# Patient Record
Sex: FEMALE | Race: WHITE | Employment: OTHER | URBAN - NONMETROPOLITAN AREA
[De-identification: names, ages, dates, MRNs, and addresses within clinical notes are randomized per-mention and may not be internally consistent; named-entity substitution may affect disease eponyms.]

---

## 2017-06-05 ENCOUNTER — HOSPITAL ENCOUNTER (EMERGENCY)
Facility: HOSPITAL | Age: 22
Discharge: HOME | End: 2017-06-05
Attending: INTERNAL MEDICINE | Admitting: INTERNAL MEDICINE
Payer: COMMERCIAL

## 2017-06-05 VITALS
DIASTOLIC BLOOD PRESSURE: 87 MMHG | HEART RATE: 85 BPM | OXYGEN SATURATION: 100 % | RESPIRATION RATE: 16 BRPM | SYSTOLIC BLOOD PRESSURE: 122 MMHG | TEMPERATURE: 98.3 F

## 2017-06-05 DIAGNOSIS — S39.012A STRAIN OF MUSCLE, FASCIA AND TENDON OF LOWER BACK, INITIAL ENCOUNTER: ICD-10-CM

## 2017-06-05 DIAGNOSIS — S39.91XA BLUNT TRAUMA TO ABDOMEN, INITIAL ENCOUNTER: ICD-10-CM

## 2017-06-05 PROCEDURE — 25000132 ZZH RX MED GY IP 250 OP 250 PS 637: Performed by: INTERNAL MEDICINE

## 2017-06-05 PROCEDURE — 25000125 ZZHC RX 250: Performed by: INTERNAL MEDICINE

## 2017-06-05 PROCEDURE — 99283 EMERGENCY DEPT VISIT LOW MDM: CPT

## 2017-06-05 PROCEDURE — 99283 EMERGENCY DEPT VISIT LOW MDM: CPT | Performed by: INTERNAL MEDICINE

## 2017-06-05 RX ORDER — IBUPROFEN 600 MG/1
600 TABLET, FILM COATED ORAL ONCE
Status: COMPLETED | OUTPATIENT
Start: 2017-06-05 | End: 2017-06-05

## 2017-06-05 RX ORDER — ONDANSETRON 4 MG/1
4 TABLET, ORALLY DISINTEGRATING ORAL ONCE
Status: COMPLETED | OUTPATIENT
Start: 2017-06-05 | End: 2017-06-05

## 2017-06-05 RX ADMIN — ONDANSETRON 4 MG: 4 TABLET, ORALLY DISINTEGRATING ORAL at 05:36

## 2017-06-05 RX ADMIN — IBUPROFEN 600 MG: 600 TABLET ORAL at 05:37
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
## 2017-06-05 NOTE — ED NOTES
Face to face report given with opportunity to observe patient.    Report given to BRYAN Mendez   6/5/2017  7:12 AM
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
## 2017-06-05 NOTE — DISCHARGE INSTRUCTIONS
Back Sprain or Strain    Injury to the muscles (strain) or ligaments (sprain) around the spine can be troubling. Injury may occur after a sudden forceful twisting or bending force such as in a car accident, after a simple awkward movement, or after lifting something heavy with poor body positioning. In any case, muscle spasm is often present and adds to the pain.  Thankfully, most people feel better in 1 to 2 weeks, and most of the rest in 1 to 2 months. Most people can remain active. Unless you had a forceful physical injury such as a car accident or fall, X-rays are usually not ordered for the first evaluation of a back sprain or strain. If pain continues and does not respond to medical treatment, your healthcare provider may order X-rays and other tests.  Home care  The following guidelines will help you care for your injury at home:    When in bed, try to find a comfortable position. A firm mattress is best. Try lying flat on your back with pillows under your knees. You can also try lying on your side with your knees bent up toward your chest and a pillow between your knees.    Don't sit for long periods. Try not to take long car rides or take other trips that have you sitting for a long time. This puts more stress on the lower back than standing or walking.    During the first 24 to 72 hours after an injury or flare-up, apply an ice pack to the painful area for 20 minutes. Then remove it for 20 minutes. Do this for 60 to 90 minutes, or several times a day, This will reduce swelling and pain. Be sure to wrap the ice pack in a thin towel or plastic to protect your skin.    You can start with ice, then switch to heat. Heat from a hot shower, hot bath, or heating pad reduces swelling and pain and works well for muscle spasms. Put heat on the painful area for 20 minutes, then remove for 20 minutes. Do this for 60 to 90 minutes, or several times a day. Do not use a heating pad while sleeping. It can burn the  skin.    You can alternate the ice and heat. Talk with your healthcare provider to find out the best treatment or therapy for your back pain.    Therapeutic massage will help relax the back muscles without stretching them.    Be aware of safe lifting methods. Do not lift anything over 15 pounds until all of the pain is gone.  Medicines  Talk to your healthcare provider before using medicines, especially if you have other health problems or are taking other medicines.    You may use acetaminophen or ibuprofen to control pain, unless another pain medicine was prescribed. If you have chronic conditions like diabetes, liver or kidney disease, stomach ulcers, or gastrointestinal bleeding, or are taking blood-thinner medicines, talk with your doctor before taking any medicines.    Be careful if you are given prescription medicines, narcotics, or medicine for muscle spasm. They can cause drowsiness, and affect your coordination, reflexes, and judgment. Do not drive or operate heavy machinery.  Follow-up care  Follow up with your healthcare provider or this facility as advised. You may need physical therapy or more tests if your symptoms get worse.  If you had X-rays today, they didn t show any broken bones, breaks, or fractures. Sometimes fractures don t show up on the first X-ray. Bruises and sprains can sometimes hurt as much as a fracture. These injuries can take time to heal completely. If your symptoms don t improve or they get worse, talk with your healthcare provider. You may need a repeat X-ray.  Call 911  Call for emergency care if any of the following occur:    Trouble breathing    Confused    Very drowsy or trouble awakening    Fainting or loss of consciousness    Rapid or very slow heart rate    Loss of bowel or bladder control  When to seek medical advice  Call your healthcare provider right away if any of the following occur:    Pain gets worse or spreads to your arms or legs    Weakness or numbness in one or  both arms or legs    Numbness in the groin or genital area    3801-2520 [employer]. [address] [place] [address], [place] [address]. All rights reserved. This information is not intended as a substitute for professional medical care. Always follow your healthcare professional's instructions.        Blunt Abdominal Trauma    Your abdomen extends from just below your chest to the top of your pelvis. It contains a number of vital organs, including your spleen, liver, pancreas, and stomach. These organs can be injured by the impact from a car accident or fall. Injury from a force that doesn t break your skin to penetrate your body is known as blunt trauma.  When to go to the emergency department (ED)  Injury to your abdomen can be very serious. For that reason, a person with blunt abdominal trauma should be taken to the ED by trained medical personnel. The effects of blunt trauma often don t appear right away, so it s important to see a doctor after a hard blow to the abdomen, even if you feel OK.  What to expect in the ED  Your breathing and pulse will be checked. You also will be examined carefully for injuries. Severe trauma may need surgery right away. Otherwise you will be watched closely for a time. You may also need to have one or more tests to find out the extent of your injuries. These may include:    Blood or urine tests need a sample of the blood or urine to be taken and checked for problems.    X-rays use radiation to take pictures of inside the body.    CT scan combines X-rays and a computer. This gives a detailed picture that can show problems with organs, such as your kidneys, spleen, liver, and stomach.    Ultrasound uses radio waves to make images of the organs in your abdomen.    Diagnostic peritoneal lavage (DPL) checks fluid from your abdomen for signs of blood or infection.  Based on the test results, you may be admitted to the hospital. Or you may have further care in the ER.  When to call  your healthcare provider  After treatment, call your healthcare provider if you notice any of these symptoms:    Increased pain or swelling in your abdomen    Nausea or vomiting    Weakness or fainting    Blood in your stool or urine     4339-3253 [employer]. [address] [place] [address], [place] [address]. All rights reserved. This information is not intended as a substitute for professional medical care. Always follow your healthcare professional's instructions.

## 2017-06-05 NOTE — ED AVS SNAPSHOT
[facility]    [address] [address]    Phone:  [phone]                                       April YURIY Maria   MRN: [id number]    Department:  [facility]   Date of Visit:  6/5/2017           After Visit Summary Signature Page     I have received my discharge instructions, and my questions have been answered. I have discussed any challenges I see with this plan with the nurse or doctor.    ..........................................................................................................................................  Patient/Patient Representative Signature      ..........................................................................................................................................  Patient Representative Print Name and Relationship to Patient    ..................................................               ................................................  Date                                            Time    ..........................................................................................................................................  Reviewed by Signature/Title    ...................................................              ..............................................  Date                                                            Time

## 2017-06-08 ASSESSMENT — ENCOUNTER SYMPTOMS
RECTAL PAIN: 0
DIZZINESS: 0
WHEEZING: 0
NECK PAIN: 0
BLOOD IN STOOL: 0
PALPITATIONS: 0
DIAPHORESIS: 0
HEMATURIA: 0
COUGH: 0
ANAL BLEEDING: 0
NUMBNESS: 0
SHORTNESS OF BREATH: 0
FEVER: 0
LIGHT-HEADEDNESS: 0
NAUSEA: 1
WOUND: 0
CHEST TIGHTNESS: 0
CONSTIPATION: 0
ABDOMINAL DISTENTION: 0
ABDOMINAL PAIN: 1
MYALGIAS: 0
FLANK PAIN: 0
NECK STIFFNESS: 0
CHILLS: 0
DYSURIA: 0
VOMITING: 0
COLOR CHANGE: 0
CONFUSION: 0
HEADACHES: 0
DIARRHEA: 0
ARTHRALGIAS: 0
BACK PAIN: 0
VOICE CHANGE: 0

## 2017-09-06 ENCOUNTER — HOSPITAL ENCOUNTER (EMERGENCY)
Facility: HOSPITAL | Age: 22
Discharge: HOME | End: 2017-09-06
Attending: NURSE PRACTITIONER | Admitting: NURSE PRACTITIONER
Payer: COMMERCIAL

## 2017-09-06 VITALS
HEART RATE: 102 BPM | SYSTOLIC BLOOD PRESSURE: 134 MMHG | DIASTOLIC BLOOD PRESSURE: 83 MMHG | RESPIRATION RATE: 16 BRPM | TEMPERATURE: 98.6 F | OXYGEN SATURATION: 99 %

## 2017-09-06 DIAGNOSIS — S05.01XA CORNEAL ABRASION, RIGHT, INITIAL ENCOUNTER: ICD-10-CM

## 2017-09-06 PROCEDURE — 25000125 ZZHC RX 250: Performed by: NURSE PRACTITIONER

## 2017-09-06 PROCEDURE — 99213 OFFICE O/P EST LOW 20 MIN: CPT

## 2017-09-06 PROCEDURE — 99213 OFFICE O/P EST LOW 20 MIN: CPT | Performed by: NURSE PRACTITIONER

## 2017-09-06 RX ORDER — TETRACAINE HYDROCHLORIDE 5 MG/ML
2 SOLUTION OPHTHALMIC ONCE
Status: COMPLETED | OUTPATIENT
Start: 2017-09-06 | End: 2017-09-06

## 2017-09-06 RX ORDER — CIPROFLOXACIN HYDROCHLORIDE 3.5 MG/ML
2 SOLUTION/ DROPS TOPICAL 4 TIMES DAILY
Qty: 1 BOTTLE | Refills: 0 | Status: SHIPPED | OUTPATIENT
Start: 2017-09-06 | End: 2021-05-24

## 2017-09-06 RX ADMIN — TETRACAINE HYDROCHLORIDE 2 DROP: 5 SOLUTION OPHTHALMIC at 09:15

## 2017-09-06 ASSESSMENT — ENCOUNTER SYMPTOMS
DIARRHEA: 0
TROUBLE SWALLOWING: 0
EYE REDNESS: 0
WHEEZING: 0
NAUSEA: 0
STRIDOR: 0
EYE PAIN: 1
PHOTOPHOBIA: 1
NEUROLOGICAL NEGATIVE: 1
COUGH: 0
EYE DISCHARGE: 0
FEVER: 0
ACTIVITY CHANGE: 0
PSYCHIATRIC NEGATIVE: 1
DYSURIA: 0
CHILLS: 0
EYE ITCHING: 0
VOMITING: 0
SHORTNESS OF BREATH: 0

## 2017-09-06 ASSESSMENT — VISUAL ACUITY
OD: 20/50
OS: 20/25

## 2017-09-06 NOTE — ED AVS SNAPSHOT
[facility]    [address] [address]    Phone:  [phone]                                       April YURIY Maria   MRN: [id number]    Department:  [facility]   Date of Visit:  9/6/2017           After Visit Summary Signature Page     I have received my discharge instructions, and my questions have been answered. I have discussed any challenges I see with this plan with the nurse or doctor.    ..........................................................................................................................................  Patient/Patient Representative Signature      ..........................................................................................................................................  Patient Representative Print Name and Relationship to Patient    ..................................................               ................................................  Date                                            Time    ..........................................................................................................................................  Reviewed by Signature/Title    ...................................................              ..............................................  Date                                                            Time

## 2017-09-06 NOTE — ED AVS SNAPSHOT
[facility]    [address]    [place] [address]    Phone:  [phone]                                       April YURIY Maria   MRN: [id number]    Department:  [facility]   Date of Visit:  9/6/2017           Patient Information     Date Of Birth          1995        Your diagnoses for this visit were:     Corneal abrasion, right, initial encounter        You were seen by Eloisa Chavez NP.      Follow-up Information     Follow up with Joellen Aguillon NP.    Why:  As needed, If symptoms worsen    Contact information:    [facility]   [address]   [facility] [address]  [phone]          Follow up with [facility].    Specialty:  EMERGENCY MEDICINE    Why:  As needed, If symptoms worsen    Contact information:    [address]  [place] [address] [phone]    Additional information:    From [facility]: Take [address]. Turn left at [address]. Turn left at the first stoplight on [address]. At the first stop sign, take a right onto [address]. Take a left into the parking lot and continue through until you reach the North enterance of the building.       From [place]: Take [address]. Take the [address] ramp towards [place]. Turn left onto [address]. Take a slight right onto [address]. Turn left at the first stoplight on [address]. At the first stop sign, take a right onto [address]. Take a left into the parking lot and continue through until you reach the North enterance of the building.       From [place]: Take [address]. Take a right at [address]. At the first stop sign, take a right onto [address]. Take a left into the parking lot and continue through until you reach the North enterance of the building.         Discharge Instructions       Use eye drops as ordered.   Do not wear contact in right eye until right eye abrasion  "has healed.   Follow up with eye doctor with any increase in symptoms or concerns.   Follow up with PCP with any increase in symptoms or concerns.   Return to urgent care or emergency department with any increase in symptoms or concerns.     Discharge References/Attachments     CORNEAL ABRASION (ENGLISH)         Review of your medicines      START taking        Dose / Directions Last dose taken    ciprofloxacin 0.3 % ophthalmic solution   Commonly known as:  CILOXAN   Dose:  2 drop   Quantity:  1 Bottle        Place 2 drops into the right eye 4 times daily for 5 days   Refills:  0                Prescriptions were sent or printed at these locations (1 Prescription)                   [id number] - [place], [place] - [address]   [address], [place] [address]    Telephone:  [phone]   Fax:  [phone]   Hours:                  E-Prescribed (1 of 1)         ciprofloxacin (CILOXAN) 0.3 % ophthalmic solution                Orders Needing Specimen Collection     None      Pending Results     No orders found from 9/4/2017 to 9/7/2017.            Pending Culture Results     No orders found from 9/4/2017 to 9/7/2017.            Thank you for choosing [facility]       Thank you for choosing [facility] for your care. Our goal is always to provide you with excellent care. Hearing back from our patients is one way we can continue to improve our services. Please take a few minutes to complete the written survey that you may receive in the mail after you visit with us. Thank you!        [facility] Information     [facility] lets you send messages to your doctor, view your test results, renew your prescriptions, schedule appointments and more. To sign up, go to [email or web address] . Click on \"Log in\" on the left side of the screen, which will take you to the Welcome page. Then click on \"Sign up Now\" on the right side of the page.     You will be asked to enter the access code listed below, as well as some personal " information. Please follow the directions to create your username and password.     Your access code is: [email or web address]  Expires: 2017  9:41 AM     Your access code will  in 90 days. If you need help or a new code, please call your [facility] clinic or [phone].        Care EveryWhere ID     This is your Care EveryWhere ID. This could be used by other organizations to access your [facility] medical records  [id number]        Equal Access to Services     [facility] : Hadbailey lebrono Soojssy, waaxda luqadaha, qaybta kaalmada adekenyattayamaximo, madeline irby . So [facility] [phone].    ATENCIÓN: Si habla español, tiene a devlin disposición servicios gratuitos de asistencia lingüística. Llame al [phone].    We comply with applicable federal civil rights laws and [place] laws. We do not discriminate on the basis of race, color, national origin, age, disability sex, sexual orientation or gender identity.            After Visit Summary       This is your record. Keep this with you and show to your community pharmacist(s) and doctor(s) at your next visit.

## 2017-09-06 NOTE — DISCHARGE INSTRUCTIONS
Use eye drops as ordered.   Do not wear contact in right eye until right eye abrasion has healed.   Follow up with eye doctor with any increase in symptoms or concerns.   Follow up with PCP with any increase in symptoms or concerns.   Return to urgent care or emergency department with any increase in symptoms or concerns.

## 2017-09-06 NOTE — ED PROVIDER NOTES
History     Chief Complaint   Patient presents with     Facial Swelling     The history is provided by the patient. No  was used.     May Maria is a 21 year old female who presents with right eye discomfort with swelling below right eye that comes and goes that started at 0130 this am. She works the night shift. She's flushed her right eye with normal saline with mild effectiveness. Vision in right eye is blurry at times. She wears contacts and changed her contacts last about 2 weeks ago. Denies injury or trauma to right eye. Denies fever, chills, or night sweats. Eating and drinking well. Bowel and bladder are working well. No antibiotic use in the past 30 days.     Visual acuity OD 20/50 and OS 20/25 per LPN.     Last comprehensive eye exam in April 2017.     I have reviewed the Medications, Allergies, Past Medical and Surgical History, and Social History in the Epic system.      Review of Systems   Constitutional: Negative for activity change, chills and fever.   HENT: Negative for trouble swallowing.    Eyes: Positive for photophobia, pain and visual disturbance. Negative for discharge, redness and itching.        Swelling around right eye. Right eye blurry at times.    Respiratory: Negative for cough, shortness of breath, wheezing and stridor.    Gastrointestinal: Negative for diarrhea, nausea and vomiting.   Genitourinary: Negative for dysuria.   Skin: Negative for rash.   Neurological: Negative.    Psychiatric/Behavioral: Negative.        Physical Exam   BP: 134/83  Pulse: 102  Temp: 98.6  F (37  C)  Resp: 16  SpO2: 99 %  Physical Exam   Constitutional: She is oriented to person, place, and time. She appears well-developed and well-nourished. No distress.   HENT:   Head: Normocephalic.   Mouth/Throat: Oropharynx is clear and moist.   Eyes: EOM are normal. Pupils are equal, round, and reactive to light. Right eye exhibits no discharge, no exudate and no hordeolum. No foreign body  present in the right eye. Left eye exhibits no discharge. Right conjunctiva is injected. Right conjunctiva has no hemorrhage. Left conjunctiva is not injected. Left conjunctiva has no hemorrhage.       Right eye examined under magnification, with fluorescein stain and wood's lamp.  No ulcer/FB/ferning noted. Abrasion to right eye at 6-7 o'clock region.  Right conjunctiva injected.    Neck: Normal range of motion. Neck supple.   Cardiovascular: Normal rate, regular rhythm and normal heart sounds.    No murmur heard.  Pulmonary/Chest: Effort normal. No respiratory distress.   Abdominal: Soft. She exhibits no distension.   Musculoskeletal: Normal range of motion.   Lymphadenopathy:     She has no cervical adenopathy.   Neurological: She is alert and oriented to person, place, and time.   Skin: Skin is warm and dry. No rash noted. She is not diaphoretic.   Psychiatric: She has a normal mood and affect. Her behavior is normal.   Nursing note and vitals reviewed.      ED Course     ED Course     Procedures    Right eye examined under magnification, with fluorescein stain and wood's lamp.  No ulcer/FB/ferning noted. Abrasion to right eye at 6-7 o'clock region.      Medications   tetracaine (PONTOCAINE) 0.5 % ophthalmic solution 2 drop (2 drops Right Eye Given 9/6/17 0915)       Assessments & Plan (with Medical Decision Making)     Discussed plan of care. She verbalized understanding. All questions answered.     I have reviewed the nursing notes.    I have reviewed the findings, diagnosis, plan and need for follow up with the patient.  Discharged in stable condition.     New Prescriptions    CIPROFLOXACIN (CILOXAN) 0.3 % OPHTHALMIC SOLUTION    Place 2 drops into the right eye 4 times daily for 5 days       Final diagnoses:   Corneal abrasion, right, initial encounter     Use eye drops as ordered.   Do not wear contact in right eye until right eye abrasion has healed.   Follow up with eye doctor with any increase in symptoms  or concerns.   Follow up with PCP with any increase in symptoms or concerns.   Return to urgent care or emergency department with any increase in symptoms or concerns.     GENE Borrero  9/6/2017  9:04 AM  [facility]       Eloisa Chavez NP  09/06/17 1147

## 2017-09-22 NOTE — ED NOTES
Luis Sheriff is a 46 year old male presenting to the walk-in clinic today for left eye erythema and irrigation x2 days. Patient reports he has three children, whom are all asymptomatic. Patient denies any hx of eye problems/trauma, extraocular pressure, impaired EOM, or vision changes. Patient denies any OTC medication use.    Denies known Latex allergy or symptoms of Latex sensitivity.  Medications reviewed and updated.  Patient would like communication of their results via:      Home Phone: [phone] (home)  Okay to leave a message containing results? Yes       Pt presents today alone for c/o eye swelling/irritation that started around 1-2 this am and then around 5 the rest of her face started to swell for unknown reason, no new products soap or foods. She has not taken an meds other than flushing her eye with saline.

## 2018-01-19 ENCOUNTER — OFFICE VISIT (OUTPATIENT)
Dept: FAMILY MEDICINE | Facility: OTHER | Age: 23
End: 2018-01-19
Attending: FAMILY MEDICINE
Payer: COMMERCIAL

## 2018-01-19 VITALS
HEIGHT: 62 IN | DIASTOLIC BLOOD PRESSURE: 72 MMHG | HEART RATE: 80 BPM | BODY MASS INDEX: 30 KG/M2 | OXYGEN SATURATION: 99 % | SYSTOLIC BLOOD PRESSURE: 118 MMHG | RESPIRATION RATE: 16 BRPM | WEIGHT: 163 LBS | TEMPERATURE: 98.2 F

## 2018-01-19 DIAGNOSIS — Z23 NEED FOR VACCINATION: ICD-10-CM

## 2018-01-19 DIAGNOSIS — G47.26 CIRCADIAN RHYTHM SLEEP DISORDER, SHIFT WORK TYPE: Primary | ICD-10-CM

## 2018-01-19 PROCEDURE — 90471 IMMUNIZATION ADMIN: CPT | Performed by: FAMILY MEDICINE

## 2018-01-19 PROCEDURE — 90651 9VHPV VACCINE 2/3 DOSE IM: CPT | Performed by: FAMILY MEDICINE

## 2018-01-19 PROCEDURE — 99213 OFFICE O/P EST LOW 20 MIN: CPT | Mod: 25 | Performed by: FAMILY MEDICINE

## 2018-01-19 RX ORDER — TRAZODONE HYDROCHLORIDE 50 MG/1
50-100 TABLET, FILM COATED ORAL
Qty: 30 TABLET | Refills: 1 | Status: SHIPPED | OUTPATIENT
Start: 2018-01-19 | End: 2018-04-20

## 2018-01-19 ASSESSMENT — ANXIETY QUESTIONNAIRES
4. TROUBLE RELAXING: SEVERAL DAYS
GAD7 TOTAL SCORE: 4
IF YOU CHECKED OFF ANY PROBLEMS ON THIS QUESTIONNAIRE, HOW DIFFICULT HAVE THESE PROBLEMS MADE IT FOR YOU TO DO YOUR WORK, TAKE CARE OF THINGS AT HOME, OR GET ALONG WITH OTHER PEOPLE: SOMEWHAT DIFFICULT
7. FEELING AFRAID AS IF SOMETHING AWFUL MIGHT HAPPEN: NOT AT ALL
3. WORRYING TOO MUCH ABOUT DIFFERENT THINGS: SEVERAL DAYS
2. NOT BEING ABLE TO STOP OR CONTROL WORRYING: NOT AT ALL
6. BECOMING EASILY ANNOYED OR IRRITABLE: NOT AT ALL
5. BEING SO RESTLESS THAT IT IS HARD TO SIT STILL: SEVERAL DAYS
1. FEELING NERVOUS, ANXIOUS, OR ON EDGE: SEVERAL DAYS

## 2018-01-19 ASSESSMENT — PATIENT HEALTH QUESTIONNAIRE - PHQ9: SUM OF ALL RESPONSES TO PHQ QUESTIONS 1-9: 4

## 2018-01-19 ASSESSMENT — PAIN SCALES - GENERAL: PAINLEVEL: NO PAIN (0)

## 2018-01-19 NOTE — PATIENT INSTRUCTIONS
Insomnia  Insomnia is repeated difficulty going to sleep or staying asleep, or both. Whether you have insomnia is not defined by a specific amount of sleep. Different people need different amounts of sleep, and you may need more or less sleep at different times of your life.  There are 3 major types of insomnia:  short-term, chronic, and  other.   Short-term, or acute insomnia lasts less than 3 months.  The symptoms are temporary and can be linked directly to a stressor, such as the death of a loved one, financial problems, or a new physical problem.  Short-term insomnia stops when the stressor resolves or the person adapts to its presence.  Chronic insomnia occurs at least 3 times a week and lasts longer than 3 months.  Chronic insomnia can occur when either the cause of the sleeping problem is not clear, or the insomnia does not get better when the stressor is resolved. A number of other criteria are also used to make the diagnosis of chronic insomnia.    Other insomnia  is the third type of insomnia-related sleep disorders.  This description applies to people who have problems getting to sleep or staying asleep, but do not meet all of the factors that describe either short-term or chronic insomnia.    Many things cause insomnia. Different people may have different causes. It can be from an underlying medical or psychological condition, or lifestyle. It can also be primary insomnia, which means no cause can be found.  Causes of insomnia include:    Chronic medical problems- heart disease, gastrointestinal problems, hormonal changes, breathing problems    Anxiety    Stress    Depression    Pain    Work schedule    Sleep apnea    Illegal drugs    Certain medicines  Many different medidcines can affect your sleep, such as stimulants, caffeine, alcohol, some decongestants, and diet pills. Other medicines may include some types of blood pressure pills, steroids, asthma medicines, antihistamines, antidepressants,  seizure medicines and statins. Not all of these will affect your sleep, and they shouldn t be stopped without talking to your doctor.  Symptoms of insomnia can include:    Lying awake for long periods at night before falling asleep    Waking up several times during the night    Waking up early in the morning and not being able to get back to sleep    Feeling tired and not refreshed by sleep    Not being able to function properly during the day and finding it hard to concentrate    Irritability    Tiredness and fatigue during the day  Home care  1. Review your medicines with your doctor or pharmacist to find out if they can cause insomnia. Not all medicines will affect your sleep, but they shouldn't be stopped without reviewing them with your doctor. There may be serious side effects and consequences from suddenly stopping your medicines. Not taking them may cause strokes, heart attacks, and many other problems.  2. Caffeine, smoking and alcohol also affect sleep. Limit your daily use and do not use these before bedtime. Alcohol may make you sleepy at first, but as its effects wear off, you may awaken a few hours later and have trouble returning to sleep.  3. Do not exercise, eat or drink large amounts of liquid within 2 hours of your bedtime.  4. Improve your sleep habits. Have a fixed bed and wake-up time. Try to keep noise, light and heat in your bedroom at a comfortable level. Try using earplugs or eyeshades if needed.   5. Avoid watching TV in bed.  6. If you do not fall asleep within 30 minutes, try to relax by reading or listening to soft music.  7. Limit daytime napping to one 30 minute period, early in the day.  8. Get regular exercise. Find other ways to lessen your stress level.  9. If a medicine was prescribed to help reset your sleep patterns, take it as directed. Sleeping pills are intended for short-term use, only. If taken for too long, the effect wears off while the risk of physical addiction and  psychological dependence increases.  Sleep diary  If the cause isn t obvious and it is not improving, try keeping a  sleep diary  for a couple of weeks. Include in it:    The time you go to bed    How long it takes to fall asleep    How many times you wake up    What time you wake up    Your meal times and what you eat    What time you drink alcohol    Your exercise habits and times  Follow-up care  Follow up with your healthcare provider, or as advised. If X-rays or CT scans were done, you will be notified if there is a change in the reading, especially if it affects treatment.  Call 911  Call 911 if any of these occur:    Trouble breathing    Confusion or trouble waking    Fainting or loss of consciousness    Rapid heart rate    New chest, arm, shoulder, neck or upper back pain    Trouble with speech or vision, weakness of an arm or leg    Trouble walking or talking, loss of balance, numbness or weakness in one side of your body, facial droop  When to seek medical advice  Call your healthcare provider right away if any of these occur:    Extreme restlessness or irritability    Confusion or hallucinations (seeing or hearing things that are not there)    Anxiety, depression    Several days without sleeping  Date Last Reviewed: 11/19/2015 2000-2017 [employer]. [address] [place] [address], [place] [address]. All rights reserved. This information is not intended as a substitute for professional medical care. Always follow your healthcare professional's instructions.

## 2018-01-19 NOTE — MR AVS SNAPSHOT
After Visit Summary   1/19/2018    May Maria    MRN: [id number]           Patient Information     Date Of Birth          1995        Visit Information        Provider Department      1/19/2018 4:00 PM Jia Kirk MD [facility] [place]        Today's Diagnoses     Circadian rhythm sleep disorder, shift work type    -  1      Care Instructions      Insomnia  Insomnia is repeated difficulty going to sleep or staying asleep, or both. Whether you have insomnia is not defined by a specific amount of sleep. Different people need different amounts of sleep, and you may need more or less sleep at different times of your life.  There are 3 major types of insomnia:  short-term, chronic, and  other.   Short-term, or acute insomnia lasts less than 3 months.  The symptoms are temporary and can be linked directly to a stressor, such as the death of a loved one, financial problems, or a new physical problem.  Short-term insomnia stops when the stressor resolves or the person adapts to its presence.  Chronic insomnia occurs at least 3 times a week and lasts longer than 3 months.  Chronic insomnia can occur when either the cause of the sleeping problem is not clear, or the insomnia does not get better when the stressor is resolved. A number of other criteria are also used to make the diagnosis of chronic insomnia.    Other insomnia  is the third type of insomnia-related sleep disorders.  This description applies to people who have problems getting to sleep or staying asleep, but do not meet all of the factors that describe either short-term or chronic insomnia.    Many things cause insomnia. Different people may have different causes. It can be from an underlying medical or psychological condition, or lifestyle. It can also be primary insomnia, which means no cause can be found.  Causes of insomnia include:    Chronic medical problems- heart disease, gastrointestinal problems, hormonal changes,  breathing problems    Anxiety    Stress    Depression    Pain    Work schedule    Sleep apnea    Illegal drugs    Certain medicines  Many different medidcines can affect your sleep, such as stimulants, caffeine, alcohol, some decongestants, and diet pills. Other medicines may include some types of blood pressure pills, steroids, asthma medicines, antihistamines, antidepressants, seizure medicines and statins. Not all of these will affect your sleep, and they shouldn t be stopped without talking to your doctor.  Symptoms of insomnia can include:    Lying awake for long periods at night before falling asleep    Waking up several times during the night    Waking up early in the morning and not being able to get back to sleep    Feeling tired and not refreshed by sleep    Not being able to function properly during the day and finding it hard to concentrate    Irritability    Tiredness and fatigue during the day  Home care  1. Review your medicines with your doctor or pharmacist to find out if they can cause insomnia. Not all medicines will affect your sleep, but they shouldn't be stopped without reviewing them with your doctor. There may be serious side effects and consequences from suddenly stopping your medicines. Not taking them may cause strokes, heart attacks, and many other problems.  2. Caffeine, smoking and alcohol also affect sleep. Limit your daily use and do not use these before bedtime. Alcohol may make you sleepy at first, but as its effects wear off, you may awaken a few hours later and have trouble returning to sleep.  3. Do not exercise, eat or drink large amounts of liquid within 2 hours of your bedtime.  4. Improve your sleep habits. Have a fixed bed and wake-up time. Try to keep noise, light and heat in your bedroom at a comfortable level. Try using earplugs or eyeshades if needed.   5. Avoid watching TV in bed.  6. If you do not fall asleep within 30 minutes, try to relax by reading or listening to  soft music.  7. Limit daytime napping to one 30 minute period, early in the day.  8. Get regular exercise. Find other ways to lessen your stress level.  9. If a medicine was prescribed to help reset your sleep patterns, take it as directed. Sleeping pills are intended for short-term use, only. If taken for too long, the effect wears off while the risk of physical addiction and psychological dependence increases.  Sleep diary  If the cause isn t obvious and it is not improving, try keeping a  sleep diary  for a couple of weeks. Include in it:    The time you go to bed    How long it takes to fall asleep    How many times you wake up    What time you wake up    Your meal times and what you eat    What time you drink alcohol    Your exercise habits and times  Follow-up care  Follow up with your healthcare provider, or as advised. If X-rays or CT scans were done, you will be notified if there is a change in the reading, especially if it affects treatment.  Call 911  Call 911 if any of these occur:    Trouble breathing    Confusion or trouble waking    Fainting or loss of consciousness    Rapid heart rate    New chest, arm, shoulder, neck or upper back pain    Trouble with speech or vision, weakness of an arm or leg    Trouble walking or talking, loss of balance, numbness or weakness in one side of your body, facial droop  When to seek medical advice  Call your healthcare provider right away if any of these occur:    Extreme restlessness or irritability    Confusion or hallucinations (seeing or hearing things that are not there)    Anxiety, depression    Several days without sleeping  Date Last Reviewed: 11/19/2015 2000-2017 [employer]. [address] [place] [address], [place] [address]. All rights reserved. This information is not intended as a substitute for professional medical care. Always follow your healthcare professional's instructions.                Follow-ups after your visit        Follow-up notes  "from your care team     Return in about 1 month (around 2018).      Who to contact     If you have questions or need follow up information about today's clinic visit or your schedule please contact [facility] [place] directly at [phone].  Normal or non-critical lab and imaging results will be communicated to you by MyChart, letter or phone within 4 business days after the clinic has received the results. If you do not hear from us within 7 days, please contact the clinic through MyChart or phone. If you have a critical or abnormal lab result, we will notify you by phone as soon as possible.  Submit refill requests through [employer] or call your pharmacy and they will forward the refill request to us. Please allow 3 business days for your refill to be completed.          Additional Information About Your Visit        [employer] Information     [employer] lets you send messages to your doctor, view your test results, renew your prescriptions, schedule appointments and more. To sign up, go to [email or web address] . Click on \"Log in\" on the left side of the screen, which will take you to the Welcome page. Then click on \"Sign up Now\" on the right side of the page.     You will be asked to enter the access code listed below, as well as some personal information. Please follow the directions to create your username and password.     Your access code is: [email or web address]  Expires: 2018  4:11 PM     Your access code will  in 90 days. If you need help or a new code, please call your [facility] clinic or [phone].        Care EveryWhere ID     This is your Care EveryWhere ID. This could be used by other organizations to access your [facility] medical records  [id number]        Your Vitals Were     Pulse Temperature Respirations Height Pulse Oximetry BMI (Body Mass Index)    80 98.2  F (36.8  C) (Tympanic) 16 5' 2\" (1.575 m) 99% 29.81 kg/m2       Blood Pressure from Last 3 Encounters:   18 118/72 "   09/06/17 134/83   06/05/17 122/87    Weight from Last 3 Encounters:   01/19/18 163 lb (73.9 kg)   01/21/16 153 lb (69.4 kg) (86 %)*     * Growth percentiles are based on [facility] 2-20 Years data.              Today, you had the following     No orders found for display         Today's Medication Changes          These changes are accurate as of: 1/19/18  4:11 PM.  If you have any questions, ask your nurse or doctor.               Start taking these medicines.        Dose/Directions    traZODone 50 MG tablet   Commonly known as:  DESYREL   Used for:  Circadian rhythm sleep disorder, shift work type   Started by:  Jia Kirk MD        Dose:   mg   Take 1-2 tablets ( mg) by mouth nightly as needed for sleep (take 1 at bedtime, may repeat x 1.)   Quantity:  30 tablet   Refills:  1            Where to get your medicines      These medications were sent to [id number] - [place] - [address] [facility] [address]  [address] [facility] [address], [facility] [address]     Phone:  [phone]     traZODone 50 MG tablet                Primary Care Provider Office Phone # Fax #    Joellen Aguillon [phone]       [facility]  [address]   [facility] [address]        Equal Access to Services     NIKO STAHL AH: Hadii meggan ku hadasho Soomaali, waaxda luqadaha, qaybta kaalmada adeegyada, madeline baugh haymima irby . So [place] [phone].    ATENCIÓN: Si habla español, tiene a devlin disposición servicios gratuitos de asistencia lingüística. Ll[facility] [phone].    We comply with applicable federal civil rights laws and [place] laws. We do not discriminate on the basis of race, color, national origin, age, disability, sex, sexual orientation, or gender identity.            Thank you!     Thank you for choosing [facility]  for your care. Our goal is always to provide you with excellent care. Hearing back from our patients is one way we can continue to improve our services. Please take  a few minutes to complete the written survey that you may receive in the mail after your visit with us. Thank you!             Your Updated Medication List - Protect others around you: Learn how to safely use, store and throw away your medicines at www.disposemymeds.org.          This list is accurate as of: 1/19/18  4:11 PM.  Always use your most recent med list.                   Brand Name Dispense Instructions for use Diagnosis    traZODone 50 MG tablet    DESYREL    30 tablet    Take 1-2 tablets ( mg) by mouth nightly as needed for sleep (take 1 at bedtime, may repeat x 1.)    Circadian rhythm sleep disorder, shift work type

## 2018-01-19 NOTE — PROGRESS NOTES
SUBJECTIVE:   May Maria is a 22 year old female who presents to clinic today for the following health issues:      New Patient/Transfer of Care  Previously received care in [place] at [facility]. Moved to [place] 2 years ago. Works as a CNA in the Hospital here.     Insomnia      Duration: works nights last 2 years part-time; worse in last 4 months since she is now fulltime    Description  Frequency of insomnia:  nightly  Time to fall asleep: over 2 hours  Middle of night awakening:  nightly  Early morning awakening:  nightly    Accompanying signs and symptoms:  morning headache and lost weight    History  Similar episodes in past:  no   Previous evaluation/sleep study:  no     Precipitating or alleviating factors:  New stressful situation: no   Caffeine intake after lunchtime: no   OTC decongestants: no   Any new medications: no     Therapies tried and outcome: OTC sleep aide, drinks water no pop    Pt works overnight shifts at the hospital. Usually gets home around 8 AM from work. Cannot fall asleep until around 12. Will also wake frequently. Has tried to wear sunglasses and keep house dark, however, working on the computer at work up until she is off and the lights in the hospital come on roughly and hour or so before she leaves for the day. She does not try and switch her days/nights on days off. Has tried unisom and melatonin. No screen once she is home. No caffeine within 6 hours of desired bet time. When has poor sleep has AM headache.     Problem list and histories reviewed & adjusted, as indicated.  Additional history: as documented    Labs reviewed in EPIC    Reviewed and updated as needed this visit by clinical staffTobacco  Allergies  Meds  Problems  Med Hx  Surg Hx  Fam Hx  Soc Hx        Reviewed and updated as needed this visit by Provider  Allergies  Meds  Problems         ROS:  Constitutional, HEENT, cardiovascular, pulmonary, gi and gu systems are negative, except as otherwise  "noted.      OBJECTIVE:   /72  Pulse 80  Temp 98.2  F (36.8  C) (Tympanic)  Resp 16  Ht 5' 2\" (1.575 m)  Wt 163 lb (73.9 kg)  SpO2 99%  BMI 29.81 kg/m2  Body mass index is 29.81 kg/(m^2).  GENERAL: healthy, alert and no distress  RESP: lungs clear to auscultation - no rales, rhonchi or wheezes  CV: regular rate and rhythm, normal S1 S2, no S3 or S4, no murmur, click or rub, no peripheral edema and peripheral pulses strong  MS: no gross musculoskeletal defects noted, no edema  NEURO: Normal strength and tone, mentation intact and speech normal  PSYCH: mentation appears normal, affect normal/bright    Diagnostic Test Results:  none     ASSESSMENT/PLAN:       1. Circadian rhythm sleep disorder, shift work type  Trial of trazodone, follow up 1 month. May increase dose. If not effective may need to consider z drug, would like to avoid, however, due to side effect/safety profile. Pt aware and agreeable.   - traZODone (DESYREL) 50 MG tablet; Take 1-2 tablets ( mg) by mouth nightly as needed for sleep (take 1 at bedtime, may repeat x 1.)  Dispense: 30 tablet; Refill: 1    2. Need for vaccination  - HUMAN PAPILLOMA VIRUS (GARDASIL 9) VACCINE [[id number]]  - 1st  Administration  [[id number]]    Jia Kirk MD  [facility] [place]  "

## 2018-01-19 NOTE — NURSING NOTE
"Chief Complaint   Patient presents with     Establish Care       Initial /72  Pulse 80  Temp 98.2  F (36.8  C) (Tympanic)  Resp 16  Ht 5' 2\" (1.575 m)  Wt 163 lb (73.9 kg)  SpO2 99%  BMI 29.81 kg/m2 Estimated body mass index is 29.81 kg/(m^2) as calculated from the following:    Height as of this encounter: 5' 2\" (1.575 m).    Weight as of this encounter: 163 lb (73.9 kg).  Medication Reconciliation: complete   Nikky Pham      "

## 2018-01-23 ASSESSMENT — ANXIETY QUESTIONNAIRES: GAD7 TOTAL SCORE: 4

## 2018-01-28 ENCOUNTER — HEALTH MAINTENANCE LETTER (OUTPATIENT)
Age: 23
End: 2018-01-28

## 2018-02-12 ENCOUNTER — APPOINTMENT (OUTPATIENT)
Dept: GENERAL RADIOLOGY | Facility: HOSPITAL | Age: 23
End: 2018-02-12
Attending: EMERGENCY MEDICINE
Payer: COMMERCIAL

## 2018-02-12 ENCOUNTER — HOSPITAL ENCOUNTER (EMERGENCY)
Facility: HOSPITAL | Age: 23
Discharge: HOME | End: 2018-02-12
Attending: EMERGENCY MEDICINE | Admitting: EMERGENCY MEDICINE
Payer: COMMERCIAL

## 2018-02-12 VITALS
OXYGEN SATURATION: 99 % | DIASTOLIC BLOOD PRESSURE: 86 MMHG | TEMPERATURE: 98.9 F | HEART RATE: 99 BPM | SYSTOLIC BLOOD PRESSURE: 132 MMHG | RESPIRATION RATE: 12 BRPM

## 2018-02-12 DIAGNOSIS — S60.031A CONTUSION OF RIGHT MIDDLE FINGER WITHOUT DAMAGE TO NAIL, INITIAL ENCOUNTER: Primary | ICD-10-CM

## 2018-02-12 PROCEDURE — 73140 X-RAY EXAM OF FINGER(S): CPT | Mod: TC,RT

## 2018-02-12 PROCEDURE — 25000132 ZZH RX MED GY IP 250 OP 250 PS 637: Performed by: EMERGENCY MEDICINE

## 2018-02-12 PROCEDURE — 90471 IMMUNIZATION ADMIN: CPT

## 2018-02-12 PROCEDURE — 99283 EMERGENCY DEPT VISIT LOW MDM: CPT | Performed by: EMERGENCY MEDICINE

## 2018-02-12 PROCEDURE — 25000128 H RX IP 250 OP 636: Performed by: EMERGENCY MEDICINE

## 2018-02-12 PROCEDURE — 90715 TDAP VACCINE 7 YRS/> IM: CPT | Performed by: EMERGENCY MEDICINE

## 2018-02-12 PROCEDURE — 99283 EMERGENCY DEPT VISIT LOW MDM: CPT | Mod: 25

## 2018-02-12 RX ORDER — IBUPROFEN 600 MG/1
600 TABLET, FILM COATED ORAL ONCE
Status: COMPLETED | OUTPATIENT
Start: 2018-02-12 | End: 2018-02-12

## 2018-02-12 RX ORDER — IBUPROFEN 800 MG/1
800 TABLET, FILM COATED ORAL EVERY 8 HOURS PRN
Qty: 30 TABLET | Refills: 0 | Status: SHIPPED | OUTPATIENT
Start: 2018-02-12 | End: 2021-05-24

## 2018-02-12 RX ADMIN — IBUPROFEN 600 MG: 600 TABLET ORAL at 03:20

## 2018-02-12 RX ADMIN — CLOSTRIDIUM TETANI TOXOID ANTIGEN (FORMALDEHYDE INACTIVATED), CORYNEBACTERIUM DIPHTHERIAE TOXOID ANTIGEN (FORMALDEHYDE INACTIVATED), BORDETELLA PERTUSSIS TOXOID ANTIGEN (GLUTARALDEHYDE INACTIVATED), BORDETELLA PERTUSSIS FILAMENTOUS HEMAGGLUTININ ANTIGEN (FORMALDEHYDE INACTIVATED), BORDETELLA PERTUSSIS PERTACTIN ANTIGEN, AND BORDETELLA PERTUSSIS FIMBRIAE 2/3 ANTIGEN 0.5 ML: 5; 2; 2.5; 5; 3; 5 INJECTION, SUSPENSION INTRAMUSCULAR at 03:20

## 2018-02-12 NOTE — ED AVS SNAPSHOT
[facility]    [address] [address]    Phone:  [phone]                                       April YURIY Maria   MRN: [id number]    Department:  [facility]   Date of Visit:  2/12/2018           After Visit Summary Signature Page     I have received my discharge instructions, and my questions have been answered. I have discussed any challenges I see with this plan with the nurse or doctor.    ..........................................................................................................................................  Patient/Patient Representative Signature      ..........................................................................................................................................  Patient Representative Print Name and Relationship to Patient    ..................................................               ................................................  Date                                            Time    ..........................................................................................................................................  Reviewed by Signature/Title    ...................................................              ..............................................  Date                                                            Time

## 2018-02-12 NOTE — DISCHARGE INSTRUCTIONS
Finger Contusion  You have a contusion. This is also called a bruise. There is swelling and some bleeding under the skin, but no broken bones. This injury generally takes a few days to a few weeks to heal. During that time, the bruise will typically change in color from reddish, to purple-blue, to greenish-yellow, then to yellow-brown.  A finger contusion may be treated with a splint or bhanu tape (taping the injured finger to the one next to it for support). Minor contusions likely will need no other treatment.  Home care    Elevate the hand to reduce pain and swelling. As much as possible, sit or lie down with the hand raised about the level of your heart. This is especially important during the first 48 hours.    Ice the finger to help reduce pain and swelling. Wrap a cold source (ice pack or ice cubes in a plastic bag) in a thin towel. Apply to the bruised finger for 20 minutes every 1 to 2 hours the first day. Continue this 3 to 4 times a day until the pain and swelling goes away.    If bhanu tape was applied and it becomes wet or dirty, change it. You may replace it with paper, plastic, or cloth tape. Before taping, put a thin strip of cotton or gauze between the fingers to absorb sweat. This will help prevent any breakdown of skin or fungal infections.     Unless another medicine was prescribed, you can take acetaminophen, ibuprofen, or naproxen to control pain. (If you have chronic liver or kidney disease or ever had a stomach ulcer or gastrointestinal bleeding, talk with your doctor before using these medicines.)  Follow up  Follow up with your healthcare provider, or as advised. Call if you are not improving within 1 to 2 weeks.  When to seek medical advice   Call your healthcare provider right away if you have any of the following:    Increased pain or swelling    Hand or arm becomes cold, blue, numb or tingly    Signs of infection: Warmth, drainage, or increased redness or pain around the  bruise    Inability to move the injured finger or hand     Frequent bruising for unknown reasons  Date Last Reviewed: 5/1/2017 2000-2017 [employer]. [address] [place] [address], [place] [address]. All rights reserved. This information is not intended as a substitute for professional medical care. Always follow your healthcare professional's instructions.

## 2018-02-12 NOTE — ED AVS SNAPSHOT
[facility]    [address] [address]    Phone:  [phone]                                       May Maria   MRN: [id number]    Department:  [facility]   Date of Visit:  2/12/2018           Patient Information     Date Of Birth          1995        Your diagnoses for this visit were:     Contusion of right middle finger without damage to nail, initial encounter        You were seen by Brannon Otero MD.      Follow-up Information     Follow up with Jia Kirk MD.    Specialty:  Family Practice    Why:  As needed    Contact information:    [address] [facility] [address]  [phone]          Discharge Instructions         Finger Contusion  You have a contusion. This is also called a bruise. There is swelling and some bleeding under the skin, but no broken bones. This injury generally takes a few days to a few weeks to heal. During that time, the bruise will typically change in color from reddish, to purple-blue, to greenish-yellow, then to yellow-brown.  A finger contusion may be treated with a splint or buddy tape (taping the injured finger to the one next to it for support). Minor contusions likely will need no other treatment.  Home care    Elevate the hand to reduce pain and swelling. As much as possible, sit or lie down with the hand raised about the level of your heart. This is especially important during the first 48 hours.    Ice the finger to help reduce pain and swelling. Wrap a cold source (ice pack or ice cubes in a plastic bag) in a thin towel. Apply to the bruised finger for 20 minutes every 1 to 2 hours the first day. Continue this 3 to 4 times a day until the pain and swelling goes away.    If buddy tape was applied and it becomes wet or dirty, change it. You may replace it with paper, plastic, or cloth tape. Before taping, put a thin strip of cotton or gauze between the fingers to absorb sweat. This will help prevent any  breakdown of skin or fungal infections.     Unless another medicine was prescribed, you can take acetaminophen, ibuprofen, or naproxen to control pain. (If you have chronic liver or kidney disease or ever had a stomach ulcer or gastrointestinal bleeding, talk with your doctor before using these medicines.)  Follow up  Follow up with your healthcare provider, or as advised. Call if you are not improving within 1 to 2 weeks.  When to seek medical advice   Call your healthcare provider right away if you have any of the following:    Increased pain or swelling    Hand or arm becomes cold, blue, numb or tingly    Signs of infection: Warmth, drainage, or increased redness or pain around the bruise    Inability to move the injured finger or hand     Frequent bruising for unknown reasons  Date Last Reviewed: 5/1/2017 2000-2017 [employer]. [address] [place] [address]. All rights reserved. This information is not intended as a substitute for professional medical care. Always follow your healthcare professional's instructions.          Future Appointments        Provider Department Dept Phone Center    2/20/2018 9:00 AM Jia Kirk MD [facility] [phone] [facility]         Review of your medicines      START taking        Dose / Directions Last dose taken    ibuprofen 800 MG tablet   Commonly known as:  ADVIL/MOTRIN   Dose:  800 mg   Quantity:  30 tablet        Take 1 tablet (800 mg) by mouth every 8 hours as needed for moderate pain   Refills:  0          Our records show that you are taking the medicines listed below. If these are incorrect, please call your family doctor or clinic.        Dose / Directions Last dose taken    traZODone 50 MG tablet   Commonly known as:  DESYREL   Dose:   mg   Quantity:  30 tablet        Take 1-2 tablets ( mg) by mouth nightly as needed for sleep (take 1 at bedtime, may repeat x 1.)   Refills:  1               "  Prescriptions were sent or printed at these locations (1 Prescription)                   [id number] - [place], [place] - [address]    [id number] , [place] [address]    Telephone:  [phone]   Fax:  [phone]   Hours:                  E-Prescribed (1 of 1)         ibuprofen (ADVIL/MOTRIN) 800 MG tablet                Procedures and tests performed during your visit     Fingers XR, 2-3 views, right      Orders Needing Specimen Collection     None      Pending Results     Date and Time Order Name Status Description    2018 0303 Fingers XR, 2-3 views, right In process             Pending Culture Results     No orders found from 2/10/2018 to 2018.            Thank you for choosing [facility]       Thank you for choosing [facility] for your care. Our goal is always to provide you with excellent care. Hearing back from our patients is one way we can continue to improve our services. Please take a few minutes to complete the written survey that you may receive in the mail after you visit with us. Thank you!        [employer] Information     [employer] lets you send messages to your doctor, view your test results, renew your prescriptions, schedule appointments and more. To sign up, go to [email or web address] . Click on \"Log in\" on the left side of the screen, which will take you to the Welcome page. Then click on \"Sign up Now\" on the right side of the page.     You will be asked to enter the access code listed below, as well as some personal information. Please follow the directions to create your username and password.     Your access code is: [email or web address]  Expires: 2018  4:11 PM     Your access code will  in 90 days. If you need help or a new code, please call your [facility] clinic or [phone].        Care EveryWhere ID     This is your Care EveryWhere ID. This could be used by other organizations to access your [facility] medical records  [id number]        Equal Access to Services     " NIKO STAHL : Hadii meggan Fuller, waaxda luqadaha, qaybta kaalmada betsey, madeline sung. So [facility] [phone].    ATENCIÓN: Si habla español, tiene a devlin disposición servicios gratuitos de asistencia lingüística. Llame al [phone].    We comply with applicable federal civil rights laws and [place] laws. We do not discriminate on the basis of race, color, national origin, age, disability, sex, sexual orientation, or gender identity.            After Visit Summary       This is your record. Keep this with you and show to your community pharmacist(s) and doctor(s) at your next visit.

## 2018-02-12 NOTE — ED PROVIDER NOTES
History     Chief Complaint   Patient presents with     Hand Injury     Right hand, middle digit injured in door. 1 cm laceration noted     HPI  April M Candace is a 22 year old female who presents to the emergency department with complaints of right middle finger pain.  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  Patient is an employee of the hospital.  Sustained a small laceration on the right middle finger palmar aspect.  Denies any other concerns.    Problem List:    There are no active problems to display for this patient.       Past Medical History:    No past medical history on file.    Past Surgical History:    Past Surgical History:   Procedure Laterality Date     PE TUBES Bilateral     years        Family History:    Family History   Problem Relation Age of Onset     Other - See Comments Mother      bells palsy post auto accident     HEART DISEASE Father        Social History:  Marital Status:  Single [1]  Social History   Substance Use Topics     Smoking status: Former Smoker     Smokeless tobacco: Never Used     Alcohol use No        Medications:      ibuprofen (ADVIL/MOTRIN) 800 MG tablet   traZODone (DESYREL) 50 MG tablet         Review of Systems   All other systems reviewed and are negative.      Physical Exam   BP: 132/86  Pulse: 99  Temp: 98.9  F (37.2  C)  Resp: 12  SpO2: 99 %      Physical Exam   Constitutional: She is oriented to person, place, and time. She appears well-developed and well-nourished. No distress.   HENT:   Head: Normocephalic and atraumatic.   Mouth/Throat: Oropharynx is clear and moist. No oropharyngeal exudate.   Eyes: Pupils are equal, round, and reactive to light. No scleral icterus.   Cardiovascular: Normal rate, regular rhythm, normal heart sounds and intact distal pulses.    Pulmonary/Chest: Breath sounds normal. No respiratory distress.   Abdominal: Soft. Bowel sounds are normal. There is no tenderness.   Musculoskeletal: She  exhibits tenderness. She exhibits no edema.        Hands:  Neurological: She is alert and oriented to person, place, and time.   Skin: Skin is warm. No rash noted. She is not diaphoretic.   Nursing note and vitals reviewed.      ED Course   Patient evaluated and x-rays ordered.  Motrin oral given for pain control.    ED Course     Procedures         Labs Ordered and Resulted from Time of ED Arrival Up to the Time of Departure from the ED - No data to display    Assessments & Plan (with Medical Decision Making)   Contusion to right middle finger: X-rays are negative for any fractures.  Bo[facility]rix was answered all questions and subsequently discharged home.  Follow-up with PCP as needed.  Given, oral Motrin given.     I have reviewed the nursing notes.    I have reviewed the findings, diagnosis, plan and need for follow up with the patient.    New Prescriptions    IBUPROFEN (ADVIL/MOTRIN) 800 MG TABLET    Take 1 tablet (800 mg) by mouth every 8 hours as needed for moderate pain       Final diagnoses:   Contusion of right middle finger without damage to nail, initial encounter       2/12/2018   [facility]     Brannon Otero MD  02/12/18 0323

## 2018-02-20 ENCOUNTER — OFFICE VISIT (OUTPATIENT)
Dept: FAMILY MEDICINE | Facility: OTHER | Age: 23
End: 2018-02-20
Attending: FAMILY MEDICINE
Payer: COMMERCIAL

## 2018-02-20 VITALS
BODY MASS INDEX: 30.73 KG/M2 | WEIGHT: 168 LBS | OXYGEN SATURATION: 97 % | SYSTOLIC BLOOD PRESSURE: 115 MMHG | DIASTOLIC BLOOD PRESSURE: 72 MMHG | TEMPERATURE: 98.1 F | HEART RATE: 82 BPM

## 2018-02-20 DIAGNOSIS — G47.00 INSOMNIA, UNSPECIFIED TYPE: Primary | ICD-10-CM

## 2018-02-20 PROCEDURE — 99213 OFFICE O/P EST LOW 20 MIN: CPT | Performed by: FAMILY MEDICINE

## 2018-02-20 RX ORDER — ZOLPIDEM TARTRATE 5 MG/1
2.5-5 TABLET ORAL
Qty: 60 TABLET | Refills: 0 | Status: SHIPPED | OUTPATIENT
Start: 2018-02-20 | End: 2018-04-20

## 2018-02-20 ASSESSMENT — ANXIETY QUESTIONNAIRES
6. BECOMING EASILY ANNOYED OR IRRITABLE: SEVERAL DAYS
GAD7 TOTAL SCORE: 7
5. BEING SO RESTLESS THAT IT IS HARD TO SIT STILL: MORE THAN HALF THE DAYS
7. FEELING AFRAID AS IF SOMETHING AWFUL MIGHT HAPPEN: NOT AT ALL
2. NOT BEING ABLE TO STOP OR CONTROL WORRYING: NOT AT ALL
1. FEELING NERVOUS, ANXIOUS, OR ON EDGE: SEVERAL DAYS
3. WORRYING TOO MUCH ABOUT DIFFERENT THINGS: SEVERAL DAYS

## 2018-02-20 ASSESSMENT — PAIN SCALES - GENERAL: PAINLEVEL: NO PAIN (0)

## 2018-02-20 ASSESSMENT — PATIENT HEALTH QUESTIONNAIRE - PHQ9: 5. POOR APPETITE OR OVEREATING: MORE THAN HALF THE DAYS

## 2018-02-20 NOTE — PATIENT INSTRUCTIONS
Zolpidem tablets  Brand Name: Ambien  What is this medicine?  ZOLPIDEM (zole PI dem) is used to treat insomnia. This medicine helps you to fall asleep and sleep through the night.  How should I use this medicine?  Take this medicine by mouth with a glass of water. Follow the directions on the prescription label. It is better to take this medicine on an empty stomach and only when you are ready for bed. Do not take your medicine more often than directed. If you have been taking this medicine for several weeks and suddenly stop taking it, you may get unpleasant withdrawal symptoms. Your doctor or health care professional may want to gradually reduce the dose. Do not stop taking this medicine on your own. Always follow your doctor or health care professional's advice.  A special MedGuide will be given to you by the pharmacist with each prescription and refill. Be sure to read this information carefully each time.  Talk to your pediatrician regarding the use of this medicine in children. Special care may be needed.  What side effects may I notice from receiving this medicine?  Side effects that you should report to your doctor or health care professional as soon as possible:    allergic reactions like skin rash, itching or hives, swelling of the face, lips, or tongue    breathing problems    changes in vision    confusion    depressed mood or other changes in moods or emotions    feeling faint or lightheaded, falls    hallucinations    loss of balance or coordination    loss of memory    numbness or tingling of the tongue    restlessness, excitability, or feelings of anxiety or agitation    signs and symptoms of liver injury like dark yellow or brown urine; general ill feeling or flu-like symptoms; light-colored stools; loss of appetite; nausea; right upper belly pain; unusually weak or tired; yellowing of the eyes or skin    suicidal thoughts    unusual activities while asleep like driving, eating, making phone  calls, or sexual activity  Side effects that usually do not require medical attention (report to your doctor or health care professional if they continue or are bothersome):    dizziness    drowsiness the day after you take this medicine    headache  What may interact with this medicine?    alcohol    antihistamines for allergy, cough and cold    certain medicines for anxiety or sleep    certain medicines for depression, like amitriptyline, fluoxetine, sertraline    certain medicines for fungal infections like ketoconazole and itraconazole    certain medicines for seizures like phenobarbital, primidone    ciprofloxacin    dietary supplements for sleep, like valerian or kava kava    general anesthetics like halothane, isoflurane, methoxyflurane, propofol    local anesthetics like lidocaine, pramoxine, tetracaine    medicines that relax muscles for surgery    narcotic medicines for pain    phenothiazines like chlorpromazine, mesoridazine, prochlorperazine, thioridazine    rifampin  What if I miss a dose?  This does not apply. This medicine should only be taken immediately before going to sleep. Do not take double or extra doses.  Where should I keep my medicine?  Keep out of the reach of children. This medicine can be abused. Keep your medicine in a safe place to protect it from theft. Do not share this medicine with anyone. Selling or giving away this medicine is dangerous and against the law.  This medicine may cause accidental overdose and death if taken by other adults, children, or pets. Mix any unused medicine with a substance like cat litter or coffee grounds. Then throw the medicine away in a sealed container like a sealed bag or a coffee can with a lid. Do not use the medicine after the expiration date.  Store at room temperature between 20 and 25 degrees C (68 and 77 degrees F).  What should I tell my health care provider before I take this medicine?  They need to know if you have any of these  "conditions:    depression    history of drug abuse or addiction    if you often drink alcohol    liver disease    lung or breathing disease    myasthenia gravis    sleep apnea    suicidal thoughts, plans, or attempt; a previous suicide attempt by you or a family member    an unusual or allergic reaction to zolpidem, other medicines, foods, dyes, or preservatives    pregnant or trying to get pregnant    breast-feeding  What should I watch for while using this medicine?  Visit your doctor or health care professional for regular checks on your progress. Keep a regular sleep schedule by going to bed at about the same time each night. Avoid caffeine-containing drinks in the evening hours. When sleep medicines are used every night for more than a few weeks, they may stop working. Talk to your doctor if you still have trouble sleeping.  After taking this medicine for sleep, you may get up out of bed while not being fully awake and do an activity that you do not know you are doing. The next morning, you may have no memory of the event. Activities such as driving a car (\"sleep-driving\"), making and eating food, talking on the phone, sexual activity, and sleep-walking have been reported. Call your doctor right away if you find out you have done any of these activities. Do not take this medicine if you have used alcohol that evening or before bed or taken another medicine for sleep since your risk of doing these sleep-related activities will be increased.  Wait for at least 8 hours after you take a dose before driving or doing other activities that require full mental alertness. Do not take this medicine unless you are able to stay in bed for a full night (7 to 8 hours) before you must be active again. You may have a decrease in mental alertness the day after use, even if you feel that you are fully awake. Tell your doctor if you will need to perform activities requiring full alertness, such as driving, the next day. Do not " stand or sit up quickly after taking this medicine, especially if you are an older patient. This reduces the risk of dizzy or fainting spells.   If you or your family notice any changes in your behavior, such as new or worsening depression, thoughts of harming yourself, anxiety, other unusual or disturbing thoughts, or memory loss, call your doctor right away.  After you stop taking this medicine, you may have trouble falling asleep. This is called rebound insomnia. This problem usually goes away on its own after 1 or 2 nights.  NOTE:This sheet is a summary. It may not cover all possible information. If you have questions about this medicine, talk to your doctor, pharmacist, or health care provider. Copyright  2017 [employer]

## 2018-02-20 NOTE — PROGRESS NOTES
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 Has tried the trazodone. No improvement with 1 tab. Thought the 100mg dose was working for a bit, but this was not sustained. No hangover or drowsiness the next day.     Problem list and histories reviewed & adjusted, as indicated.  Additional history: as documented    Labs reviewed in EPIC    Reviewed and updated as needed this visit by clinical staff  Tobacco  Allergies  Meds  Problems  Med Hx  Surg Hx  Fam Hx  Soc Hx        Reviewed and updated as needed this visit by Provider  Allergies  Meds  Problems         ROS:  Constitutional, HEENT, cardiovascular, pulmonary, gi and gu systems are negative, except as otherwise noted.    OBJECTIVE:     /72 (BP Location: Right arm, Patient Position: Supine, Cuff Size: Adult Regular)  Pulse 82  Temp 98.1  F (36.7  C) (Tympanic)  Wt 168 lb (76.2 kg)  SpO2 97%  BMI 30.73 kg/m2  Body mass index is 30.73 kg/(m^2).  GENERAL: healthy, alert and no distress  RESP: lungs clear to auscultation - no rales, rhonchi or wheezes  CV: regular rate and rhythm, normal S1 S2, no S3 or S4, no murmur, click or rub,  MS: no gross  musculoskeletal defects noted, no edema    Diagnostic Test Results:  none     ASSESSMENT/PLAN:     1. Insomnia, unspecified type  Shift work sleep disorder. Trial of ambien. Discussed at some length potential side effects of sleep walking, eating, driving, residual drowsiness the next day. She will use caution. If not helpful, consider referral to sleep medicine.   - zolpidem (AMBIEN) 5 MG tablet; Take 0.5-1 tablets (2.5-5 mg) by mouth nightly as needed for sleep  Dispense: 60 tablet; Refill: 0    Jia Kirk MD  [facility]

## 2018-02-20 NOTE — MR AVS SNAPSHOT
After Visit Summary   2/20/2018    May Maria    MRN: [id number]           Patient Information     Date Of Birth          1995        Visit Information        Provider Department      2/20/2018 9:00 AM Jia Kirk MD [facility] [place]        Today's Diagnoses     Insomnia, unspecified type    -  1      Care Instructions      Zolpidem tablets  Brand Name: Ambien  What is this medicine?  ZOLPIDEM (zole PI dem) is used to treat insomnia. This medicine helps you to fall asleep and sleep through the night.  How should I use this medicine?  Take this medicine by mouth with a glass of water. Follow the directions on the prescription label. It is better to take this medicine on an empty stomach and only when you are ready for bed. Do not take your medicine more often than directed. If you have been taking this medicine for several weeks and suddenly stop taking it, you may get unpleasant withdrawal symptoms. Your doctor or health care professional may want to gradually reduce the dose. Do not stop taking this medicine on your own. Always follow your doctor or health care professional's advice.  A special MedGuide will be given to you by the pharmacist with each prescription and refill. Be sure to read this information carefully each time.  Talk to your pediatrician regarding the use of this medicine in children. Special care may be needed.  What side effects may I notice from receiving this medicine?  Side effects that you should report to your doctor or health care professional as soon as possible:    allergic reactions like skin rash, itching or hives, swelling of the face, lips, or tongue    breathing problems    changes in vision    confusion    depressed mood or other changes in moods or emotions    feeling faint or lightheaded, falls    hallucinations    loss of balance or coordination    loss of memory    numbness or tingling of the tongue    restlessness, excitability, or  feelings of anxiety or agitation    signs and symptoms of liver injury like dark yellow or brown urine; general ill feeling or flu-like symptoms; light-colored stools; loss of appetite; nausea; right upper belly pain; unusually weak or tired; yellowing of the eyes or skin    suicidal thoughts    unusual activities while asleep like driving, eating, making phone calls, or sexual activity  Side effects that usually do not require medical attention (report to your doctor or health care professional if they continue or are bothersome):    dizziness    drowsiness the day after you take this medicine    headache  What may interact with this medicine?    alcohol    antihistamines for allergy, cough and cold    certain medicines for anxiety or sleep    certain medicines for depression, like amitriptyline, fluoxetine, sertraline    certain medicines for fungal infections like ketoconazole and itraconazole    certain medicines for seizures like phenobarbital, primidone    ciprofloxacin    dietary supplements for sleep, like valerian or kava kava    general anesthetics like halothane, isoflurane, methoxyflurane, propofol    local anesthetics like lidocaine, pramoxine, tetracaine    medicines that relax muscles for surgery    narcotic medicines for pain    phenothiazines like chlorpromazine, mesoridazine, prochlorperazine, thioridazine    rifampin  What if I miss a dose?  This does not apply. This medicine should only be taken immediately before going to sleep. Do not take double or extra doses.  Where should I keep my medicine?  Keep out of the reach of children. This medicine can be abused. Keep your medicine in a safe place to protect it from theft. Do not share this medicine with anyone. Selling or giving away this medicine is dangerous and against the law.  This medicine may cause accidental overdose and death if taken by other adults, children, or pets. Mix any unused medicine with a substance like cat litter or coffee  "grounds. Then throw the medicine away in a sealed container like a sealed bag or a coffee can with a lid. Do not use the medicine after the expiration date.  Store at room temperature between 20 and 25 degrees C (68 and 77 degrees F).  What should I tell my health care provider before I take this medicine?  They need to know if you have any of these conditions:    depression    history of drug abuse or addiction    if you often drink alcohol    liver disease    lung or breathing disease    myasthenia gravis    sleep apnea    suicidal thoughts, plans, or attempt; a previous suicide attempt by you or a family member    an unusual or allergic reaction to zolpidem, other medicines, foods, dyes, or preservatives    pregnant or trying to get pregnant    breast-feeding  What should I watch for while using this medicine?  Visit your doctor or health care professional for regular checks on your progress. Keep a regular sleep schedule by going to bed at about the same time each night. Avoid caffeine-containing drinks in the evening hours. When sleep medicines are used every night for more than a few weeks, they may stop working. Talk to your doctor if you still have trouble sleeping.  After taking this medicine for sleep, you may get up out of bed while not being fully awake and do an activity that you do not know you are doing. The next morning, you may have no memory of the event. Activities such as driving a car (\"sleep-driving\"), making and eating food, talking on the phone, sexual activity, and sleep-walking have been reported. Call your doctor right away if you find out you have done any of these activities. Do not take this medicine if you have used alcohol that evening or before bed or taken another medicine for sleep since your risk of doing these sleep-related activities will be increased.  Wait for at least 8 hours after you take a dose before driving or doing other activities that require full mental alertness. Do " not take this medicine unless you are able to stay in bed for a full night (7 to 8 hours) before you must be active again. You may have a decrease in mental alertness the day after use, even if you feel that you are fully awake. Tell your doctor if you will need to perform activities requiring full alertness, such as driving, the next day. Do not stand or sit up quickly after taking this medicine, especially if you are an older patient. This reduces the risk of dizzy or fainting spells.   If you or your family notice any changes in your behavior, such as new or worsening depression, thoughts of harming yourself, anxiety, other unusual or disturbing thoughts, or memory loss, call your doctor right away.  After you stop taking this medicine, you may have trouble falling asleep. This is called rebound insomnia. This problem usually goes away on its own after 1 or 2 nights.  NOTE:This sheet is a summary. It may not cover all possible information. If you have questions about this medicine, talk to your doctor, pharmacist, or health care provider. Copyright  2017 [employer]                Follow-ups after your visit        Follow-up notes from your care team     Return in about 2 months (around 4/20/2018) for insomnia.      Your next 10 appointments already scheduled     Apr 20, 2018  8:30 AM CDT   (Arrive by 8:15 AM)   SHORT with Jia Kirk MD   [facility] ([facility] )    [address] [place] [address] [phone]              Who to contact     If you have questions or need follow up information about today's clinic visit or your schedule please [facility] at [phone].  Normal or non-critical lab and imaging results will be communicated to you by MyChart, letter or phone within 4 business days after the clinic has received the results. If you do not hear from us within 7 days, please contact the clinic through MyChart or phone. If you  have a critical or abnormal lab result, we will notify you by phone as soon as possible.  Submit refill requests through [employer] or call your pharmacy and they will forward the refill request to us. Please allow 3 business days for your refill to be completed.          Additional Information About Your Visit        [employer] Information     [employer] gives you secure access to your electronic health record. If you see a primary care provider, you can also send messages to your care team and make appointments. If you have questions, please call your primary care clinic.  If you do not have a primary care provider, please call [phone] and they will assist you.        Care EveryWhere ID     This is your Care EveryWhere ID. This could be used by other organizations to access your [place] medical records  [id number]        Your Vitals Were     Pulse Temperature Pulse Oximetry BMI (Body Mass Index)          82 98.1  F (36.7  C) (Tympanic) 97% 30.73 kg/m2         Blood Pressure from Last 3 Encounters:   02/20/18 115/72   02/12/18 132/86   01/19/18 118/72    Weight from Last 3 Encounters:   02/20/18 168 lb (76.2 kg)   01/19/18 163 lb (73.9 kg)   01/21/16 153 lb (69.4 kg) (86 %)*     * Growth percentiles are based on [facility] 2-20 Years data.              Today, you had the following     No orders found for display         Today's Medication Changes          These changes are accurate as of 2/20/18  9:25 AM.  If you have any questions, ask your nurse or doctor.               Start taking these medicines.        Dose/Directions    zolpidem 5 MG tablet   Commonly known as:  AMBIEN   Used for:  Insomnia, unspecified type   Started by:  Jia Kirk MD        Dose:  2.5-5 mg   Take 0.5-1 tablets (2.5-5 mg) by mouth nightly as needed for sleep   Quantity:  60 tablet   Refills:  0            Where to get your medicines      Some of these will need a paper prescription and others can be bought over the counter.  Ask your nurse  if you have questions.     Bring a paper prescription for each of these medications     zolpidem 5 MG tablet                Primary Care Provider Office Phone # Fax #    Jia Kirk [phone] [address] [facility] [address]        Equal Access to Services     [facility] FAVIO : Hadbailey maharaj hadtawandao Soomaali, waaxda luqadaha, qaybta kaalmada adeegyada, waxmaisha hamiltonyarielneftali sung. So [facility] [phone].    ATENCIÓN: Si habla español, tiene a devlin disposición servicios gratuitos de asistencia lingüística. Llame al [phone].    We comply with applicable federal civil rights laws and [place] laws. We do not discriminate on the basis of race, color, national origin, age, disability, sex, sexual orientation, or gender identity.            Thank you!     Thank you for choosing [facility]  for your care. Our goal is always to provide you with excellent care. Hearing back from our patients is one way we can continue to improve our services. Please take a few minutes to complete the written survey that you may receive in the mail after your visit with us. Thank you!             Your Updated Medication List - Protect others around you: Learn how to safely use, store and throw away your medicines at www.disposemymeds.org.          This list is accurate as of 2/20/18  9:25 AM.  Always use your most recent med list.                   Brand Name Dispense Instructions for use Diagnosis    ibuprofen 800 MG tablet    ADVIL/MOTRIN    30 tablet    Take 1 tablet (800 mg) by mouth every 8 hours as needed for moderate pain        traZODone 50 MG tablet    DESYREL    30 tablet    Take 1-2 tablets ( mg) by mouth nightly as needed for sleep (take 1 at bedtime, may repeat x 1.)    Circadian rhythm sleep disorder, shift work type       zolpidem 5 MG tablet    AMBIEN    60 tablet    Take 0.5-1 tablets (2.5-5 mg) by mouth nightly as needed for sleep    Insomnia, unspecified  type

## 2018-02-21 ASSESSMENT — PATIENT HEALTH QUESTIONNAIRE - PHQ9: SUM OF ALL RESPONSES TO PHQ QUESTIONS 1-9: 4

## 2018-02-21 ASSESSMENT — ANXIETY QUESTIONNAIRES: GAD7 TOTAL SCORE: 7

## 2018-04-20 ENCOUNTER — OFFICE VISIT (OUTPATIENT)
Dept: FAMILY MEDICINE | Facility: OTHER | Age: 23
End: 2018-04-20
Attending: FAMILY MEDICINE
Payer: COMMERCIAL

## 2018-04-20 VITALS
HEART RATE: 98 BPM | TEMPERATURE: 99.4 F | RESPIRATION RATE: 20 BRPM | HEIGHT: 62 IN | SYSTOLIC BLOOD PRESSURE: 114 MMHG | OXYGEN SATURATION: 98 % | DIASTOLIC BLOOD PRESSURE: 76 MMHG | BODY MASS INDEX: 30.91 KG/M2 | WEIGHT: 168 LBS

## 2018-04-20 DIAGNOSIS — G47.26 SHIFT WORK SLEEP DISORDER: Primary | ICD-10-CM

## 2018-04-20 PROCEDURE — 99213 OFFICE O/P EST LOW 20 MIN: CPT | Performed by: FAMILY MEDICINE

## 2018-04-20 RX ORDER — ZOLPIDEM TARTRATE 5 MG/1
5 TABLET ORAL
Qty: 60 TABLET | Refills: 3 | Status: SHIPPED | OUTPATIENT
Start: 2018-04-20 | End: 2018-07-15

## 2018-04-20 ASSESSMENT — ANXIETY QUESTIONNAIRES
IF YOU CHECKED OFF ANY PROBLEMS ON THIS QUESTIONNAIRE, HOW DIFFICULT HAVE THESE PROBLEMS MADE IT FOR YOU TO DO YOUR WORK, TAKE CARE OF THINGS AT HOME, OR GET ALONG WITH OTHER PEOPLE: NOT DIFFICULT AT ALL
2. NOT BEING ABLE TO STOP OR CONTROL WORRYING: NOT AT ALL
1. FEELING NERVOUS, ANXIOUS, OR ON EDGE: NOT AT ALL
GAD7 TOTAL SCORE: 2
4. TROUBLE RELAXING: SEVERAL DAYS
7. FEELING AFRAID AS IF SOMETHING AWFUL MIGHT HAPPEN: NOT AT ALL
5. BEING SO RESTLESS THAT IT IS HARD TO SIT STILL: SEVERAL DAYS
6. BECOMING EASILY ANNOYED OR IRRITABLE: NOT AT ALL
3. WORRYING TOO MUCH ABOUT DIFFERENT THINGS: NOT AT ALL

## 2018-04-20 ASSESSMENT — PAIN SCALES - GENERAL: PAINLEVEL: NO PAIN (0)

## 2018-04-20 NOTE — PROGRESS NOTES
"  SUBJECTIVE:   May Maria is a 22 year old female who presents to clinic today for the following health issues:      Insomnia  Onset: one year and a half ago    Description:   Time to fall asleep (sleep latency): 1 hour  Middle of night awakening:  YES  Early morning awakening:  YES    Progression of Symptoms:  improving    Accompanying Signs & Symptoms:  Daytime sleepiness/napping: no  Excessive snoring/apnea: no  Restless legs: no  Frequent urination: no  Chronic pain:  no    History:  Prior Insomnia: YES    Precipitating factors:   New stressful situation: no  Caffeine intake: no  OTC decongestants: YES- Pt is sick   Any new medications: no    Alleviating factors:  Self medicating (alcohol, etc.):  no    Therapies Tried and outcome: Ambien- Pt states she can sleep throughout the night.     Pt states she is sleeping nearly a full 8 hrs with the Ambien. Feels rested the next day. No hangover effect. No sleep walking/behaviors. Continues to abstain from etoh. Not smoking. No other drug use. No plans for transition to days at work.     Problem list and histories reviewed & adjusted, as indicated.  Additional history: as documented    Labs reviewed in EPIC    Reviewed and updated as needed this visit by clinical staff  Tobacco  Allergies  Meds  Problems  Med Hx  Surg Hx  Fam Hx  Soc Hx        Reviewed and updated as needed this visit by Provider  Tobacco  Allergies  Meds  Problems  Med Hx  Surg Hx  Soc Hx        ROS:  Constitutional, HEENT, cardiovascular, pulmonary, gi and gu systems are negative, except as otherwise noted.    OBJECTIVE:     /76 (BP Location: Left arm, Patient Position: Chair, Cuff Size: Adult Regular)  Pulse 98  Temp 99.4  F (37.4  C) (Tympanic)  Resp 20  Ht 5' 2\" (1.575 m)  Wt 168 lb (76.2 kg)  SpO2 98%  BMI 30.73 kg/m2  Body mass index is 30.73 kg/(m^2).  GENERAL: healthy, alert and no distress  NECK: no adenopathy, no asymmetry, masses, or scars and thyroid normal " to palpation  RESP: lungs clear to auscultation - no rales, rhonchi or wheezes  CV: regular rate and rhythm, normal S1 S2, no S3 or S4, no murmur, click or rub, no peripheral edema and peripheral pulses strong  ABDOMEN: soft, nontender, no hepatosplenomegaly, no masses and bowel sounds normal  MS: no gross musculoskeletal defects noted, no edema    Diagnostic Test Results:  none     ASSESSMENT/PLAN:     1. Shift work sleep disorder  Ambien is effective. Continue for now. Trial of melatonin, benadryl, trazodone ineffective.   - zolpidem (AMBIEN) 5 MG tablet; Take 1 tablet (5 mg) by mouth nightly as needed for sleep  Dispense: 60 tablet; Refill: 3    Jia Kirk MD  [facility]

## 2018-04-20 NOTE — NURSING NOTE
"Chief Complaint   Patient presents with     RECHECK     2 month F/U insomnia        Initial /76 (BP Location: Left arm, Patient Position: Chair, Cuff Size: Adult Regular)  Pulse 98  Temp 99.4  F (37.4  C) (Tympanic)  Resp 20  Ht 5' 2\" (1.575 m)  Wt 168 lb (76.2 kg)  SpO2 98%  BMI 30.73 kg/m2 Estimated body mass index is 30.73 kg/(m^2) as calculated from the following:    Height as of this encounter: 5' 2\" (1.575 m).    Weight as of this encounter: 168 lb (76.2 kg).  Medication Reconciliation: karli Carter      "

## 2018-04-20 NOTE — MR AVS SNAPSHOT
After Visit Summary   4/20/2018    May Maria    MRN: [id number]           Patient Information     Date Of Birth          1995        Visit Information        Provider Department      4/20/2018 8:30 AM Jia Kirk MD [facility]        Today's Diagnoses     Shift work sleep disorder    -  1       Follow-ups after your visit        Follow-up notes from your care team     Return in about 7 months (around 11/20/2018) for Physical.      Your next 10 appointments already scheduled     Apr 20, 2018  8:30 AM CDT   (Arrive by 8:15 AM)   SHORT with Jia Kirk MD   [facility] ([facility] [place] )    [address]  [place] [address]   [phone]            Nov 27, 2018  8:00 AM CST   (Arrive by 7:45 AM)   PHYSICAL with Jia Kirk MD   [facility] ([facility] [place] )    [address]  [place] [address]   [phone]              Who to contact     If you have questions or need follow up information about today's clinic visit or your schedule please contact [facility] directly at [phone].  Normal or non-critical lab and imaging results will be communicated to you by MyChart, letter or phone within 4 business days after the clinic has received the results. If you do not hear from us within 7 days, please contact the clinic through MyChart or phone. If you have a critical or abnormal lab result, we will notify you by phone as soon as possible.  Submit refill requests through [email or web address] or call your pharmacy and they will forward the refill request to us. Please allow 3 business days for your refill to be completed.          Additional Information About Your Visit        MyChart Information     [email or web address] gives you secure access to your electronic health record. If you see a primary care provider, you can also send messages to your care team and make appointments. If you have questions, please call  "your primary care clinic.  If you do not have a primary care provider, please call [phone] and they will assist you.        Care EveryWhere ID     This is your Care EveryWhere ID. This could be used by other organizations to access your [place] medical records  [id number]        Your Vitals Were     Pulse Temperature Respirations Height Pulse Oximetry BMI (Body Mass Index)    98 99.4  F (37.4  C) (Tympanic) 20 5' 2\" (1.575 m) 98% 30.73 kg/m2       Blood Pressure from Last 3 Encounters:   04/20/18 114/76   02/20/18 115/72   02/12/18 132/86    Weight from Last 3 Encounters:   04/20/18 168 lb (76.2 kg)   02/20/18 168 lb (76.2 kg)   01/19/18 163 lb (73.9 kg)              Today, you had the following     No orders found for display         Today's Medication Changes          These changes are accurate as of 4/20/18  8:29 AM.  If you have any questions, ask your nurse or doctor.               These medicines have changed or have updated prescriptions.        Dose/Directions    zolpidem 5 MG tablet   Commonly known as:  AMBIEN   This may have changed:  how much to take   Used for:  Shift work sleep disorder   Changed by:  Jia Kirk MD        Dose:  5 mg   Take 1 tablet (5 mg) by mouth nightly as needed for sleep   Quantity:  60 tablet   Refills:  3            Where to get your medicines      Some of these will need a paper prescription and others can be bought over the counter.  Ask your nurse if you have questions.     Bring a paper prescription for each of these medications     zolpidem 5 MG tablet                Primary Care Provider Office Phone # Fax #    Jia Kirk [phone]       [facility] [place] [address]        Equal Access to Services     ALLAN STAHL AH: Jose Luis Fuller, holli beltran, lidia leggett, madeline sung. So [facility] [phone].    ATENCIÓN: Si habla español, tiene a devlin disposición servicios " arie de asistencia lingüística. Noah leslie [phone].    We comply with applicable federal civil rights laws and [place] laws. We do not discriminate on the basis of race, color, national origin, age, disability, sex, sexual orientation, or gender identity.            Thank you!     Thank you for choosing [facility]  for your care. Our goal is always to provide you with excellent care. Hearing back from our patients is one way we can continue to improve our services. Please take a few minutes to complete the written survey that you may receive in the mail after your visit with us. Thank you!             Your Updated Medication List - Protect others around you: Learn how to safely use, store and throw away your medicines at www.disposemymeds.org.          This list is accurate as of 4/20/18  8:29 AM.  Always use your most recent med list.                   Brand Name Dispense Instructions for use Diagnosis    zolpidem 5 MG tablet    AMBIEN    60 tablet    Take 1 tablet (5 mg) by mouth nightly as needed for sleep    Shift work sleep disorder

## 2018-04-21 ASSESSMENT — PATIENT HEALTH QUESTIONNAIRE - PHQ9: SUM OF ALL RESPONSES TO PHQ QUESTIONS 1-9: 0

## 2018-04-21 ASSESSMENT — ANXIETY QUESTIONNAIRES: GAD7 TOTAL SCORE: 2

## 2018-05-15 ENCOUNTER — APPOINTMENT (OUTPATIENT)
Dept: ULTRASOUND IMAGING | Facility: HOSPITAL | Age: 23
End: 2018-05-15
Attending: EMERGENCY MEDICINE
Payer: COMMERCIAL

## 2018-05-15 ENCOUNTER — APPOINTMENT (OUTPATIENT)
Dept: CT IMAGING | Facility: HOSPITAL | Age: 23
End: 2018-05-15
Attending: EMERGENCY MEDICINE
Payer: COMMERCIAL

## 2018-05-15 ENCOUNTER — HOSPITAL ENCOUNTER (EMERGENCY)
Facility: HOSPITAL | Age: 23
Discharge: HOME | End: 2018-05-15
Attending: EMERGENCY MEDICINE | Admitting: EMERGENCY MEDICINE
Payer: COMMERCIAL

## 2018-05-15 VITALS
BODY MASS INDEX: 29.44 KG/M2 | SYSTOLIC BLOOD PRESSURE: 119 MMHG | OXYGEN SATURATION: 98 % | RESPIRATION RATE: 18 BRPM | TEMPERATURE: 98.5 F | DIASTOLIC BLOOD PRESSURE: 75 MMHG | HEIGHT: 62 IN | HEART RATE: 112 BPM | WEIGHT: 160 LBS

## 2018-05-15 DIAGNOSIS — N26.1 RENAL ATROPHY, RIGHT: ICD-10-CM

## 2018-05-15 DIAGNOSIS — R10.31 ABDOMINAL PAIN, RIGHT LOWER QUADRANT: Primary | ICD-10-CM

## 2018-05-15 DIAGNOSIS — N83.11 CORPUS LUTEUM CYST OF RIGHT OVARY: ICD-10-CM

## 2018-05-15 LAB
ALBUMIN SERPL-MCNC: 4.4 G/DL (ref 3.4–5)
ALBUMIN UR-MCNC: 10 MG/DL
ALP SERPL-CCNC: 96 U/L (ref 40–150)
ALT SERPL W P-5'-P-CCNC: 23 U/L (ref 0–50)
ANION GAP SERPL CALCULATED.3IONS-SCNC: 10 MMOL/L (ref 3–14)
APPEARANCE UR: ABNORMAL
AST SERPL W P-5'-P-CCNC: 14 U/L (ref 0–45)
BACTERIA #/AREA URNS HPF: ABNORMAL /HPF
BASOPHILS # BLD AUTO: 0.1 10E9/L (ref 0–0.2)
BASOPHILS NFR BLD AUTO: 0.9 %
BILIRUB SERPL-MCNC: 0.6 MG/DL (ref 0.2–1.3)
BILIRUB UR QL STRIP: NEGATIVE
BUN SERPL-MCNC: 15 MG/DL (ref 7–30)
CALCIUM SERPL-MCNC: 9.5 MG/DL (ref 8.5–10.1)
CHLORIDE SERPL-SCNC: 105 MMOL/L (ref 94–109)
CO2 SERPL-SCNC: 23 MMOL/L (ref 20–32)
COLOR UR AUTO: YELLOW
CREAT SERPL-MCNC: 0.78 MG/DL (ref 0.52–1.04)
CRP SERPL-MCNC: <2.9 MG/L (ref 0–8)
DIFFERENTIAL METHOD BLD: NORMAL
EOSINOPHIL # BLD AUTO: 0.1 10E9/L (ref 0–0.7)
EOSINOPHIL NFR BLD AUTO: 1.7 %
ERYTHROCYTE [DISTWIDTH] IN BLOOD BY AUTOMATED COUNT: 13.2 % (ref 10–15)
GFR SERPL CREATININE-BSD FRML MDRD: >90 ML/MIN/1.7M2
GLUCOSE SERPL-MCNC: 94 MG/DL (ref 70–99)
GLUCOSE UR STRIP-MCNC: NEGATIVE MG/DL
HCG UR QL: NEGATIVE
HCT VFR BLD AUTO: 38.4 % (ref 35–47)
HGB BLD-MCNC: 13.5 G/DL (ref 11.7–15.7)
HGB UR QL STRIP: ABNORMAL
IMM GRANULOCYTES # BLD: 0 10E9/L (ref 0–0.4)
IMM GRANULOCYTES NFR BLD: 0.3 %
KETONES UR STRIP-MCNC: NEGATIVE MG/DL
LACTATE SERPL-SCNC: 1.3 MMOL/L (ref 0.4–2)
LEUKOCYTE ESTERASE UR QL STRIP: ABNORMAL
LIPASE SERPL-CCNC: 113 U/L (ref 73–393)
LYMPHOCYTES # BLD AUTO: 2.2 10E9/L (ref 0.8–5.3)
LYMPHOCYTES NFR BLD AUTO: 27.7 %
MCH RBC QN AUTO: 30 PG (ref 26.5–33)
MCHC RBC AUTO-ENTMCNC: 35.2 G/DL (ref 31.5–36.5)
MCV RBC AUTO: 85 FL (ref 78–100)
MONOCYTES # BLD AUTO: 0.6 10E9/L (ref 0–1.3)
MONOCYTES NFR BLD AUTO: 7.5 %
MUCOUS THREADS #/AREA URNS LPF: PRESENT /LPF
NEUTROPHILS # BLD AUTO: 4.8 10E9/L (ref 1.6–8.3)
NEUTROPHILS NFR BLD AUTO: 61.9 %
NITRATE UR QL: NEGATIVE
NRBC # BLD AUTO: 0 10*3/UL
NRBC BLD AUTO-RTO: 0 /100
PH UR STRIP: 7 PH (ref 4.7–8)
PLATELET # BLD AUTO: 302 10E9/L (ref 150–450)
POTASSIUM SERPL-SCNC: 3.9 MMOL/L (ref 3.4–5.3)
PROT SERPL-MCNC: 8.4 G/DL (ref 6.8–8.8)
RBC # BLD AUTO: 4.5 10E12/L (ref 3.8–5.2)
RBC #/AREA URNS AUTO: 54 /HPF (ref 0–2)
SODIUM SERPL-SCNC: 138 MMOL/L (ref 133–144)
SOURCE: ABNORMAL
SP GR UR STRIP: 1.02 (ref 1–1.03)
UROBILINOGEN UR STRIP-MCNC: 4 MG/DL (ref 0–2)
WBC # BLD AUTO: 7.8 10E9/L (ref 4–11)
WBC #/AREA URNS AUTO: 6 /HPF (ref 0–5)

## 2018-05-15 PROCEDURE — 36415 COLL VENOUS BLD VENIPUNCTURE: CPT | Performed by: EMERGENCY MEDICINE

## 2018-05-15 PROCEDURE — 99285 EMERGENCY DEPT VISIT HI MDM: CPT | Mod: 25

## 2018-05-15 PROCEDURE — 80053 COMPREHEN METABOLIC PANEL: CPT | Performed by: EMERGENCY MEDICINE

## 2018-05-15 PROCEDURE — 83605 ASSAY OF LACTIC ACID: CPT | Performed by: EMERGENCY MEDICINE

## 2018-05-15 PROCEDURE — 96375 TX/PRO/DX INJ NEW DRUG ADDON: CPT

## 2018-05-15 PROCEDURE — 96361 HYDRATE IV INFUSION ADD-ON: CPT

## 2018-05-15 PROCEDURE — 81001 URINALYSIS AUTO W/SCOPE: CPT | Performed by: FAMILY MEDICINE

## 2018-05-15 PROCEDURE — 83690 ASSAY OF LIPASE: CPT | Performed by: EMERGENCY MEDICINE

## 2018-05-15 PROCEDURE — 81025 URINE PREGNANCY TEST: CPT | Performed by: FAMILY MEDICINE

## 2018-05-15 PROCEDURE — 76705 ECHO EXAM OF ABDOMEN: CPT | Mod: TC

## 2018-05-15 PROCEDURE — 25000128 H RX IP 250 OP 636: Performed by: EMERGENCY MEDICINE

## 2018-05-15 PROCEDURE — 85025 COMPLETE CBC W/AUTO DIFF WBC: CPT | Performed by: EMERGENCY MEDICINE

## 2018-05-15 PROCEDURE — 86140 C-REACTIVE PROTEIN: CPT | Performed by: EMERGENCY MEDICINE

## 2018-05-15 PROCEDURE — 99284 EMERGENCY DEPT VISIT MOD MDM: CPT | Mod: Z6 | Performed by: EMERGENCY MEDICINE

## 2018-05-15 PROCEDURE — 96374 THER/PROPH/DIAG INJ IV PUSH: CPT | Mod: XU

## 2018-05-15 PROCEDURE — 74177 CT ABD & PELVIS W/CONTRAST: CPT | Mod: TC

## 2018-05-15 RX ORDER — METOCLOPRAMIDE HYDROCHLORIDE 5 MG/ML
10 INJECTION INTRAMUSCULAR; INTRAVENOUS ONCE
Status: COMPLETED | OUTPATIENT
Start: 2018-05-15 | End: 2018-05-15

## 2018-05-15 RX ORDER — KETOROLAC TROMETHAMINE 30 MG/ML
30 INJECTION, SOLUTION INTRAMUSCULAR; INTRAVENOUS ONCE
Status: COMPLETED | OUTPATIENT
Start: 2018-05-15 | End: 2018-05-15

## 2018-05-15 RX ORDER — ONDANSETRON 2 MG/ML
4 INJECTION INTRAMUSCULAR; INTRAVENOUS ONCE
Status: COMPLETED | OUTPATIENT
Start: 2018-05-15 | End: 2018-05-15

## 2018-05-15 RX ORDER — IBUPROFEN 800 MG/1
800 TABLET, FILM COATED ORAL EVERY 8 HOURS PRN
Qty: 60 TABLET | Refills: 0 | Status: SHIPPED | OUTPATIENT
Start: 2018-05-15 | End: 2021-05-24

## 2018-05-15 RX ORDER — IOPAMIDOL 612 MG/ML
100 INJECTION, SOLUTION INTRAVASCULAR ONCE
Status: COMPLETED | OUTPATIENT
Start: 2018-05-15 | End: 2018-05-15

## 2018-05-15 RX ADMIN — SODIUM CHLORIDE 1000 ML: 9 INJECTION, SOLUTION INTRAVENOUS at 21:28

## 2018-05-15 RX ADMIN — KETOROLAC TROMETHAMINE 30 MG: 30 INJECTION, SOLUTION INTRAMUSCULAR at 21:01

## 2018-05-15 RX ADMIN — ONDANSETRON 4 MG: 2 INJECTION INTRAMUSCULAR; INTRAVENOUS at 20:58

## 2018-05-15 RX ADMIN — METOCLOPRAMIDE 10 MG: 5 INJECTION, SOLUTION INTRAMUSCULAR; INTRAVENOUS at 21:29

## 2018-05-15 RX ADMIN — IOPAMIDOL 100 ML: 612 INJECTION, SOLUTION INTRAVENOUS at 22:34

## 2018-05-15 NOTE — ED AVS SNAPSHOT
[facility]    [address] [address]    Phone:  [phone]                                       April YURIY Maria   MRN: [id number]    Department:  [facility]   Date of Visit:  5/15/2018           After Visit Summary Signature Page     I have received my discharge instructions, and my questions have been answered. I have discussed any challenges I see with this plan with the nurse or doctor.    ..........................................................................................................................................  Patient/Patient Representative Signature      ..........................................................................................................................................  Patient Representative Print Name and Relationship to Patient    ..................................................               ................................................  Date                                            Time    ..........................................................................................................................................  Reviewed by Signature/Title    ...................................................              ..............................................  Date                                                            Time

## 2018-05-15 NOTE — ED AVS SNAPSHOT
[facility]    [address] [address]    Phone:  [phone]                                       May Maria   MRN: [id number]    Department:  [facility]   Date of Visit:  5/15/2018           Patient Information     Date Of Birth          1995        Your diagnoses for this visit were:     Abdominal pain, right lower quadrant     Renal atrophy, right     Corpus luteum cyst of right ovary        You were seen by Brannon Otero MD.      Follow-up Information     Follow up with Jia Kirk MD In 3 days.    Specialty:  Family Practice    Why:  Continuity of care    Contact information:    [address] [place] [address]  [phone]          Discharge Instructions           Ovarian Cysts  A cyst is usually a fluid-filled sac, like a small water balloon. Cysts are almost always harmless, and many go away on their own. Usually they grow slowly. They can vary in size from as small as a pea to larger than a grapefruit. Many cause no symptoms at all. Often they are felt only during a pelvic exam. Ovarian cysts are usually not cancer.       Functional cyst  A functional cyst is the most common kind of cyst. It forms when a follicle does not release a mature egg or continues to grow after releasing the egg. Functional cysts usually occur on only one ovary at a time. They usually shrink on their own in 1 to 3 months. In rare cases, a cyst will burst (rupture), causing pain. Pain might also be caused by the twisting of an ovary that is enlarged because of the cyst growing on it.     Dermoid cyst  Sometimes cells that are present from birth will start to grow into different kinds of tissue such as skin, fat, hair, and teeth. This kind of cyst is called a dermoid cyst. Dermoid cysts can grow on one or both ovaries. Usually they cause no symptoms. But if they leak or the ovary becomes twisted, they can cause severe pain.    Endometrioma  Sometimes tissue  similar to the lining of the uterus (endometrium) grows and becomes part of the ovary. This kind of cyst is often called a chocolate cyst because of its dark-brown color. These cysts can grow on one or both ovaries. They often cause pain, especially around menstruation or during sex.    Benign cystadenoma  If the capsule that surrounds the ovary grows, it can form a cystadenoma. These cysts can grow on one or both ovaries. Usually they cause no symptoms if they are small. But if they become large, they can press on organs near the ovaries, causing pain. They can also cause pain by stretching the ovarian capsule. A cyst that pushes on the bladder can cause frequent urination. Sometimes these cysts rupture and bleed.  Malignant cysts  These cysts can invade other tissues or spread to other parts of the body.  Date Last Reviewed: 6/1/2017 2000-2017 [employer]. [address] [place] [address]. All rights reserved. This information is not intended as a substitute for professional medical care. Always follow your healthcare professional's instructions.      What to expect when you have contrast    During your exam, we will inject  contrast  into your vein or artery. (Contrast is a clear liquid with iodine in it. It shows up on X-rays.)    You may feel warm or hot. You may have a metal taste in your mouth and a slight upset stomach. You may also feel pressure near the kidneys and bladder. These effects will last about 1 to 3 minutes.    Please tell us if you have:    Sneezing     Itching    Hives     Swelling in the face    A hoarse voice    Breathing problems    Other new symptoms    Serious problems are rare.  They may include:    Irregular heartbeat     Seizures    Kidney failure              Tissue damage    Shock      Death    If you have any problems during the exam, we  will treat them right away.    When you get home    Call your hospital if you have any new symptoms in the next 2 days,  like hives or swelling. (Phone numbers are at the bottom of this page.) Or call your family doctor.     If you have wheezing or trouble breathing, call 911.    Self-care  -Drink at least 4 extra glasses of water today.   This reduces the stress on your kidneys.  -Keep taking your regular medicines.    The contrast will pass out of your body in your  Urine(pee). This will happen in the next 24 hours. You  will not feel this. Your urine will not  change color.    If you have kidney problems or take metformin    Drink 4 to 8 large glasses of water for the next  2 days, if you are not on a fluid restriction.    ?If you take metformin (Glucophage or Glucovance) for diabetes, keep taking it.      ?Your kidney function tests are abnormal.  If you take Metformin, do not take it for 48 hours. Please go to your clinic for a blood test within 3 days after your exam before the restarting this medicine.     (Note to provider:please give patient prescription for lab tests.)    ?Special instructions: Drink an extra 4 glasses of water to flush out the contrast.     I have read and understand the above information.    Patient Sign Here:______________________________________Date:________Time:______    Staff Sign Here:________________________________________Date:_______Time:______      Radiology Departments:     ?[facility]: [phone] ?[facility]: [phone]     ?[place]: [phone] ?[facility]:[phone]      ?[facility]: [phone]  ?[facility]: [phone]  ?[facility]:[phone]    ?[facility]:[phone]  ?[facility]:[phone]    Your next 10 appointments already scheduled     May 16, 2018  1:00 PM CDT   (Arrive by 12:45 PM)   SHORT with Jia Kirk MD   [facility] [place] ([facility] [place] )    [address]  [place] [address]   [phone]            Nov 27, 2018  8:00 AM CST   (Arrive by 7:45 AM)   PHYSICAL with Jia Kirk MD   [facility] [place] ([facility] [place] )    [address]  [place] [address]   [phone]                 Review of your medicines      START taking        Dose / Directions Last dose taken    ibuprofen 800 MG tablet   Commonly known as:  ADVIL/MOTRIN   Dose:  800 mg   Quantity:  60 tablet        Take 1 tablet (800 mg) by mouth every 8 hours as needed for moderate pain   Refills:  0          Our records show that you are taking the medicines listed below. If these are incorrect, please call your family doctor or clinic.        Dose / Directions Last dose taken    zolpidem 5 MG tablet   Commonly known as:  AMBIEN   Dose:  5 mg   Quantity:  60 tablet        Take 1 tablet (5 mg) by mouth nightly as needed for sleep   Refills:  3                Prescriptions were sent or printed at these locations (1 Prescription)                   [facility] [id number]  [place], [place] - [address] [address]   [address] [facility] [address], [place] [address]    Telephone:  [phone]   Fax:  [phone]   Hours:                  E-Prescribed (1 of 1)         ibuprofen (ADVIL/MOTRIN) 800 MG tablet                Procedures and tests performed during your visit     CBC with platelets differential    CRP inflammation    CT Abdomen Pelvis w Contrast    Comprehensive metabolic panel    HCG qualitative urine (UPT)    Lactic acid    Lipase    UA with Microscopic    US Appendix Only      Orders Needing Specimen Collection     None      Pending Results     Date and Time Order Name Status Description    5/15/2018 2113 CT Abdomen Pelvis w Contrast In process     5/15/2018 2035 US Appendix Only In process             Pending Culture Results     No orders found from 5/13/2018 to 5/16/2018.            Thank you for choosing [facility]       Thank you for choosing [facility] for your care. Our goal is always to provide you with excellent care. Hearing back from our patients is one way we can continue to improve our services. Please take a few minutes to complete the written survey that you may  receive in the mail after you visit with us. Thank you!        [employer] Information     [employer] gives you secure access to your electronic health record. If you see a primary care provider, you can also send messages to your care team and make appointments. If you have questions, please call your primary care clinic.  If you do not have a primary care provider, please call [phone] and they will assist you.        Care EveryWhere ID     This is your Care EveryWhere ID. This could be used by other organizations to access your [facility] medical records  [id number]        Equal Access to Services     [facility] : Jose Luis Fuller, holli beltran, lidia leggett, madeline irby . So [facility] [phone].    ATENCIÓN: Si habla español, tiene a devlin disposición servicios gratuitos de asistencia lingüística. Llame al [phone].    We comply with applicable federal civil rights laws and [place] laws. We do not discriminate on the basis of race, color, national origin, age, disability, sex, sexual orientation, or gender identity.            After Visit Summary       This is your record. Keep this with you and show to your community pharmacist(s) and doctor(s) at your next visit.

## 2018-05-16 ENCOUNTER — OFFICE VISIT (OUTPATIENT)
Dept: FAMILY MEDICINE | Facility: OTHER | Age: 23
End: 2018-05-16
Attending: FAMILY MEDICINE
Payer: COMMERCIAL

## 2018-05-16 VITALS
WEIGHT: 161.13 LBS | HEART RATE: 82 BPM | DIASTOLIC BLOOD PRESSURE: 76 MMHG | BODY MASS INDEX: 29.47 KG/M2 | TEMPERATURE: 97.8 F | SYSTOLIC BLOOD PRESSURE: 126 MMHG | OXYGEN SATURATION: 97 %

## 2018-05-16 DIAGNOSIS — K59.00 CONSTIPATION, UNSPECIFIED CONSTIPATION TYPE: ICD-10-CM

## 2018-05-16 DIAGNOSIS — Z30.013 ENCOUNTER FOR INITIAL PRESCRIPTION OF INJECTABLE CONTRACEPTIVE: ICD-10-CM

## 2018-05-16 DIAGNOSIS — N26.1 ATROPHY OF RIGHT KIDNEY: ICD-10-CM

## 2018-05-16 DIAGNOSIS — R10.31 RLQ ABDOMINAL PAIN: Primary | ICD-10-CM

## 2018-05-16 PROCEDURE — 96372 THER/PROPH/DIAG INJ SC/IM: CPT | Performed by: FAMILY MEDICINE

## 2018-05-16 PROCEDURE — 99213 OFFICE O/P EST LOW 20 MIN: CPT | Mod: 25 | Performed by: FAMILY MEDICINE

## 2018-05-16 RX ORDER — MEDROXYPROGESTERONE ACETATE 150 MG/ML
150 INJECTION, SUSPENSION INTRAMUSCULAR
Qty: 1 ML | Refills: 0 | OUTPATIENT
Start: 2018-05-16 | End: 2019-03-11

## 2018-05-16 ASSESSMENT — ANXIETY QUESTIONNAIRES
IF YOU CHECKED OFF ANY PROBLEMS ON THIS QUESTIONNAIRE, HOW DIFFICULT HAVE THESE PROBLEMS MADE IT FOR YOU TO DO YOUR WORK, TAKE CARE OF THINGS AT HOME, OR GET ALONG WITH OTHER PEOPLE: SOMEWHAT DIFFICULT
4. TROUBLE RELAXING: SEVERAL DAYS
GAD7 TOTAL SCORE: 3
1. FEELING NERVOUS, ANXIOUS, OR ON EDGE: NOT AT ALL
3. WORRYING TOO MUCH ABOUT DIFFERENT THINGS: NOT AT ALL
6. BECOMING EASILY ANNOYED OR IRRITABLE: SEVERAL DAYS
2. NOT BEING ABLE TO STOP OR CONTROL WORRYING: NOT AT ALL
5. BEING SO RESTLESS THAT IT IS HARD TO SIT STILL: SEVERAL DAYS
7. FEELING AFRAID AS IF SOMETHING AWFUL MIGHT HAPPEN: NOT AT ALL

## 2018-05-16 ASSESSMENT — PAIN SCALES - GENERAL: PAINLEVEL: MODERATE PAIN (4)

## 2018-05-16 NOTE — NURSING NOTE
"Chief Complaint   Patient presents with     Contraception     depo     Kidney Problem       Initial /76  Pulse 82  Temp 97.8  F (36.6  C) (Tympanic)  Wt 161 lb 2 oz (73.1 kg)  SpO2 97%  BMI 29.47 kg/m2 Estimated body mass index is 29.47 kg/(m^2) as calculated from the following:    Height as of 5/15/18: 5' 2\" (1.575 m).    Weight as of this encounter: 161 lb 2 oz (73.1 kg).  Medication Reconciliation: complete    Jenna Kim MA    "

## 2018-05-16 NOTE — ED PROVIDER NOTES
"  History     Chief Complaint   Patient presents with     Abdominal Pain     right sided flank/abdomen pain. intermittent for the last few weeks. worse over the last week. ongoing today. sharp to aching pain.     Nausea     HPI  April M Candace is a 22 year old female who presents to the emergency department with a one-week history of nausea and right lower quadrant abdominal pain. The pain is rated a 6/10 and has worsened in the last 24 hours.  Denies any vomiting or diarrhea.  No fever.  Denies any history of abdominal trauma, shortness of breath, weight loss or night sweats.  Patient is currently on her monthly periods and describes the pain is very different from monthly period pain.    Problem List:    There are no active problems to display for this patient.       Past Medical History:    Past Medical History:   Diagnosis Date     Shift work sleep disorder        Past Surgical History:    Past Surgical History:   Procedure Laterality Date     PE TUBES Bilateral     years        Family History:    Family History   Problem Relation Age of Onset     Other - See Comments Mother      bells palsy post auto accident     HEART DISEASE Father        Social History:  Marital Status:  Single [1]  Social History   Substance Use Topics     Smoking status: Former Smoker     Smokeless tobacco: Never Used     Alcohol use No        Medications:      ibuprofen (ADVIL/MOTRIN) 800 MG tablet   zolpidem (AMBIEN) 5 MG tablet   medroxyPROGESTERone (DEPO-PROVERA) 150 MG/ML injection         Review of Systems   All other systems reviewed and are negative.      Physical Exam   BP: 126/79  Pulse: 112  Heart Rate: 103  Temp: 98.5  F (36.9  C)  Resp: 20  Height: 157.5 cm (5' 2\")  Weight: 72.6 kg (160 lb)  SpO2: 99 %      Physical Exam   Constitutional: She appears well-developed. No distress.   HENT:   Mouth/Throat: No oropharyngeal exudate.   Cardiovascular: Normal rate, regular rhythm and normal heart sounds.    Pulmonary/Chest: Breath " sounds normal. No respiratory distress. She has no wheezes.   Abdominal: Soft. Bowel sounds are normal. She exhibits no distension. There is tenderness.       Neurological: She is alert.   Skin: She is not diaphoretic.   Nursing note and vitals reviewed.      ED Course   Patient evaluated and laboratory tests ordered.      ED Course     Procedures         Results for orders placed or performed during the hospital encounter of 05/15/18 (from the past 24 hour(s))   CT Abdomen Pelvis w Contrast    Narrative    EXAMINATION: CT ABDOMEN PELVIS W CONTRAST, 5/15/2018 10:44 PM    TECHNIQUE:  Helical CT images from the lung bases through the  symphysis pubis were obtained  with IV contrast. Contrast dose:  [id number]    COMPARISON: none    HISTORY: right flank pain with nausea;     FINDINGS:    There is dependent atelectasis at the lung bases.    The liver is free of masses or biliary ductal enlargement. No  calcified gallstones are seen.    The the spleen and pancreas appear normal.    The adrenal glands are normal.    The right kidney is atrophic. The left kidney is normal. No renal  masses or hydronephrosis.    The periaortic lymph nodes are normal in caliber.    No intraperitoneal masses or inflammatory changes are noted. Appendix  is normal    In the pelvis the bladder and rectum appear normal. Uterus is normal  in appearance. No ovarian masses are seen.    The regional skeleton is intact      Impression    IMPRESSION: Atrophic right kidney which appears chronic there is  compensatory hypertrophy of the left kidney. No intraperitoneal masses  or inflammatory changes are seen.     IAM LAUREANO MD       Medications   ondansetron (ZOFRAN) injection 4 mg (4 mg Intravenous Given 5/15/18 2058)   ketorolac (TORADOL) injection 30 mg (30 mg Intravenous Given 5/15/18 2101)   metoclopramide (REGLAN) injection 10 mg (10 mg Intravenous Given 5/15/18 2129)   0.9% sodium chloride BOLUS (0 mLs Intravenous Stopped 5/15/18 2231)    iopamidol (ISOVUE-300) IV solution 61% 100 mL (100 mLs Intravenous Given 5/15/18 2234)   sodium chloride (PF) 0.9% PF flush 50 mL (250 mLs Intravenous Given 5/15/18 2234)       Assessments & Plan (with Medical Decision Making)   Right lower quadrant abdominal pain: Unclear etiology though CT scan of the abdomen showed atrophic right kidney and corpus luteum cyst of the right ovary.  The right ovarian cyst could possibly explain the right lower quadrant abdominal pain.  Patient's pain was controlled after IV Toradol was given.  Laboratory test done were all normal including normal CRP, lactic acid, lipase, CBC and CMP.  Negative pregnancy test.  Urinalysis showed some blood consistent with patient's current monthly period.  These results were discussed with the patient and she will now be discharged home on Motrin as needed for pain.  Advised follow-up with primary care physician tomorrow for further evaluation of the atrophic right kidney.  Answered all questions and subsequently discharged home.  I have reviewed the nursing notes.    I have reviewed the findings, diagnosis, plan and need for follow up with the patient.    Discharge Medication List as of 5/15/2018 11:40 PM      START taking these medications    Details   ibuprofen (ADVIL/MOTRIN) 800 MG tablet Take 1 tablet (800 mg) by mouth every 8 hours as needed for moderate pain, Disp-60 tablet, R-0, E-Prescribe             Final diagnoses:   Abdominal pain, right lower quadrant   Renal atrophy, right   Corpus luteum cyst of right ovary       5/15/2018   [facility]      Brannon Otero MD  05/15/18 0831       Brannon Otero MD  05/16/18 2687

## 2018-05-16 NOTE — ED NOTES
Condition stable, understands discharge instructions, prescription x 1 e-scribed to Pharmacy of choice, discharged home.

## 2018-05-16 NOTE — MR AVS SNAPSHOT
After Visit Summary   5/16/2018    May Maria    MRN: [id number]           Patient Information     Date Of Birth          1995        Visit Information        Provider Department      5/16/2018 1:00 PM Jia Kirk MD [facility] [place]        Today's Diagnoses     RLQ abdominal pain    -  1    Constipation, unspecified constipation type        Encounter for initial prescription of injectable contraceptive        Atrophy of right kidney           Follow-ups after your visit        Additional Services     NEPHROLOGY ADULT REFERRAL       Your provider has referred you to: Dr. Boone or Dr. Ravi    Please be aware that coverage of these services is subject to the terms and limitations of your health insurance plan.  Call member services at your health plan with any benefit or coverage questions.      Reason for referral:  Atrophic right kidney, ? Congenital    Please bring the following to your appointment:    >>   Any x-rays, CTs or MRIs which have been performed.  Contact the facility where they were done to arrange for  prior to your scheduled appointment.   >>   List of current medications   >>   This referral request   >>   Any documents/labs given to you for this referral                  Your next 10 appointments already scheduled     Nov 27, 2018  8:00 AM CST   (Arrive by 7:45 AM)   PHYSICAL with Jia Kirk MD   [facility] [place] ([facility] [place] )    [address] [place] [address] [place]  [place] [address] [phone]              Who to contact     If you have questions or need follow up information about today's clinic visit or your schedule please contact [facility] directly at [phone].  Normal or non-critical lab and imaging results will be communicated to you by MyChart, letter or phone within 4 business days after the clinic has received the results. If you do not hear from us within 7 days, please contact the clinic through  [employer] or phone. If you have a critical or abnormal lab result, we will notify you by phone as soon as possible.  Submit refill requests through [address] or call your pharmacy and they will forward the refill request to us. Please allow 3 business days for your refill to be completed.          Additional Information About Your Visit        [employer] Information     [address] gives you secure access to your electronic health record. If you see a primary care provider, you can also send messages to your care team and make appointments. If you have questions, please call your primary care clinic.  If you do not have a primary care provider, please call [phone] and they will assist you.        Care EveryWhere ID     This is your Care EveryWhere ID. This could be used by other organizations to access your [facility] medical records  [id number]        Your Vitals Were     Pulse Temperature Pulse Oximetry BMI (Body Mass Index)          82 97.8  F (36.6  C) (Tympanic) 97% 29.47 kg/m2         Blood Pressure from Last 3 Encounters:   05/16/18 126/76   05/15/18 119/75   04/20/18 114/76    Weight from Last 3 Encounters:   05/16/18 161 lb 2 oz (73.1 kg)   05/15/18 160 lb (72.6 kg)   04/20/18 168 lb (76.2 kg)              We Performed the Following     NEPHROLOGY ADULT REFERRAL        Primary Care Provider Office Phone # Fax #    Jia Kirk [phone] [phone]        Equal Access to Services     [facility] : Hadii meggan ku hadasho Solupeali, waaxda luqadaha, qaybta kaalmada madeline leggett . So [facility] [phone].    ATENCIÓN: Si habla español, tiene a devlin disposición servicios gratuitos de asistencia lingüística. Llame al [phone].    We comply with applicable federal civil rights laws and [place] laws. We do not discriminate on the basis of race, color, national origin, age, disability, sex, sexual orientation, or gender  identity.            Thank you!     Thank you for choosing [facility]  for your care. Our goal is always to provide you with excellent care. Hearing back from our patients is one way we can continue to improve our services. Please take a few minutes to complete the written survey that you may receive in the mail after your visit with us. Thank you!             Your Updated Medication List - Protect others around you: Learn how to safely use, store and throw away your medicines at www.disposemymeds.org.          This list is accurate as of 5/16/18  1:36 PM.  Always use your most recent med list.                   Brand Name Dispense Instructions for use Diagnosis    ibuprofen 800 MG tablet    ADVIL/MOTRIN    60 tablet    Take 1 tablet (800 mg) by mouth every 8 hours as needed for moderate pain        zolpidem 5 MG tablet    AMBIEN    60 tablet    Take 1 tablet (5 mg) by mouth nightly as needed for sleep    Shift work sleep disorder

## 2018-05-16 NOTE — PROGRESS NOTES
CT Abdomen Pelvis w Contrast     IMPRESSION: Atrophic right kidney which appears chronic there is  compensatory hypertrophy of the left kidney. No intraperitoneal masses  or inflammatory changes are seen.       Results discussed with patient in ED.  Advised to follow up with PCP regarding atrophic right kidney.  Result routed to PCP Dr. Kirk

## 2018-05-16 NOTE — PROGRESS NOTES
SUBJECTIVE:                                                    May Maria is a 22 year old female who presents to clinic today for the following health issues:    Contraception       Duration: none    Description (location/character/radiation): none    Intensity:  none    Accompanying signs and symptoms:  Has been on depo in the past and would like to restart.     History (similar episodes/previous evaluation): None    Precipitating or alleviating factors: None    Therapies tried and outcome: None    Not currently sexually active, however, would like to get back on birth control. Menses are regular. Hcg negative in ED yesterday. Declines STI testing.        Abdominal Pain      Duration: about a week ago    Description (location/character/radiation): right side    Intensity:  Moderate    Accompanying signs and symptoms: cramping and nauseous. Cant eat without getting sick. Went to ER last night and had a CT scan done. Was told that she has renal atrophy of the right kidney. wonders if they abdomen pain is from the kidney issue.      History (similar episodes/previous evaluation): None    Precipitating or alleviating factors: None    Therapies tried and outcome: given ibuprofen last night.     Pt notes rlq abd pain over the last week. Pain is crampy. Stooling daily. Stools is small, hard. Not difficult to pass. Passing gas. Menses regular, currently having this.  She is currently on menses, but this does not feel like period cramps. Labwork in ED unremarkable. CT done and noted only atrophic right kidney and cyst on right ovary. She was sent home and advised close follow up.        Problem list and histories reviewed & adjusted, as indicated.  Additional history: as documented    Labs reviewed in EPIC    ROS:  Constitutional, HEENT, cardiovascular, pulmonary, gi and gu systems are negative, except as otherwise noted.    OBJECTIVE:     /76  Pulse 82  Temp 97.8  F (36.6  C) (Tympanic)  Wt 161 lb 2 oz  (73.1 kg)  SpO2 97%  BMI 29.47 kg/m2  Body mass index is 29.47 kg/(m^2).  GENERAL: healthy, alert and no distress  NECK: no adenopathy, no asymmetry, masses, or scars and thyroid normal to palpation  RESP: lungs clear to auscultation - no rales, rhonchi or wheezes  CV: regular rate and rhythm, normal S1 S2, no S3 or S4, no murmur, click or rub, no peripheral edema and peripheral pulses strong  ABDOMEN: soft, mild tenderness in RLQ, no hepatosplenomegaly, no masses and bowel sounds slow  MS: no gross musculoskeletal defects noted, no edema    Diagnostic Test Results:  EXAMINATION: CT ABDOMEN PELVIS W CONTRAST, 5/15/2018 10:44 PM     TECHNIQUE:  Helical CT images from the lung bases through the  symphysis pubis were obtained  with IV contrast. Contrast dose:  [id number]     COMPARISON: none     HISTORY: right flank pain with nausea;      FINDINGS:     There is dependent atelectasis at the lung bases.     The liver is free of masses or biliary ductal enlargement. No  calcified gallstones are seen.     The the spleen and pancreas appear normal.     The adrenal glands are normal.     The right kidney is atrophic. The left kidney is normal. No renal  masses or hydronephrosis.     The periaortic lymph nodes are normal in caliber.     No intraperitoneal masses or inflammatory changes are noted. Appendix  is normal     In the pelvis the bladder and rectum appear normal. Uterus is normal  in appearance. No ovarian masses are seen.     The regional skeleton is intact         IMPRESSION: Atrophic right kidney which appears chronic there is  compensatory hypertrophy of the left kidney. No intraperitoneal masses  or inflammatory changes are seen.      IAM LAUREANO MD    ASSESSMENT/PLAN:     RLQ abdominal pain / Constipation, unspecified constipation type  Pain persists. No acute cause noted in ED. Labs unremarkable. CT with large stool ball noted in RLQ per my read. Attempt bowel cleanout with Miralax, instructions given,  return to clinic next week for recheck.     Encounter for initial prescription of injectable contraceptive  Pregnancy test negative yesterday in ED. Depo given. No complications. Return for nurse visit to repeat. Pt aware of potential side effects of weight gain, skin changes, long term decreased bone density. Aware she needs condoms for STI protection.,  - Medroxyprogesterone inj  1mg   (Depo Provera J-Code)  - INJECTION INTRAMUSCULAR OR SUB-Q  - medroxyPROGESTERone (DEPO-PROVERA) 150 MG/ML injection; Inject 1 mL (150 mg) into the muscle every 3 months  Dispense: 1 mL; Refill: 0    Atrophy of right kidney  Noted incidentally on CT of abdomen. Creatinine was normal. Suspect this is congenital. Referred to Nephrology for further discussion regarding any needed work up and follow up which can likely be done in primary clinic.   - NEPHROLOGY ADULT REFERRAL    Jia Kirk MD  [facility]

## 2018-05-16 NOTE — DISCHARGE INSTRUCTIONS
Ovarian Cysts  A cyst is usually a fluid-filled sac, like a small water balloon. Cysts are almost always harmless, and many go away on their own. Usually they grow slowly. They can vary in size from as small as a pea to larger than a grapefruit. Many cause no symptoms at all. Often they are felt only during a pelvic exam. Ovarian cysts are usually not cancer.       Functional cyst  A functional cyst is the most common kind of cyst. It forms when a follicle does not release a mature egg or continues to grow after releasing the egg. Functional cysts usually occur on only one ovary at a time. They usually shrink on their own in 1 to 3 months. In rare cases, a cyst will burst (rupture), causing pain. Pain might also be caused by the twisting of an ovary that is enlarged because of the cyst growing on it.     Dermoid cyst  Sometimes cells that are present from birth will start to grow into different kinds of tissue such as skin, fat, hair, and teeth. This kind of cyst is called a dermoid cyst. Dermoid cysts can grow on one or both ovaries. Usually they cause no symptoms. But if they leak or the ovary becomes twisted, they can cause severe pain.    Endometrioma  Sometimes tissue similar to the lining of the uterus (endometrium) grows and becomes part of the ovary. This kind of cyst is often called a chocolate cyst because of its dark-brown color. These cysts can grow on one or both ovaries. They often cause pain, especially around menstruation or during sex.    Benign cystadenoma  If the capsule that surrounds the ovary grows, it can form a cystadenoma. These cysts can grow on one or both ovaries. Usually they cause no symptoms if they are small. But if they become large, they can press on organs near the ovaries, causing pain. They can also cause pain by stretching the ovarian capsule. A cyst that pushes on the bladder can cause frequent urination. Sometimes these cysts rupture and bleed.  Malignant cysts  These  cysts can invade other tissues or spread to other parts of the body.  Date Last Reviewed: 6/1/2017 2000-2017 [employer]. [address] [place] [address], [place] [address]. All rights reserved. This information is not intended as a substitute for professional medical care. Always follow your healthcare professional's instructions.      What to expect when you have contrast    During your exam, we will inject  contrast  into your vein or artery. (Contrast is a clear liquid with iodine in it. It shows up on X-rays.)    You may feel warm or hot. You may have a metal taste in your mouth and a slight upset stomach. You may also feel pressure near the kidneys and bladder. These effects will last about 1 to 3 minutes.    Please tell us if you have:    Sneezing     Itching    Hives     Swelling in the face    A hoarse voice    Breathing problems    Other new symptoms    Serious problems are rare.  They may include:    Irregular heartbeat     Seizures    Kidney failure              Tissue damage    Shock      Death    If you have any problems during the exam, we  will treat them right away.    When you get home    Call your hospital if you have any new symptoms in the next 2 days, like hives or swelling. (Phone numbers are at the bottom of this page.) Or call your family doctor.     If you have wheezing or trouble breathing, call 911.    Self-care  -Drink at least 4 extra glasses of water today.   This reduces the stress on your kidneys.  -Keep taking your regular medicines.    The contrast will pass out of your body in your  Urine(pee). This will happen in the next 24 hours. You  will not feel this. Your urine will not  change color.    If you have kidney problems or take metformin    Drink 4 to 8 large glasses of water for the next  2 days, if you are not on a fluid restriction.    ?If you take metformin (Glucophage or Glucovance) for diabetes, keep taking it.      ?Your kidney function tests are abnormal.  If you  take Metformin, do not take it for 48 hours. Please go to your clinic for a blood test within 3 days after your exam before the restarting this medicine.     (Note to provider:please give patient prescription for lab tests.)    ?Special instructions: Drink an extra 4 glasses of water to flush out the contrast.     I have read and understand the above information.    Patient Sign Here:______________________________________Date:________Time:______    Staff Sign Here:________________________________________Date:_______Time:______      Radiology Departments:     ?[facility]: [phone] ?[facility]: [phone]     ?[place]: [phone] ?[facility]:[phone]      ?[facility]: [phone]  ?[facility]: [phone]  ?[place]:[phone]    ?[facility]:[phone]  ?[facility]:[phone]

## 2018-05-17 ASSESSMENT — ANXIETY QUESTIONNAIRES: GAD7 TOTAL SCORE: 3

## 2018-05-17 ASSESSMENT — PATIENT HEALTH QUESTIONNAIRE - PHQ9: SUM OF ALL RESPONSES TO PHQ QUESTIONS 1-9: 4

## 2018-05-25 ENCOUNTER — RADIANT APPOINTMENT (OUTPATIENT)
Dept: GENERAL RADIOLOGY | Facility: OTHER | Age: 23
End: 2018-05-25
Attending: FAMILY MEDICINE
Payer: COMMERCIAL

## 2018-05-25 ENCOUNTER — OFFICE VISIT (OUTPATIENT)
Dept: FAMILY MEDICINE | Facility: OTHER | Age: 23
End: 2018-05-25
Attending: FAMILY MEDICINE
Payer: COMMERCIAL

## 2018-05-25 VITALS
WEIGHT: 161 LBS | HEART RATE: 100 BPM | DIASTOLIC BLOOD PRESSURE: 82 MMHG | BODY MASS INDEX: 29.63 KG/M2 | OXYGEN SATURATION: 99 % | TEMPERATURE: 97.8 F | HEIGHT: 62 IN | SYSTOLIC BLOOD PRESSURE: 124 MMHG

## 2018-05-25 DIAGNOSIS — R82.90 ABNORMAL URINE FINDINGS: ICD-10-CM

## 2018-05-25 DIAGNOSIS — R10.31 RIGHT LOWER QUADRANT PAIN: ICD-10-CM

## 2018-05-25 DIAGNOSIS — K59.00 CONSTIPATION, UNSPECIFIED CONSTIPATION TYPE: ICD-10-CM

## 2018-05-25 DIAGNOSIS — N30.01 ACUTE CYSTITIS WITH HEMATURIA: Primary | ICD-10-CM

## 2018-05-25 DIAGNOSIS — R31.9 HEMATURIA, UNSPECIFIED TYPE: ICD-10-CM

## 2018-05-25 LAB
ALBUMIN UR-MCNC: NEGATIVE MG/DL
APPEARANCE UR: ABNORMAL
BACTERIA #/AREA URNS HPF: ABNORMAL /HPF
BILIRUB UR QL STRIP: NEGATIVE
COLOR UR AUTO: YELLOW
GLUCOSE UR STRIP-MCNC: NEGATIVE MG/DL
HGB UR QL STRIP: ABNORMAL
KETONES UR STRIP-MCNC: NEGATIVE MG/DL
LEUKOCYTE ESTERASE UR QL STRIP: ABNORMAL
MUCOUS THREADS #/AREA URNS LPF: PRESENT /LPF
NITRATE UR QL: POSITIVE
PH UR STRIP: 7 PH (ref 4.7–8)
RBC #/AREA URNS AUTO: 66 /HPF (ref 0–2)
SOURCE: ABNORMAL
SP GR UR STRIP: 1.02 (ref 1–1.03)
SQUAMOUS #/AREA URNS AUTO: 3 /HPF (ref 0–1)
UROBILINOGEN UR STRIP-MCNC: NORMAL MG/DL (ref 0–2)
WBC #/AREA URNS AUTO: 10 /HPF (ref 0–5)

## 2018-05-25 PROCEDURE — 87088 URINE BACTERIA CULTURE: CPT | Performed by: FAMILY MEDICINE

## 2018-05-25 PROCEDURE — 87086 URINE CULTURE/COLONY COUNT: CPT | Performed by: FAMILY MEDICINE

## 2018-05-25 PROCEDURE — 81001 URINALYSIS AUTO W/SCOPE: CPT | Performed by: FAMILY MEDICINE

## 2018-05-25 PROCEDURE — 87186 SC STD MICRODIL/AGAR DIL: CPT | Performed by: FAMILY MEDICINE

## 2018-05-25 PROCEDURE — 74019 RADEX ABDOMEN 2 VIEWS: CPT | Mod: TC | Performed by: RADIOLOGY

## 2018-05-25 PROCEDURE — 99213 OFFICE O/P EST LOW 20 MIN: CPT | Performed by: FAMILY MEDICINE

## 2018-05-25 RX ORDER — NITROFURANTOIN 25; 75 MG/1; MG/1
100 CAPSULE ORAL 2 TIMES DAILY
Qty: 14 CAPSULE | Refills: 0 | Status: SHIPPED | OUTPATIENT
Start: 2018-05-25 | End: 2018-06-22

## 2018-05-25 ASSESSMENT — PAIN SCALES - GENERAL: PAINLEVEL: SEVERE PAIN (7)

## 2018-05-25 NOTE — PROGRESS NOTES
"  SUBJECTIVE:                                                    May Maria is a 22 year old female who presents to clinic today for the following health issues:        Follow up abdomen      Duration: 5-15-18    Description (location/character/radiation): back and abdomen    Intensity:  moderate    Accompanying signs and symptoms: side pain has gotten better. Now the back hurts and the cyst is very bad these last few days.    History (similar episodes/previous evaluation): None    Precipitating or alleviating factors: None    Therapies tried and outcome: None    Pain in the flank has improved, but continues to have significant pain in the RLQ. Still feels she cannot stand up straight due to pain. Pain comes in waves, but is now constantly present at least a little. Able to work, but not do any physical tasks. Ongoing nausea that is persistent. Had watery stool for some time, but now having solid stools. Used miralax, but did not have large or watery BMs. Feels her stools were basically like normal. No fevers, chills. She is concerned about the ovarian cyst noted on CT. Also notes new low back pain which does not radiate. Is tearful due to ongoing pain issues.      Problem list and histories reviewed & adjusted, as indicated.  Additional history: as documented    Labs reviewed in EPIC    ROS:  Constitutional, HEENT, cardiovascular, pulmonary, gi and gu systems are negative, except as otherwise noted.    OBJECTIVE:     /82  Pulse 100  Temp 97.8  F (36.6  C) (Tympanic)  Ht 5' 2\" (1.575 m)  Wt 161 lb (73 kg)  SpO2 99%  BMI 29.45 kg/m2  Body mass index is 29.45 kg/(m^2).  GENERAL: healthy, alert and no distress  RESP: lungs clear to auscultation - no rales, rhonchi or wheezes  CV: regular rate and rhythm, normal S1 S2, no S3 or S4, no murmur, click or rub, no peripheral edema and peripheral pulses strong  ABDOMEN: tenderness RLQ and bowel sounds normal  NEURO: Normal strength and tone, mentation intact " and speech normal  PSYCH: mentation appears normal and tearful    Diagnostic Test Results:  Results for orders placed or performed in visit on 05/25/18 (from the past 24 hour(s))   UA reflex to Microscopic and Culture - [place]   Result Value Ref [place]    Color Urine Yellow     Appearance Urine Slightly Cloudy     Glucose Urine Negative NEG^Negative mg/dL    Bilirubin Urine Negative NEG^Negative    Ketones Urine Negative NEG^Negative mg/dL    Specific Gravity Urine 1.016 1.003 - 1.035    Blood Urine Moderate (A) NEG^Negative    pH Urine 7.0 4.7 - 8.0 pH    Protein Albumin Urine Negative NEG^Negative mg/dL    Urobilinogen mg/dL Normal 0.0 - 2.0 mg/dL    Nitrite Urine Positive (A) NEG^Negative    Leukocyte Esterase Urine Large (A) NEG^Negative    Source Midstream Urine     RBC Urine 66 (H) 0 - 2 /HPF    WBC Urine 10 (H) 0 - 5 /HPF    Bacteria Urine Moderate (A) NEG^Negative /HPF    Squamous Epithelial /HPF Urine 3 (H) 0 - 1 /HPF    Mucous Urine Present (A) NEG^Negative /LPF     PROCEDURE: XR ABDOMEN 2 VW 5/25/2018 9:37 AM     HISTORY: ; Constipation, unspecified constipation type; Right lower  quadrant pain     COMPARISONS: None.     TECHNIQUE: Supine and upright views.     FINDINGS: There is no free intraperitoneal air. Small amount of gas  and stool are seen within the colon. There are a few nondilated  gas-filled small bowel loops. No mass is seen and there is no  suspicious calcification.          IMPRESSION: Bowel gas pattern within normal limits.     FREDY MCLEAN MD    ASSESSMENT/PLAN:     1. Acute cystitis with hematuria  Based on UA. Possible cause of low back and RLQ pain. Will treat and follow up urine test and check in on sx in 1 week.   - nitroFURantoin, macrocrystal-monohydrate, (MACROBID) 100 MG capsule; Take 1 capsule (100 mg) by mouth 2 times daily  Dispense: 14 capsule; Refill: 0  - UA reflex to Microscopic and Culture - [place]  - Urine Culture Aerobic Bacterial    2. Constipation, unspecified  constipation type  Continue miralax 1-2 capfuls a day. Increase hydration.   - XR ABDOMEN 2 VW (Clinic Performed); Future    3. Right lower quadrant pain  Possibly related to above, doubt cyst is cause of pain at this point, but if pain persisting next week will get Pelvic US.   - XR ABDOMEN 2 VW (Clinic Performed); Future    Jia Kirk MD  [facility]

## 2018-05-25 NOTE — NURSING NOTE
"Chief Complaint   Patient presents with     RECHECK     abdomen pain       Initial /82  Pulse 100  Temp 97.8  F (36.6  C) (Tympanic)  Ht 5' 2\" (1.575 m)  Wt 161 lb (73 kg)  SpO2 99%  BMI 29.45 kg/m2 Estimated body mass index is 29.45 kg/(m^2) as calculated from the following:    Height as of this encounter: 5' 2\" (1.575 m).    Weight as of this encounter: 161 lb (73 kg).  Medication Reconciliation: complete    Jenna Kim MA    "

## 2018-05-25 NOTE — MR AVS SNAPSHOT
After Visit Summary   5/25/2018    May Maria    MRN: [id number]           Patient Information     Date Of Birth          1995        Visit Information        Provider Department      5/25/2018 9:00 AM Jia Kirk MD [facility]        Today's Diagnoses     Acute cystitis with hematuria    -  1    Constipation, unspecified constipation type        Right lower quadrant pain        Hematuria, unspecified type        Abnormal urine findings           Follow-ups after your visit        Your next 10 appointments already scheduled     Jun 01, 2018  4:00 PM CDT   (Arrive by 3:45 PM)   SHORT with Jia Kirk MD   [facility] [place] )    [address]  [place] [address]   [phone]            Nov 27, 2018  8:00 AM CST   (Arrive by 7:45 AM)   PHYSICAL with Jia Kirk MD   [facility] ([facility] [place] )    [address]  [place] [address]   [phone]              Who to contact     If you have questions or need follow up information about today's clinic visit or your schedule please contact [facility] directly at [phone].  Normal or non-critical lab and imaging results will be communicated to you by MyChart, letter or phone within 4 business days after the clinic has received the results. If you do not hear from us within 7 days, please contact the clinic through MyChart or phone. If you have a critical or abnormal lab result, we will notify you by phone as soon as possible.  Submit refill requests through [employer] or call your pharmacy and they will forward the refill request to us. Please allow 3 business days for your refill to be completed.          Additional Information About Your Visit        MyChart Information     [employer] gives you secure access to your electronic health record. If you see a primary care provider, you can also send messages to your care team and make  "appointments. If you have questions, please call your primary care clinic.  If you do not have a primary care provider, please call [phone] and they will assist you.        Care EveryWhere ID     This is your Care EveryWhere ID. This could be used by other organizations to access your [place] medical records  [id number]        Your Vitals Were     Pulse Temperature Height Pulse Oximetry BMI (Body Mass Index)       100 97.8  F (36.6  C) (Tympanic) 5' 2\" (1.575 m) 99% 29.45 kg/m2        Blood Pressure from Last 3 Encounters:   05/25/18 124/82   05/16/18 126/76   05/15/18 119/75    Weight from Last 3 Encounters:   05/25/18 161 lb (73 kg)   05/16/18 161 lb 2 oz (73.1 kg)   05/15/18 160 lb (72.6 kg)              We Performed the Following     UA reflex to Microscopic and Culture - [place]     Urine Culture Aerobic Bacterial          Today's Medication Changes          These changes are accurate as of 5/25/18 10:20 AM.  If you have any questions, ask your nurse or doctor.               Start taking these medicines.        Dose/Directions    nitroFURantoin (macrocrystal-monohydrate) 100 MG capsule   Commonly known as:  MACROBID   Used for:  Acute cystitis with hematuria   Started by:  Jia Kirk MD        Dose:  100 mg   Take 1 capsule (100 mg) by mouth 2 times daily   Quantity:  14 capsule   Refills:  0            Where to get your medicines      These medications were sent to [id number] - [place], [place] - [address]  [address], [place] [address]     Phone:  [phone]     nitroFURantoin (macrocrystal-monohydrate) 100 MG capsule                Primary Care Provider Office Phone # Fax #    Jia Kirk [phone] [address] [facility]  [place] [address]        Equal Access to Services     ALLAN STAHL AH: Jose Luis Fuller, waaxda luqadaha, qaybta kaalmamaximo leggett, madeline sung. [facility] [phone].    ATENCIÓN: Si habla " español, tiene a devlin disposición servicios gratuitos de asistencia lingüística. Noah leslie [phone].    We comply with applicable federal civil rights laws and [place] laws. We do not discriminate on the basis of race, color, national origin, age, disability, sex, sexual orientation, or gender identity.            Thank you!     Thank you for choosing [facility]  [facility] your [facility]. Our goal is always to provide you with excellent care. Hearing back from our patients is one way we can continue to improve our services. Please take a few minutes to complete the written survey that you may receive in the mail after your visit with us. Thank you!             Your Updated Medication List - Protect others around you: Learn how to safely use, store and throw away your medicines at www.disposemymeds.org.          This list is accurate as of 5/25/18 10:20 AM.  Always use your most recent med list.                   Brand Name Dispense Instructions for use Diagnosis    medroxyPROGESTERone 150 MG/ML injection    DEPO-PROVERA    1 mL    Inject 1 mL (150 mg) into the muscle every 3 months    Encounter for initial prescription of injectable contraceptive       nitroFURantoin (macrocrystal-monohydrate) 100 MG capsule    MACROBID    14 capsule    Take 1 capsule (100 mg) by mouth 2 times daily    Acute cystitis with hematuria       zolpidem 5 MG tablet    AMBIEN    60 tablet    Take 1 tablet (5 mg) by mouth nightly as needed for sleep    Shift work sleep disorder

## 2018-05-27 LAB
BACTERIA SPEC CULT: ABNORMAL
SPECIMEN SOURCE: ABNORMAL

## 2018-06-01 ENCOUNTER — OFFICE VISIT (OUTPATIENT)
Dept: FAMILY MEDICINE | Facility: OTHER | Age: 23
End: 2018-06-01
Attending: FAMILY MEDICINE
Payer: COMMERCIAL

## 2018-06-01 VITALS
BODY MASS INDEX: 29.77 KG/M2 | OXYGEN SATURATION: 100 % | DIASTOLIC BLOOD PRESSURE: 80 MMHG | WEIGHT: 161.8 LBS | HEART RATE: 108 BPM | TEMPERATURE: 98.3 F | SYSTOLIC BLOOD PRESSURE: 126 MMHG | HEIGHT: 62 IN

## 2018-06-01 DIAGNOSIS — R10.31 RLQ ABDOMINAL PAIN: Primary | ICD-10-CM

## 2018-06-01 DIAGNOSIS — R30.0 DYSURIA: ICD-10-CM

## 2018-06-01 DIAGNOSIS — R11.0 NAUSEA: ICD-10-CM

## 2018-06-01 LAB
ALBUMIN SERPL-MCNC: 4.2 G/DL (ref 3.4–5)
ALBUMIN UR-MCNC: NEGATIVE MG/DL
ALP SERPL-CCNC: 106 U/L (ref 40–150)
ALT SERPL W P-5'-P-CCNC: 23 U/L (ref 0–50)
ANION GAP SERPL CALCULATED.3IONS-SCNC: 6 MMOL/L (ref 3–14)
APPEARANCE UR: CLEAR
AST SERPL W P-5'-P-CCNC: 10 U/L (ref 0–45)
BACTERIA #/AREA URNS HPF: ABNORMAL /HPF
BASOPHILS # BLD AUTO: 0.1 10E9/L (ref 0–0.2)
BASOPHILS NFR BLD AUTO: 0.7 %
BILIRUB SERPL-MCNC: 0.5 MG/DL (ref 0.2–1.3)
BILIRUB UR QL STRIP: NEGATIVE
BUN SERPL-MCNC: 14 MG/DL (ref 7–30)
CALCIUM SERPL-MCNC: 8.9 MG/DL (ref 8.5–10.1)
CHLORIDE SERPL-SCNC: 108 MMOL/L (ref 94–109)
CO2 SERPL-SCNC: 26 MMOL/L (ref 20–32)
COLOR UR AUTO: YELLOW
CREAT SERPL-MCNC: 0.71 MG/DL (ref 0.52–1.04)
DIFFERENTIAL METHOD BLD: NORMAL
EOSINOPHIL # BLD AUTO: 0.2 10E9/L (ref 0–0.7)
EOSINOPHIL NFR BLD AUTO: 3.3 %
ERYTHROCYTE [DISTWIDTH] IN BLOOD BY AUTOMATED COUNT: 12.8 % (ref 10–15)
GFR SERPL CREATININE-BSD FRML MDRD: >90 ML/MIN/1.7M2
GLUCOSE SERPL-MCNC: 94 MG/DL (ref 70–99)
GLUCOSE UR STRIP-MCNC: NEGATIVE MG/DL
HCT VFR BLD AUTO: 40.6 % (ref 35–47)
HGB BLD-MCNC: 14 G/DL (ref 11.7–15.7)
HGB UR QL STRIP: ABNORMAL
IMM GRANULOCYTES # BLD: 0 10E9/L (ref 0–0.4)
IMM GRANULOCYTES NFR BLD: 0.4 %
KETONES UR STRIP-MCNC: NEGATIVE MG/DL
LEUKOCYTE ESTERASE UR QL STRIP: NEGATIVE
LYMPHOCYTES # BLD AUTO: 1.7 10E9/L (ref 0.8–5.3)
LYMPHOCYTES NFR BLD AUTO: 24.4 %
MCH RBC QN AUTO: 29.7 PG (ref 26.5–33)
MCHC RBC AUTO-ENTMCNC: 34.5 G/DL (ref 31.5–36.5)
MCV RBC AUTO: 86 FL (ref 78–100)
MONOCYTES # BLD AUTO: 0.6 10E9/L (ref 0–1.3)
MONOCYTES NFR BLD AUTO: 8 %
MUCOUS THREADS #/AREA URNS LPF: PRESENT /LPF
NEUTROPHILS # BLD AUTO: 4.5 10E9/L (ref 1.6–8.3)
NEUTROPHILS NFR BLD AUTO: 63.2 %
NITRATE UR QL: NEGATIVE
NRBC # BLD AUTO: 0 10*3/UL
NRBC BLD AUTO-RTO: 0 /100
PH UR STRIP: 6.5 PH (ref 4.7–8)
PLATELET # BLD AUTO: 293 10E9/L (ref 150–450)
POTASSIUM SERPL-SCNC: 3.7 MMOL/L (ref 3.4–5.3)
PROT SERPL-MCNC: 8.1 G/DL (ref 6.8–8.8)
RBC # BLD AUTO: 4.71 10E12/L (ref 3.8–5.2)
RBC #/AREA URNS AUTO: 7 /HPF (ref 0–2)
SODIUM SERPL-SCNC: 140 MMOL/L (ref 133–144)
SOURCE: ABNORMAL
SP GR UR STRIP: 1.01 (ref 1–1.03)
SQUAMOUS #/AREA URNS AUTO: 2 /HPF (ref 0–1)
UROBILINOGEN UR STRIP-MCNC: NORMAL MG/DL (ref 0–2)
WBC # BLD AUTO: 7 10E9/L (ref 4–11)
WBC #/AREA URNS AUTO: 2 /HPF (ref 0–5)

## 2018-06-01 PROCEDURE — 80053 COMPREHEN METABOLIC PANEL: CPT | Performed by: FAMILY MEDICINE

## 2018-06-01 PROCEDURE — 99213 OFFICE O/P EST LOW 20 MIN: CPT | Performed by: FAMILY MEDICINE

## 2018-06-01 PROCEDURE — 36415 COLL VENOUS BLD VENIPUNCTURE: CPT | Performed by: FAMILY MEDICINE

## 2018-06-01 PROCEDURE — 85025 COMPLETE CBC W/AUTO DIFF WBC: CPT | Performed by: FAMILY MEDICINE

## 2018-06-01 PROCEDURE — 81001 URINALYSIS AUTO W/SCOPE: CPT | Performed by: FAMILY MEDICINE

## 2018-06-01 RX ORDER — METOCLOPRAMIDE 5 MG/1
5 TABLET ORAL 3 TIMES DAILY PRN
Qty: 30 TABLET | Refills: 0 | Status: SHIPPED | OUTPATIENT
Start: 2018-06-01 | End: 2019-12-30

## 2018-06-01 ASSESSMENT — ANXIETY QUESTIONNAIRES
4. TROUBLE RELAXING: SEVERAL DAYS
3. WORRYING TOO MUCH ABOUT DIFFERENT THINGS: NOT AT ALL
7. FEELING AFRAID AS IF SOMETHING AWFUL MIGHT HAPPEN: NOT AT ALL
GAD7 TOTAL SCORE: 2
2. NOT BEING ABLE TO STOP OR CONTROL WORRYING: NOT AT ALL
6. BECOMING EASILY ANNOYED OR IRRITABLE: NOT AT ALL
1. FEELING NERVOUS, ANXIOUS, OR ON EDGE: NOT AT ALL
5. BEING SO RESTLESS THAT IT IS HARD TO SIT STILL: SEVERAL DAYS

## 2018-06-01 ASSESSMENT — PAIN SCALES - GENERAL: PAINLEVEL: MODERATE PAIN (4)

## 2018-06-01 NOTE — PROGRESS NOTES
"  SUBJECTIVE:   May Maria is a 22 year old female who presents to clinic today for the following health issues:      Abdominal Pain      Duration: 5/15/2018    Description (location/character/radiation): RLQ       Associated flank pain: None    Intensity:  4/10    Accompanying signs and symptoms:        Fever/Chills: no        Gas/Bloating: YES- Gas       Nausea/vomitting: YES- Nausea       Diarrhea: no        Dysuria or Hematuria: no     History (previous similar pain/trauma/previous testing): yes    Precipitating or alleviating factors:       Pain worse with eating/BM/urination: No       Pain relieved by BM: no     Therapies tried and outcome: miralax feeling like things are passing but having     Low back pain.    LMP:  5/15/2018    Continues to struggle with the RLQ pain. Pain can be severe, but otherwise is mild and constant. Associated with lots of nausea. No vomiting. Pain sometimes radiates to the right flank area. Dx with UTI last week. Completed abx. Has been using miralax and having multiple stools. Feels her bowels are cleared. Again, denies fevers, chills, hematuria, hematochezia.     Problem list and histories reviewed & adjusted, as indicated.  Additional history: as documented    Labs reviewed in EPIC    Reviewed and updated as needed this visit by clinical staff  Tobacco  Allergies  Meds  Med Hx  Surg Hx  Fam Hx  Soc Hx      Reviewed and updated as needed this visit by Provider         ROS:  Constitutional, HEENT, cardiovascular, pulmonary, gi and gu systems are negative, except as otherwise noted.    OBJECTIVE:     /80  Pulse 108  Temp 98.3  F (36.8  C)  Ht 5' 2\" (1.575 m)  Wt 161 lb 12.8 oz (73.4 kg)  SpO2 100%  Breastfeeding? No  BMI 29.59 kg/m2  Body mass index is 29.59 kg/(m^2).  GENERAL: healthy, alert and no distress  CV: regular rate and rhythm, normal S1 S2, no S3 or S4, no murmur, click or rub, no peripheral edema and peripheral pulses strong  ABDOMEN: tenderness " RLQ, bowel sounds normal and no palpable or pulsatile masses  MS: no gross musculoskeletal defects noted, no edema    Diagnostic Test Results:  Results for orders placed or performed in visit on 06/01/18   CBC with platelets and differential   Result Value Ref Range    WBC 7.0 4.0 - 11.0 10e9/L    RBC Count 4.71 3.8 - 5.2 10e12/L    Hemoglobin 14.0 11.7 - 15.7 g/dL    Hematocrit 40.6 35.0 - 47.0 %    MCV 86 78 - 100 fl    MCH 29.7 26.5 - 33.0 pg    MCHC 34.5 31.5 - 36.5 g/dL    RDW 12.8 10.0 - 15.0 %    Platelet Count 293 150 - 450 10e9/L    Diff Method Automated Method     % Neutrophils 63.2 %    % Lymphocytes 24.4 %    % Monocytes 8.0 %    % Eosinophils 3.3 %    % Basophils 0.7 %    % Immature Granulocytes 0.4 %    Nucleated RBCs 0 0 /100    Absolute Neutrophil 4.5 1.6 - 8.3 10e9/L    Absolute Lymphocytes 1.7 0.8 - 5.3 10e9/L    Absolute Monocytes 0.6 0.0 - 1.3 10e9/L    Absolute Eosinophils 0.2 0.0 - 0.7 10e9/L    Absolute Basophils 0.1 0.0 - 0.2 10e9/L    Abs Immature Granulocytes 0.0 0 - 0.4 10e9/L    Absolute Nucleated RBC 0.0    Comprehensive metabolic panel (BMP + Alb, Alk Phos, ALT, AST, Total. Bili, TP)   Result Value Ref Range    Sodium 140 133 - 144 mmol/L    Potassium 3.7 3.4 - 5.3 mmol/L    Chloride 108 94 - 109 mmol/L    Carbon Dioxide 26 20 - 32 mmol/L    Anion Gap 6 3 - 14 mmol/L    Glucose 94 70 - 99 mg/dL    Urea Nitrogen 14 7 - 30 mg/dL    Creatinine 0.71 0.52 - 1.04 mg/dL    GFR Estimate >90 >60 mL/min/1.7m2    GFR Estimate If Black >90 >60 mL/min/1.7m2    Calcium 8.9 8.5 - 10.1 mg/dL    Bilirubin Total 0.5 0.2 - 1.3 mg/dL    Albumin 4.2 3.4 - 5.0 g/dL    Protein Total 8.1 6.8 - 8.8 g/dL    Alkaline Phosphatase 106 40 - 150 U/L    ALT 23 0 - 50 U/L    AST 10 0 - 45 U/L   UA reflex to Microscopic and Culture - [place]   Result Value Ref Range    Color Urine Yellow     Appearance Urine Clear     Glucose Urine Negative NEG^Negative mg/dL    Bilirubin Urine Negative NEG^Negative    Ketones Urine  Negative NEG^Negative mg/dL    Specific Gravity Urine 1.013 1.003 - 1.035    Blood Urine Moderate (A) NEG^Negative    pH Urine 6.5 4.7 - 8.0 pH    Protein Albumin Urine Negative NEG^Negative mg/dL    Urobilinogen mg/dL Normal 0.0 - 2.0 mg/dL    Nitrite Urine Negative NEG^Negative    Leukocyte Esterase Urine Negative NEG^Negative    Source Midstream Urine     RBC Urine 7 (H) 0 - 2 /HPF    WBC Urine 2 0 - 5 /HPF    Bacteria Urine None (A) NEG^Negative /HPF    Squamous Epithelial /HPF Urine 2 (H) 0 - 1 /HPF    Mucous Urine Present (A) NEG^Negative /LPF       ASSESSMENT/PLAN:     1. RLQ abdominal pain / Nausea  Unclear cause. Pt has had CT of the abdomen and pelvis and labwork remains normal save for UTI at last visit. Reglan for nausea (zofran does not work for her), will get Pelvic US to further characterize possible right ovarian cyst as possible cause of pain.   - US Pelvic Complete with Transvaginal; Future  - metoclopramide (REGLAN) 5 MG tablet; Take 1 tablet (5 mg) by mouth 3 times daily as needed  Dispense: 30 tablet; Refill: 0  - CBC with platelets and differential  - Comprehensive metabolic panel (BMP + Alb, Alk Phos, ALT, AST, Total. Bili, TP)  - UA reflex to Microscopic and Culture - [place] Kirk MD  [facility] [place]

## 2018-06-01 NOTE — MR AVS SNAPSHOT
After Visit Summary   6/1/2018    May Maria    MRN: [id number]           Patient Information     Date Of Birth          1995        Visit Information        Provider Department      6/1/2018 4:00 PM Jia Kirk MD [facility]        Today's Diagnoses     RLQ abdominal pain    -  1    Nausea        Dysuria           Follow-ups after your visit        Your next 10 appointments already scheduled     Nov 27, 2018  8:00 AM CST   (Arrive by 7:45 AM)   PHYSICAL with Jia Kirk MD   [facility] [place] ([facility] [place] )    [address]  [place] [address]   [phone]              Who to contact     If you have questions or need follow up information about today's clinic visit or your schedule please contact [facility] directly at [phone].  Normal or non-critical lab and imaging results will be communicated to you by MyChart, letter or phone within 4 business days after the clinic has received the results. If you do not hear from us within 7 days, please contact the clinic through MyChart or phone. If you have a critical or abnormal lab result, we will notify you by phone as soon as possible.  Submit refill requests through [employer] or call your pharmacy and they will forward the refill request to us. Please allow 3 business days for your refill to be completed.          Additional Information About Your Visit        MyChart Information     [employer] gives you secure access to your electronic health record. If you see a primary care provider, you can also send messages to your care team and make appointments. If you have questions, please call your primary care clinic.  If you do not have a primary care provider, please call [phone] and they will assist you.        Care EveryWhere ID     This is your Care EveryWhere ID. This could be used by other organizations to access your [facility] medical records  [id number]        Your  "Vitals Were     Pulse Temperature Height Pulse Oximetry Breastfeeding? BMI (Body Mass Index)    108 98.3  F (36.8  C) 5' 2\" (1.575 m) 100% No 29.59 kg/m2       Blood Pressure from Last 3 Encounters:   06/01/18 126/80   05/25/18 124/82   05/16/18 126/76    Weight from Last 3 Encounters:   06/01/18 161 lb 12.8 oz (73.4 kg)   05/25/18 161 lb (73 kg)   05/16/18 161 lb 2 oz (73.1 kg)              We Performed the Following     CBC with platelets and differential     Comprehensive metabolic panel (BMP + Alb, Alk Phos, ALT, AST, Total. Bili, TP)     UA reflex to Microscopic and Culture - [place]          Today's Medication Changes          These changes are accurate as of 6/1/18 11:59 PM.  If you have any questions, ask your nurse or doctor.               Start taking these medicines.        Dose/Directions    metoclopramide 5 MG tablet   Commonly known as:  REGLAN   Used for:  RLQ abdominal pain   Started by:  Jia Kirk MD        Dose:  5 mg   Take 1 tablet (5 mg) by mouth 3 times daily as needed   Quantity:  30 tablet   Refills:  0            Where to get your medicines      These medications were sent to [id number] - [place] - [address]   [address], [place] [address]     Phone:  [phone]     metoclopramide 5 MG tablet                Primary Care Provider Office Phone # Fax #    Jia Kirk [phone]       [facility]  [facility] [address]        Equal Access to Services     [facility]: Hadii meggan lebrono Sojossy, waaxda luqadaha, qaybta kaalmada adeegyamaximo, madeline sung. So [facility] [phone].    ATENCIÓN: Si habla español, tiene a devlin disposición servicios gratuitos de asistencia lingüística. Llame al [phone].    We comply with applicable federal civil rights laws and [place] laws. We do not discriminate on the basis of race, color, national origin, age, disability, sex, sexual orientation, or gender identity.          "   Thank you!     Thank you for choosing [facility] [place]  for your care. Our goal is always to provide you with excellent care. Hearing back from our patients is one way we can continue to improve our services. Please take a few minutes to complete the written survey that you may receive in the mail after your visit with us. Thank you!             Your Updated Medication List - Protect others around you: Learn how to safely use, store and throw away your medicines at www.disposemymeds.org.          This list is accurate as of 6/1/18 11:59 PM.  Always use your most recent med list.                   Brand Name Dispense Instructions for use Diagnosis    medroxyPROGESTERone 150 MG/ML injection    DEPO-PROVERA    1 mL    Inject 1 mL (150 mg) into the muscle every 3 months    Encounter for initial prescription of injectable contraceptive       metoclopramide 5 MG tablet    REGLAN    30 tablet    Take 1 tablet (5 mg) by mouth 3 times daily as needed    RLQ abdominal pain       nitroFURantoin (macrocrystal-monohydrate) 100 MG capsule    MACROBID    14 capsule    Take 1 capsule (100 mg) by mouth 2 times daily    Acute cystitis with hematuria       zolpidem 5 MG tablet    AMBIEN    60 tablet    Take 1 tablet (5 mg) by mouth nightly as needed for sleep    Shift work sleep disorder

## 2018-06-01 NOTE — NURSING NOTE
"Chief Complaint   Patient presents with     Abdominal Pain       Initial /80  Pulse 108  Temp 98.3  F (36.8  C)  Ht 5' 2\" (1.575 m)  Wt 161 lb 12.8 oz (73.4 kg)  SpO2 100%  Breastfeeding? No  BMI 29.59 kg/m2 Estimated body mass index is 29.59 kg/(m^2) as calculated from the following:    Height as of this encounter: 5' 2\" (1.575 m).    Weight as of this encounter: 161 lb 12.8 oz (73.4 kg).  Medication Reconciliation: complete    DARCY Healy    "

## 2018-06-02 ASSESSMENT — ANXIETY QUESTIONNAIRES: GAD7 TOTAL SCORE: 2

## 2018-06-02 ASSESSMENT — PATIENT HEALTH QUESTIONNAIRE - PHQ9: SUM OF ALL RESPONSES TO PHQ QUESTIONS 1-9: 6

## 2018-06-04 DIAGNOSIS — N39.0 RECURRENT UTI: Primary | ICD-10-CM

## 2018-06-04 LAB
ALBUMIN UR-MCNC: 30 MG/DL
APPEARANCE UR: ABNORMAL
BACTERIA #/AREA URNS HPF: ABNORMAL /HPF
BILIRUB UR QL STRIP: NEGATIVE
COLOR UR AUTO: YELLOW
GLUCOSE UR STRIP-MCNC: NEGATIVE MG/DL
HGB UR QL STRIP: ABNORMAL
KETONES UR STRIP-MCNC: NEGATIVE MG/DL
LEUKOCYTE ESTERASE UR QL STRIP: ABNORMAL
MUCOUS THREADS #/AREA URNS LPF: PRESENT /LPF
NITRATE UR QL: NEGATIVE
PH UR STRIP: 5.5 PH (ref 4.7–8)
RBC #/AREA URNS AUTO: 9 /HPF (ref 0–2)
SOURCE: ABNORMAL
SP GR UR STRIP: 1.02 (ref 1–1.03)
SQUAMOUS #/AREA URNS AUTO: 6 /HPF (ref 0–1)
UROBILINOGEN UR STRIP-MCNC: NORMAL MG/DL (ref 0–2)
WBC #/AREA URNS AUTO: 19 /HPF (ref 0–5)

## 2018-06-04 PROCEDURE — 87086 URINE CULTURE/COLONY COUNT: CPT | Performed by: INTERNAL MEDICINE

## 2018-06-04 PROCEDURE — 81001 URINALYSIS AUTO W/SCOPE: CPT | Performed by: INTERNAL MEDICINE

## 2018-06-05 ENCOUNTER — HOSPITAL ENCOUNTER (OUTPATIENT)
Dept: ULTRASOUND IMAGING | Facility: HOSPITAL | Age: 23
Discharge: HOME | End: 2018-06-05
Attending: FAMILY MEDICINE | Admitting: FAMILY MEDICINE
Payer: COMMERCIAL

## 2018-06-05 DIAGNOSIS — R10.31 RLQ ABDOMINAL PAIN: ICD-10-CM

## 2018-06-05 LAB
BACTERIA SPEC CULT: ABNORMAL
SPECIMEN SOURCE: ABNORMAL

## 2018-06-05 PROCEDURE — 76856 US EXAM PELVIC COMPLETE: CPT | Mod: TC

## 2018-06-15 ENCOUNTER — TELEPHONE (OUTPATIENT)
Dept: FAMILY MEDICINE | Facility: OTHER | Age: 23
End: 2018-06-15

## 2018-06-15 NOTE — TELEPHONE ENCOUNTER
11:06 AM    Reason for Call: OVERBOOK    Patient is having the following symptoms: talk about birth control// not working for her she states that she is bleeding and has been for a while for 1 weeks.    The patient is requesting an appointment for ASAP with .    Was an appointment offered for this call? Yes  If yes : Appointment type short               Date 07/03/18, does not want to wait this long    Preferred method for responding to this message: Telephone Call  What is your phone number ?[phone]    If we cannot reach you directly, may we leave a detailed response at the number you provided? Yes    Can this message wait until your PCP/provider returns, if unavailable today?Yes , pcp is out     Enid Millan

## 2018-06-22 ENCOUNTER — OFFICE VISIT (OUTPATIENT)
Dept: FAMILY MEDICINE | Facility: OTHER | Age: 23
End: 2018-06-22
Attending: FAMILY MEDICINE
Payer: COMMERCIAL

## 2018-06-22 VITALS
OXYGEN SATURATION: 97 % | WEIGHT: 156 LBS | DIASTOLIC BLOOD PRESSURE: 84 MMHG | HEART RATE: 71 BPM | SYSTOLIC BLOOD PRESSURE: 132 MMHG | TEMPERATURE: 97.9 F | BODY MASS INDEX: 28.53 KG/M2

## 2018-06-22 DIAGNOSIS — Z30.09 ENCOUNTER FOR OTHER GENERAL COUNSELING OR ADVICE ON CONTRACEPTION: Primary | ICD-10-CM

## 2018-06-22 DIAGNOSIS — M54.50 RIGHT-SIDED LOW BACK PAIN WITHOUT SCIATICA, UNSPECIFIED CHRONICITY: ICD-10-CM

## 2018-06-22 DIAGNOSIS — R63.4 LOSS OF WEIGHT: ICD-10-CM

## 2018-06-22 DIAGNOSIS — R31.29 OTHER MICROSCOPIC HEMATURIA: ICD-10-CM

## 2018-06-22 PROCEDURE — 99213 OFFICE O/P EST LOW 20 MIN: CPT | Performed by: FAMILY MEDICINE

## 2018-06-22 ASSESSMENT — ANXIETY QUESTIONNAIRES
3. WORRYING TOO MUCH ABOUT DIFFERENT THINGS: NOT AT ALL
1. FEELING NERVOUS, ANXIOUS, OR ON EDGE: NOT AT ALL
5. BEING SO RESTLESS THAT IT IS HARD TO SIT STILL: SEVERAL DAYS
4. TROUBLE RELAXING: SEVERAL DAYS
GAD7 TOTAL SCORE: 2
6. BECOMING EASILY ANNOYED OR IRRITABLE: NOT AT ALL
7. FEELING AFRAID AS IF SOMETHING AWFUL MIGHT HAPPEN: NOT AT ALL
2. NOT BEING ABLE TO STOP OR CONTROL WORRYING: NOT AT ALL

## 2018-06-22 ASSESSMENT — PAIN SCALES - GENERAL: PAINLEVEL: MODERATE PAIN (4)

## 2018-06-22 NOTE — PROGRESS NOTES
"  SUBJECTIVE:                                                    May Maria is a 22 year old female who presents to clinic today for the following health issues:        Birth control      Duration: 5-16-18    Description (location/character/radiation): bea    Intensity:  none    Accompanying signs and symptoms: patient started depo on may 16 th and has been bleeding a lot ever since. having some clots. Would be willng to try another form of BC. Spoke of the \"mary\"    History (similar episodes/previous evaluation): None    Precipitating or alleviating factors: None    Therapies tried and outcome: None     Low Back Pain    This has been an issue for the last month or so. Initially noted in RLQ, CT w/contrast negative other than for atrophy of right kidney, Pelvic US negative, blood work negative including inflammatory markers. Did have UTI which was treated, some residual blood noted in urine. Given stool cleanout. Pain now better in the RLQ, but still noted in right low back/flank area. Associated with nausea. Has also been losing weight. Is not trying. Bowel soft and regular, no blood.     Problem list and histories reviewed & adjusted, as indicated.  Additional history: as documented    Labs reviewed in EPIC    ROS:  Constitutional, HEENT, cardiovascular, pulmonary, gi and gu systems are negative, except as otherwise noted.    OBJECTIVE:     /84  Pulse 71  Temp 97.9  F (36.6  C) (Tympanic)  Wt 156 lb (70.8 kg)  SpO2 97%  BMI 28.53 kg/m2  Body mass index is 28.53 kg/(m^2).  GENERAL: healthy, alert and no distress  RESP: lungs clear to auscultation - no rales, rhonchi or wheezes  CV: regular rate and rhythm, normal S1 S2, no S3 or S4, no murmur, click or rub  ABDOMEN: soft, nontender, no hepatosplenomegaly, no masses and bowel sounds normal, no flank pain  MS: no gross musculoskeletal defects noted, no edema    Diagnostic Test Results:  none     ASSESSMENT/PLAN:     1. Encounter for other general " counseling or advice on contraception  Would like nexplanon when depo provera is due to repeated. Referred to OB for this.   - OB/GYN REFERRAL    2. Right-sided low back pain without sciatica, unspecified chronicity / hematuria  Has been referred to Urology, follow up with them. If work up negative, will consider msk cause, possibly trial of PT.     3. Loss of weight  Unclear cause, persisting nausea. Will complete urologic work up as above. Monitor, possible trial of PPI    Jia Kirk MD  [facility]

## 2018-06-22 NOTE — PATIENT INSTRUCTIONS
Etonogestrel implant  Brand Name: Nexplanon  What is this medicine?  ETONOGESTREL (et oh bea RUBIN trel) is a contraceptive (birth control) device. It is used to prevent pregnancy. It can be used for up to 3 years.  How should I use this medicine?  This device is inserted just under the skin on the inner side of your upper arm by a health care professional.  Talk to your pediatrician regarding the use of this medicine in children. Special care may be needed.  What side effects may I notice from receiving this medicine?  Side effects that you should report to your doctor or health care professional as soon as possible:    allergic reactions like skin rash, itching or hives, swelling of the face, lips, or tongue    breast lumps    changes in emotions or moods    depressed mood    heavy or prolonged menstrual bleeding    pain, irritation, swelling, or bruising at the insertion site    scar at site of insertion    signs of infection at the insertion site such as fever, and skin redness, pain or discharge    signs of pregnancy    signs and symptoms of a blood clot such as breathing problems; changes in vision; chest pain; severe, sudden headache; pain, swelling, warmth in the leg; trouble speaking; sudden numbness or weakness of the face, arm or leg    signs and symptoms of liver injury like dark yellow or brown urine; general ill feeling or flu-like symptoms; light-colored stools; loss of appetite; nausea; right upper belly pain; unusually weak or tired; yellowing of the eyes or skin    unusual vaginal bleeding, discharge    signs and symptoms of a stroke like changes in vision; confusion; trouble speaking or understanding; severe headaches; sudden numbness or weakness of the face, arm or leg; trouble walking; dizziness; loss of balance or coordination  Side effects that usually do not require medical attention (report to your doctor or health care professional if they continue or are bothersome):    acne    back pain     breast pain    changes in weight    dizziness    general ill feeling or flu-like symptoms    headache    irregular menstrual bleeding    nausea    sore throat    vaginal irritation or inflammation  What may interact with this medicine?  Do not take this medicine with any of the following medications:    amprenavir    bosentan    fosamprenavir  This medicine may also interact with the following medications:    barbiturate medicines for inducing sleep or treating seizures    certain medicines for fungal infections like ketoconazole and itraconazole    grapefruit juice    griseofulvin    medicines to treat seizures like carbamazepine, felbamate, oxcarbazepine, phenytoin, topiramate    modafinil    phenylbutazone    rifampin    rufinamide    some medicines to treat HIV infection like atazanavir, indinavir, lopinavir, nelfinavir, tipranavir, ritonavir    Eufemia's wort  What if I miss a dose?  This does not apply.  Where should I keep my medicine?  This drug is given in a hospital or clinic and will not be stored at home.  What should I tell my health care provider before I take this medicine?  They need to know if you have any of these conditions:    abnormal vaginal bleeding    blood vessel disease or blood clots    cancer of the breast, cervix, or liver    depression    diabetes    gallbladder disease    headaches    heart disease or recent heart attack    high blood pressure    high cholesterol    kidney disease    liver disease    renal disease    seizures    tobacco smoker    an unusual or allergic reaction to etonogestrel, other hormones, anesthetics or antiseptics, medicines, foods, dyes, or preservatives    pregnant or trying to get pregnant    breast-feeding  What should I watch for while using this medicine?  This product does not protect you against HIV infection (AIDS) or other sexually transmitted diseases.  You should be able to feel the implant by pressing your fingertips over the skin where it was  inserted. Contact your doctor if you cannot feel the implant, and use a non-hormonal birth control method (such as condoms) until your doctor confirms that the implant is in place. If you feel that the implant may have broken or become bent while in your arm, contact your healthcare provider.  NOTE:This sheet is a summary. It may not cover all possible information. If you have questions about this medicine, talk to your doctor, pharmacist, or health care provider. Copyright  2018 [employer]

## 2018-06-22 NOTE — MR AVS SNAPSHOT
After Visit Summary   6/22/2018    May Maria    MRN: [id number]           Patient Information     Date Of Birth          1995        Visit Information        Provider Department      6/22/2018 8:00 AM Jia Kirk MD [facility]        Today's Diagnoses     Encounter for other general counseling or advice on contraception    -  1    Right-sided low back pain without sciatica, unspecified chronicity        Loss of weight          Care Instructions      Etonogestrel implant  Brand Name: Nexplanon  What is this medicine?  ETONOGESTREL (et oh bea RUBIN trel) is a contraceptive (birth control) device. It is used to prevent pregnancy. It can be used for up to 3 years.  How should I use this medicine?  This device is inserted just under the skin on the inner side of your upper arm by a health care professional.  Talk to your pediatrician regarding the use of this medicine in children. Special care may be needed.  What side effects may I notice from receiving this medicine?  Side effects that you should report to your doctor or health care professional as soon as possible:    allergic reactions like skin rash, itching or hives, swelling of the face, lips, or tongue    breast lumps    changes in emotions or moods    depressed mood    heavy or prolonged menstrual bleeding    pain, irritation, swelling, or bruising at the insertion site    scar at site of insertion    signs of infection at the insertion site such as fever, and skin redness, pain or discharge    signs of pregnancy    signs and symptoms of a blood clot such as breathing problems; changes in vision; chest pain; severe, sudden headache; pain, swelling, warmth in the leg; trouble speaking; sudden numbness or weakness of the face, arm or leg    signs and symptoms of liver injury like dark yellow or brown urine; general ill feeling or flu-like symptoms; light-colored stools; loss of appetite; nausea; right upper belly pain;  unusually weak or tired; yellowing of the eyes or skin    unusual vaginal bleeding, discharge    signs and symptoms of a stroke like changes in vision; confusion; trouble speaking or understanding; severe headaches; sudden numbness or weakness of the face, arm or leg; trouble walking; dizziness; loss of balance or coordination  Side effects that usually do not require medical attention (report to your doctor or health care professional if they continue or are bothersome):    acne    back pain    breast pain    changes in weight    dizziness    general ill feeling or flu-like symptoms    headache    irregular menstrual bleeding    nausea    sore throat    vaginal irritation or inflammation  What may interact with this medicine?  Do not take this medicine with any of the following medications:    amprenavir    bosentan    fosamprenavir  This medicine may also interact with the following medications:    barbiturate medicines for inducing sleep or treating seizures    certain medicines for fungal infections like ketoconazole and itraconazole    grapefruit juice    griseofulvin    medicines to treat seizures like carbamazepine, felbamate, oxcarbazepine, phenytoin, topiramate    modafinil    phenylbutazone    rifampin    rufinamide    some medicines to treat HIV infection like atazanavir, indinavir, lopinavir, nelfinavir, tipranavir, ritonavir    Eufemia's wort  What if I miss a dose?  This does not apply.  Where should I keep my medicine?  This drug is given in a hospital or clinic and will not be stored at home.  What should I tell my health care provider before I take this medicine?  They need to know if you have any of these conditions:    abnormal vaginal bleeding    blood vessel disease or blood clots    cancer of the breast, cervix, or liver    depression    diabetes    gallbladder disease    headaches    heart disease or recent heart attack    high blood pressure    high cholesterol    kidney disease    liver  disease    renal disease    seizures    tobacco smoker    an unusual or allergic reaction to etonogestrel, other hormones, anesthetics or antiseptics, medicines, foods, dyes, or preservatives    pregnant or trying to get pregnant    breast-feeding  What should I watch for while using this medicine?  This product does not protect you against HIV infection (AIDS) or other sexually transmitted diseases.  You should be able to feel the implant by pressing your fingertips over the skin where it was inserted. Contact your doctor if you cannot feel the implant, and use a non-hormonal birth control method (such as condoms) until your doctor confirms that the implant is in place. If you feel that the implant may have broken or become bent while in your arm, contact your healthcare provider.  NOTE:This sheet is a summary. It may not cover all possible information. If you have questions about this medicine, talk to your doctor, pharmacist, or health care provider. Copyright  2018 [employer]                Follow-ups after your visit        Additional Services     OB/GYN REFERRAL       Your provider has referred you to:  Critical access hospital [phone]  [email or web address]    Please be aware that coverage of these services is subject to the terms and limitations of your health insurance plan.  Call member services at your health plan with any benefit or coverage questions.      Please bring the following with you to your appointment:    (1) Any X-Rays, CTs or MRIs which have been performed.  Contact the facility where they were done to arrange for  prior to your scheduled appointment.   (2) List of current medications   (3) This referral request   (4) Any documents/labs given to you for this referral                  Your next 10 appointments already scheduled     Aug 16, 2018  9:45 AM CDT   (Arrive by 9:30 AM)   PROCEDURE with Merline Diallo NP   [facility]  ([facility] )    [address] [place] [address]   [phone]            Nov 27, 2018  8:00 AM CST   (Arrive by 7:45 AM)   PHYSICAL with Jia Kirk MD   [facility] [place] ([facility] )    [address] [place] [address]   [phone]              Who to contact     If you have questions or need follow up information about today's clinic visit or your schedule please contact [facility] directly at [phone].  Normal or non-critical lab and imaging results will be communicated to you by MyChart, letter or phone within 4 business days after the clinic has received the results. If you do not hear from us within 7 days, please contact the clinic through NEHPt or phone. If you have a critical or abnormal lab result, we will notify you by phone as soon as possible.  Submit refill requests through [employer] or call your pharmacy and they will forward the refill request to us. Please allow 3 business days for your refill to be completed.          Additional Information About Your Visit        MyChart Information     [employer] gives you secure access to your electronic health record. If you see a primary care provider, you can also send messages to your care team and make appointments. If you have questions, please call your primary care clinic.  If you do not have a primary care provider, please call [phone] and they will assist you.        Care EveryWhere ID     This is your Care EveryWhere ID. This could be used by other organizations to access your [facility] medical records  [id number]        Your Vitals Were     Pulse Temperature Pulse Oximetry BMI (Body Mass Index)          71 97.9  F (36.6  C) (Tympanic) 97% 28.53 kg/m2         Blood Pressure from Last 3 Encounters:   06/22/18 132/84   06/01/18 126/80   05/25/18 124/82    Weight from Last 3 Encounters:   06/22/18 156 lb (70.8 kg)   06/01/18 161 lb 12.8 oz (73.4 kg)   05/25/18 161  lb (73 kg)              We Performed the Following     OB/GYN REFERRAL        Primary Care Provider Office Phone # Fax #    Jia Kirk [phone] [phone]        Equal Access to Services     NIKO STAHL : Hadii meggan ku hadtawandao Soomaali, waaxda luqadaha, qaybta kaalmada adeegyada, waxmaisha arvinin hayaan daliakenyatta thomasneftali sung. So [facility] [phone].    ATENCIÓN: Si habla español, tiene a devlin disposición servicios gratuitos de asistencia lingüística. Llame al [phone].    We comply with applicable federal civil rights laws and [place] laws. We do not discriminate on the basis of race, color, national origin, age, disability, sex, sexual orientation, or gender identity.            Thank you!     Thank you for choosing [facility]  for your care. Our goal is always to provide you with excellent care. Hearing back from our patients is one way we can continue to improve our services. Please take a few minutes to complete the written survey that you may receive in the mail after your visit with us. Thank you!             Your Updated Medication List - Protect others around you: Learn how to safely use, store and throw away your medicines at www.disposemymeds.org.          This list is accurate as of 6/22/18  8:09 AM.  Always use your most recent med list.                   Brand Name Dispense Instructions for use Diagnosis    medroxyPROGESTERone 150 MG/ML injection    DEPO-PROVERA    1 mL    Inject 1 mL (150 mg) into the muscle every 3 months    Encounter for initial prescription of injectable contraceptive       metoclopramide 5 MG tablet    REGLAN    30 tablet    Take 1 tablet (5 mg) by mouth 3 times daily as needed    RLQ abdominal pain       zolpidem 5 MG tablet    AMBIEN    60 tablet    Take 1 tablet (5 mg) by mouth nightly as needed for sleep    Shift work sleep disorder

## 2018-06-22 NOTE — NURSING NOTE
"Chief Complaint   Patient presents with     RECHECK       Initial /84  Pulse 71  Temp 97.9  F (36.6  C) (Tympanic)  Wt 156 lb (70.8 kg)  SpO2 97%  BMI 28.53 kg/m2 Estimated body mass index is 28.53 kg/(m^2) as calculated from the following:    Height as of 6/1/18: 5' 2\" (1.575 m).    Weight as of this encounter: 156 lb (70.8 kg).  Medication Reconciliation: complete    Jenna Kim MA    "

## 2018-06-23 ASSESSMENT — ANXIETY QUESTIONNAIRES: GAD7 TOTAL SCORE: 2

## 2018-06-23 ASSESSMENT — PATIENT HEALTH QUESTIONNAIRE - PHQ9: SUM OF ALL RESPONSES TO PHQ QUESTIONS 1-9: 6

## 2018-07-12 ENCOUNTER — MYC MEDICAL ADVICE (OUTPATIENT)
Dept: FAMILY MEDICINE | Facility: OTHER | Age: 23
End: 2018-07-12

## 2018-07-15 DIAGNOSIS — G47.26 SHIFT WORK SLEEP DISORDER: ICD-10-CM

## 2018-07-16 RX ORDER — ZOLPIDEM TARTRATE 5 MG/1
TABLET ORAL
Qty: 60 TABLET | Refills: 0 | Status: SHIPPED | OUTPATIENT
Start: 2018-07-16 | End: 2019-10-31

## 2018-07-25 ENCOUNTER — MYC REFILL (OUTPATIENT)
Dept: FAMILY MEDICINE | Facility: OTHER | Age: 23
End: 2018-07-25

## 2018-07-25 DIAGNOSIS — G47.26 SHIFT WORK SLEEP DISORDER: ICD-10-CM

## 2018-07-25 RX ORDER — ZOLPIDEM TARTRATE 5 MG/1
TABLET ORAL
Qty: 60 TABLET | Refills: 0 | Status: CANCELLED | OUTPATIENT
Start: 2018-07-25

## 2018-07-26 RX ORDER — ZOLPIDEM TARTRATE 5 MG/1
TABLET ORAL
Qty: 60 TABLET | Refills: 0 | OUTPATIENT
Start: 2018-07-26

## 2018-07-26 NOTE — TELEPHONE ENCOUNTER
[facility]  Original authorizing provider: Malik Bautista MD    May YANCharisma Maria would like a refill of the following medications:  zolpidem (AMBIEN) 5 MG tablet [Malik Bautista MD]    Preferred pharmacy: [facility] [address] [place] [id number] [facility] [address]    Comment:

## 2018-07-26 NOTE — TELEPHONE ENCOUNTER
Ambien   Last Written Prescription Date: 7/16/18  Last Fill Quantity: 60 # of Refills: 0  Last Office Visit: 6/22/18    Gisella Salinas LPN

## 2018-07-26 NOTE — TELEPHONE ENCOUNTER
Patient calling back and stated that [facility] never received the refill on the 16th. Could this please be re sent.

## 2018-07-26 NOTE — TELEPHONE ENCOUNTER
Ambien was filled 7/16/18 for #60 which should last 2 months.  Notified patient that this is too early to fill.

## 2018-08-05 ENCOUNTER — APPOINTMENT (OUTPATIENT)
Dept: CT IMAGING | Facility: HOSPITAL | Age: 23
End: 2018-08-05
Attending: FAMILY MEDICINE

## 2018-08-05 ENCOUNTER — HOSPITAL ENCOUNTER (EMERGENCY)
Facility: HOSPITAL | Age: 23
Discharge: HOME | End: 2018-08-05
Attending: FAMILY MEDICINE | Admitting: FAMILY MEDICINE

## 2018-08-05 VITALS
OXYGEN SATURATION: 99 % | TEMPERATURE: 98 F | DIASTOLIC BLOOD PRESSURE: 74 MMHG | RESPIRATION RATE: 16 BRPM | HEART RATE: 96 BPM | SYSTOLIC BLOOD PRESSURE: 108 MMHG

## 2018-08-05 DIAGNOSIS — S00.03XA CONTUSION OF FACE, SCALP AND NECK, INITIAL ENCOUNTER: ICD-10-CM

## 2018-08-05 DIAGNOSIS — S16.1XXA STRAIN OF NECK MUSCLE, INITIAL ENCOUNTER: ICD-10-CM

## 2018-08-05 DIAGNOSIS — S10.93XA CONTUSION OF FACE, SCALP AND NECK, INITIAL ENCOUNTER: ICD-10-CM

## 2018-08-05 DIAGNOSIS — S09.90XA CLOSED HEAD INJURY, INITIAL ENCOUNTER: ICD-10-CM

## 2018-08-05 DIAGNOSIS — S00.83XA CONTUSION OF FACE, SCALP AND NECK, INITIAL ENCOUNTER: ICD-10-CM

## 2018-08-05 LAB
ALBUMIN SERPL-MCNC: 4.5 G/DL (ref 3.4–5)
ALP SERPL-CCNC: 97 U/L (ref 40–150)
ALT SERPL W P-5'-P-CCNC: 22 U/L (ref 0–50)
ANION GAP SERPL CALCULATED.3IONS-SCNC: 11 MMOL/L (ref 3–14)
AST SERPL W P-5'-P-CCNC: 13 U/L (ref 0–45)
BASOPHILS # BLD AUTO: 0 10E9/L (ref 0–0.2)
BASOPHILS NFR BLD AUTO: 0.5 %
BILIRUB SERPL-MCNC: 0.5 MG/DL (ref 0.2–1.3)
BUN SERPL-MCNC: 12 MG/DL (ref 7–30)
CALCIUM SERPL-MCNC: 9.1 MG/DL (ref 8.5–10.1)
CHLORIDE SERPL-SCNC: 109 MMOL/L (ref 94–109)
CO2 SERPL-SCNC: 22 MMOL/L (ref 20–32)
CREAT SERPL-MCNC: 0.78 MG/DL (ref 0.52–1.04)
DIFFERENTIAL METHOD BLD: NORMAL
EOSINOPHIL # BLD AUTO: 0.2 10E9/L (ref 0–0.7)
EOSINOPHIL NFR BLD AUTO: 2.9 %
ERYTHROCYTE [DISTWIDTH] IN BLOOD BY AUTOMATED COUNT: 12.6 % (ref 10–15)
GFR SERPL CREATININE-BSD FRML MDRD: >90 ML/MIN/1.7M2
GLUCOSE SERPL-MCNC: 99 MG/DL (ref 70–99)
HCG UR QL: NEGATIVE
HCT VFR BLD AUTO: 40.7 % (ref 35–47)
HGB BLD-MCNC: 14.3 G/DL (ref 11.7–15.7)
IMM GRANULOCYTES # BLD: 0 10E9/L (ref 0–0.4)
IMM GRANULOCYTES NFR BLD: 0.3 %
LYMPHOCYTES # BLD AUTO: 1.5 10E9/L (ref 0.8–5.3)
LYMPHOCYTES NFR BLD AUTO: 18.6 %
MCH RBC QN AUTO: 29.8 PG (ref 26.5–33)
MCHC RBC AUTO-ENTMCNC: 35.1 G/DL (ref 31.5–36.5)
MCV RBC AUTO: 85 FL (ref 78–100)
MONOCYTES # BLD AUTO: 0.4 10E9/L (ref 0–1.3)
MONOCYTES NFR BLD AUTO: 5.4 %
NEUTROPHILS # BLD AUTO: 5.7 10E9/L (ref 1.6–8.3)
NEUTROPHILS NFR BLD AUTO: 72.3 %
NRBC # BLD AUTO: 0 10*3/UL
NRBC BLD AUTO-RTO: 0 /100
PLATELET # BLD AUTO: 277 10E9/L (ref 150–450)
POTASSIUM SERPL-SCNC: 3.8 MMOL/L (ref 3.4–5.3)
PROT SERPL-MCNC: 8.1 G/DL (ref 6.8–8.8)
RBC # BLD AUTO: 4.8 10E12/L (ref 3.8–5.2)
SODIUM SERPL-SCNC: 142 MMOL/L (ref 133–144)
WBC # BLD AUTO: 7.9 10E9/L (ref 4–11)

## 2018-08-05 PROCEDURE — 99284 EMERGENCY DEPT VISIT MOD MDM: CPT | Mod: 25

## 2018-08-05 PROCEDURE — 70486 CT MAXILLOFACIAL W/O DYE: CPT | Mod: TC

## 2018-08-05 PROCEDURE — 96374 THER/PROPH/DIAG INJ IV PUSH: CPT

## 2018-08-05 PROCEDURE — 36415 COLL VENOUS BLD VENIPUNCTURE: CPT | Performed by: FAMILY MEDICINE

## 2018-08-05 PROCEDURE — 85025 COMPLETE CBC W/AUTO DIFF WBC: CPT | Performed by: FAMILY MEDICINE

## 2018-08-05 PROCEDURE — 99285 EMERGENCY DEPT VISIT HI MDM: CPT | Mod: 25

## 2018-08-05 PROCEDURE — 99285 EMERGENCY DEPT VISIT HI MDM: CPT | Performed by: FAMILY MEDICINE

## 2018-08-05 PROCEDURE — 72125 CT NECK SPINE W/O DYE: CPT | Mod: TC

## 2018-08-05 PROCEDURE — 70450 CT HEAD/BRAIN W/O DYE: CPT | Mod: TC

## 2018-08-05 PROCEDURE — 81025 URINE PREGNANCY TEST: CPT | Performed by: FAMILY MEDICINE

## 2018-08-05 PROCEDURE — 25000128 H RX IP 250 OP 636: Performed by: FAMILY MEDICINE

## 2018-08-05 PROCEDURE — 80053 COMPREHEN METABOLIC PANEL: CPT | Performed by: FAMILY MEDICINE

## 2018-08-05 RX ORDER — METHOCARBAMOL 750 MG/1
1500 TABLET, FILM COATED ORAL 3 TIMES DAILY
Qty: 60 TABLET | Refills: 0 | Status: SHIPPED | OUTPATIENT
Start: 2018-08-05 | End: 2018-11-13

## 2018-08-05 RX ORDER — METOCLOPRAMIDE HYDROCHLORIDE 5 MG/ML
10 INJECTION INTRAMUSCULAR; INTRAVENOUS ONCE
Status: COMPLETED | OUTPATIENT
Start: 2018-08-05 | End: 2018-08-05

## 2018-08-05 RX ADMIN — METOCLOPRAMIDE 10 MG: 5 INJECTION, SOLUTION INTRAMUSCULAR; INTRAVENOUS at 14:07

## 2018-08-05 ASSESSMENT — ENCOUNTER SYMPTOMS
WEAKNESS: 0
LIGHT-HEADEDNESS: 0
FEVER: 0
VOMITING: 0
NECK PAIN: 1
ACTIVITY CHANGE: 1
DIAPHORESIS: 0
NAUSEA: 1
FATIGUE: 0
BACK PAIN: 0
SHORTNESS OF BREATH: 0
HEADACHES: 1
PSYCHIATRIC NEGATIVE: 1
ABDOMINAL PAIN: 0
DIZZINESS: 0
DYSURIA: 0
COUGH: 0
SPEECH DIFFICULTY: 0
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
## 2018-08-05 NOTE — ED AVS SNAPSHOT
[facility]    [address] [address]    Phone:  [phone]                                       April YURIY Maria   MRN: [id number]    Department:  [facility]   Date of Visit:  8/5/2018           After Visit Summary Signature Page     I have received my discharge instructions, and my questions have been answered. I have discussed any challenges I see with this plan with the nurse or doctor.    ..........................................................................................................................................  Patient/Patient Representative Signature      ..........................................................................................................................................  Patient Representative Print Name and Relationship to Patient    ..................................................               ................................................  Date                                            Time    ..........................................................................................................................................  Reviewed by Signature/Title    ...................................................              ..............................................  Date                                                            Time

## 2018-08-05 NOTE — ED AVS SNAPSHOT
[facility]    [address] [address]    Phone:  [phone]                                       May Maria   MRN: [id number]    Department:  [facility]   Date of Visit:  8/5/2018           Patient Information     Date Of Birth          1995        Your diagnoses for this visit were:     Contusion of face, scalp and neck, initial encounter     Strain of neck muscle, initial encounter     Closed head injury, initial encounter        You were seen by Gris Masters MD.      Follow-up Information     Follow up with Jia Kirk MD In 3 days.    Specialty:  Family Practice    Why:  As needed, If symptoms worsen, or fail to improve    Contact information:    [address] [facility] [address]  [phone]        Discharge References/Attachments     CONCUSSION, AFTER (ENGLISH)    NECK SPRAIN OR STRAIN (ENGLISH)      Your next 10 appointments already scheduled     Aug 16, 2018  9:45 AM CDT   (Arrive by 9:30 AM)   PROCEDURE with Merline Diallo NP   [facility] ([facility] )    [address] [facility] [address]   [phone]            Nov 27, 2018  8:00 AM CST   (Arrive by 7:45 AM)   PHYSICAL with Jia Kirk MD   [facility] ([facility] )    [address] [facility] [address]   [phone]                 Review of your medicines      START taking        Dose / Directions Last dose taken    methocarbamol 750 MG tablet   Commonly known as:  ROBAXIN   Dose:  1500 mg   Quantity:  60 tablet        Take 2 tablets (1,500 mg) by mouth 3 times daily   Refills:  0          Our records show that you are taking the medicines listed below. If these are incorrect, please call your family doctor or clinic.        Dose / Directions Last dose taken    medroxyPROGESTERone 150 MG/ML injection   Commonly known as:  DEPO-PROVERA   Dose:  150 mg   Quantity:  1 mL        Inject 1 mL  (150 mg) into the muscle every 3 months   Refills:  0        metoclopramide 5 MG tablet   Commonly known as:  REGLAN   Dose:  5 mg   Quantity:  30 tablet        Take 1 tablet (5 mg) by mouth 3 times daily as needed   Refills:  0        zolpidem 5 MG tablet   Commonly known as:  AMBIEN   Quantity:  60 tablet        TAKE ONE-HALF TO ONE TABLET BY MOUTH NIGHTLY AS NEEDED FOR SLEEP   Refills:  0                Prescriptions were sent or printed at these locations (1 Prescription)                   [id number] - [place], [place] - [address] [facility] [address]   [address] [address], [place] [address]    Telephone:  [phone]   Fax:  [phone]   Hours:                  E-Prescribed (1 of 1)         methocarbamol (ROBAXIN) 750 MG tablet                Procedures and tests performed during your visit     CBC with platelets differential    CT Cervical Spine w/o Contrast    CT Facial Bones without Contrast    CT Head w/o Contrast    Comprehensive metabolic panel    [place] Coma Scale (GCS) assessment    HCG qualitative urine    Peripheral IV: Standard    Pulse oximetry nursing      Orders Needing Specimen Collection     None      Pending Results     Date and Time Order Name Status Description    8/5/2018 1340 CT Facial Bones without Contrast In process     8/5/2018 1340 CT Cervical Spine w/o Contrast In process     8/5/2018 1340 CT Head w/o Contrast In process             Pending Culture Results     No orders found from 8/3/2018 to 8/6/2018.            Thank you for choosing [facility]       Thank you for choosing [facility] for your care. Our goal is always to provide you with excellent care. Hearing back from our patients is one way we can continue to improve our services. Please take a few minutes to complete the written survey that you may receive in the mail after you visit with us. Thank you!        [employer] Information     [employer] gives you secure access to your electronic health record. If you see a primary care provider, you can also  send messages to your care team and make appointments. If you have questions, please call your primary care clinic.  If you do not have a primary care provider, please call [phone] and they will assist you.        Care EveryWhere ID     This is your Care EveryWhere ID. This could be used by other organizations to access your [facility] medical records  [id number]        Equal Access to Services     NIKO STAHL : Jose Luis Fuller, wamarvin beltran, qaybta kaalmamaximo leggett, madeline irby . So [facility] [phone].    ATENCIÓN: Si habla español, tiene a devlin disposición servicios gratuitos de asistencia lingüística. Noah al [phone].    We comply with applicable federal civil rights laws and [place] laws. We do not discriminate on the basis of race, color, national origin, age, disability, sex, sexual orientation, or gender identity.            After Visit Summary       This is your record. Keep this with you and show to your community pharmacist(s) and doctor(s) at your next visit.
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
## 2018-08-14 ENCOUNTER — OFFICE VISIT (OUTPATIENT)
Dept: OBGYN | Facility: OTHER | Age: 23
End: 2018-08-14
Attending: NURSE PRACTITIONER
Payer: COMMERCIAL

## 2018-08-14 VITALS
DIASTOLIC BLOOD PRESSURE: 64 MMHG | HEART RATE: 87 BPM | WEIGHT: 156 LBS | HEIGHT: 62 IN | SYSTOLIC BLOOD PRESSURE: 106 MMHG | BODY MASS INDEX: 28.71 KG/M2 | OXYGEN SATURATION: 99 %

## 2018-08-14 DIAGNOSIS — Z30.42 ENCOUNTER FOR SURVEILLANCE OF INJECTABLE CONTRACEPTIVE: Primary | ICD-10-CM

## 2018-08-14 DIAGNOSIS — Z30.017 ENCOUNTER FOR INITIAL PRESCRIPTION OF IMPLANTABLE SUBDERMAL CONTRACEPTIVE: ICD-10-CM

## 2018-08-14 PROCEDURE — 96372 THER/PROPH/DIAG INJ SC/IM: CPT | Performed by: NURSE PRACTITIONER

## 2018-08-14 ASSESSMENT — PAIN SCALES - GENERAL: PAINLEVEL: NO PAIN (0)

## 2018-08-14 NOTE — NURSING NOTE
"Chief Complaint   Patient presents with     Contraception     Nexplanon Placement       Initial /64 (BP Location: Right arm, Patient Position: Sitting, Cuff Size: Adult Regular)  Pulse 87  Ht 5' 2\" (1.575 m)  Wt 156 lb (70.8 kg)  SpO2 99%  BMI 28.53 kg/m2 Estimated body mass index is 28.53 kg/(m^2) as calculated from the following:    Height as of this encounter: 5' 2\" (1.575 m).    Weight as of this encounter: 156 lb (70.8 kg).  Medication Reconciliation: complete    Lety Sanders LPN    "

## 2018-08-14 NOTE — MR AVS SNAPSHOT
After Visit Summary   8/14/2018    May Maria    MRN: [id number]           Patient Information     Date Of Birth          1995        Visit Information        Provider Department      8/14/2018 10:45 AM Merline Diallo NP [facility] [place]        Today's Diagnoses     Encounter for surveillance of injectable contraceptive    -  1      Care Instructions    Follow up as needed          Follow-ups after your visit        Your next 10 appointments already scheduled     Nov 27, 2018  8:00 AM CST   (Arrive by 7:45 AM)   PHYSICAL with Jia Kirk MD   [facility] [place] ([facility] [place] )    [address]  [place] [address]   [phone]              Who to contact     If you have questions or need follow up information about today's clinic visit or your schedule please contact [facility] directly at [phone].  Normal or non-critical lab and imaging results will be communicated to you by MyChart, letter or phone within 4 business days after the clinic has received the results. If you do not hear from us within 7 days, please contact the clinic through MyChart or phone. If you have a critical or abnormal lab result, we will notify you by phone as soon as possible.  Submit refill requests through [employer] or call your pharmacy and they will forward the refill request to us. Please allow 3 business days for your refill to be completed.          Additional Information About Your Visit        MyChart Information     [employer] gives you secure access to your electronic health record. If you see a primary care provider, you can also send messages to your care team and make appointments. If you have questions, please call your primary care clinic.  If you do not have a primary care provider, please call [phone] and they will assist you.        Care EveryWhere ID     This is your Care EveryWhere ID. This could be used by other organizations to access  "your [facility] medical records  [id number]        Your Vitals Were     Pulse Height Pulse Oximetry BMI (Body Mass Index)          87 5' 2\" (1.575 m) 99% 28.53 kg/m2         Blood Pressure from Last 3 Encounters:   08/14/18 106/64   08/05/18 108/74   06/22/18 132/84    Weight from Last 3 Encounters:   08/14/18 156 lb (70.8 kg)   06/22/18 156 lb (70.8 kg)   06/01/18 161 lb 12.8 oz (73.4 kg)              We Performed the Following     C Medroxyprogesterone inj/1mg     INJECTION INTRAMUSCULAR OR SUB-Q        Primary Care Provider Office Phone # Fax #    Jia Kirk [phone] [address] [phone]        Equal Access to Services     [facility] : Hadii meggan maharaj hadasho Sojossy, waaxda luqadaha, qaybta kaalmada betsey, madeline irby . So [facility] [phone].    ATENCIÓN: Si habla español, tiene a devlin disposición servicios gratuitos de asistencia lingüística. Noah al [phone].    We comply with applicable federal civil rights laws and [place] laws. We do not discriminate on the basis of race, color, national origin, age, disability, sex, sexual orientation, or gender identity.            Thank you!     Thank you for choosing [facility]  for your care. Our goal is always to provide you with excellent care. Hearing back from our patients is one way we can continue to improve our services. Please take a few minutes to complete the written survey that you may receive in the mail after your visit with us. Thank you!             Your Updated Medication List - Protect others around you: Learn how to safely use, store and throw away your medicines at www.disposemymeds.org.          This list is accurate as of 8/14/18 11:59 PM.  Always use your most recent med list.                   Brand Name Dispense Instructions for use Diagnosis    medroxyPROGESTERone 150 MG/ML injection    DEPO-PROVERA    1 mL    Inject 1 mL (150 mg) into the " muscle every 3 months    Encounter for initial prescription of injectable contraceptive       methocarbamol 750 MG tablet    ROBAXIN    60 tablet    Take 2 tablets (1,500 mg) by mouth 3 times daily        metoclopramide 5 MG tablet    REGLAN    30 tablet    Take 1 tablet (5 mg) by mouth 3 times daily as needed    RLQ abdominal pain       zolpidem 5 MG tablet    AMBIEN    60 tablet    TAKE ONE-HALF TO ONE TABLET BY MOUTH NIGHTLY AS NEEDED FOR SLEEP    Shift work sleep disorder

## 2018-08-14 NOTE — PROGRESS NOTES
The following medication was given:     MEDICATION: Depo Provera 150mg  ROUTE: IM  SITE: Deltoid - Left  DOSE: 1 mL  LOT #: [id number]  :  [employer]   EXPIRATION DATE:  4/30/2020  NDC#: [address]-5  Patient will return between 10/30/18-11/13/18.    Lety Sanders

## 2018-08-15 NOTE — PROGRESS NOTES
"[facility]                HPI   April is presenting with questions on contraception.  She is currently using Depo Provera but would like information on all other options.  We have discussed all contraceptive options  In depth including pills, injections, patches, implants, vaginal rings,and IUD's. She has chosen to stick with DepoProvera at this time.               Medications:     Current Outpatient Prescriptions Ordered in Epic   Medication     medroxyPROGESTERone (DEPO-PROVERA) 150 MG/ML injection     methocarbamol (ROBAXIN) 750 MG tablet     metoclopramide (REGLAN) 5 MG tablet     zolpidem (AMBIEN) 5 MG tablet     No current Epic-ordered facility-administered medications on file.                 Allergies:   Pineapple         Review of Systems:   The 5 point Review of Systems is negative other than noted in the HPI                     Physical Exam:   Blood pressure 106/64, pulse 87, height 5' 2\" (1.575 m), weight 156 lb (70.8 kg), SpO2 99 %, not currently breastfeeding.  Constitutional:   awake, alert, cooperative, no apparent distress, and appears stated age              Assessment and Plan:   Contraceptive counseling - as above  Contraceptive surveillance - Depo Provera as documented     LANRE Hart  8/15/2018  11:45 AM  "

## 2018-11-13 ENCOUNTER — HOSPITAL ENCOUNTER (EMERGENCY)
Facility: HOSPITAL | Age: 23
Discharge: HOME | End: 2018-11-13
Attending: FAMILY MEDICINE | Admitting: FAMILY MEDICINE
Payer: COMMERCIAL

## 2018-11-13 VITALS
WEIGHT: 153 LBS | TEMPERATURE: 97.9 F | DIASTOLIC BLOOD PRESSURE: 81 MMHG | RESPIRATION RATE: 16 BRPM | BODY MASS INDEX: 28.16 KG/M2 | OXYGEN SATURATION: 100 % | HEART RATE: 90 BPM | SYSTOLIC BLOOD PRESSURE: 117 MMHG | HEIGHT: 62 IN

## 2018-11-13 DIAGNOSIS — N39.0 ACUTE UTI: ICD-10-CM

## 2018-11-13 LAB
ALBUMIN UR-MCNC: 10 MG/DL
ANION GAP SERPL CALCULATED.3IONS-SCNC: 7 MMOL/L (ref 3–14)
APPEARANCE UR: ABNORMAL
BACTERIA #/AREA URNS HPF: ABNORMAL /HPF
BILIRUB UR QL STRIP: NEGATIVE
BUN SERPL-MCNC: 16 MG/DL (ref 7–30)
CALCIUM SERPL-MCNC: 8.5 MG/DL (ref 8.5–10.1)
CHLORIDE SERPL-SCNC: 111 MMOL/L (ref 94–109)
CO2 SERPL-SCNC: 22 MMOL/L (ref 20–32)
COLOR UR AUTO: YELLOW
CREAT SERPL-MCNC: 0.9 MG/DL (ref 0.52–1.04)
GFR SERPL CREATININE-BSD FRML MDRD: 79 ML/MIN/1.7M2
GLUCOSE SERPL-MCNC: 95 MG/DL (ref 70–99)
GLUCOSE UR STRIP-MCNC: NEGATIVE MG/DL
HCG UR QL: NEGATIVE
HGB UR QL STRIP: ABNORMAL
KETONES UR STRIP-MCNC: NEGATIVE MG/DL
LEUKOCYTE ESTERASE UR QL STRIP: ABNORMAL
MUCOUS THREADS #/AREA URNS LPF: PRESENT /LPF
NITRATE UR QL: POSITIVE
PH UR STRIP: 7 PH (ref 4.7–8)
POTASSIUM SERPL-SCNC: 4 MMOL/L (ref 3.4–5.3)
RBC #/AREA URNS AUTO: 21 /HPF (ref 0–2)
SODIUM SERPL-SCNC: 140 MMOL/L (ref 133–144)
SOURCE: ABNORMAL
SP GR UR STRIP: 1.02 (ref 1–1.03)
UROBILINOGEN UR STRIP-MCNC: NORMAL MG/DL (ref 0–2)
WBC #/AREA URNS AUTO: 19 /HPF (ref 0–5)

## 2018-11-13 PROCEDURE — 99283 EMERGENCY DEPT VISIT LOW MDM: CPT

## 2018-11-13 PROCEDURE — 81025 URINE PREGNANCY TEST: CPT | Performed by: FAMILY MEDICINE

## 2018-11-13 PROCEDURE — 36415 COLL VENOUS BLD VENIPUNCTURE: CPT | Performed by: FAMILY MEDICINE

## 2018-11-13 PROCEDURE — 99283 EMERGENCY DEPT VISIT LOW MDM: CPT | Performed by: FAMILY MEDICINE

## 2018-11-13 PROCEDURE — 81001 URINALYSIS AUTO W/SCOPE: CPT | Performed by: FAMILY MEDICINE

## 2018-11-13 PROCEDURE — 80048 BASIC METABOLIC PNL TOTAL CA: CPT | Performed by: FAMILY MEDICINE

## 2018-11-13 RX ORDER — CIPROFLOXACIN 500 MG/1
500 TABLET, FILM COATED ORAL 2 TIMES DAILY
Qty: 10 TABLET | Refills: 0 | Status: SHIPPED | OUTPATIENT
Start: 2018-11-13 | End: 2019-03-11

## 2018-11-13 RX ORDER — TRAMADOL HYDROCHLORIDE 50 MG/1
50-100 TABLET ORAL EVERY 6 HOURS PRN
Qty: 15 TABLET | Refills: 0 | Status: SHIPPED | OUTPATIENT
Start: 2018-11-13 | End: 2019-01-15

## 2018-11-13 ASSESSMENT — ENCOUNTER SYMPTOMS
SHORTNESS OF BREATH: 0
DYSURIA: 1
NEUROLOGICAL NEGATIVE: 1
ROS GI COMMENTS: SEE HPI
ACTIVITY CHANGE: 1
FLANK PAIN: 1
FATIGUE: 0
ABDOMINAL PAIN: 1
DYSPHORIC MOOD: 1
DIAPHORESIS: 0
FEVER: 0
HEMATURIA: 0

## 2018-11-13 NOTE — ED AVS SNAPSHOT
[facility]    [address] [address]    Phone:  [phone]                                       May Maria   MRN: [id number]    Department[facility]  [facility]   Date of Visit:  11/13/2018           Patient Information     Date Of Birth          1995        Your diagnoses for this visit were:     Acute UTI        You were seen by Gris Masters MD.      Follow-up Information     Follow up with Jia Kirk MD In 2 days.    Specialty:  Family Practice    Contact information:    [address] [facility] [address]  [phone]          Discharge Instructions         Understanding Urinary Tract Infections (UTIs)  Most UTIs are caused by bacteria, although they may also be caused by viruses or fungi. Bacteria from the bowel are the most common source of infection. The infection may start because of any of the following:    Sexual activity. During sex, bacteria can travel from the penis, vagina, or rectum into the urethra.     Bacteria on the skin outside the rectum may travel into the urethra. This is more common in women since the rectum and urethra are closer to each other than in men. Wiping from front to back after using the toilet and keeping the area clean can help prevent germs from getting to the urethra.    Blockage of urine flow through the urinary tract. If urine sits too long, germs may start to grow out of control.      Parts of the urinary tract  The infection can occur in any part of the urinary tract.    The kidneys collect and store urine.    The ureters carry urine from the kidneys to the bladder.    The bladder holds urine until you are ready to let it out.    The urethra carries urine from the bladder out of the body. It is shorter in women, so bacteria can move through it more easily. The urethra is longer in men, so a UTI is less likely to reach the bladder or kidneys in men.  Date Last Reviewed: 1/1/2017 2000-2018 [employer]  [employer]. [address] [place] [address]. All rights reserved. This information is not intended as a substitute for professional medical care. Always follow your healthcare professional's instructions.          Your next 10 appointments already scheduled     Nov 27, 2018  8:00 AM CST   (Arrive by 7:45 AM)   PHYSICAL with Jia Kirk MD   [facility] ([facility] )    [address]  [place] [address]   [phone]                 Review of your medicines      START taking        Dose / Directions Last dose taken    ciprofloxacin 500 MG tablet   Commonly known as:  CIPRO   Dose:  500 mg   Quantity:  10 tablet        Take 1 tablet (500 mg) by mouth 2 times daily for 5 days   Refills:  0        traMADol 50 MG tablet   Commonly known as:  ULTRAM   Dose:   mg   Quantity:  15 tablet        Take 1-2 tablets ( mg) by mouth every 6 hours as needed for pain   Refills:  0          Our records show that you are taking the medicines listed below. If these are incorrect, please call your family doctor or clinic.        Dose / Directions Last dose taken    medroxyPROGESTERone 150 MG/ML injection   Commonly known as:  DEPO-PROVERA   Dose:  150 mg   Quantity:  1 mL        Inject 1 mL (150 mg) into the muscle every 3 months   Refills:  0        metoclopramide 5 MG tablet   Commonly known as:  REGLAN   Dose:  5 mg   Quantity:  30 tablet        Take 1 tablet (5 mg) by mouth 3 times daily as needed   Refills:  0        zolpidem 5 MG tablet   Commonly known as:  AMBIEN   Quantity:  60 tablet        TAKE ONE-HALF TO ONE TABLET BY MOUTH NIGHTLY AS NEEDED FOR SLEEP   Refills:  0                Information about OPIOIDS     PRESCRIPTION OPIOIDS: WHAT YOU NEED TO KNOW   We gave you an opioid (narcotic) pain medicine. It is important to manage your pain, but opioids are not always the best choice. You should first try all the other options your care team  gave you. Take this medicine for as short a time (and as few doses) as possible.    Some activities can increase your pain, such as bandage changes or therapy sessions. It may help to take your pain medicine 30 to 60 minutes before these activities. Reduce your stress by getting enough sleep, working on hobbies you enjoy and practicing relaxation or meditation. Talk to your care team about ways to manage your pain beyond prescription opioids.    These medicines have risks:    DO NOT drive when on new or higher doses of pain medicine. These medicines can affect your alertness and reaction times, and you could be arrested for driving under the influence (DUI). If you need to use opioids long-term, talk to your care team about driving.    DO NOT operate heavy machinery    DO NOT do any other dangerous activities while taking these medicines.    DO NOT drink any alcohol while taking these medicines.     If the opioid prescribed includes acetaminophen, DO NOT take with any other medicines that contain acetaminophen. Read all labels carefully. Look for the word  acetaminophen  or  Tylenol.  Ask your pharmacist if you have questions or are unsure.    You can get addicted to pain medicines, especially if you have a history of addiction (chemical, alcohol or substance dependence). Talk to your care team about ways to reduce this risk.    All opioids tend to cause constipation. Drink plenty of water and eat foods that have a lot of fiber, such as fruits, vegetables, prune juice, apple juice and high-fiber cereal. Take a laxative (Miralax, milk of magnesia, Colace, Senna) if you don t move your bowels at least every other day. Other side effects include upset stomach, sleepiness, dizziness, throwing up, tolerance (needing more of the medicine to have the same effect), physical dependence and slowed breathing.    Store your pills in a secure place, locked if possible. We will not replace any lost or stolen medicine. If you  don t finish your medicine, please throw away (dispose) as directed by your pharmacist. The [place] [employer]ution [employer] has more information about safe disposal: https://www.pca.state.[place]/living-green/managing-unwanted-medications        Prescriptions were sent or printed at these locations (2 Prescriptions)                   [facility] [address] [address]    [address] , [place] [address]    Telephone:  [phone]   Fax:  [phone]   Hours:                  E-Prescribed (1 of 2)         ciprofloxacin (CIPRO) 500 MG tablet                 Printed at Department/Unit printer (1 of 2)         traMADol (ULTRAM) 50 MG tablet                Procedures and tests performed during your visit     Basic metabolic panel    HCG qualitative urine    UA reflex to Microscopic      Orders Needing Specimen Collection     None      Pending Results     No orders found from 11/11/2018 to 11/14/2018.            Pending Culture Results     No orders found from 11/11/2018 to 11/14/2018.            Thank you for choosing [facility]       Thank you for choosing [facility] for your care. Our goal is always to provide you with excellent care. Hearing back from our patients is one way we can continue to improve our services. Please take a few minutes to complete the written survey that you may receive in the mail after you visit with us. Thank you!        [employer] Information     [employer] gives you secure access to your electronic health record. If you see a primary care provider, you can also send messages to your care team and make appointments. If you have questions, please call your primary care clinic.  If you do not have a primary care provider, please call [phone] and they will assist you.        Care EveryWhere ID     This is your Care EveryWhere ID. This could be used by other organizations to access your [facility] medical records  [id number]        Equal Access to Services     NIKO BARNEY: Jose Luis  meggan Fuller, holli beltran, madeline swenson. So [facility] [phone].    ATENCIÓN: Si habla español, tiene a devlin disposición servicios gratuitos de asistencia lingüística. Llame al [phone].    We comply with applicable federal civil rights laws and [place] laws. We do not discriminate on the basis of race, color, national origin, age, disability, sex, sexual orientation, or gender identity.            After Visit Summary       This is your record. Keep this with you and show to your community pharmacist(s) and doctor(s) at your next visit.

## 2018-11-13 NOTE — ED NOTES
Pt discharged to home with note for work, script for ultram and instruct to follow up with Reagan in two days and start cipro for uti

## 2018-11-13 NOTE — ED PROVIDER NOTES
"  History     Chief Complaint   Patient presents with     Pelvic Pain     HPI  May Maria is a 22 year old female who presents to the ER with pelvic pain radiating up her right side.  It does not approach her kidney but follows the line of the ureter on the right side.  Kidney on the right is not functioning but it is still present.  She has not had a fever, but has been up since 2:00 this morning due to the pain.    Problem List:    There are no active problems to display for this patient.       Past Medical History:    Past Medical History:   Diagnosis Date     Atrophic kidney      Shift work sleep disorder        Past Surgical History:    Past Surgical History:   Procedure Laterality Date     PE TUBES Bilateral     years        Family History:    Family History   Problem Relation Age of Onset     Other - See Comments Mother      bells palsy post auto accident     HEART DISEASE Father        Social History:  Marital Status:  Single [1]  Social History   Substance Use Topics     Smoking status: Former Smoker     Smokeless tobacco: Never Used     Alcohol use No        Medications:      ciprofloxacin (CIPRO) 500 MG tablet   metoclopramide (REGLAN) 5 MG tablet   traMADol (ULTRAM) 50 MG tablet   zolpidem (AMBIEN) 5 MG tablet   medroxyPROGESTERone (DEPO-PROVERA) 150 MG/ML injection         Review of Systems   Constitutional: Positive for activity change. Negative for diaphoresis, fatigue and fever.   HENT: Negative.    Respiratory: Negative for shortness of breath.    Cardiovascular: Negative for chest pain.   Gastrointestinal: Positive for abdominal pain.        See HPI   Genitourinary: Positive for dysuria and flank pain. Negative for hematuria and urgency.   Skin: Negative.    Neurological: Negative.    Psychiatric/Behavioral: Positive for dysphoric mood.       Physical Exam   BP: 132/97  Heart Rate: 96  Temp: 99.1  F (37.3  C)  Resp: 16  Height: 157.5 cm (5' 2\")  Weight: 69.4 kg (153 lb)  SpO2: 99 " %      Physical Exam   Constitutional: She is oriented to person, place, and time. She appears well-developed and well-nourished. No distress.   HENT:   Head: Normocephalic and atraumatic.   Neck: Normal range of motion. Neck supple.   Cardiovascular: Normal rate, regular rhythm, normal heart sounds and intact distal pulses.    No murmur heard.  Pulmonary/Chest: Effort normal and breath sounds normal. No respiratory distress.   Abdominal: Soft. Bowel sounds are normal. She exhibits no distension. There is no tenderness.   Musculoskeletal: Normal range of motion. She exhibits no edema.   Neurological: She is alert and oriented to person, place, and time.   Skin: Skin is warm and dry.   Psychiatric: Judgment normal. Her affect is blunt. Cognition and memory are normal.   Nursing note and vitals reviewed.      ED Course     ED Course     Procedures    Results for orders placed or performed during the hospital encounter of 11/13/18 (from the past 24 hour(s))   HCG qualitative urine   Result Value Ref Range    HCG Qual Urine Negative NEG^Negative   UA reflex to Microscopic   Result Value Ref Range    Color Urine Yellow     Appearance Urine Slightly Cloudy     Glucose Urine Negative NEG^Negative mg/dL    Bilirubin Urine Negative NEG^Negative    Ketones Urine Negative NEG^Negative mg/dL    Specific Gravity Urine 1.018 1.003 - 1.035    Blood Urine Small (A) NEG^Negative    pH Urine 7.0 4.7 - 8.0 pH    Protein Albumin Urine 10 (A) NEG^Negative mg/dL    Urobilinogen mg/dL Normal 0.0 - 2.0 mg/dL    Nitrite Urine Positive (A) NEG^Negative    Leukocyte Esterase Urine Moderate (A) NEG^Negative    Source Unspecified Urine     RBC Urine 21 (H) 0 - 2 /HPF    WBC Urine 19 (H) 0 - 5 /HPF    Bacteria Urine Moderate (A) NEG^Negative /HPF    Mucous Urine Present (A) NEG^Negative /LPF   Basic metabolic panel   Result Value Ref Range    Sodium 140 133 - 144 mmol/L    Potassium 4.0 3.4 - 5.3 mmol/L    Chloride 111 (H) 94 - 109 mmol/L     Carbon Dioxide 22 20 - 32 mmol/L    Anion Gap 7 3 - 14 mmol/L    Glucose 95 70 - 99 mg/dL    Urea Nitrogen 16 7 - 30 mg/dL    Creatinine 0.90 0.52 - 1.04 mg/dL    GFR Estimate 79 >60 mL/min/1.7m2    GFR Estimate If Black >90 >60 mL/min/1.7m2    Calcium 8.5 8.5 - 10.1 mg/dL       Medications - No data to display    Assessments & Plan (with Medical Decision Making)    Urine shows positive nitrites, moderate leukocyte esterase, white cells, red cells, and bacteria.  Patient be treated with Cipro for her urinary tract infection and given a small supply of Ultram for pain as she is not able to take NSAIDs due to her single kidney.  She should follow-up with primary care in 2 days if symptoms have not completely gone away.  Work note given for today.  Patient also states that she is having depression secondary to her Depo-Provera and is feeling very ambivalent about having additional injections.  This information was forwarded to Dr. Kirk.    I have reviewed the nursing notes.    I have reviewed the findings, diagnosis, plan and need for follow up with the patient.  New Prescriptions    CIPROFLOXACIN (CIPRO) 500 MG TABLET    Take 1 tablet (500 mg) by mouth 2 times daily for 5 days    TRAMADOL (ULTRAM) 50 MG TABLET    Take 1-2 tablets ( mg) by mouth every 6 hours as needed for pain       Final diagnoses:   Acute UTI       11/13/2018   [facility]     Gris Masters MD  11/13/18 1010       Gris Masters MD  11/13/18 1012

## 2018-11-13 NOTE — ED AVS SNAPSHOT
[facility]    [address] [address]    Phone:  [phone]                                       April YURIY Maria   MRN: [id number]    Department:  [facility]   Date of Visit:  11/13/2018           After Visit Summary Signature Page     I have received my discharge instructions, and my questions have been answered. I have discussed any challenges I see with this plan with the nurse or doctor.    ..........................................................................................................................................  Patient/Patient Representative Signature      ..........................................................................................................................................  Patient Representative Print Name and Relationship to Patient    ..................................................               ................................................  Date                                   Time    ..........................................................................................................................................  Reviewed by Signature/Title    ...................................................              ..............................................  Date                                               Time          22EPIC Rev 08/18

## 2018-11-13 NOTE — ED NOTES
Pt to rm 6 in er, urine spec obtained with no c/o dysuria, Pt states has been having rt side low abd pain off and on for 2 months,l but this am since 2 am has gotten worse and won't go away after tylenol. Rates pain at 8/10 and is sharp shooting pain rt low quad. Gown on and call light in reach

## 2018-11-13 NOTE — LETTER
[facility]  [address] [address]  Phone: [phone]    November 13, 2018        May Maria  [address] [facility] [address]          To whom it may concern:    RE: May Maria was seen in the emergency room and should not return to work today.    Please contact me for questions or concerns.      Sincerely,        Gris Nunes MD

## 2018-11-13 NOTE — DISCHARGE INSTRUCTIONS
Understanding Urinary Tract Infections (UTIs)  Most UTIs are caused by bacteria, although they may also be caused by viruses or fungi. Bacteria from the bowel are the most common source of infection. The infection may start because of any of the following:    Sexual activity. During sex, bacteria can travel from the penis, vagina, or rectum into the urethra.     Bacteria on the skin outside the rectum may travel into the urethra. This is more common in women since the rectum and urethra are closer to each other than in men. Wiping from front to back after using the toilet and keeping the area clean can help prevent germs from getting to the urethra.    Blockage of urine flow through the urinary tract. If urine sits too long, germs may start to grow out of control.      Parts of the urinary tract  The infection can occur in any part of the urinary tract.    The kidneys collect and store urine.    The ureters carry urine from the kidneys to the bladder.    The bladder holds urine until you are ready to let it out.    The urethra carries urine from the bladder out of the body. It is shorter in women, so bacteria can move through it more easily. The urethra is longer in men, so a UTI is less likely to reach the bladder or kidneys in men.  Date Last Reviewed: 1/1/2017 2000-2018 [employer]. [address] [place] [address], [place] [address]. All rights reserved. This information is not intended as a substitute for professional medical care. Always follow your healthcare professional's instructions.         room air

## 2019-01-08 NOTE — PROGRESS NOTES
SUBJECTIVE:   May Maria is a 23 year old female who presents to clinic today for the following health issues:      Heart Palpitations      Duration: about a month     Description (location/character/radiation): palpitations daily, feeling fast fluttering for 30 seconds or more, states it hurts and she has to sit down and catch her breath, notices it happening with more movement but also notices it at night     Intensity:  moderate    Accompanying signs and symptoms: above     History (similar episodes/previous evaluation): has had this in the past, but has now become a daily issue     Precipitating or alleviating factors: movement     Therapies tried and outcome: None     Seems to be increasing in frequency recently. Last week lasted long enough to make her lightheaded and had to sit down. Has never had these to this degree in the past. No fevers, chills, nausea, vomiting, diarrhea.     Continues to have her right lower abd pain, now chronic. Was seen in nephrology and referred to Urology, but has not been able to schedule. Has had persisting hematuria that is mild. Recent UTI again 11/13. Treated with Cipro.     Problem list and histories reviewed & adjusted, as indicated.  Additional history: as documented    Labs reviewed in EPIC    Reviewed and updated as needed this visit by clinical staff  Tobacco  Allergies  Meds  Problems  Med Hx  Surg Hx  Fam Hx  Soc Hx        Reviewed and updated as needed this visit by Provider  Tobacco  Allergies  Meds  Problems  Med Hx  Surg Hx  Fam Hx  Soc Hx          ROS:  Constitutional, HEENT, cardiovascular, pulmonary, gi and gu systems are negative, except as otherwise noted.    OBJECTIVE:     /84 (Patient Position: Sitting)   Pulse 91   Temp 98.3  F (36.8  C) (Tympanic)   Wt 69.4 kg (153 lb)   SpO2 98%   BMI 27.98 kg/m    Body mass index is 27.98 kg/m .  GENERAL: healthy, alert and no distress  NECK: no adenopathy, no asymmetry, masses, or scars  and thyroid normal to palpation  RESP: lungs clear to auscultation - no rales, rhonchi or wheezes  CV: regular rate and rhythm, normal S1 S2, no S3 or S4, no murmur, click or rub, no peripheral edema and peripheral pulses strong  ABDOMEN: soft, mild ttp in the RLQ, no epigastic, RUQ pain, no flank ttp or suprapubic pain.     Diagnostic Test Results:  No results found for this or any previous visit (from the past 24 hour(s)).    ASSESSMENT/PLAN:       Palpitations  Worsening over the last month. EKG wnl today. Labs and monitor ordered. Follow up 1 month. If syncope or sustained palpitations needs to be seen asap.   - EKG 12-lead complete w/read - (Clinic Performed)  - CBC with platelets and differential  - Comprehensive metabolic panel (BMP + Alb, Alk Phos, ALT, AST, Total. Bili, TP)  - TSH with free T4 reflex  - Zio Patch Holter Adult Pediatric Greater than 48 hrs; Future    Recurrent UTI / Abdominal pain, right lower quadrant / Microscopic hematuria  Urinary symptoms resolved. Placed repeat referral to Urology (initially placed by nephrology). Recheck UA today.   - UROLOGY ADULT REFERRAL  - UA reflex to Microscopic and Culture - JOSÉ MIGUEL Kirk MD  [facility] - JOSÉ MIGUEL

## 2019-01-15 ENCOUNTER — OFFICE VISIT (OUTPATIENT)
Dept: FAMILY MEDICINE | Facility: OTHER | Age: 24
End: 2019-01-15
Attending: FAMILY MEDICINE
Payer: COMMERCIAL

## 2019-01-15 VITALS
SYSTOLIC BLOOD PRESSURE: 126 MMHG | BODY MASS INDEX: 27.98 KG/M2 | HEART RATE: 91 BPM | OXYGEN SATURATION: 98 % | TEMPERATURE: 98.3 F | WEIGHT: 153 LBS | DIASTOLIC BLOOD PRESSURE: 84 MMHG

## 2019-01-15 DIAGNOSIS — R10.31 ABDOMINAL PAIN, RIGHT LOWER QUADRANT: ICD-10-CM

## 2019-01-15 DIAGNOSIS — R00.2 PALPITATIONS: Primary | ICD-10-CM

## 2019-01-15 DIAGNOSIS — R31.29 MICROSCOPIC HEMATURIA: ICD-10-CM

## 2019-01-15 DIAGNOSIS — N39.0 RECURRENT UTI: ICD-10-CM

## 2019-01-15 LAB
ALBUMIN SERPL-MCNC: 4.2 G/DL (ref 3.4–5)
ALBUMIN UR-MCNC: NEGATIVE MG/DL
ALP SERPL-CCNC: 97 U/L (ref 40–150)
ALT SERPL W P-5'-P-CCNC: 20 U/L (ref 0–50)
ANION GAP SERPL CALCULATED.3IONS-SCNC: 6 MMOL/L (ref 3–14)
APPEARANCE UR: CLEAR
AST SERPL W P-5'-P-CCNC: 8 U/L (ref 0–45)
BACTERIA #/AREA URNS HPF: ABNORMAL /HPF
BASOPHILS # BLD AUTO: 0.1 10E9/L (ref 0–0.2)
BASOPHILS NFR BLD AUTO: 0.8 %
BILIRUB SERPL-MCNC: 0.3 MG/DL (ref 0.2–1.3)
BILIRUB UR QL STRIP: NEGATIVE
BUN SERPL-MCNC: 13 MG/DL (ref 7–30)
CALCIUM SERPL-MCNC: 8.7 MG/DL (ref 8.5–10.1)
CHLORIDE SERPL-SCNC: 107 MMOL/L (ref 94–109)
CO2 SERPL-SCNC: 26 MMOL/L (ref 20–32)
COLOR UR AUTO: ABNORMAL
CREAT SERPL-MCNC: 0.77 MG/DL (ref 0.52–1.04)
DIFFERENTIAL METHOD BLD: NORMAL
EOSINOPHIL # BLD AUTO: 0.3 10E9/L (ref 0–0.7)
EOSINOPHIL NFR BLD AUTO: 4.9 %
ERYTHROCYTE [DISTWIDTH] IN BLOOD BY AUTOMATED COUNT: 12.7 % (ref 10–15)
GFR SERPL CREATININE-BSD FRML MDRD: >90 ML/MIN/{1.73_M2}
GLUCOSE SERPL-MCNC: 91 MG/DL (ref 70–99)
GLUCOSE UR STRIP-MCNC: NEGATIVE MG/DL
HCT VFR BLD AUTO: 37.7 % (ref 35–47)
HGB BLD-MCNC: 13 G/DL (ref 11.7–15.7)
HGB UR QL STRIP: ABNORMAL
IMM GRANULOCYTES # BLD: 0 10E9/L (ref 0–0.4)
IMM GRANULOCYTES NFR BLD: 0.2 %
KETONES UR STRIP-MCNC: NEGATIVE MG/DL
LEUKOCYTE ESTERASE UR QL STRIP: NEGATIVE
LYMPHOCYTES # BLD AUTO: 2.2 10E9/L (ref 0.8–5.3)
LYMPHOCYTES NFR BLD AUTO: 36.2 %
MCH RBC QN AUTO: 30 PG (ref 26.5–33)
MCHC RBC AUTO-ENTMCNC: 34.5 G/DL (ref 31.5–36.5)
MCV RBC AUTO: 87 FL (ref 78–100)
MONOCYTES # BLD AUTO: 0.4 10E9/L (ref 0–1.3)
MONOCYTES NFR BLD AUTO: 7.1 %
NEUTROPHILS # BLD AUTO: 3.1 10E9/L (ref 1.6–8.3)
NEUTROPHILS NFR BLD AUTO: 50.8 %
NITRATE UR QL: NEGATIVE
NRBC # BLD AUTO: 0 10*3/UL
NRBC BLD AUTO-RTO: 0 /100
PH UR STRIP: 6 PH (ref 4.7–8)
PLATELET # BLD AUTO: 268 10E9/L (ref 150–450)
POTASSIUM SERPL-SCNC: 3.7 MMOL/L (ref 3.4–5.3)
PROT SERPL-MCNC: 7.7 G/DL (ref 6.8–8.8)
RBC # BLD AUTO: 4.33 10E12/L (ref 3.8–5.2)
RBC #/AREA URNS AUTO: 7 /HPF (ref 0–2)
SODIUM SERPL-SCNC: 139 MMOL/L (ref 133–144)
SOURCE: ABNORMAL
SP GR UR STRIP: 1.02 (ref 1–1.03)
TSH SERPL DL<=0.005 MIU/L-ACNC: 1.42 MU/L (ref 0.4–4)
UROBILINOGEN UR STRIP-MCNC: NORMAL MG/DL (ref 0–2)
WBC # BLD AUTO: 6.2 10E9/L (ref 4–11)
WBC #/AREA URNS AUTO: 2 /HPF (ref 0–5)

## 2019-01-15 PROCEDURE — 36415 COLL VENOUS BLD VENIPUNCTURE: CPT | Performed by: FAMILY MEDICINE

## 2019-01-15 PROCEDURE — 81001 URINALYSIS AUTO W/SCOPE: CPT | Performed by: FAMILY MEDICINE

## 2019-01-15 PROCEDURE — 93000 ELECTROCARDIOGRAM COMPLETE: CPT | Performed by: INTERNAL MEDICINE

## 2019-01-15 PROCEDURE — 80050 GENERAL HEALTH PANEL: CPT | Performed by: FAMILY MEDICINE

## 2019-01-15 PROCEDURE — 99214 OFFICE O/P EST MOD 30 MIN: CPT | Mod: 25 | Performed by: FAMILY MEDICINE

## 2019-01-15 NOTE — NURSING NOTE
"Chief Complaint   Patient presents with     Palpitations       Initial /84 (Patient Position: Sitting)   Pulse 91   Temp 98.3  F (36.8  C) (Tympanic)   Wt 69.4 kg (153 lb)   SpO2 98%   BMI 27.98 kg/m   Estimated body mass index is 27.98 kg/m  as calculated from the following:    Height as of 11/13/18: 1.575 m (5' 2\").    Weight as of this encounter: 69.4 kg (153 lb).  Medication Reconciliation: complete    Anita Jade LPN  "

## 2019-01-18 ENCOUNTER — APPOINTMENT (OUTPATIENT)
Dept: OCCUPATIONAL MEDICINE | Facility: OTHER | Age: 24
End: 2019-01-18

## 2019-01-18 PROCEDURE — 99000 SPECIMEN HANDLING OFFICE-LAB: CPT

## 2019-01-21 ENCOUNTER — HOSPITAL ENCOUNTER (OUTPATIENT)
Dept: CARDIOLOGY | Facility: HOSPITAL | Age: 24
Discharge: HOME | End: 2019-01-21
Attending: FAMILY MEDICINE | Admitting: FAMILY MEDICINE
Payer: COMMERCIAL

## 2019-01-21 DIAGNOSIS — R00.2 PALPITATIONS: ICD-10-CM

## 2019-01-21 PROCEDURE — 0296T ZIO PATCH HOLTER ADULT PEDIATRIC GREATER THAN 48 HRS: CPT | Mod: TC

## 2019-01-21 PROCEDURE — 0298T ZIO PATCH HOLTER ADULT PEDIATRIC GREATER THAN 48 HRS: CPT | Performed by: INTERNAL MEDICINE

## 2019-01-23 ENCOUNTER — OFFICE VISIT (OUTPATIENT)
Dept: UROLOGY | Facility: OTHER | Age: 24
End: 2019-01-23
Attending: UROLOGY
Payer: COMMERCIAL

## 2019-01-23 ENCOUNTER — MYC MEDICAL ADVICE (OUTPATIENT)
Dept: FAMILY MEDICINE | Facility: OTHER | Age: 24
End: 2019-01-23

## 2019-01-23 VITALS
HEART RATE: 100 BPM | RESPIRATION RATE: 16 BRPM | WEIGHT: 153 LBS | DIASTOLIC BLOOD PRESSURE: 62 MMHG | BODY MASS INDEX: 28.16 KG/M2 | HEIGHT: 62 IN | TEMPERATURE: 98.5 F | OXYGEN SATURATION: 98 % | SYSTOLIC BLOOD PRESSURE: 102 MMHG

## 2019-01-23 DIAGNOSIS — R31.29 MICROSCOPIC HEMATURIA: ICD-10-CM

## 2019-01-23 DIAGNOSIS — R10.31 ABDOMINAL PAIN, RIGHT LOWER QUADRANT: ICD-10-CM

## 2019-01-23 DIAGNOSIS — N39.0 RECURRENT UTI: ICD-10-CM

## 2019-01-23 PROCEDURE — 51798 US URINE CAPACITY MEASURE: CPT | Performed by: UROLOGY

## 2019-01-23 PROCEDURE — 99203 OFFICE O/P NEW LOW 30 MIN: CPT | Performed by: UROLOGY

## 2019-01-23 ASSESSMENT — MIFFLIN-ST. JEOR: SCORE: 1412.25

## 2019-01-23 ASSESSMENT — PAIN SCALES - GENERAL: PAINLEVEL: MODERATE PAIN (4)

## 2019-01-23 NOTE — NURSING NOTE
"Chief Complaint   Patient presents with     Consult     Regarding recurrent UTI's per Reagan. Patient C/O hematuria, right flank pain also.       Initial /62   Pulse 100   Temp 98.5  F (36.9  C) (Tympanic)   Resp 16   Ht 1.575 m (5' 2\")   Wt 69.4 kg (153 lb)   SpO2 98%   BMI 27.98 kg/m   Estimated body mass index is 27.98 kg/m  as calculated from the following:    Height as of this encounter: 1.575 m (5' 2\").    Weight as of this encounter: 69.4 kg (153 lb).  Medication Reconciliation: complete   Review of Systems:    Weight loss:    yes     Recent fever/chills:  yes   Night sweats:   No  Current skin rash:  No   Recent hair loss:  No  Heat intolerance:  No   Cold intolerance:  No  Chest pain:   No   Palpitations:   yes  Shortness of breath:  yes   Wheezing:   No  Constipation:    No   Diarrhea:   yes   Nausea:   yes   Vomiting:   No   Kidney/side pain:  yes   Back pain:   yes  Frequent headaches:  yes   Dizziness:     yes  Leg swelling:   No   Calf pain:    No    Parents, brothers or sisters with history of kidney cancer:   No  Parents, brothers or sisters with history of bladder cancer: No      Pauline Hahn LPN    "

## 2019-01-23 NOTE — PROGRESS NOTES
"I was asked to see this patient by Jia Kirk MD and provide my opinion about the following:  Frequent UTI    Type of Visit  Consult    Chief Complaint  Frequent UTI    HPI  Ms. Maria is a 23 year old female who presents with recurrent UTIs.  She has been treated for approximately 4 infections in the last year.  The frequent infections have become a problem predominantly in the last 2 years.  She denies fevers or development of pyelonephritis.  All have been treated with oral outpatient course of antibiotics.  She has undergone a CT scan revealing no stones.  She has no history of stones however incidentally she was identified as having an atrophic right kidney.      Past Medical History  She  has a past medical history of Atrophic kidney and Shift work sleep disorder.  There is no problem list on file for this patient.      Past Surgical History  She  has a past surgical history that includes pe tubes (Bilateral).    Medications  She has a current medication list which includes the following prescription(s): medroxyprogesterone, metoclopramide, and zolpidem.    Allergies  Allergies   Allergen Reactions     Pineapple        Social History  She  reports that she has quit smoking. she has never used smokeless tobacco. She reports that she does not drink alcohol or use drugs.  No drug abuse.    Family History  Family History   Problem Relation Age of Onset     Other - See Comments Mother         bells palsy post auto accident     Heart Disease Father         s/p stents       Review of Systems  I personally reviewed the ROS with the patient.    Nursing Notes:   Pauline Hahn LPN  1/23/2019  8:50 AM  Signed  Chief Complaint   Patient presents with     Consult     Regarding recurrent UTI's per Reagan. Patient C/O hematuria, right flank pain also.       Initial /62   Pulse 100   Temp 98.5  F (36.9  C) (Tympanic)   Resp 16   Ht 1.575 m (5' 2\")   Wt 69.4 kg (153 lb)   SpO2 98%   BMI 27.98 kg/m    " "Estimated body mass index is 27.98 kg/m  as calculated from the following:    Height as of this encounter: 1.575 m (5' 2\").    Weight as of this encounter: 69.4 kg (153 lb).  Medication Reconciliation: complete   Review of Systems:    Weight loss:    yes     Recent fever/chills:  yes   Night sweats:   No  Current skin rash:  No   Recent hair loss:  No  Heat intolerance:  No   Cold intolerance:  No  Chest pain:   No   Palpitations:   yes  Shortness of breath:  yes   Wheezing:   No  Constipation:    No   Diarrhea:   yes   Nausea:   yes   Vomiting:   No   Kidney/side pain:  yes   Back pain:   yes  Frequent headaches:  yes   Dizziness:     yes  Leg swelling:   No   Calf pain:    No    Parents, brothers or sisters with history of kidney cancer:   No  Parents, brothers or sisters with history of bladder cancer: No      Pauline Hahn LPN      Physical Exam  Vitals:    01/23/19 0836   BP: 102/62   Pulse: 100   Resp: 16   Temp: 98.5  F (36.9  C)   TempSrc: Tympanic   SpO2: 98%   Weight: 69.4 kg (153 lb)   Height: 1.575 m (5' 2\")     Constitutional: NAD, WDWN  Head: NCAT  Eyes: Conjunctivae normal  Cardiovascular: Regular rate  Pulmonary/Chest: Respirations are even and non-labored bilaterally  Abdominal: Soft with no distension, tenderness, masses, guarding or CVA tenderness  Neurological: A + O x 3,  cranial nerves II-XII grossly intact  Extremities: YOLANDA x 4, warm, no clubbing, no cyanosis  Skin: Pink, warm, dry with no rash  Psychiatric:  Normal mood and affect  Genitourinary: nonpalpable bladder    Labs  Results for orders placed or performed in visit on 01/15/19   UA reflex to Microscopic and Culture - [place]   Result Value Ref Range    Color Urine Light Yellow     Appearance Urine Clear     Glucose Urine Negative NEG^Negative mg/dL    Bilirubin Urine Negative NEG^Negative    Ketones Urine Negative NEG^Negative mg/dL    Specific Gravity Urine 1.020 1.003 - 1.035    Blood Urine Moderate (A) NEG^Negative    pH Urine " 6.0 4.7 - 8.0 pH    Protein Albumin Urine Negative NEG^Negative mg/dL    Urobilinogen mg/dL Normal 0.0 - 2.0 mg/dL    Nitrite Urine Negative NEG^Negative    Leukocyte Esterase Urine Negative NEG^Negative    Source Midstream Urine     RBC Urine 7 (H) 0 - 2 /HPF    WBC Urine 2 0 - 5 /HPF    Bacteria Urine Many (A) NEG^Negative /HPF   CBC with platelets and differential   Result Value Ref Range    WBC 6.2 4.0 - 11.0 10e9/L    RBC Count 4.33 3.8 - 5.2 10e12/L    Hemoglobin 13.0 11.7 - 15.7 g/dL    Hematocrit 37.7 35.0 - 47.0 %    MCV 87 78 - 100 fl    MCH 30.0 26.5 - 33.0 pg    MCHC 34.5 31.5 - 36.5 g/dL    RDW 12.7 10.0 - 15.0 %    Platelet Count 268 150 - 450 10e9/L    Diff Method Automated Method     % Neutrophils 50.8 %    % Lymphocytes 36.2 %    % Monocytes 7.1 %    % Eosinophils 4.9 %    % Basophils 0.8 %    % Immature Granulocytes 0.2 %    Nucleated RBCs 0 0 /100    Absolute Neutrophil 3.1 1.6 - 8.3 10e9/L    Absolute Lymphocytes 2.2 0.8 - 5.3 10e9/L    Absolute Monocytes 0.4 0.0 - 1.3 10e9/L    Absolute Eosinophils 0.3 0.0 - 0.7 10e9/L    Absolute Basophils 0.1 0.0 - 0.2 10e9/L    Abs Immature Granulocytes 0.0 0 - 0.4 10e9/L    Absolute Nucleated RBC 0.0    Comprehensive metabolic panel (BMP + Alb, Alk Phos, ALT, AST, Total. Bili, TP)   Result Value Ref Range    Sodium 139 133 - 144 mmol/L    Potassium 3.7 3.4 - 5.3 mmol/L    Chloride 107 94 - 109 mmol/L    Carbon Dioxide 26 20 - 32 mmol/L    Anion Gap 6 3 - 14 mmol/L    Glucose 91 70 - 99 mg/dL    Urea Nitrogen 13 7 - 30 mg/dL    Creatinine 0.77 0.52 - 1.04 mg/dL    GFR Estimate >90 >60 mL/min/[1.73_m2]    GFR Estimate If Black >90 >60 mL/min/[1.73_m2]    Calcium 8.7 8.5 - 10.1 mg/dL    Bilirubin Total 0.3 0.2 - 1.3 mg/dL    Albumin 4.2 3.4 - 5.0 g/dL    Protein Total 7.7 6.8 - 8.8 g/dL    Alkaline Phosphatase 97 40 - 150 U/L    ALT 20 0 - 50 U/L    AST 8 0 - 45 U/L   TSH with free T4 reflex   Result Value Ref Range    TSH 1.42 0.40 - 4.00 mU/L      Susceptibility     Escherichia coli (1)     Antibiotic Interpretation Sensitivity Method Status   Amoxicillin/Clav Sensitive <=2 ug/mL RENÉ Final   AMPICILLIN Sensitive <=2 ug/mL RENÉ Final   CEFAZOLIN Sensitive <=4 ug/mL RENÉ Final    Cefazolin RENÉ breakpoints are for the treatment of uncomplicated urinary tract   infections.  For the treatment of systemic infections, please contact the   laboratory for additional testing.   CEFTAZIDIME Sensitive <=1 ug/mL RENÉ Final   CEFTRIAXONE Sensitive <=1 ug/mL RENÉ Final   CIPROFLOXACIN Sensitive <=0.25 ug/mL RENÉ Final   GENTAMICIN Sensitive <=1 ug/mL RENÉ Final   LEVOFLOXACIN Sensitive <=0.12 ug/mL RENÉ Final   NITROFURANTOIN Sensitive <=16 ug/mL RENÉ Final   TOBRAMYCIN Sensitive <=1 ug/mL RENÉ Final   Trimethoprim/Sulfa Sensitive <=1/19 ug/mL RENÉ Final   AMPICILLIN/SULBACTAM Sensitive <=2 ug/mL RENÉ Final   Piperacillin/Tazo Sensitive <=4 ug/mL RENÉ Final   CEFEPIME Sensitive <=1 ug/mL RENÉ Final         Imaging  I personally reviewed and interpreted the images and report.  CT a/p   5/15/2018  IMPRESSION: Atrophic right kidney which appears chronic there is  compensatory hypertrophy of the left kidney. No intraperitoneal masses  or inflammatory changes are seen.     Post-Void Residual  A post-void residual was measured by ultrasonic bladder scanner.  40 mL    Assessment & Plan  Ms. Maria is a 23 year old female with frequent UTIs.  Relatively recent CT reviewed and no stones present.  She does have an atrophic right kidney.  Emptying well.  Discussed cystoscopy as the last diagnostic test remaining however she is of very low risk and I explained the clinical value of cystoscopy for her situation would be relatively low.  She elected to defer.

## 2019-01-25 PROBLEM — R31.29 MICROSCOPIC HEMATURIA: Status: ACTIVE | Noted: 2019-01-25

## 2019-01-25 NOTE — TELEPHONE ENCOUNTER
Called and LVM. Wait for appointment 2/13 to discuss further. Will repeat thorough abd and pelvic exam and discuss where to go from here. Okay to continue monitoring the blood in urine per guidelines.   Jia Kirk MD

## 2019-03-11 ENCOUNTER — TELEPHONE (OUTPATIENT)
Dept: FAMILY MEDICINE | Facility: OTHER | Age: 24
End: 2019-03-11

## 2019-03-11 ENCOUNTER — APPOINTMENT (OUTPATIENT)
Dept: GENERAL RADIOLOGY | Facility: HOSPITAL | Age: 24
End: 2019-03-11
Attending: NURSE PRACTITIONER

## 2019-03-11 ENCOUNTER — HOSPITAL ENCOUNTER (EMERGENCY)
Facility: HOSPITAL | Age: 24
Discharge: HOME | End: 2019-03-11
Attending: NURSE PRACTITIONER | Admitting: NURSE PRACTITIONER

## 2019-03-11 VITALS
HEART RATE: 88 BPM | OXYGEN SATURATION: 100 % | DIASTOLIC BLOOD PRESSURE: 85 MMHG | SYSTOLIC BLOOD PRESSURE: 133 MMHG | RESPIRATION RATE: 16 BRPM | TEMPERATURE: 98.9 F

## 2019-03-11 DIAGNOSIS — S39.012A STRAIN OF LUMBAR REGION, INITIAL ENCOUNTER: ICD-10-CM

## 2019-03-11 DIAGNOSIS — S39.92XA BACK INJURY, INITIAL ENCOUNTER: ICD-10-CM

## 2019-03-11 PROCEDURE — G0463 HOSPITAL OUTPT CLINIC VISIT: HCPCS

## 2019-03-11 PROCEDURE — 99213 OFFICE O/P EST LOW 20 MIN: CPT | Performed by: NURSE PRACTITIONER

## 2019-03-11 PROCEDURE — 72100 X-RAY EXAM L-S SPINE 2/3 VWS: CPT | Mod: TC

## 2019-03-11 ASSESSMENT — ENCOUNTER SYMPTOMS
TROUBLE SWALLOWING: 0
APPETITE CHANGE: 0
COUGH: 0
DYSURIA: 0
NUMBNESS: 0
WEAKNESS: 0
PSYCHIATRIC NEGATIVE: 1
CHILLS: 0
FEVER: 0
BACK PAIN: 1
ACTIVITY CHANGE: 0

## 2019-03-11 NOTE — DISCHARGE INSTRUCTIONS
Take tylenol as needed for pain. Follow directions on package.   Take Zanaflex as needed as directed for muscle spasm. Do not drive or participate in activities that require alertness when taking.   Apply ice to back. Protect skin.   Follow up with PCP this week for work comp injury. No work until cleared by PCP.   Return to urgent care or emergency department with any increase in symptoms or concerns.

## 2019-03-11 NOTE — ED TRIAGE NOTES
"Pt presents today with c/o back pain from a pt of hers \"going limb\" and putting all weight on her.   "

## 2019-03-11 NOTE — ED AVS SNAPSHOT
[facility]  [address] [address]  Phone:  [phone]                                    April YURIY Maria   MRN: [id number]    Department:  [facility]   Date of Visit:  3/11/2019           After Visit Summary Signature Page    I have received my discharge instructions, and my questions have been answered. I have discussed any challenges I see with this plan with the nurse or doctor.    ..........................................................................................................................................  Patient/Patient Representative Signature      ..........................................................................................................................................  Patient Representative Print Name and Relationship to Patient    ..................................................               ................................................  Date                                   Time    ..........................................................................................................................................  Reviewed by Signature/Title    ...................................................              ..............................................  Date                                               Time          22EPIC Rev 08/18

## 2019-03-11 NOTE — TELEPHONE ENCOUNTER
2:16 PM    Reason for Call: OVERBOOK    Patient is having the following symptoms: Work comp follow up back / Urgent Care calling    The patient is requesting an appointment for ASAP with Dr. Kirk    Was an appointment offered for this call?   No    Preferred method for responding to this message: [phone]    If we cannot reach you directly, may we leave a detailed response at the number you provided?  Yes      Valarie Mai

## 2019-03-11 NOTE — ED PROVIDER NOTES
History     Chief Complaint   Patient presents with     Back Pain     The history is provided by the patient. No  was used.     May Maria is a 23 year old female who presents with lower back pain. She was at work today helping a patient and developed low back pain after patient went limp causing all of the patients weight to distribute to her body. 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 No interventions for symptoms. Bowel and bladder are working well. No incontinence of bowel or bladder control. No saddle paresthesia. She is able to ambulate. Movement makes pain worse.         Review of Systems   Constitutional: Negative for activity change, appetite change, chills and fever.   HENT: Negative for trouble swallowing.    Respiratory: Negative for cough.    Genitourinary: Negative for dysuria.   Musculoskeletal: Positive for back pain.        Low back pain. Denies radiation of pain down legs.  Pain has a pulling and spasm sensation.   Neurological: Negative for weakness and numbness.   Psychiatric/Behavioral: Negative.        Physical Exam   BP: 133/85  Pulse: 88  Temp: 98.9  F (37.2  C)  Resp: 16  SpO2: 100 %      Physical Exam   Constitutional: She is oriented to person, place, and time. She appears well-developed and well-nourished. No distress.   HENT:   Head: Normocephalic.   Mouth/Throat: Oropharynx is clear and moist.   Neck: Normal range of motion. Neck supple.   Cardiovascular: Normal rate, regular rhythm, normal heart sounds and intact distal pulses.   No murmur heard.  Pulmonary/Chest: Effort normal. No stridor. No respiratory distress. She has no wheezes. She has no rales.   Abdominal: Soft. She exhibits no distension.   Musculoskeletal: Normal range of motion. She exhibits tenderness. She exhibits no edema or deformity.   CMS and ROM intact x4 extremities.  Distal pulses intact.  No step-offs or TTP to spinal column.  Pain is reproducible to bilateral  paraspinal muscles at L4-S1 region.  Pain is greater on the left paraspinal muscles than right paraspinal muscles.   Lymphadenopathy:     She has no cervical adenopathy.   Neurological: She is alert and oriented to person, place, and time. She exhibits normal muscle tone.   Skin: Skin is warm and dry. Capillary refill takes less than 2 seconds. No rash noted. She is not diaphoretic. No erythema.   No rash, erythema, bruising, or warmth to the touch to posterior back.   Psychiatric: She has a normal mood and affect. Her behavior is normal.   Nursing note and vitals reviewed.      ED Course       Procedures    I personally reviewed the x-rays and there is NO fracture or dislocation. Radiology review pending and nurse will notify patient if there is any change in the treatment plan.      Results for orders placed or performed during the hospital encounter of 03/11/19 (from the past 24 hour(s))   Lumbar spine XR, 2-3 views    Narrative    Exam: XR LUMBAR SPINE 2-3 VIEWS     History:Female, age 23 years, injury.    Comparison:  None    Technique: Three views are submitted.    Findings: Bones are normally mineralized. No evidence of acute or  subacute fracture. No evidence of acute subluxation. Straightening of  the normal lordotic curvature.           Impression    Impression:  1.  No evidence of acute or subacute bony abnormality.     2.  Lumbar spine straightening, possibly related to muscle spasm.    HUNG PAYAN MD       Assessments & Plan (with Medical Decision Making)     Discussed plan of care. She verbalized understanding. All questions answered.     I have reviewed the nursing notes.    I have reviewed the findings, diagnosis, plan and need for follow up with the patient.  Discharged in stable condition.        Medication List      Started    tiZANidine 4 MG tablet  Commonly known as:  ZANAFLEX  4 mg, Oral, 3 TIMES DAILY PRN            Final diagnoses:   Back injury, initial encounter   Strain of lumbar  region, initial encounter     Take tylenol as needed for pain. Follow directions on package.   Take Zanaflex as needed as directed for muscle spasm. Do not drive or participate in activities that require alertness when taking.   Apply ice to back. Protect skin.   Follow up with PCP this week for work comp injury. No work until cleared by PCP.   Return to urgent care or emergency department with any increase in symptoms or concerns.     GENE Borrero  3/11/2019  2:14 PM  UR[facility]ARE [facility]       Eloisa Chavez NP  03/17/19 [id number]

## 2019-03-12 NOTE — PROGRESS NOTES
Occupational Visit     Subjective:  April YURIY Maria, 23 year old, female is seen today.  The date of injury is 3/11/2019.  The patient is employed at [employer].    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 She initially felt a slight tug only. Was able to ambulate out of the room. Pain and stiffness started after sitting for some time. Pain does not radiate. Pain worse with prolonged sitting or standing.     Seen 3/11 in the ED. XR done and negative. Given muscle relaxer. Took only one. Makes her very sleepy. Feels in general pain is improving.       Allergies   Allergen Reactions     Pineapple          Review of Systems:  Constitutional, HEENT, cardiovascular, pulmonary, gi and gu systems are negative, except as otherwise noted.      OBJECTIVE:  Vitals: B/P: Data Unavailable, T: Data Unavailable, P: Data Unavailable, R: Data Unavailable      Exam:  GENERAL: healthy, alert, well nourished, well hydrated, no distress  RESP: lungs clear to auscultation - no rales, no rhonchi, no wheezes  CV: regular rates and rhythm, normal S1 S2, no S3 or S4 and no murmur, no click or rub -  NEURO: strength and tone- normal, sensory exam- grossly normal, mentation- intact, speech- normal, reflexes- symmetric  BACK: no CVA tenderness, no paralumbar tenderness      Labs: None      ASSESSMENT/PLAN:    April was seen today for work comp.    Diagnoses and all orders for this visit:    Back muscle spasm  Back strain due to work injury. Symptoms are improving over time. Will start PT, give pt 2 full weeks on light duty to ensure proper body mechanics for transfers of larger weight moving forward. NSAID, ice/heat as comfortable.   -     PHYSICAL THERAPY REFERRAL; Future    Jia Kirk MD

## 2019-03-15 ENCOUNTER — OFFICE VISIT (OUTPATIENT)
Dept: FAMILY MEDICINE | Facility: OTHER | Age: 24
End: 2019-03-15
Attending: FAMILY MEDICINE
Payer: OTHER MISCELLANEOUS

## 2019-03-15 VITALS
SYSTOLIC BLOOD PRESSURE: 104 MMHG | TEMPERATURE: 98 F | HEIGHT: 62 IN | OXYGEN SATURATION: 98 % | DIASTOLIC BLOOD PRESSURE: 72 MMHG | BODY MASS INDEX: 29.44 KG/M2 | RESPIRATION RATE: 18 BRPM | HEART RATE: 84 BPM | WEIGHT: 160 LBS

## 2019-03-15 DIAGNOSIS — M62.830 BACK MUSCLE SPASM: Primary | ICD-10-CM

## 2019-03-15 PROCEDURE — 99213 OFFICE O/P EST LOW 20 MIN: CPT | Performed by: FAMILY MEDICINE

## 2019-03-15 ASSESSMENT — MIFFLIN-ST. JEOR: SCORE: 1444.01

## 2019-03-15 ASSESSMENT — PAIN SCALES - GENERAL: PAINLEVEL: MILD PAIN (3)

## 2019-03-15 NOTE — NURSING NOTE
"Chief Complaint   Patient presents with     Work Comp     DOI 3/11/2019       Initial /72 (BP Location: Right arm, Patient Position: Sitting, Cuff Size: Adult Regular)   Pulse 84   Temp 98  F (36.7  C) (Tympanic)   Resp 18   Ht 1.575 m (5' 2\")   Wt 72.6 kg (160 lb)   SpO2 98%   BMI 29.26 kg/m   Estimated body mass index is 29.26 kg/m  as calculated from the following:    Height as of this encounter: 1.575 m (5' 2\").    Weight as of this encounter: 72.6 kg (160 lb).  Medication Reconciliation: complete    Melissa Pascual LPN  "

## 2019-03-20 NOTE — PROGRESS NOTES
Occupational Visit     Subjective:  April YURIY Maria, 23 year old, female is seen 3/29/19.  The date of injury is 3/11/19.  The patient is employed at [employer].    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 She initially felt a slight tug only. Was able to ambulate out of the room. Pain and stiffness started after sitting for some time.     Pain improved. Able to work without difficulty. Does not radiate. Occasional spasm here and there, but lessening. Would like to return to full work duties.        Allergies   Allergen Reactions     Pineapple          Review of Systems:  Constitutional, HEENT, cardiovascular, pulmonary, gi and gu systems are negative, except as otherwise noted.      OBJECTIVE:  Vitals: B/P: Data Unavailable, T: Data Unavailable, P: Data Unavailable, R: Data Unavailable      Exam:  GENERAL: healthy, alert and no distress  MS: extremities- no gross deformities noted, no edema  NEURO: strength and tone- normal, mentation- intact, speech- normal  Comprehensive back pain exam:  No tenderness, Range of motion not limited by pain, Lower extremity strength functional and equal on both sides, Lower extremity reflexes within normal limits bilaterally, Lower extremity sensation normal and equal on both sides and Straight leg raise negative bilaterally      Labs: None      ASSESSMENT/PLAN:    April was seen today for work comp.    Diagnoses and all orders for this visit:    Acute bilateral low back pain without sciatica: Doing well. Okay for return to work. Consider PT if spasms continue intermittently. Follow up for physical in 3-6 months.     Jia Kirk MD

## 2019-03-29 ENCOUNTER — OFFICE VISIT (OUTPATIENT)
Dept: FAMILY MEDICINE | Facility: OTHER | Age: 24
End: 2019-03-29
Attending: FAMILY MEDICINE
Payer: OTHER MISCELLANEOUS

## 2019-03-29 VITALS
TEMPERATURE: 98.3 F | BODY MASS INDEX: 29.26 KG/M2 | WEIGHT: 159 LBS | DIASTOLIC BLOOD PRESSURE: 74 MMHG | SYSTOLIC BLOOD PRESSURE: 110 MMHG | HEART RATE: 79 BPM | RESPIRATION RATE: 16 BRPM | OXYGEN SATURATION: 99 % | HEIGHT: 62 IN

## 2019-03-29 DIAGNOSIS — M54.50 ACUTE BILATERAL LOW BACK PAIN WITHOUT SCIATICA: Primary | ICD-10-CM

## 2019-03-29 PROCEDURE — 99213 OFFICE O/P EST LOW 20 MIN: CPT | Performed by: FAMILY MEDICINE

## 2019-03-29 ASSESSMENT — PAIN SCALES - GENERAL: PAINLEVEL: NO PAIN (0)

## 2019-03-29 ASSESSMENT — MIFFLIN-ST. JEOR: SCORE: 1439.47

## 2019-03-29 NOTE — NURSING NOTE
"Chief Complaint   Patient presents with     Work Comp       Initial /74   Pulse 79   Temp 98.3  F (36.8  C) (Tympanic)   Resp 16   Ht 1.575 m (5' 2\")   Wt 72.1 kg (159 lb)   SpO2 99%   BMI 29.08 kg/m   Estimated body mass index is 29.08 kg/m  as calculated from the following:    Height as of this encounter: 1.575 m (5' 2\").    Weight as of this encounter: 72.1 kg (159 lb).  Medication Reconciliation: complete    Yuko Blakely LPN  "

## 2019-06-14 DIAGNOSIS — N26.1 RENAL ATROPHY, RIGHT: Primary | ICD-10-CM

## 2019-06-18 ENCOUNTER — TRANSFERRED RECORDS (OUTPATIENT)
Dept: HEALTH INFORMATION MANAGEMENT | Facility: CLINIC | Age: 24
End: 2019-06-18

## 2019-06-18 DIAGNOSIS — N26.1 RENAL ATROPHY, RIGHT: ICD-10-CM

## 2019-06-18 LAB
ALBUMIN SERPL-MCNC: 3.9 G/DL (ref 3.4–5)
ALBUMIN UR-MCNC: NEGATIVE MG/DL
ANION GAP SERPL CALCULATED.3IONS-SCNC: 6 MMOL/L (ref 3–14)
APPEARANCE UR: ABNORMAL
BACTERIA #/AREA URNS HPF: ABNORMAL /HPF
BILIRUB UR QL STRIP: NEGATIVE
BUN SERPL-MCNC: 17 MG/DL (ref 7–30)
CALCIUM SERPL-MCNC: 9.8 MG/DL (ref 8.5–10.1)
CHLORIDE SERPL-SCNC: 105 MMOL/L (ref 94–109)
CO2 SERPL-SCNC: 27 MMOL/L (ref 20–32)
COLOR UR AUTO: YELLOW
CREAT SERPL-MCNC: 0.8 MG/DL (ref 0.52–1.04)
GFR SERPL CREATININE-BSD FRML MDRD: >90 ML/MIN/{1.73_M2}
GLUCOSE SERPL-MCNC: 89 MG/DL (ref 70–99)
GLUCOSE UR STRIP-MCNC: NEGATIVE MG/DL
HGB UR QL STRIP: ABNORMAL
KETONES UR STRIP-MCNC: NEGATIVE MG/DL
LEUKOCYTE ESTERASE UR QL STRIP: ABNORMAL
MUCOUS THREADS #/AREA URNS LPF: PRESENT /LPF
NITRATE UR QL: POSITIVE
PH UR STRIP: 5.5 PH (ref 4.7–8)
PHOSPHATE SERPL-MCNC: 4.6 MG/DL (ref 2.5–4.5)
POTASSIUM SERPL-SCNC: 3.8 MMOL/L (ref 3.4–5.3)
PROT UR-MCNC: 0.1 G/L
PROT/CREAT 24H UR: 0.07 G/G CR (ref 0–0.2)
RBC #/AREA URNS AUTO: 4 /HPF (ref 0–2)
SODIUM SERPL-SCNC: 138 MMOL/L (ref 133–144)
SOURCE: ABNORMAL
SP GR UR STRIP: 1.02 (ref 1–1.03)
SQUAMOUS #/AREA URNS AUTO: 3 /HPF (ref 0–1)
UROBILINOGEN UR STRIP-MCNC: NORMAL MG/DL (ref 0–2)
WBC #/AREA URNS AUTO: 23 /HPF (ref 0–5)

## 2019-06-18 PROCEDURE — 87086 URINE CULTURE/COLONY COUNT: CPT | Performed by: INTERNAL MEDICINE

## 2019-06-18 PROCEDURE — 87088 URINE BACTERIA CULTURE: CPT | Performed by: INTERNAL MEDICINE

## 2019-06-18 PROCEDURE — 36415 COLL VENOUS BLD VENIPUNCTURE: CPT | Performed by: INTERNAL MEDICINE

## 2019-06-18 PROCEDURE — 87186 SC STD MICRODIL/AGAR DIL: CPT | Performed by: INTERNAL MEDICINE

## 2019-06-18 PROCEDURE — 81001 URINALYSIS AUTO W/SCOPE: CPT | Performed by: INTERNAL MEDICINE

## 2019-06-18 PROCEDURE — 84156 ASSAY OF PROTEIN URINE: CPT | Performed by: INTERNAL MEDICINE

## 2019-06-18 PROCEDURE — 80069 RENAL FUNCTION PANEL: CPT | Performed by: INTERNAL MEDICINE

## 2019-06-19 NOTE — RESULT ENCOUNTER NOTE
[facility]  Spoke with Shaniqua Watkins in lab and she will be faxing result to Dr. Boone.  Result routed to PCP Dr. Kirk as well.

## 2019-06-20 LAB
BACTERIA SPEC CULT: ABNORMAL
SPECIMEN SOURCE: ABNORMAL

## 2019-06-24 DIAGNOSIS — N39.0 RECURRENT UTI: Primary | ICD-10-CM

## 2019-06-24 DIAGNOSIS — R31.29 MICROSCOPIC HEMATURIA: ICD-10-CM

## 2019-06-26 NOTE — PROGRESS NOTES
SUBJECTIVE:   CC: May Maria is an 23 year old woman who presents for preventive health visit.     Healthy Habits:    Do you get at least three servings of calcium containing foods daily (dairy, green leafy vegetables, etc.)? No    Amount of exercise or daily activities, outside of work: 3 hour(s) per day    Problems taking medications regularly No    Medication side effects: No    Have you had an eye exam in the past two years? yes    Do you see a dentist twice per year? yes    Do you have sleep apnea, excessive snoring or daytime drowsiness?no    Today's PHQ-2 Score:   PHQ-2 (  [employer]) 3/29/2019 3/15/2019   Q1: Little interest or pleasure in doing things 0 0   Q2: Feeling down, depressed or hopeless 0 0   PHQ-2 Score 0 0       Abuse: Current or Past(Physical, Sexual or Emotional)- No  Do you feel safe in your environment? Yes    Social History     Tobacco Use     Smoking status: Former Smoker     Packs/day: 0.25     Types: Cigarettes     Start date: 3/15/2017     Last attempt to quit: 3/15/2018     Years since quittin.2     Smokeless tobacco: Never Used   Substance Use Topics     Alcohol use: No     Alcohol/week: 0.0 oz     If you drink alcohol do you typically have >3 drinks per day or >7 drinks per week? No                     Reviewed orders with patient.  Reviewed health maintenance and updated orders accordingly - Yes    Cancer Screenings:  Colon - NA  Cervical - Due for first pap.   Breast - NA  Lung - NA  Prostate - NA  Skin - No person history of skin cancer, no family history melanoma. No tanning bed use.     Immunizations:  Tdap - 2018  Zoster - NA  Influenza - NA  Prevnar - NA  Pneumovax - NA  HPV - due for third injection.     Reviewed and updated as needed this visit by clinical staff  Tobacco  Allergies  Meds  Med Hx  Surg Hx  Soc Hx        Reviewed and updated as needed this visit by Provider  Tobacco  Med Hx  Surg Hx  Soc Hx           ROS:  CONSTITUTIONAL: NEGATIVE for fever,  "chills, change in weight  INTEGUMENTARU/SKIN: NEGATIVE for worrisome rashes, moles or lesions  EYES: NEGATIVE for vision changes or irritation  ENT: NEGATIVE for ear, mouth and throat problems  RESP: NEGATIVE for significant cough or SOB  BREAST: NEGATIVE for masses, tenderness or discharge  CV: NEGATIVE for chest pain, palpitations or peripheral edema  GI: NEGATIVE for nausea, abdominal pain, heartburn, or change in bowel habits  : NEGATIVE for unusual urinary or vaginal symptoms. Periods are regular.  MUSCULOSKELETAL: NEGATIVE for significant arthralgias or myalgia  NEURO: NEGATIVE for weakness, dizziness or paresthesias  PSYCHIATRIC: NEGATIVE for changes in mood or affect    OBJECTIVE:   /78 (BP Location: Right arm, Patient Position: Sitting, Cuff Size: Adult Regular)   Pulse 104   Temp 99.9  F (37.7  C) (Tympanic)   Ht 1.605 m (5' 3.2\")   Wt 73.5 kg (162 lb)   SpO2 98%   BMI 28.52 kg/m       EXAM:  GENERAL: healthy, alert and no distress  EYES: Eyes grossly normal to inspection, PERRL and conjunctivae and sclerae normal  HENT: ear canals and TM's normal, nose and mouth without ulcers or lesions  NECK: no adenopathy, no asymmetry, masses, or scars and thyroid normal to palpation  RESP: lungs clear to auscultation - no rales, rhonchi or wheezes  BREAST: normal without masses, tenderness or nipple discharge and no palpable axillary masses or adenopathy  CV: regular rate and rhythm, normal S1 S2, no S3 or S4, no murmur, click or rub, no peripheral edema and peripheral pulses strong  ABDOMEN: soft, nontender, no hepatosplenomegaly, no masses and bowel sounds normal   (female): normal female external genitalia, normal urethral meatus, vaginal mucosa pink, moist, well rugated, and normal cervix/adnexa/uterus without masses or discharge  MS: no gross musculoskeletal defects noted, no edema  SKIN: no suspicious lesions or rashes  NEURO: Normal strength and tone, mentation intact and speech normal  PSYCH: " mentation appears normal, affect normal/bright    Diagnostic Test Results:  Labs reviewed in Epic  No results found for this or any previous visit (from the past 24 hour(s)).  Results for orders placed or performed in visit on 06/18/19   Protein  random urine with Creat Ratio   Result Value Ref Range    Protein Random Urine 0.10 g/L    Protein Total Urine g/gr Creatinine 0.07 0 - 0.2 g/g Cr   UA reflex to Microscopic and Culture   Result Value Ref Range    Color Urine Yellow     Appearance Urine Slightly Cloudy     Glucose Urine Negative NEG^Negative mg/dL    Bilirubin Urine Negative NEG^Negative    Ketones Urine Negative NEG^Negative mg/dL    Specific Gravity Urine 1.022 1.003 - 1.035    Blood Urine Moderate (A) NEG^Negative    pH Urine 5.5 4.7 - 8.0 pH    Protein Albumin Urine Negative NEG^Negative mg/dL    Urobilinogen mg/dL Normal 0.0 - 2.0 mg/dL    Nitrite Urine Positive (A) NEG^Negative    Leukocyte Esterase Urine Large (A) NEG^Negative    Source Midstream Urine     RBC Urine 4 (H) 0 - 2 /HPF    WBC Urine 23 (H) 0 - 5 /HPF    Bacteria Urine Many (A) NEG^Negative /HPF    Squamous Epithelial /HPF Urine 3 (H) 0 - 1 /HPF    Mucous Urine Present (A) NEG^Negative /LPF   Renal panel (Alb, BUN, Ca, Cl, CO2, Creat, Gluc, Phos, K, Na)   Result Value Ref Range    Sodium 138 133 - 144 mmol/L    Potassium 3.8 3.4 - 5.3 mmol/L    Chloride 105 94 - 109 mmol/L    Carbon Dioxide 27 20 - 32 mmol/L    Anion Gap 6 3 - 14 mmol/L    Glucose 89 70 - 99 mg/dL    Urea Nitrogen 17 7 - 30 mg/dL    Creatinine 0.80 0.52 - 1.04 mg/dL    GFR Estimate >90 >60 mL/min/[1.73_m2]    GFR Estimate If Black >90 >60 mL/min/[1.73_m2]    Calcium 9.8 8.5 - 10.1 mg/dL    Phosphorus 4.6 (H) 2.5 - 4.5 mg/dL    Albumin 3.9 3.4 - 5.0 g/dL       ASSESSMENT/PLAN:   1. Routine general medical examination at a health care facility  - GC/Chlamydia by PCR - [facility]  - A pap thin layer screen only - recommended age 21 - 24 years    2. Encounter for initial  "prescription of contraceptive pills  Depo worsened depression. Non smoker, no migraine hx. ? Dad with blood clot, but not recurrent.   - norethindrone-ethinyl estradiol (JUNEL FE 1/20) 1-20 MG-MCG tablet; Take 1 tablet by mouth daily  Dispense: 83 tablet; Refill: 3    3. Dysuria  Intermittent, scheduled for cysto with Urology.   - UA reflex to Microscopic and Culture - [place]    COUNSELING:   Reviewed preventive health counseling, as reflected in patient instructions       Regular exercise       Healthy diet/nutrition    Estimated body mass index is 28.52 kg/m  as calculated from the following:    Height as of this encounter: 1.605 m (5' 3.2\").    Weight as of this encounter: 73.5 kg (162 lb).    Weight management plan: Discussed healthy diet and exercise guidelines     reports that she quit smoking about 15 months ago. Her smoking use included cigarettes. She started smoking about 2 years ago. She smoked 0.25 packs per day. She has never used smokeless tobacco.      Counseling Resources:  ATP IV Guidelines  Pooled Cohorts Equation Calculator  Breast Cancer Risk Calculator  FRAX Risk Assessment  ICSI Preventive Guidelines  Dietary Guidelines for Americans, 2010  [place] MyPlate  ASA Prophylaxis  Lung CA Screening    Jia Kirk MD  [facility] [place]  "

## 2019-07-02 ENCOUNTER — OFFICE VISIT (OUTPATIENT)
Dept: FAMILY MEDICINE | Facility: OTHER | Age: 24
End: 2019-07-02
Attending: FAMILY MEDICINE
Payer: COMMERCIAL

## 2019-07-02 VITALS
DIASTOLIC BLOOD PRESSURE: 78 MMHG | HEART RATE: 104 BPM | WEIGHT: 162 LBS | HEIGHT: 63 IN | BODY MASS INDEX: 28.7 KG/M2 | OXYGEN SATURATION: 98 % | TEMPERATURE: 99.9 F | SYSTOLIC BLOOD PRESSURE: 122 MMHG

## 2019-07-02 DIAGNOSIS — Z00.00 ROUTINE GENERAL MEDICAL EXAMINATION AT A HEALTH CARE FACILITY: Primary | ICD-10-CM

## 2019-07-02 DIAGNOSIS — R30.0 DYSURIA: ICD-10-CM

## 2019-07-02 DIAGNOSIS — R82.90 ABNORMAL URINE FINDINGS: ICD-10-CM

## 2019-07-02 DIAGNOSIS — Z30.011 ENCOUNTER FOR INITIAL PRESCRIPTION OF CONTRACEPTIVE PILLS: ICD-10-CM

## 2019-07-02 LAB
ALBUMIN UR-MCNC: NEGATIVE MG/DL
APPEARANCE UR: CLEAR
BACTERIA #/AREA URNS HPF: ABNORMAL /HPF
BILIRUB UR QL STRIP: NEGATIVE
C TRACH DNA SPEC QL PROBE+SIG AMP: NOT DETECTED
COLOR UR AUTO: YELLOW
GLUCOSE UR STRIP-MCNC: NEGATIVE MG/DL
HGB UR QL STRIP: ABNORMAL
KETONES UR STRIP-MCNC: NEGATIVE MG/DL
LEUKOCYTE ESTERASE UR QL STRIP: ABNORMAL
MUCOUS THREADS #/AREA URNS LPF: PRESENT /LPF
N GONORRHOEA DNA SPEC QL PROBE+SIG AMP: NOT DETECTED
NITRATE UR QL: NEGATIVE
PH UR STRIP: 6 PH (ref 4.7–8)
RBC #/AREA URNS AUTO: 20 /HPF (ref 0–2)
SOURCE: ABNORMAL
SP GR UR STRIP: 1.03 (ref 1–1.03)
SPECIMEN SOURCE: NORMAL
UROBILINOGEN UR STRIP-MCNC: 2 MG/DL (ref 0–2)
WBC #/AREA URNS AUTO: 34 /HPF (ref 0–5)

## 2019-07-02 PROCEDURE — 99395 PREV VISIT EST AGE 18-39: CPT | Performed by: FAMILY MEDICINE

## 2019-07-02 PROCEDURE — 81001 URINALYSIS AUTO W/SCOPE: CPT | Performed by: FAMILY MEDICINE

## 2019-07-02 PROCEDURE — 87491 CHLMYD TRACH DNA AMP PROBE: CPT | Performed by: FAMILY MEDICINE

## 2019-07-02 PROCEDURE — 87088 URINE BACTERIA CULTURE: CPT | Performed by: FAMILY MEDICINE

## 2019-07-02 PROCEDURE — 87086 URINE CULTURE/COLONY COUNT: CPT | Performed by: FAMILY MEDICINE

## 2019-07-02 PROCEDURE — G0123 SCREEN CERV/VAG THIN LAYER: HCPCS | Performed by: FAMILY MEDICINE

## 2019-07-02 PROCEDURE — 87591 N.GONORRHOEAE DNA AMP PROB: CPT | Performed by: FAMILY MEDICINE

## 2019-07-02 PROCEDURE — 87186 SC STD MICRODIL/AGAR DIL: CPT | Performed by: FAMILY MEDICINE

## 2019-07-02 RX ORDER — NORETHINDRONE ACETATE AND ETHINYL ESTRADIOL 1MG-20(21)
1 KIT ORAL DAILY
Qty: 83 TABLET | Refills: 3 | Status: SHIPPED | OUTPATIENT
Start: 2019-07-02 | End: 2019-10-31

## 2019-07-02 ASSESSMENT — PAIN SCALES - GENERAL: PAINLEVEL: MILD PAIN (3)

## 2019-07-02 ASSESSMENT — MIFFLIN-ST. JEOR: SCORE: 1472.13

## 2019-07-02 NOTE — NURSING NOTE
"Chief Complaint   Patient presents with     Physical       Initial /78 (BP Location: Right arm, Patient Position: Sitting, Cuff Size: Adult Regular)   Pulse 104   Temp 99.9  F (37.7  C) (Tympanic)   Ht 1.605 m (5' 3.2\")   Wt 73.5 kg (162 lb)   SpO2 98%   BMI 28.52 kg/m   Estimated body mass index is 28.52 kg/m  as calculated from the following:    Height as of this encounter: 1.605 m (5' 3.2\").    Weight as of this encounter: 73.5 kg (162 lb).  Medication Reconciliation: complete  "

## 2019-07-04 LAB
BACTERIA SPEC CULT: ABNORMAL
SPECIMEN SOURCE: ABNORMAL

## 2019-07-05 DIAGNOSIS — N39.0 URINARY TRACT INFECTION WITHOUT HEMATURIA, SITE UNSPECIFIED: Primary | ICD-10-CM

## 2019-07-05 RX ORDER — SULFAMETHOXAZOLE/TRIMETHOPRIM 800-160 MG
1 TABLET ORAL 2 TIMES DAILY
Qty: 10 TABLET | Refills: 0 | Status: SHIPPED | OUTPATIENT
Start: 2019-07-05 | End: 2019-12-30

## 2019-07-08 ENCOUNTER — OFFICE VISIT (OUTPATIENT)
Dept: UROLOGY | Facility: OTHER | Age: 24
End: 2019-07-08
Attending: UROLOGY
Payer: COMMERCIAL

## 2019-07-08 ENCOUNTER — HOSPITAL ENCOUNTER (OUTPATIENT)
Dept: ULTRASOUND IMAGING | Facility: OTHER | Age: 24
Discharge: HOME | End: 2019-07-08
Attending: UROLOGY | Admitting: UROLOGY
Payer: COMMERCIAL

## 2019-07-08 VITALS — RESPIRATION RATE: 12 BRPM | BODY MASS INDEX: 29.29 KG/M2 | HEART RATE: 88 BPM | WEIGHT: 166.4 LBS

## 2019-07-08 DIAGNOSIS — R31.29 MICROSCOPIC HEMATURIA: ICD-10-CM

## 2019-07-08 DIAGNOSIS — R31.0 GROSS HEMATURIA: Primary | ICD-10-CM

## 2019-07-08 PROCEDURE — 76770 US EXAM ABDO BACK WALL COMP: CPT

## 2019-07-08 PROCEDURE — 52000 CYSTOURETHROSCOPY: CPT | Performed by: UROLOGY

## 2019-07-08 ASSESSMENT — PAIN SCALES - GENERAL: PAINLEVEL: MILD PAIN (3)

## 2019-07-08 NOTE — PATIENT INSTRUCTIONS

## 2019-07-08 NOTE — PROGRESS NOTES
Preprocedure diagnosis  Hematuria    Postprocedure diagnosis  Hematuria    Procedure  Flexible Cystourethroscopy    Surgeon  Jose A Erwin MD    Anesthesia  2% lidocaine jelly intraurethrally    Complications  None    Indications  23 year old female undergoing a flexible cystoscopy for the above mentioned indications.    Findings  Cystoscopic findings included a normal urethra.    The bladder appeared to be normal capacity.    There were no tumors, stones or foreign bodies.    The orifices were slit-shaped and in their normal location.    Procedure  The patient was placed in supine position and prepped and draped in sterile fashion with lidocaine jelly per urethra for anesthesia.    I passed a lubricated 14F flexible cystoscope through the urethra and into the bladder and the bladder was completely visualized.  The cystoscope was retroflexed and the bladder neck visualized.    The cystoscope was slowly withdrawn while visualizing the urethra and the procedure terminated.    The patient tolerated the procedure well.      Imaging  Results for orders placed or performed during the hospital encounter of 07/08/19   US Renal Complete    Narrative    PROCEDURE: US RENAL COMPLETE    HISTORY: frequent uti; Microscopic hematuria. Renal atrophy on the  right.    TECHNIQUE:  A renal ultrasound was performed.    COMPARISON:  CT abdomen pelvis 5/15/2018    MEASUREMENTS:    Right renal length: 6 cm  Left renal length: 12.9 cm    RENAL FINDINGS: The right kidney is again atrophic. There is  compensatory hypertrophy of the left kidney. No suspicious renal mass  or shadowing stone is identified.    BLADDER: The bladder is moderately distended and grossly unremarkable.      Impression    IMPRESSION:      Chronic small volume right kidney.      JUDI SEXTON MD     Plan  Reassurance  Follow-up with Dr. Boone as planned

## 2019-07-08 NOTE — NURSING NOTE
Patient positioned in frog leg position prior to Jose A Erwin MD prepping patient with chlorhexidine gluconate cleanser.    [place] Protocol    A. Pre-procedure verification complete Yes   1-relevant information / documentation available, reviewed and properly matched to the patient; 2-consent accurate and complete, 3-equipment and supplies available    B. Site marking complete N/A  Site marked if not in continuous attendance with patient    C. TIME OUT completed Yes  Time Out was conducted just prior to starting procedure to verify the eight required elements: 1-patient identity, 2-consent accurate and complete, 3-position, 4-correct side/site marked (if applicable), 5-procedure, 6-relevant images / results properly labeled and displayed (if applicable), 7-antibiotics / irrigation fluids (if applicable), 8-safety precautions.    After procedure perineum area rinsed. Discharge instructions reviewed with patient. Patient verbalized understanding of discharge instructions and discharged ambulatory.  Nina Sandoval..................7/8/2019  10:35 AM

## 2019-07-10 LAB
COPATH REPORT: NORMAL
PAP: NORMAL

## 2019-10-31 ENCOUNTER — MYC REFILL (OUTPATIENT)
Dept: FAMILY MEDICINE | Facility: OTHER | Age: 24
End: 2019-10-31

## 2019-10-31 DIAGNOSIS — G47.26 SHIFT WORK SLEEP DISORDER: ICD-10-CM

## 2019-10-31 DIAGNOSIS — Z30.011 ENCOUNTER FOR INITIAL PRESCRIPTION OF CONTRACEPTIVE PILLS: ICD-10-CM

## 2019-10-31 RX ORDER — NORETHINDRONE ACETATE AND ETHINYL ESTRADIOL 1MG-20(21)
1 KIT ORAL DAILY
Qty: 83 TABLET | Refills: 0 | Status: SHIPPED | OUTPATIENT
Start: 2019-10-31 | End: 2019-12-30

## 2019-10-31 NOTE — TELEPHONE ENCOUNTER
zolpidem (AMBIEN) 5 MG tablet      Last Written Prescription Date:  7-16-18  Last Fill Quantity: 60,   # refills: 0  Last Office Visit: 7-2-19  Future Office visit:       Routing refill request to provider for review/approval because:  Drug not on the [facility], [facility] or [facility] refill protocol or controlled substance

## 2019-11-01 ENCOUNTER — MYC REFILL (OUTPATIENT)
Dept: FAMILY MEDICINE | Facility: OTHER | Age: 24
End: 2019-11-01

## 2019-11-01 DIAGNOSIS — G47.26 SHIFT WORK SLEEP DISORDER: ICD-10-CM

## 2019-11-01 RX ORDER — ZOLPIDEM TARTRATE 5 MG/1
TABLET ORAL
Qty: 60 TABLET | Refills: 0 | OUTPATIENT
Start: 2019-11-01

## 2019-11-01 RX ORDER — ZOLPIDEM TARTRATE 5 MG/1
2.5-5 TABLET ORAL
Qty: 60 TABLET | Refills: 0 | Status: SHIPPED | OUTPATIENT
Start: 2019-11-01 | End: 2019-12-30

## 2019-11-01 NOTE — TELEPHONE ENCOUNTER
zolpidem (AMBIEN) 5 MG tablet     Last Written Prescription Date:  11/01/2019  Last Fill Quantity: 60,   # refills: 0  Last Office Visit: 09/23/2019  Future Office visit:       Routing refill request to provider for review/approval because:

## 2019-12-02 ENCOUNTER — TELEPHONE (OUTPATIENT)
Dept: OBGYN | Facility: OTHER | Age: 24
End: 2019-12-02

## 2019-12-02 DIAGNOSIS — Z32.01 PREGNANCY TEST POSITIVE: Primary | ICD-10-CM

## 2019-12-02 NOTE — TELEPHONE ENCOUNTER
Christopher Anthony Patient:     Positive pregnancy test : Yes       P:0  LMP : 10/29/2019 GA: 4w6d  Prenatal vitamins?: Yes  Bleeding?: No  Cramping?: No  1-sided pelvic pain?: No   Advised patient to be seen ASAP if any of the above symptoms.    Order pended for dating US.     Charbel kant scheduled with Christopher on- Will schedule after US

## 2019-12-17 ENCOUNTER — HOSPITAL ENCOUNTER (OUTPATIENT)
Dept: ULTRASOUND IMAGING | Facility: HOSPITAL | Age: 24
Discharge: HOME | End: 2019-12-17
Attending: ADVANCED PRACTICE MIDWIFE | Admitting: ADVANCED PRACTICE MIDWIFE
Payer: COMMERCIAL

## 2019-12-17 DIAGNOSIS — Z32.01 PREGNANCY TEST POSITIVE: ICD-10-CM

## 2019-12-17 PROCEDURE — 76801 OB US < 14 WKS SINGLE FETUS: CPT | Mod: TC

## 2019-12-30 ENCOUNTER — PRENATAL OFFICE VISIT (OUTPATIENT)
Dept: OBGYN | Facility: OTHER | Age: 24
End: 2019-12-30
Attending: ADVANCED PRACTICE MIDWIFE
Payer: COMMERCIAL

## 2019-12-30 VITALS
BODY MASS INDEX: 29.52 KG/M2 | HEART RATE: 105 BPM | SYSTOLIC BLOOD PRESSURE: 112 MMHG | WEIGHT: 166.6 LBS | OXYGEN SATURATION: 99 % | HEIGHT: 63 IN | DIASTOLIC BLOOD PRESSURE: 66 MMHG

## 2019-12-30 DIAGNOSIS — Z34.01 SUPERVISION OF NORMAL FIRST PREGNANCY IN FIRST TRIMESTER: Primary | ICD-10-CM

## 2019-12-30 DIAGNOSIS — Z34.00 SUPERVISION OF NORMAL FIRST PREGNANCY, ANTEPARTUM: ICD-10-CM

## 2019-12-30 DIAGNOSIS — Z11.3 SCREEN FOR STD (SEXUALLY TRANSMITTED DISEASE): ICD-10-CM

## 2019-12-30 PROBLEM — N26.1 RENAL ATROPHY, RIGHT: Status: ACTIVE | Noted: 2018-06-04

## 2019-12-30 PROBLEM — N39.0 RECURRENT UTI: Status: ACTIVE | Noted: 2018-06-04

## 2019-12-30 LAB
SPECIMEN SOURCE: ABNORMAL
WET PREP SPEC: ABNORMAL

## 2019-12-30 PROCEDURE — 87491 CHLMYD TRACH DNA AMP PROBE: CPT | Performed by: ADVANCED PRACTICE MIDWIFE

## 2019-12-30 PROCEDURE — 87591 N.GONORRHOEAE DNA AMP PROB: CPT | Performed by: ADVANCED PRACTICE MIDWIFE

## 2019-12-30 PROCEDURE — 99207 ZZC PRENATAL VISIT: CPT | Performed by: ADVANCED PRACTICE MIDWIFE

## 2019-12-30 PROCEDURE — 87210 SMEAR WET MOUNT SALINE/INK: CPT | Performed by: ADVANCED PRACTICE MIDWIFE

## 2019-12-30 RX ORDER — PRENATAL VIT/IRON FUM/FOLIC AC 27MG-0.8MG
1 TABLET ORAL DAILY
COMMUNITY
End: 2021-01-14

## 2019-12-30 ASSESSMENT — MIFFLIN-ST. JEOR: SCORE: 1487.99

## 2019-12-30 ASSESSMENT — PAIN SCALES - GENERAL: PAINLEVEL: NO PAIN (0)

## 2019-12-30 NOTE — PROGRESS NOTES
"May Maria is a 24 year old  presenting after a positive pregnancy test.  /66 (BP Location: Left arm, Patient Position: Sitting, Cuff Size: Adult Regular)   Pulse 105   Ht 1.605 m (5' 3.2\")   Wt 75.6 kg (166 lb 9.6 oz)   LMP 10/29/2019   SpO2 99%   BMI 29.33 kg/m      Lmp: 10/28/19   Regular: y Oral Contraceptive Agent's: y  Bleeding: n  Pain: n  Smoker: n  Cats: n  Folate: y  Other meds: n    Major Medical Illness: n    Transvaginal Ultrasound by SPENSER Jim CNM  FHT: +  Gestational Age: 8 w 6 d  Scan = dates  Done by SPENSER Jim CNM      Assessment:   G 1 @ 8w 6 d  Need of prenatal cervical STI screening    Plan:   Wet prep, GC/CT    Total visit greater than 20 minutes with 15 minutes spent face to face with patient discussing first trimester of pregnancy education including medications, alcohol, foods, activities, and travel; prenatal care, questions answered.        SPENSER Jim CNM    "

## 2019-12-30 NOTE — NURSING NOTE
"Chief Complaint   Patient presents with     Prenatal Care     PreNew 8 weeks 6 days       Initial /66 (BP Location: Left arm, Patient Position: Sitting, Cuff Size: Adult Regular)   Pulse 105   Ht 1.605 m (5' 3.2\")   Wt 75.6 kg (166 lb 9.6 oz)   LMP 10/29/2019   SpO2 99%   BMI 29.33 kg/m   Estimated body mass index is 29.33 kg/m  as calculated from the following:    Height as of this encounter: 1.605 m (5' 3.2\").    Weight as of this encounter: 75.6 kg (166 lb 9.6 oz).  Medication Reconciliation: complete     Lety Sanders LPN    "

## 2019-12-30 NOTE — PATIENT INSTRUCTIONS
Return to office in 4 week(s) for prenatal care and as needed.    Thank you for allowing Christopher DUEÑAS CNM and our OB team to participate in your care.  If you have a scheduling or an appointment question please contact [facility] Unit Coordinator at their direct line [phone].   ALL nursing questions or concerns can be directed to your OB nurse at: [phone] Dione Hamilton/Carmela

## 2019-12-31 LAB
C TRACH DNA SPEC QL PROBE+SIG AMP: NOT DETECTED
N GONORRHOEA DNA SPEC QL PROBE+SIG AMP: NOT DETECTED
SPECIMEN SOURCE: NORMAL

## 2020-01-20 ENCOUNTER — PRENATAL OFFICE VISIT (OUTPATIENT)
Dept: OBGYN | Facility: OTHER | Age: 25
End: 2020-01-20
Attending: ADVANCED PRACTICE MIDWIFE
Payer: COMMERCIAL

## 2020-01-20 ENCOUNTER — MEDICAL CORRESPONDENCE (OUTPATIENT)
Dept: HEALTH INFORMATION MANAGEMENT | Facility: CLINIC | Age: 25
End: 2020-01-20

## 2020-01-20 VITALS
HEART RATE: 85 BPM | WEIGHT: 165 LBS | BODY MASS INDEX: 30.36 KG/M2 | DIASTOLIC BLOOD PRESSURE: 75 MMHG | HEIGHT: 62 IN | SYSTOLIC BLOOD PRESSURE: 110 MMHG | OXYGEN SATURATION: 98 %

## 2020-01-20 DIAGNOSIS — Z34.01 SUPERVISION OF NORMAL FIRST PREGNANCY IN FIRST TRIMESTER: Primary | ICD-10-CM

## 2020-01-20 DIAGNOSIS — N30.00 ACUTE CYSTITIS WITHOUT HEMATURIA: ICD-10-CM

## 2020-01-20 LAB
ABO + RH BLD: NORMAL
ABO + RH BLD: NORMAL
ALBUMIN UR-MCNC: NEGATIVE MG/DL
AMORPH CRY #/AREA URNS HPF: ABNORMAL /HPF
AMPHETAMINES UR QL: NOT DETECTED NG/ML
APPEARANCE UR: ABNORMAL
BACTERIA #/AREA URNS HPF: ABNORMAL /HPF
BARBITURATES UR QL SCN: NOT DETECTED NG/ML
BENZODIAZ UR QL SCN: NOT DETECTED NG/ML
BILIRUB UR QL STRIP: NEGATIVE
BLD GP AB SCN SERPL QL: NORMAL
BLOOD BANK CMNT PATIENT-IMP: NORMAL
BUPRENORPHINE UR QL: NOT DETECTED NG/ML
CANNABINOIDS UR QL: NOT DETECTED NG/ML
COCAINE UR QL SCN: NOT DETECTED NG/ML
COLOR UR AUTO: YELLOW
D-METHAMPHET UR QL: NOT DETECTED NG/ML
ERYTHROCYTE [DISTWIDTH] IN BLOOD BY AUTOMATED COUNT: 12.9 % (ref 10–15)
GLUCOSE UR STRIP-MCNC: NEGATIVE MG/DL
HCT VFR BLD AUTO: 40.3 % (ref 35–47)
HGB BLD-MCNC: 13.7 G/DL (ref 11.7–15.7)
HGB UR QL STRIP: ABNORMAL
KETONES UR STRIP-MCNC: NEGATIVE MG/DL
LEUKOCYTE ESTERASE UR QL STRIP: NEGATIVE
MCH RBC QN AUTO: 29.3 PG (ref 26.5–33)
MCHC RBC AUTO-ENTMCNC: 34 G/DL (ref 31.5–36.5)
MCV RBC AUTO: 86 FL (ref 78–100)
METHADONE UR QL SCN: NOT DETECTED NG/ML
MUCOUS THREADS #/AREA URNS LPF: PRESENT /LPF
NITRATE UR QL: POSITIVE
OPIATES UR QL SCN: NOT DETECTED NG/ML
OXYCODONE UR QL SCN: NOT DETECTED NG/ML
PCP UR QL SCN: NOT DETECTED NG/ML
PH UR STRIP: 7 PH (ref 4.7–8)
PLATELET # BLD AUTO: 277 10E9/L (ref 150–450)
PROPOXYPH UR QL: NOT DETECTED NG/ML
RBC # BLD AUTO: 4.68 10E12/L (ref 3.8–5.2)
RBC #/AREA URNS AUTO: 4 /HPF (ref 0–2)
SOURCE: ABNORMAL
SP GR UR STRIP: 1.02 (ref 1–1.03)
SPECIMEN EXP DATE BLD: NORMAL
SQUAMOUS #/AREA URNS AUTO: 1 /HPF (ref 0–1)
TRICYCLICS UR QL SCN: NOT DETECTED NG/ML
UROBILINOGEN UR STRIP-MCNC: NORMAL MG/DL (ref 0–2)
WBC # BLD AUTO: 7.5 10E9/L (ref 4–11)
WBC #/AREA URNS AUTO: 9 /HPF (ref 0–5)

## 2020-01-20 PROCEDURE — 87086 URINE CULTURE/COLONY COUNT: CPT | Performed by: ADVANCED PRACTICE MIDWIFE

## 2020-01-20 PROCEDURE — 76815 OB US LIMITED FETUS(S): CPT | Performed by: ADVANCED PRACTICE MIDWIFE

## 2020-01-20 PROCEDURE — 87088 URINE BACTERIA CULTURE: CPT | Performed by: ADVANCED PRACTICE MIDWIFE

## 2020-01-20 PROCEDURE — 87389 HIV-1 AG W/HIV-1&-2 AB AG IA: CPT | Performed by: ADVANCED PRACTICE MIDWIFE

## 2020-01-20 PROCEDURE — 81001 URINALYSIS AUTO W/SCOPE: CPT | Mod: 59 | Performed by: ADVANCED PRACTICE MIDWIFE

## 2020-01-20 PROCEDURE — 86850 RBC ANTIBODY SCREEN: CPT | Performed by: ADVANCED PRACTICE MIDWIFE

## 2020-01-20 PROCEDURE — 80306 DRUG TEST PRSMV INSTRMNT: CPT | Performed by: ADVANCED PRACTICE MIDWIFE

## 2020-01-20 PROCEDURE — 86900 BLOOD TYPING SEROLOGIC ABO: CPT | Performed by: ADVANCED PRACTICE MIDWIFE

## 2020-01-20 PROCEDURE — 36415 COLL VENOUS BLD VENIPUNCTURE: CPT | Performed by: ADVANCED PRACTICE MIDWIFE

## 2020-01-20 PROCEDURE — 87340 HEPATITIS B SURFACE AG IA: CPT | Performed by: ADVANCED PRACTICE MIDWIFE

## 2020-01-20 PROCEDURE — 86901 BLOOD TYPING SEROLOGIC RH(D): CPT | Performed by: ADVANCED PRACTICE MIDWIFE

## 2020-01-20 PROCEDURE — 87186 SC STD MICRODIL/AGAR DIL: CPT | Performed by: ADVANCED PRACTICE MIDWIFE

## 2020-01-20 PROCEDURE — 99207 ZZC FIRST OB VISIT: CPT | Performed by: ADVANCED PRACTICE MIDWIFE

## 2020-01-20 PROCEDURE — 86762 RUBELLA ANTIBODY: CPT | Performed by: ADVANCED PRACTICE MIDWIFE

## 2020-01-20 PROCEDURE — 85027 COMPLETE CBC AUTOMATED: CPT | Performed by: ADVANCED PRACTICE MIDWIFE

## 2020-01-20 PROCEDURE — 86780 TREPONEMA PALLIDUM: CPT | Performed by: ADVANCED PRACTICE MIDWIFE

## 2020-01-20 RX ORDER — CEPHALEXIN 500 MG/1
500 CAPSULE ORAL 4 TIMES DAILY
Qty: 21 CAPSULE | Refills: 0 | Status: SHIPPED | OUTPATIENT
Start: 2020-01-20 | End: 2020-02-07

## 2020-01-20 RX ORDER — CEPHALEXIN 500 MG/1
500 CAPSULE ORAL 2 TIMES DAILY
Qty: 21 CAPSULE | Refills: 0 | Status: SHIPPED | OUTPATIENT
Start: 2020-01-20 | End: 2020-01-20 | Stop reason: ALTCHOICE

## 2020-01-20 ASSESSMENT — ANXIETY QUESTIONNAIRES
1. FEELING NERVOUS, ANXIOUS, OR ON EDGE: NOT AT ALL
5. BEING SO RESTLESS THAT IT IS HARD TO SIT STILL: NOT AT ALL
3. WORRYING TOO MUCH ABOUT DIFFERENT THINGS: NOT AT ALL
7. FEELING AFRAID AS IF SOMETHING AWFUL MIGHT HAPPEN: NOT AT ALL
2. NOT BEING ABLE TO STOP OR CONTROL WORRYING: NOT AT ALL
6. BECOMING EASILY ANNOYED OR IRRITABLE: NOT AT ALL
GAD7 TOTAL SCORE: 0

## 2020-01-20 ASSESSMENT — PATIENT HEALTH QUESTIONNAIRE - PHQ9
5. POOR APPETITE OR OVEREATING: NOT AT ALL
SUM OF ALL RESPONSES TO PHQ QUESTIONS 1-9: 0

## 2020-01-20 ASSESSMENT — PAIN SCALES - GENERAL: PAINLEVEL: NO PAIN (0)

## 2020-01-20 ASSESSMENT — MIFFLIN-ST. JEOR: SCORE: 1461.69

## 2020-01-20 NOTE — NURSING NOTE
"Chief Complaint   Patient presents with     Prenatal Care       Initial /75 (BP Location: Left arm, Cuff Size: Adult Regular)   Pulse 85   Ht 1.575 m (5' 2\")   Wt 74.8 kg (165 lb)   LMP 10/29/2019   SpO2 98%   BMI 30.18 kg/m   Estimated body mass index is 30.18 kg/m  as calculated from the following:    Height as of this encounter: 1.575 m (5' 2\").    Weight as of this encounter: 74.8 kg (165 lb).  Medication Reconciliation: complete  Zaynab Russell LPN  "

## 2020-01-20 NOTE — PATIENT INSTRUCTIONS
Return to office in 4 week(s) for prenatal care and as needed.    If you think your bag of water is broke; have bleeding like a period; think your in labor; or are worried about your baby's movement; please call the labor and delivery unit @ [phone].    Thank you for allowing Christopher DUEÑAS CNM and our OB team to participate in your care.  If you have a scheduling or an appointment question please contact [facility] Unit Coordinator at their direct line [phone].   ALL nursing questions or concerns can be directed to your OB nurse at: [phone] Dione Hamilton/Carmela
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
## 2020-01-21 LAB
HBV SURFACE AG SERPL QL IA: NONREACTIVE
HIV 1+2 AB+HIV1 P24 AG SERPL QL IA: NONREACTIVE
RUBV IGG SERPL IA-ACNC: 29 IU/ML
T PALLIDUM AB SER QL: NONREACTIVE

## 2020-01-21 ASSESSMENT — ANXIETY QUESTIONNAIRES: GAD7 TOTAL SCORE: 0

## 2020-01-22 LAB
BACTERIA SPEC CULT: ABNORMAL
SPECIMEN SOURCE: ABNORMAL

## 2020-01-24 ENCOUNTER — HOSPITAL ENCOUNTER (OUTPATIENT)
Dept: ULTRASOUND IMAGING | Facility: HOSPITAL | Age: 25
Discharge: HOME | End: 2020-01-24
Attending: ADVANCED PRACTICE MIDWIFE | Admitting: ADVANCED PRACTICE MIDWIFE
Payer: COMMERCIAL

## 2020-01-24 DIAGNOSIS — Z34.01 SUPERVISION OF NORMAL FIRST PREGNANCY IN FIRST TRIMESTER: ICD-10-CM

## 2020-01-24 PROCEDURE — 76813 OB US NUCHAL MEAS 1 GEST: CPT | Mod: TC

## 2020-01-24 PROCEDURE — 84704 HCG FREE BETACHAIN TEST: CPT | Performed by: ADVANCED PRACTICE MIDWIFE

## 2020-01-24 PROCEDURE — 36415 COLL VENOUS BLD VENIPUNCTURE: CPT | Performed by: ADVANCED PRACTICE MIDWIFE

## 2020-01-24 PROCEDURE — 84163 PAPPA SERUM: CPT | Performed by: ADVANCED PRACTICE MIDWIFE

## 2020-01-24 PROCEDURE — 82105 ALPHA-FETOPROTEIN SERUM: CPT | Performed by: ADVANCED PRACTICE MIDWIFE

## 2020-01-28 LAB — FIRST TRIMESTER SCREEN BIOCHEM MARKERS: NORMAL

## 2020-02-04 ENCOUNTER — APPOINTMENT (OUTPATIENT)
Dept: ULTRASOUND IMAGING | Facility: HOSPITAL | Age: 25
End: 2020-02-04
Attending: PHYSICIAN ASSISTANT
Payer: COMMERCIAL

## 2020-02-04 ENCOUNTER — HOSPITAL ENCOUNTER (EMERGENCY)
Facility: HOSPITAL | Age: 25
Discharge: HOME | End: 2020-02-04
Attending: PHYSICIAN ASSISTANT | Admitting: PHYSICIAN ASSISTANT
Payer: COMMERCIAL

## 2020-02-04 ENCOUNTER — TRANSFERRED RECORDS (OUTPATIENT)
Dept: HEALTH INFORMATION MANAGEMENT | Facility: CLINIC | Age: 25
End: 2020-02-04

## 2020-02-04 VITALS
TEMPERATURE: 97.9 F | BODY MASS INDEX: 30.18 KG/M2 | DIASTOLIC BLOOD PRESSURE: 74 MMHG | OXYGEN SATURATION: 98 % | HEART RATE: 99 BPM | RESPIRATION RATE: 16 BRPM | SYSTOLIC BLOOD PRESSURE: 134 MMHG | WEIGHT: 165 LBS

## 2020-02-04 DIAGNOSIS — O23.40 UTI IN PREGNANCY: ICD-10-CM

## 2020-02-04 DIAGNOSIS — R10.9 FLANK PAIN: ICD-10-CM

## 2020-02-04 LAB
ALBUMIN SERPL-MCNC: 3.5 G/DL (ref 3.4–5)
ALBUMIN UR-MCNC: 30 MG/DL
ALP SERPL-CCNC: 70 U/L (ref 40–150)
ALT SERPL W P-5'-P-CCNC: 23 U/L (ref 0–50)
ANION GAP SERPL CALCULATED.3IONS-SCNC: 7 MMOL/L (ref 3–14)
APPEARANCE UR: ABNORMAL
AST SERPL W P-5'-P-CCNC: 9 U/L (ref 0–45)
BACTERIA #/AREA URNS HPF: ABNORMAL /HPF
BASOPHILS # BLD AUTO: 0 10E9/L (ref 0–0.2)
BASOPHILS NFR BLD AUTO: 0.4 %
BILIRUB SERPL-MCNC: 0.7 MG/DL (ref 0.2–1.3)
BILIRUB UR QL STRIP: NEGATIVE
BUN SERPL-MCNC: 6 MG/DL (ref 7–30)
CALCIUM SERPL-MCNC: 8.7 MG/DL (ref 8.5–10.1)
CHLORIDE SERPL-SCNC: 106 MMOL/L (ref 94–109)
CO2 SERPL-SCNC: 23 MMOL/L (ref 20–32)
COLOR UR AUTO: YELLOW
CREAT SERPL-MCNC: 0.6 MG/DL (ref 0.52–1.04)
DIFFERENTIAL METHOD BLD: NORMAL
EOSINOPHIL # BLD AUTO: 0.1 10E9/L (ref 0–0.7)
EOSINOPHIL NFR BLD AUTO: 0.6 %
ERYTHROCYTE [DISTWIDTH] IN BLOOD BY AUTOMATED COUNT: 13.1 % (ref 10–15)
GFR SERPL CREATININE-BSD FRML MDRD: >90 ML/MIN/{1.73_M2}
GLUCOSE SERPL-MCNC: 109 MG/DL (ref 70–99)
GLUCOSE UR STRIP-MCNC: NEGATIVE MG/DL
HCT VFR BLD AUTO: 35 % (ref 35–47)
HGB BLD-MCNC: 12.1 G/DL (ref 11.7–15.7)
HGB UR QL STRIP: ABNORMAL
IMM GRANULOCYTES # BLD: 0 10E9/L (ref 0–0.4)
IMM GRANULOCYTES NFR BLD: 0.4 %
KETONES UR STRIP-MCNC: 40 MG/DL
LEUKOCYTE ESTERASE UR QL STRIP: ABNORMAL
LYMPHOCYTES # BLD AUTO: 1.2 10E9/L (ref 0.8–5.3)
LYMPHOCYTES NFR BLD AUTO: 14.2 %
MCH RBC QN AUTO: 29.5 PG (ref 26.5–33)
MCHC RBC AUTO-ENTMCNC: 34.6 G/DL (ref 31.5–36.5)
MCV RBC AUTO: 85 FL (ref 78–100)
MONOCYTES # BLD AUTO: 0.5 10E9/L (ref 0–1.3)
MONOCYTES NFR BLD AUTO: 5.9 %
MUCOUS THREADS #/AREA URNS LPF: PRESENT /LPF
NEUTROPHILS # BLD AUTO: 6.6 10E9/L (ref 1.6–8.3)
NEUTROPHILS NFR BLD AUTO: 78.5 %
NITRATE UR QL: NEGATIVE
NRBC # BLD AUTO: 0 10*3/UL
NRBC BLD AUTO-RTO: 0 /100
PH UR STRIP: 5.5 PH (ref 4.7–8)
PLATELET # BLD AUTO: 197 10E9/L (ref 150–450)
POTASSIUM SERPL-SCNC: 3.2 MMOL/L (ref 3.4–5.3)
PROT SERPL-MCNC: 7.1 G/DL (ref 6.8–8.8)
RBC # BLD AUTO: 4.1 10E12/L (ref 3.8–5.2)
RBC #/AREA URNS AUTO: 78 /HPF (ref 0–2)
SODIUM SERPL-SCNC: 136 MMOL/L (ref 133–144)
SOURCE: ABNORMAL
SP GR UR STRIP: 1.02 (ref 1–1.03)
SQUAMOUS #/AREA URNS AUTO: 11 /HPF (ref 0–1)
UROBILINOGEN UR STRIP-MCNC: NORMAL MG/DL (ref 0–2)
WBC # BLD AUTO: 8.5 10E9/L (ref 4–11)
WBC #/AREA URNS AUTO: 75 /HPF (ref 0–5)

## 2020-02-04 PROCEDURE — 87186 SC STD MICRODIL/AGAR DIL: CPT | Performed by: PHYSICIAN ASSISTANT

## 2020-02-04 PROCEDURE — 87088 URINE BACTERIA CULTURE: CPT | Performed by: PHYSICIAN ASSISTANT

## 2020-02-04 PROCEDURE — 87086 URINE CULTURE/COLONY COUNT: CPT | Performed by: PHYSICIAN ASSISTANT

## 2020-02-04 PROCEDURE — 25000128 H RX IP 250 OP 636: Performed by: PHYSICIAN ASSISTANT

## 2020-02-04 PROCEDURE — 25800030 ZZH RX IP 258 OP 636: Performed by: PHYSICIAN ASSISTANT

## 2020-02-04 PROCEDURE — 99285 EMERGENCY DEPT VISIT HI MDM: CPT | Mod: 25

## 2020-02-04 PROCEDURE — 81001 URINALYSIS AUTO W/SCOPE: CPT | Performed by: EMERGENCY MEDICINE

## 2020-02-04 PROCEDURE — 85025 COMPLETE CBC W/AUTO DIFF WBC: CPT | Performed by: PHYSICIAN ASSISTANT

## 2020-02-04 PROCEDURE — 99284 EMERGENCY DEPT VISIT MOD MDM: CPT | Mod: Z6 | Performed by: PHYSICIAN ASSISTANT

## 2020-02-04 PROCEDURE — 25000132 ZZH RX MED GY IP 250 OP 250 PS 637: Performed by: PHYSICIAN ASSISTANT

## 2020-02-04 PROCEDURE — 80053 COMPREHEN METABOLIC PANEL: CPT | Performed by: PHYSICIAN ASSISTANT

## 2020-02-04 PROCEDURE — 76815 OB US LIMITED FETUS(S): CPT | Mod: TC

## 2020-02-04 PROCEDURE — 76770 US EXAM ABDO BACK WALL COMP: CPT | Mod: TC

## 2020-02-04 PROCEDURE — 96365 THER/PROPH/DIAG IV INF INIT: CPT

## 2020-02-04 PROCEDURE — 36415 COLL VENOUS BLD VENIPUNCTURE: CPT | Performed by: PHYSICIAN ASSISTANT

## 2020-02-04 RX ORDER — POTASSIUM CHLORIDE 1500 MG/1
20 TABLET, EXTENDED RELEASE ORAL ONCE
Status: COMPLETED | OUTPATIENT
Start: 2020-02-04 | End: 2020-02-04

## 2020-02-04 RX ORDER — CIPROFLOXACIN 250 MG/1
250 TABLET, FILM COATED ORAL 2 TIMES DAILY
Qty: 10 TABLET | Refills: 0 | Status: SHIPPED | OUTPATIENT
Start: 2020-02-04 | End: 2020-02-17

## 2020-02-04 RX ORDER — SODIUM CHLORIDE 9 MG/ML
1000 INJECTION, SOLUTION INTRAVENOUS CONTINUOUS
Status: DISCONTINUED | OUTPATIENT
Start: 2020-02-04 | End: 2020-02-04 | Stop reason: HOSPADM

## 2020-02-04 RX ORDER — CIPROFLOXACIN 250 MG/1
250 TABLET, FILM COATED ORAL ONCE
Status: COMPLETED | OUTPATIENT
Start: 2020-02-04 | End: 2020-02-04

## 2020-02-04 RX ORDER — ACETAMINOPHEN 325 MG/1
975 TABLET ORAL ONCE
Status: COMPLETED | OUTPATIENT
Start: 2020-02-04 | End: 2020-02-04

## 2020-02-04 RX ADMIN — SODIUM CHLORIDE 1000 ML: 9 INJECTION, SOLUTION INTRAVENOUS at 18:44

## 2020-02-04 RX ADMIN — ACETAMINOPHEN 975 MG: 325 TABLET, FILM COATED ORAL at 18:45

## 2020-02-04 RX ADMIN — AZITHROMYCIN MONOHYDRATE 500 MG: 500 INJECTION, POWDER, LYOPHILIZED, FOR SOLUTION INTRAVENOUS at 18:47

## 2020-02-04 RX ADMIN — POTASSIUM CHLORIDE 20 MEQ: 1500 TABLET, EXTENDED RELEASE ORAL at 19:02

## 2020-02-04 RX ADMIN — CIPROFLOXACIN HYDROCHLORIDE 250 MG: 250 TABLET, FILM COATED ORAL at 19:02

## 2020-02-04 RX ADMIN — SODIUM CHLORIDE 1000 ML: 9 INJECTION, SOLUTION INTRAVENOUS at 17:36

## 2020-02-04 ASSESSMENT — ENCOUNTER SYMPTOMS
SHORTNESS OF BREATH: 0
FATIGUE: 0
FLANK PAIN: 1
APPETITE CHANGE: 0
ABDOMINAL PAIN: 0
CHILLS: 0
ACTIVITY CHANGE: 0
FEVER: 0
NAUSEA: 0
VOMITING: 0

## 2020-02-04 NOTE — ED AVS SNAPSHOT
[facility]  [address] [address]  Phone:  [phone]                                    April YURIY Maria   MRN: [id number]    Department:  [facility]   Date of Visit:  2/4/2020           After Visit Summary Signature Page    I have received my discharge instructions, and my questions have been answered. I have discussed any challenges I see with this plan with the nurse or doctor.    ..........................................................................................................................................  Patient/Patient Representative Signature      ..........................................................................................................................................  Patient Representative Print Name and Relationship to Patient    ..................................................               ................................................  Date                                   Time    ..........................................................................................................................................  Reviewed by Signature/Title    ...................................................              ..............................................  Date                                               Time          22EPIC Rev 08/18

## 2020-02-05 NOTE — ED NOTES
Patient given written and verbal discharge instructions and patient verbalizes understanding. Patient advised that prescription for cipro has been sent to [facility]. Patient left ambulatory.

## 2020-02-05 NOTE — ED PROVIDER NOTES
"  History     Chief Complaint   Patient presents with     Flank Pain     \"right flank pain since this morning\"      Vaginal Bleeding     \"is 14 weeks pregnant, 1 episode of vaginal spotting, denies lower abdominal pelvic cramping\"      The history is provided by the patient.     May Maria is a 24 year old female who presented to the emergency department ambulatory along with significant other for evaluation of right flank pain.  Patient originally seen today in the [facility] emergency department.  She has right flank pain since this morning.  History of atrophic right kidney.  She is 14 weeks gestation.  She was treated for urinary tract infection on January 20.  Denies any fevers or chills.  Denies any abdominal pain.  Denies any vaginal bleeding or discharge.  The patient tells me she has had a work-up previously by urology for persistent microscopic hematuria.    Allergies:  Allergies   Allergen Reactions     Pineapple Hives and Swelling     Throat swelling       Problem List:    Patient Active Problem List    Diagnosis Date Noted     Supervision of normal first pregnancy, antepartum 12/30/2019     Priority: Medium       B positive  FOB:  Dominick Downing 3rd floor UA       Microscopic hematuria 01/25/2019     Priority: Medium     No anatomic cause noted. Repeat yearly.        Recurrent UTI 06/04/2018     Priority: Medium     Renal atrophy, right 06/04/2018     Priority: Medium        Past Medical History:    Past Medical History:   Diagnosis Date     Atrophic kidney      Shift work sleep disorder        Past Surgical History:    Past Surgical History:   Procedure Laterality Date     PE TUBES Bilateral     years        Family History:    Family History   Problem Relation Age of Onset     Other - See Comments Mother         bells palsy post auto accident     Heart Disease Father         s/p stents       Social History:  Marital Status:  Single [1]  Social History     Tobacco Use     Smoking status: " Former Smoker     Packs/day: 0.25     Types: Cigarettes     Start date: 3/15/2017     Last attempt to quit: 3/15/2018     Years since quittin.8     Smokeless tobacco: Never Used   Substance Use Topics     Alcohol use: No     Alcohol/week: 0.0 standard drinks     Drug use: No        Medications:    ciprofloxacin (CIPRO) 250 MG tablet  cephALEXin (KEFLEX) 500 MG capsule  Prenatal Vit-Fe Fumarate-FA (PRENATAL MULTIVITAMIN W/IRON) 27-0.8 MG tablet          Review of Systems   Constitutional: Negative for activity change, appetite change, chills, fatigue and fever.   Respiratory: Negative for shortness of breath.    Gastrointestinal: Negative for abdominal pain, nausea and vomiting.   Genitourinary: Positive for flank pain. Negative for vaginal bleeding and vaginal discharge.   Skin: Negative.        Physical Exam   BP: 132/86  Pulse: 98  Heart Rate: 87  Temp: 99.5  F (37.5  C)  Resp: 18  Weight: 74.8 kg (165 lb)  SpO2: 100 %      Physical Exam  Vitals signs and nursing note reviewed.   Constitutional:       Appearance: Normal appearance. She is normal weight.   Cardiovascular:      Rate and Rhythm: Normal rate.   Pulmonary:      Effort: Pulmonary effort is normal.   Abdominal:      General: There is no distension.      Tenderness: There is no abdominal tenderness. There is no guarding.   Skin:     General: Skin is warm and dry.      Capillary Refill: Capillary refill takes less than 2 seconds.   Neurological:      General: No focal deficit present.      Mental Status: She is alert and oriented to person, place, and time.   Psychiatric:         Mood and Affect: Mood normal.         Behavior: Behavior normal.         ED Course        Procedures               Critical Care time:  none               Results for orders placed or performed during the hospital encounter of 20 (from the past 24 hour(s))   UA reflex to Microscopic and Culture   Result Value Ref Range    Color Urine Yellow     Appearance Urine Slightly  Cloudy     Glucose Urine Negative NEG^Negative mg/dL    Bilirubin Urine Negative NEG^Negative    Ketones Urine 40 (A) NEG^Negative mg/dL    Specific Gravity Urine 1.021 1.003 - 1.035    Blood Urine Large (A) NEG^Negative    pH Urine 5.5 4.7 - 8.0 pH    Protein Albumin Urine 30 (A) NEG^Negative mg/dL    Urobilinogen mg/dL Normal 0.0 - 2.0 mg/dL    Nitrite Urine Negative NEG^Negative    Leukocyte Esterase Urine Moderate (A) NEG^Negative    Source Midstream Urine     RBC Urine 78 (H) 0 - 2 /HPF    WBC Urine 75 (H) 0 - 5 /HPF    Bacteria Urine Few (A) NEG^Negative /HPF    Squamous Epithelial /HPF Urine 11 (H) 0 - 1 /HPF    Mucous Urine Present (A) NEG^Negative /LPF   US Renal Complete    Narrative    PROCEDURE: US RENAL COMPLETE 2/4/2020 4:05 PM    HISTORY: right flank pain and pregnant. Hematuria    COMPARISONS: None.    TECHNIQUE: Routine renal ultrasound.    FINDINGS: Right kidney is small and atrophic. It measures 7.2 x 4.3 x  3.4 cm with cortex measuring 9 mm. No mass or hydronephrosis is seen.    Left kidney appears normal. It measures 13.7 x 5.3 x 6.2 cm. Cortex  measures 9 mm. No mass or hydronephrosis is seen.    Bladder is partially distended. Prevoid bladder volume is 13.3 cc with  nonvisualization of the ureteral jets.         Impression    IMPRESSION: Atrophic right kidney. Normal appearing left kidney.    FREDY MCLEAN MD   US OB >14 Weeks Limited wo Fetal Measurement    Narrative    OB ULTRASOUND REPORT     VENESSA by LMP: 8/4/2020, 14 weeks 0 days    VENESSA by 1st US:        Clinical:  Findings pain, 14 weeks pregnant. Possible vaginal  bleeding.  Gestation Number:  1  Presentation:    Transverse  Lie:    Transverse  Cardiac Activity:   Regular  BPM:  160  Movement:  Yes  Placenta:   Anterior  Amniotic Fluid:    Normal visually      Anatomy:    Stomach  Unremarkable         Bladder  Unremarkable                Impression    Impression:  1.  Limited evaluation demonstrates single living fetus with  anterior  placenta. No evidence of acute abnormality.        HUNG PAYAN MD   CBC with platelets differential   Result Value Ref Range    WBC 8.5 4.0 - 11.0 10e9/L    RBC Count 4.10 3.8 - 5.2 10e12/L    Hemoglobin 12.1 11.7 - 15.7 g/dL    Hematocrit 35.0 35.0 - 47.0 %    MCV 85 78 - 100 fl    MCH 29.5 26.5 - 33.0 pg    MCHC 34.6 31.5 - 36.5 g/dL    RDW 13.1 10.0 - 15.0 %    Platelet Count 197 150 - 450 10e9/L    Diff Method Automated Method     % Neutrophils 78.5 %    % Lymphocytes 14.2 %    % Monocytes 5.9 %    % Eosinophils 0.6 %    % Basophils 0.4 %    % Immature Granulocytes 0.4 %    Nucleated RBCs 0 0 /100    Absolute Neutrophil 6.6 1.6 - 8.3 10e9/L    Absolute Lymphocytes 1.2 0.8 - 5.3 10e9/L    Absolute Monocytes 0.5 0.0 - 1.3 10e9/L    Absolute Eosinophils 0.1 0.0 - 0.7 10e9/L    Absolute Basophils 0.0 0.0 - 0.2 10e9/L    Abs Immature Granulocytes 0.0 0 - 0.4 10e9/L    Absolute Nucleated RBC 0.0    Comprehensive metabolic panel   Result Value Ref Range    Sodium 136 133 - 144 mmol/L    Potassium 3.2 (L) 3.4 - 5.3 mmol/L    Chloride 106 94 - 109 mmol/L    Carbon Dioxide 23 20 - 32 mmol/L    Anion Gap 7 3 - 14 mmol/L    Glucose 109 (H) 70 - 99 mg/dL    Urea Nitrogen 6 (L) 7 - 30 mg/dL    Creatinine 0.60 0.52 - 1.04 mg/dL    GFR Estimate >90 >60 mL/min/[1.73_m2]    GFR Estimate If Black >90 >60 mL/min/[1.73_m2]    Calcium 8.7 8.5 - 10.1 mg/dL    Bilirubin Total 0.7 0.2 - 1.3 mg/dL    Albumin 3.5 3.4 - 5.0 g/dL    Protein Total 7.1 6.8 - 8.8 g/dL    Alkaline Phosphatase 70 40 - 150 U/L    ALT 23 0 - 50 U/L    AST 9 0 - 45 U/L       Medications   0.9% sodium chloride BOLUS (0 mLs Intravenous Stopped 2/4/20 1843)     Followed by   sodium chloride 0.9% infusion (1,000 mLs Intravenous New Bag 2/4/20 1844)   acetaminophen (TYLENOL) tablet 975 mg (975 mg Oral Given 2/4/20 1845)   potassium chloride ER (K-DUR/KLOR-CON M) CR tablet 20 mEq (20 mEq Oral Given 2/4/20 1902)   ciprofloxacin (CIPRO) tablet 250 mg (250 mg  Oral Given 2/4/20 1902)       Assessments & Plan (with Medical Decision Making)   Work-up as above.  Previous urine culture on January 20 showed positivity to Pseudomonas.  She was originally treated with Keflex.  Current urine is abnormal.  She was actually nitrite positive at [place] today.  Consultation with OB/GYN Dr. Talley.  He advised that oral ciprofloxacin is safe in pregnancy.  This would be the most reasonable choice for this patient's current infection.  I doubt pyelonephritis at this time.  She has no fevers or tachycardia.  No white count.  She has an atrophic right kidney.  Renal ultrasound was unremarkable.  Transabdominal ultrasound was unremarkable.  Long detailed discussion with the patient.  She will follow-up with OB/GYN in 2 days.  Return here for any worsening symptoms, new symptoms, or other concerns.    I also doubt that the patient has a ureteral obstruction.    This document was prepared using a combination of typing and voice generated software.  While every attempt was made for accuracy, spelling and grammatical errors may exist.    I have reviewed the nursing notes.    I have reviewed the findings, diagnosis, plan and need for follow up with the patient.       New Prescriptions    CIPROFLOXACIN (CIPRO) 250 MG TABLET    Take 1 tablet (250 mg) by mouth 2 times daily for 5 days       Final diagnoses:   Flank pain   UTI in pregnancy       2/4/2020   [facility]     Iliana Gray PA-C  02/04/20 1932

## 2020-02-05 NOTE — DISCHARGE INSTRUCTIONS
With consultation of gynecologist, we elected to treat you with low-dose ciprofloxacin twice a day for 5 days.  You likely have a persistent Pseudomonas urinary tract infection.  Please follow-up with gynecology in the next 2 days.  Rest and stay hydrated.  Your renal ultrasound was unremarkable for emergent findings.  Your abdominal ultrasound showed an unremarkable 14-week gestation.  Please return here for any worsening symptoms, new symptoms, fevers, chills, vomiting, abdominal discomfort, or other concerns whatsoever.

## 2020-02-06 ENCOUNTER — MYC MEDICAL ADVICE (OUTPATIENT)
Dept: OBGYN | Facility: OTHER | Age: 25
End: 2020-02-06

## 2020-02-07 ENCOUNTER — HOSPITAL ENCOUNTER (EMERGENCY)
Facility: HOSPITAL | Age: 25
Discharge: HOME | End: 2020-02-07
Attending: PHYSICIAN ASSISTANT | Admitting: PHYSICIAN ASSISTANT
Payer: COMMERCIAL

## 2020-02-07 VITALS
TEMPERATURE: 98.3 F | OXYGEN SATURATION: 99 % | RESPIRATION RATE: 18 BRPM | BODY MASS INDEX: 30.16 KG/M2 | WEIGHT: 164.9 LBS | DIASTOLIC BLOOD PRESSURE: 85 MMHG | SYSTOLIC BLOOD PRESSURE: 121 MMHG

## 2020-02-07 DIAGNOSIS — R10.9 FLANK PAIN: ICD-10-CM

## 2020-02-07 LAB
ALBUMIN SERPL-MCNC: 3.4 G/DL (ref 3.4–5)
ALBUMIN UR-MCNC: NEGATIVE MG/DL
ALP SERPL-CCNC: 70 U/L (ref 40–150)
ALT SERPL W P-5'-P-CCNC: 26 U/L (ref 0–50)
ANION GAP SERPL CALCULATED.3IONS-SCNC: 7 MMOL/L (ref 3–14)
APPEARANCE UR: CLEAR
AST SERPL W P-5'-P-CCNC: 13 U/L (ref 0–45)
BACTERIA #/AREA URNS HPF: ABNORMAL /HPF
BACTERIA SPEC CULT: ABNORMAL
BASOPHILS # BLD AUTO: 0 10E9/L (ref 0–0.2)
BASOPHILS NFR BLD AUTO: 0.4 %
BILIRUB SERPL-MCNC: 0.4 MG/DL (ref 0.2–1.3)
BILIRUB UR QL STRIP: NEGATIVE
BUN SERPL-MCNC: 10 MG/DL (ref 7–30)
CALCIUM SERPL-MCNC: 9.1 MG/DL (ref 8.5–10.1)
CHLORIDE SERPL-SCNC: 105 MMOL/L (ref 94–109)
CO2 SERPL-SCNC: 24 MMOL/L (ref 20–32)
COLOR UR AUTO: ABNORMAL
CREAT SERPL-MCNC: 0.66 MG/DL (ref 0.52–1.04)
DIFFERENTIAL METHOD BLD: NORMAL
EOSINOPHIL # BLD AUTO: 0.1 10E9/L (ref 0–0.7)
EOSINOPHIL NFR BLD AUTO: 1.3 %
ERYTHROCYTE [DISTWIDTH] IN BLOOD BY AUTOMATED COUNT: 13 % (ref 10–15)
GFR SERPL CREATININE-BSD FRML MDRD: >90 ML/MIN/{1.73_M2}
GLUCOSE SERPL-MCNC: 91 MG/DL (ref 70–99)
GLUCOSE UR STRIP-MCNC: NEGATIVE MG/DL
HCG UR QL: POSITIVE
HCT VFR BLD AUTO: 35.4 % (ref 35–47)
HGB BLD-MCNC: 12.4 G/DL (ref 11.7–15.7)
HGB UR QL STRIP: ABNORMAL
IMM GRANULOCYTES # BLD: 0 10E9/L (ref 0–0.4)
IMM GRANULOCYTES NFR BLD: 0.3 %
KETONES UR STRIP-MCNC: NEGATIVE MG/DL
LEUKOCYTE ESTERASE UR QL STRIP: NEGATIVE
LYMPHOCYTES # BLD AUTO: 1.7 10E9/L (ref 0.8–5.3)
LYMPHOCYTES NFR BLD AUTO: 24.4 %
MCH RBC QN AUTO: 29.7 PG (ref 26.5–33)
MCHC RBC AUTO-ENTMCNC: 35 G/DL (ref 31.5–36.5)
MCV RBC AUTO: 85 FL (ref 78–100)
MONOCYTES # BLD AUTO: 0.4 10E9/L (ref 0–1.3)
MONOCYTES NFR BLD AUTO: 6.4 %
MUCOUS THREADS #/AREA URNS LPF: PRESENT /LPF
NEUTROPHILS # BLD AUTO: 4.5 10E9/L (ref 1.6–8.3)
NEUTROPHILS NFR BLD AUTO: 67.2 %
NITRATE UR QL: NEGATIVE
NRBC # BLD AUTO: 0 10*3/UL
NRBC BLD AUTO-RTO: 0 /100
PH UR STRIP: 6.5 PH (ref 4.7–8)
PLATELET # BLD AUTO: 232 10E9/L (ref 150–450)
POTASSIUM SERPL-SCNC: 3.4 MMOL/L (ref 3.4–5.3)
PROT SERPL-MCNC: 7.3 G/DL (ref 6.8–8.8)
RBC # BLD AUTO: 4.18 10E12/L (ref 3.8–5.2)
RBC #/AREA URNS AUTO: 3 /HPF (ref 0–2)
SODIUM SERPL-SCNC: 136 MMOL/L (ref 133–144)
SOURCE: ABNORMAL
SP GR UR STRIP: 1.02 (ref 1–1.03)
SPECIMEN SOURCE: ABNORMAL
SQUAMOUS #/AREA URNS AUTO: 2 /HPF (ref 0–1)
UROBILINOGEN UR STRIP-MCNC: NORMAL MG/DL (ref 0–2)
WBC # BLD AUTO: 6.8 10E9/L (ref 4–11)
WBC #/AREA URNS AUTO: 1 /HPF (ref 0–5)

## 2020-02-07 PROCEDURE — 81001 URINALYSIS AUTO W/SCOPE: CPT | Performed by: PHYSICIAN ASSISTANT

## 2020-02-07 PROCEDURE — 36415 COLL VENOUS BLD VENIPUNCTURE: CPT | Performed by: PHYSICIAN ASSISTANT

## 2020-02-07 PROCEDURE — 99283 EMERGENCY DEPT VISIT LOW MDM: CPT

## 2020-02-07 PROCEDURE — 85025 COMPLETE CBC W/AUTO DIFF WBC: CPT | Performed by: PHYSICIAN ASSISTANT

## 2020-02-07 PROCEDURE — 99283 EMERGENCY DEPT VISIT LOW MDM: CPT | Mod: Z6 | Performed by: PHYSICIAN ASSISTANT

## 2020-02-07 PROCEDURE — 81025 URINE PREGNANCY TEST: CPT | Performed by: PHYSICIAN ASSISTANT

## 2020-02-07 PROCEDURE — 80053 COMPREHEN METABOLIC PANEL: CPT | Performed by: PHYSICIAN ASSISTANT

## 2020-02-07 RX ORDER — ACETAMINOPHEN 325 MG/1
325-650 TABLET ORAL EVERY 6 HOURS PRN
COMMUNITY

## 2020-02-07 RX ORDER — OXYCODONE HYDROCHLORIDE 5 MG/1
5 TABLET ORAL EVERY 6 HOURS PRN
Qty: 10 TABLET | Refills: 0 | Status: SHIPPED | OUTPATIENT
Start: 2020-02-07 | End: 2020-02-17

## 2020-02-07 NOTE — ED AVS SNAPSHOT
[facility]  [address] [address]  Phone:  [phone]                                    April YURIY Maria   MRN: [id number]    Department:  [facility]   Date of Visit:  2/7/2020           After Visit Summary Signature Page    I have received my discharge instructions, and my questions have been answered. I have discussed any challenges I see with this plan with the nurse or doctor.    ..........................................................................................................................................  Patient/Patient Representative Signature      ..........................................................................................................................................  Patient Representative Print Name and Relationship to Patient    ..................................................               ................................................  Date                                   Time    ..........................................................................................................................................  Reviewed by Signature/Title    ...................................................              ..............................................  Date                                               Time          22EPIC Rev 08/18

## 2020-02-07 NOTE — DISCHARGE INSTRUCTIONS
May try oxycodone for severe pain if he cannot sleep at night.  Urine culture was sensitive to Cipro  Continue your antibiotic return here for concerns such as fever or vomiting.

## 2020-02-07 NOTE — ED PROVIDER NOTES
History     Chief Complaint   Patient presents with     Flank Pain     HPI  May Maria is a 24 year old female who presents here right flank pain she is 14 weeks pregnant.  Recently diagnosed with UTI/possible Donell she is started on Cipro with the approval of OB/GYN she continues to have flank pain.  No vaginal bleeding no abdominal pain.  No pelvic cramping.  No vomiting she has no lightheadedness no weakness no chest pain or shortness of breath.  This is her first pregnancy.    Allergies:  Allergies   Allergen Reactions     Pineapple Hives and Swelling     Throat swelling       Problem List:    Patient Active Problem List    Diagnosis Date Noted     Supervision of normal first pregnancy, antepartum 2019     Priority: Medium     B positive  Atrophic right kidney  FOB:  [facility] 3rd floor UA       Microscopic hematuria 2019     Priority: Medium     No anatomic cause noted. Repeat yearly.        Recurrent UTI 2018     Priority: Medium     Renal atrophy, right 2018     Priority: Medium        Past Medical History:    Past Medical History:   Diagnosis Date     Atrophic kidney      Shift work sleep disorder        Past Surgical History:    Past Surgical History:   Procedure Laterality Date     PE TUBES Bilateral     years        Family History:    Family History   Problem Relation Age of Onset     Other - See Comments Mother         bells palsy post auto accident     Heart Disease Father         s/p stents       Social History:  Marital Status:  Single [1]  Social History     Tobacco Use     Smoking status: Former Smoker     Packs/day: 0.25     Types: Cigarettes     Start date: 3/15/2017     Last attempt to quit: 3/15/2018     Years since quittin.9     Smokeless tobacco: Never Used   Substance Use Topics     Alcohol use: No     Alcohol/week: 0.0 standard drinks     Drug use: No        Medications:    acetaminophen (TYLENOL) 325 MG tablet  ciprofloxacin (CIPRO) 250 MG  tablet  oxyCODONE (ROXICODONE) 5 MG tablet  Prenatal Vit-Fe Fumarate-FA (PRENATAL MULTIVITAMIN W/IRON) 27-0.8 MG tablet          Review of Systems  I did do a complete 10 point review of systems. Pertinent positives and negatives listed per HPI. All other systems have been reviewed and are negative.      Physical Exam   BP: 142/97  Heart Rate: 106  Temp: 99.8  F (37.7  C)  Resp: 18  Weight: 74.8 kg (164 lb 14.5 oz)  SpO2: 100 %      Physical Exam  Vital signs reviewed nurse's notes reviewed.  Head and face normocephalic atraumatic oral mucosa moist.  Neck supple.  Lungs clear to auscultation.  Cardiovascular heart rate is 106.  Abdomen soft nontender she does have some low back tenderness and flank tenderness.  Extremities no edema neurologic she is awake alert oriented answer appropriately.  ED Course        UA seems to be clearing up I did review her culture results she is pansensitive.  At this point it boils and pain control she has had a recent renal ultrasound which showed an atrophied kidney she tells me no hydronephrosis I suppose this could be a stoneThere was some microscopic blood in her urine but she says that is normal for her.         Results for orders placed or performed during the hospital encounter of 02/07/20 (from the past 24 hour(s))   UA with Microscopic reflex to Culture   Result Value Ref Range    Color Urine Light Yellow     Appearance Urine Clear     Glucose Urine Negative NEG^Negative mg/dL    Bilirubin Urine Negative NEG^Negative    Ketones Urine Negative NEG^Negative mg/dL    Specific Gravity Urine 1.018 1.003 - 1.035    Blood Urine Moderate (A) NEG^Negative    pH Urine 6.5 4.7 - 8.0 pH    Protein Albumin Urine Negative NEG^Negative mg/dL    Urobilinogen mg/dL Normal 0.0 - 2.0 mg/dL    Nitrite Urine Negative NEG^Negative    Leukocyte Esterase Urine Negative NEG^Negative    Source Midstream Urine     WBC Urine 1 0 - 5 /HPF    RBC Urine 3 (H) 0 - 2 /HPF    Bacteria Urine Few (A)  NEG^Negative /HPF    Squamous Epithelial /HPF Urine 2 (H) 0 - 1 /HPF    Mucous Urine Present (A) NEG^Negative /LPF   HCG qualitative urine   Result Value Ref Range    HCG Qual Urine Positive (A) NEG^Negative   CBC with platelets differential   Result Value Ref Range    WBC 6.8 4.0 - 11.0 10e9/L    RBC Count 4.18 3.8 - 5.2 10e12/L    Hemoglobin 12.4 11.7 - 15.7 g/dL    Hematocrit 35.4 35.0 - 47.0 %    MCV 85 78 - 100 fl    MCH 29.7 26.5 - 33.0 pg    MCHC 35.0 31.5 - 36.5 g/dL    RDW 13.0 10.0 - 15.0 %    Platelet Count 232 150 - 450 10e9/L    Diff Method Automated Method     % Neutrophils 67.2 %    % Lymphocytes 24.4 %    % Monocytes 6.4 %    % Eosinophils 1.3 %    % Basophils 0.4 %    % Immature Granulocytes 0.3 %    Nucleated RBCs 0 0 /100    Absolute Neutrophil 4.5 1.6 - 8.3 10e9/L    Absolute Lymphocytes 1.7 0.8 - 5.3 10e9/L    Absolute Monocytes 0.4 0.0 - 1.3 10e9/L    Absolute Eosinophils 0.1 0.0 - 0.7 10e9/L    Absolute Basophils 0.0 0.0 - 0.2 10e9/L    Abs Immature Granulocytes 0.0 0 - 0.4 10e9/L    Absolute Nucleated RBC 0.0    Comprehensive metabolic panel   Result Value Ref Range    Sodium 136 133 - 144 mmol/L    Potassium 3.4 3.4 - 5.3 mmol/L    Chloride 105 94 - 109 mmol/L    Carbon Dioxide 24 20 - 32 mmol/L    Anion Gap 7 3 - 14 mmol/L    Glucose 91 70 - 99 mg/dL    Urea Nitrogen 10 7 - 30 mg/dL    Creatinine 0.66 0.52 - 1.04 mg/dL    GFR Estimate >90 >60 mL/min/[1.73_m2]    GFR Estimate If Black >90 >60 mL/min/[1.73_m2]    Calcium 9.1 8.5 - 10.1 mg/dL    Bilirubin Total 0.4 0.2 - 1.3 mg/dL    Albumin 3.4 3.4 - 5.0 g/dL    Protein Total 7.3 6.8 - 8.8 g/dL    Alkaline Phosphatase 70 40 - 150 U/L    ALT 26 0 - 50 U/L    AST 13 0 - 45 U/L       Medications - No data to display    Assessments & Plan (with Medical Decision Making)     I have reviewed the nursing notes.    I have reviewed the findings, diagnosis, plan and need for follow up with the patient.  Medication as directed    New Prescriptions     OXYCODONE (ROXICODONE) 5 MG TABLET    Take 1 tablet (5 mg) by mouth every 6 hours as needed for pain       Final diagnoses:   Flank pain       2/7/2020   [facility]

## 2020-02-07 NOTE — ED TRIAGE NOTES
"Presents with reports of 3 day hx of R flank pain. Was seen in [place] ED and [place] ED on Tuesday. Diagnosed with recurrent UTI. States a hx of UTI r/t R sided atrophic kidney. Patient states she had a kidney ultrasound and abdominal ultrasound on Tuesday. Started on Cipro BID - has been taking as prescribed. Continues to report R sided flank pain. States it is not worsening \"it's just not getting better.\" Denies vaginal bleeding at this time. Reports increased frequency.   Patient is currently 14 week pregnant - VENESSA 8/4/20  "

## 2020-02-17 ENCOUNTER — PRENATAL OFFICE VISIT (OUTPATIENT)
Dept: OBGYN | Facility: OTHER | Age: 25
End: 2020-02-17
Attending: ADVANCED PRACTICE MIDWIFE
Payer: COMMERCIAL

## 2020-02-17 VITALS — DIASTOLIC BLOOD PRESSURE: 74 MMHG | SYSTOLIC BLOOD PRESSURE: 110 MMHG | WEIGHT: 165 LBS | BODY MASS INDEX: 30.18 KG/M2

## 2020-02-17 DIAGNOSIS — Z34.00 SUPERVISION OF NORMAL FIRST PREGNANCY, ANTEPARTUM: ICD-10-CM

## 2020-02-17 DIAGNOSIS — Z34.01 SUPERVISION OF NORMAL FIRST PREGNANCY IN FIRST TRIMESTER: ICD-10-CM

## 2020-02-17 PROCEDURE — 36415 COLL VENOUS BLD VENIPUNCTURE: CPT | Performed by: ADVANCED PRACTICE MIDWIFE

## 2020-02-17 PROCEDURE — 99000 SPECIMEN HANDLING OFFICE-LAB: CPT | Performed by: ADVANCED PRACTICE MIDWIFE

## 2020-02-17 PROCEDURE — 82105 ALPHA-FETOPROTEIN SERUM: CPT | Mod: 90 | Performed by: ADVANCED PRACTICE MIDWIFE

## 2020-02-17 PROCEDURE — 99207 ZZC PRENATAL VISIT: CPT | Performed by: ADVANCED PRACTICE MIDWIFE

## 2020-02-17 ASSESSMENT — PAIN SCALES - GENERAL: PAINLEVEL: NO PAIN (0)

## 2020-02-17 NOTE — PATIENT INSTRUCTIONS
Return to office in 4 week(s) for prenatal care and as needed.    If you think your bag of water is broke; have bleeding like a period; think your in labor; or are worried about your baby's movement; please call the labor and delivery unit @ [phone].    Thank you for allowing Christopher DUEÑAS CNM and our OB team to participate in your care.  If you have a scheduling or an appointment question please contact [facility] Unit Coordinator at their direct line [phone].   ALL nursing questions or concerns can be directed to your OB nurse at: [phone] Dione Hamilton/Carmela

## 2020-02-20 LAB
# FETUSES US: NORMAL
# FETUSES: NORMAL
AFP ADJ MOM AMN: 0.87
AFP SERPL-MCNC: 26 NG/ML
AGE - REPORTED: 22.7 YR
CURRENT SMOKER: NO
CURRENT SMOKER: NO
DIABETES STATUS PATIENT: NO
FAMILY MEMBER DISEASES HX: NO
FAMILY MEMBER DISEASES HX: NO
GA METHOD: NORMAL
GA METHOD: NORMAL
GA: NORMAL WK
IDDM PATIENT QL: NO
INTEGRATED SCN PATIENT-IMP: NORMAL
LMP START DATE: NORMAL
MONOCHORIONIC TWINS: NO
SERVICE CMNT-IMP: NO
SPECIMEN DRAWN SERPL: NORMAL
VALPROIC/CARBAMAZEPINE STATUS: NO
WEIGHT UNITS: NORMAL

## 2020-03-16 ENCOUNTER — PRENATAL OFFICE VISIT (OUTPATIENT)
Dept: OBGYN | Facility: OTHER | Age: 25
End: 2020-03-16
Attending: ADVANCED PRACTICE MIDWIFE
Payer: COMMERCIAL

## 2020-03-16 VITALS — WEIGHT: 167 LBS | SYSTOLIC BLOOD PRESSURE: 102 MMHG | BODY MASS INDEX: 30.54 KG/M2 | DIASTOLIC BLOOD PRESSURE: 64 MMHG

## 2020-03-16 DIAGNOSIS — Z34.02 SUPERVISION OF NORMAL FIRST PREGNANCY IN SECOND TRIMESTER: Primary | ICD-10-CM

## 2020-03-16 DIAGNOSIS — Z87.440 PERSONAL HISTORY OF URINARY TRACT INFECTION: ICD-10-CM

## 2020-03-16 LAB
ALBUMIN UR-MCNC: NEGATIVE MG/DL
APPEARANCE UR: CLEAR
BACTERIA #/AREA URNS HPF: ABNORMAL /HPF
BILIRUB UR QL STRIP: NEGATIVE
COLOR UR AUTO: ABNORMAL
GLUCOSE UR STRIP-MCNC: NEGATIVE MG/DL
HGB UR QL STRIP: ABNORMAL
KETONES UR STRIP-MCNC: NEGATIVE MG/DL
LEUKOCYTE ESTERASE UR QL STRIP: NEGATIVE
MUCOUS THREADS #/AREA URNS LPF: PRESENT /LPF
NITRATE UR QL: NEGATIVE
PH UR STRIP: 6.5 PH (ref 4.7–8)
RBC #/AREA URNS AUTO: 3 /HPF (ref 0–2)
SOURCE: ABNORMAL
SP GR UR STRIP: 1.02 (ref 1–1.03)
UROBILINOGEN UR STRIP-MCNC: NORMAL MG/DL (ref 0–2)
WBC #/AREA URNS AUTO: <1 /HPF (ref 0–5)

## 2020-03-16 PROCEDURE — 81001 URINALYSIS AUTO W/SCOPE: CPT | Performed by: ADVANCED PRACTICE MIDWIFE

## 2020-03-16 PROCEDURE — 99207 ZZC PRENATAL VISIT: CPT | Performed by: ADVANCED PRACTICE MIDWIFE

## 2020-03-16 ASSESSMENT — PAIN SCALES - GENERAL: PAINLEVEL: NO PAIN (0)

## 2020-03-16 NOTE — PATIENT INSTRUCTIONS
Return to office in 4 week(s) for prenatal care and as needed.    If you think your bag of water is broke; have bleeding like a period; think your in labor; or are worried about your baby's movement; please call the labor and delivery unit @ [phone].    Thank you for allowing Christopher DUEÑAS CNM and our OB team to participate in your care.  If you have a scheduling or an appointment question please contact [facility] Unit Coordinator at their direct line [phone].   ALL nursing questions or concerns can be directed to your OB nurse at: [phone] Dione Hamilton/Carmela

## 2020-03-16 NOTE — PROGRESS NOTES
Doing well.  Baby active.   Denies contractions, bleeding, or leakage of fluid.     Discussed:  PTL, concerns with UTI, UA, US, fetal movement, reviewed 1st and 2nd tri screenings    Plan:  UA with micro reflex to culture    Return to office in 4 weeks for prenatal care and as needed.    SPENSER Jim, CNM

## 2020-03-24 ENCOUNTER — HOSPITAL ENCOUNTER (OUTPATIENT)
Dept: ULTRASOUND IMAGING | Facility: CLINIC | Age: 25
End: 2020-03-24
Attending: ADVANCED PRACTICE MIDWIFE
Payer: COMMERCIAL

## 2020-03-24 ENCOUNTER — HOSPITAL ENCOUNTER (OUTPATIENT)
Dept: ULTRASOUND IMAGING | Facility: HOSPITAL | Age: 25
Discharge: HOME | End: 2020-03-24
Attending: ADVANCED PRACTICE MIDWIFE | Admitting: ADVANCED PRACTICE MIDWIFE
Payer: COMMERCIAL

## 2020-03-24 ENCOUNTER — OFFICE VISIT (OUTPATIENT)
Dept: MATERNAL FETAL MEDICINE | Facility: CLINIC | Age: 25
End: 2020-03-24
Attending: ADVANCED PRACTICE MIDWIFE
Payer: COMMERCIAL

## 2020-03-24 DIAGNOSIS — Z34.01 SUPERVISION OF NORMAL FIRST PREGNANCY IN FIRST TRIMESTER: ICD-10-CM

## 2020-03-24 DIAGNOSIS — Z36.3 SCREENING, ANTENATAL, FOR MALFORMATION BY ULTRASOUND: Primary | ICD-10-CM

## 2020-03-24 NOTE — PROGRESS NOTES
Type of service: Telemedicine Office Visit for prenatal ultrasound    Date of service:  Date: 03/24/20     Time service began:  8:00 AM    Time service ended:  9:00 AM    Reason: .tel: Patient unable to travel    Description of basis or telemedicine appropriateness:     Consultation provided at the request of Dr. Anthony for advice regarding the diagnosis and treatment of this patient's prenatal care.  The patient's condition can be safely assessed via telemedicine.    The Mode of Transmission:    Secure interactive audio and visual telecommunication system (Video Guidance)    Location of originating and distant sites:      Originating site:   [place], [place]    Distant site:    [place], [place]    Remy Galarza MD

## 2020-04-21 ENCOUNTER — PRENATAL OFFICE VISIT (OUTPATIENT)
Dept: OBGYN | Facility: OTHER | Age: 25
End: 2020-04-21
Attending: ADVANCED PRACTICE MIDWIFE
Payer: COMMERCIAL

## 2020-04-21 VITALS
BODY MASS INDEX: 31.83 KG/M2 | HEIGHT: 62 IN | WEIGHT: 173 LBS | SYSTOLIC BLOOD PRESSURE: 114 MMHG | DIASTOLIC BLOOD PRESSURE: 70 MMHG

## 2020-04-21 DIAGNOSIS — N26.1 RENAL ATROPHY, RIGHT: ICD-10-CM

## 2020-04-21 DIAGNOSIS — Z87.440 PERSONAL HISTORY OF URINARY TRACT INFECTION: ICD-10-CM

## 2020-04-21 DIAGNOSIS — R31.29 MICROSCOPIC HEMATURIA: ICD-10-CM

## 2020-04-21 DIAGNOSIS — Z34.02 SUPERVISION OF NORMAL FIRST PREGNANCY IN SECOND TRIMESTER: Primary | ICD-10-CM

## 2020-04-21 LAB
ALBUMIN UR-MCNC: NEGATIVE MG/DL
APPEARANCE UR: CLEAR
BACTERIA #/AREA URNS HPF: ABNORMAL /HPF
BILIRUB UR QL STRIP: NEGATIVE
COLOR UR AUTO: ABNORMAL
GLUCOSE UR STRIP-MCNC: NEGATIVE MG/DL
HGB UR QL STRIP: ABNORMAL
KETONES UR STRIP-MCNC: NEGATIVE MG/DL
LEUKOCYTE ESTERASE UR QL STRIP: NEGATIVE
MUCOUS THREADS #/AREA URNS LPF: PRESENT /LPF
NITRATE UR QL: NEGATIVE
PH UR STRIP: 6.5 PH (ref 4.7–8)
RBC #/AREA URNS AUTO: 3 /HPF (ref 0–2)
SOURCE: ABNORMAL
SP GR UR STRIP: 1.02 (ref 1–1.03)
SQUAMOUS #/AREA URNS AUTO: 2 /HPF (ref 0–1)
UROBILINOGEN UR STRIP-MCNC: NORMAL MG/DL (ref 0–2)
WBC #/AREA URNS AUTO: 2 /HPF (ref 0–5)

## 2020-04-21 PROCEDURE — 81001 URINALYSIS AUTO W/SCOPE: CPT | Performed by: ADVANCED PRACTICE MIDWIFE

## 2020-04-21 PROCEDURE — 99207 ZZC PRENATAL VISIT: CPT | Performed by: ADVANCED PRACTICE MIDWIFE

## 2020-04-21 ASSESSMENT — PAIN SCALES - GENERAL: PAINLEVEL: MODERATE PAIN (4)

## 2020-04-21 ASSESSMENT — MIFFLIN-ST. JEOR: SCORE: 1497.97

## 2020-04-21 NOTE — NURSING NOTE
"Chief Complaint   Patient presents with     Prenatal Care     25w0d       Initial /70 (BP Location: Left arm, Patient Position: Chair, Cuff Size: Adult Regular)   Ht 1.575 m (5' 2\")   Wt 78.5 kg (173 lb)   LMP 10/29/2019   BMI 31.64 kg/m   Estimated body mass index is 31.64 kg/m  as calculated from the following:    Height as of this encounter: 1.575 m (5' 2\").    Weight as of this encounter: 78.5 kg (173 lb).  Medication Reconciliation: complete  Joy Carter LPN    "

## 2020-04-21 NOTE — PATIENT INSTRUCTIONS
Return to office in 3 week(s) for prenatal care and as needed.    If you think your bag of water is broke; have bleeding like a period; think your in labor; or are worried about your baby's movement; please call the labor and delivery unit @ [phone].    Thank you for allowing Christopher DUEÑAS CNM and our OB team to participate in your care.  If you have a scheduling or an appointment question please contact [facility] Unit Coordinator at their direct line [phone].   ALL nursing questions or concerns can be directed to your OB nurse at: [phone] Dione Hamilton/Carmela

## 2020-04-21 NOTE — PROGRESS NOTES
Doing well.  Baby active.   Denies contractions, bleeding, or leakage of fluid.     Discussed:  Occasional kidney pain (especially at night), hydration, dysuria, reviewed US, fetal movement    Offered urologist referral    Plan:  UA with micro reflex to culture    Return to office in 3 weeks for prenatal care and as needed.    SPENSER Jim, CNM

## 2020-04-30 ENCOUNTER — TELEPHONE (OUTPATIENT)
Dept: FAMILY MEDICINE | Facility: OTHER | Age: 25
End: 2020-04-30

## 2020-04-30 NOTE — TELEPHONE ENCOUNTER
To: Urology Dept  This patient would like an appointment.  Please return her call @ [phone].  Thank you

## 2020-05-05 ENCOUNTER — VIRTUAL VISIT (OUTPATIENT)
Dept: UROLOGY | Facility: OTHER | Age: 25
End: 2020-05-05
Attending: UROLOGY
Payer: COMMERCIAL

## 2020-05-05 VITALS — BODY MASS INDEX: 32.19 KG/M2 | WEIGHT: 176 LBS

## 2020-05-05 DIAGNOSIS — R31.29 MICROSCOPIC HEMATURIA: Primary | ICD-10-CM

## 2020-05-05 PROCEDURE — 99213 OFFICE O/P EST LOW 20 MIN: CPT | Mod: TEL | Performed by: UROLOGY

## 2020-05-05 ASSESSMENT — PAIN SCALES - GENERAL: PAINLEVEL: MODERATE PAIN (4)

## 2020-05-05 NOTE — PROGRESS NOTES
History     Chief Complaint:      Telephone (follow up Hematuria)      HPI   May Maria is a 24 year old female whom I spoke with on the telephone today regarding microhematuria and frequent UTI's .  April has a history of having urinary tract infections as a small child.  In January 2019 she was seen by Dr. Erwin because of some gross hematuria and recurrent urinary tract infections.  She typically grows out E. coli but she did have one that was Pseudomonas.  She had a CT scan in 2018 that shows an atrophic right kidney but that does appear to function and she has an enlarged left kidney.  It is difficult to know if this atrophy occurred in utero or if this is scarring from having had recurrent infections as a child but I am suspicious that this probably occurred in utero given the hypertrophy of the other kidney.  On that CT scan there did not show any stones or obstruction.  April is now pregnant and she has had recent urinalyses that show 3 RBCs per high-powered field so some small amount of blood.  I was asked to see her for follow-up.  She has not had any gross hematuria but she constantly complains of some pain in the right flank area.  She was told at one point that maybe she had a kidney stone but it is difficult to know.  The ultrasound does not show any obvious stones and there is no hydronephrosis on her ultrasound that was done in February and because of her pregnancy we would not want to do a CT at this time.  I cannot explain her pain in the right kidney based on these radiological findings.  The patient has never had a stone in the past, she had no stones on her 2018 CT and she has no family history of stones so I am less suspicious that this pain could be from the passage of any small stone.    Allergies:    Allergies   Allergen Reactions     Pineapple Hives and Swelling     Throat swelling        Medications:      Prenatal Vit-Fe Fumarate-FA (PRENATAL MULTIVITAMIN W/IRON) 27-0.8 MG  tablet  acetaminophen (TYLENOL) 325 MG tablet        Problem List:      Patient Active Problem List    Diagnosis Date Noted     Supervision of normal first pregnancy, antepartum 2019     Priority: Medium     B positive  Atrophic right kidney/Noted kidney stones in R kidney  FOB:  [facility] 3rd floor UA       Microscopic hematuria 2019     Priority: Medium     No anatomic cause noted. Repeat yearly.        Recurrent UTI 2018     Priority: Medium     Renal atrophy, right 2018     Priority: Medium        Past Medical History:      Past Medical History:   Diagnosis Date     Atrophic kidney      Shift work sleep disorder        Past Surgical History:      Past Surgical History:   Procedure Laterality Date     PE TUBES Bilateral     years        Family History:      Family History   Problem Relation Age of Onset     Other - See Comments Mother         bells palsy post auto accident     Heart Disease Father         s/p stents       Social History:    Marital Status:  Single [1]  Social History     Tobacco Use     Smoking status: Former Smoker     Packs/day: 0.25     Types: Cigarettes     Start date: 3/15/2017     Last attempt to quit: 3/15/2018     Years since quittin.1     Smokeless tobacco: Never Used   Substance Use Topics     Alcohol use: No     Alcohol/week: 0.0 standard drinks     Drug use: No        Review of Systems      Physical Exam   Vitals:  Wt 79.8 kg (176 lb)   LMP 10/29/2019   BMI 32.19 kg/m        Physical Exam  Impression: #1 atrophic right kidney, recurrent urinary tract infection, microhematuria    Plan   Plan: April had a thorough evaluation last July with Dr. Erwin.  She has had a recent renal ultrasound in February that is negative for any hydronephrosis or any significant stone burden.  She does not feel that the infection she had last year were due to intercourse.  Her most recent urinalyses have shown 3 RBCs per high-powered field which is considered within the  normal limits although I suspect April has microhematuria and that is normal for her so I do not feel any further evaluation needs to be done at this time since she had a complete work-up less than a year ago.  The patient obviously is being followed closely by her OB to rule out any urinary tract infections which is important.  I am unable to explain the flank pain that  she is having and of course that is frustrating to April.  Recommendations at this time are to do a follow-up after she is delivered, I would like to see her in the office for  A physical exam and review of all of our recent imaging studies.  If she continues to have flank pain and there is concern for possible stone we can always do a CT stone protocol at that time but we will make that decision when we see her.  I suspect April is 1 of those patients that is just susceptible to urinary tract infections.  She has a significant history of these as a child and it will likely continue into her adulthood.  There are other congenital anomalies in the family as well.  26 minutes were spent in this phone consultation      No follow-ups on file.    Evonne Davis MD  [facility] [place]

## 2020-05-05 NOTE — LETTER
5/5/2020       RE: May Maria  [address] [facility] [address]     Dear Colleague,    Thank you for referring your patient, May Maria, to the [facility] [place] at [facility]. Please see a copy of my visit note below.      History     Chief Complaint:      Telephone (follow up Hematuria)      HPI   May Maria is a 24 year old female whom I spoke with on the telephone today regarding microhematuria and frequent UTI's .  April has a history of having urinary tract infections as a small child.  In January 2019 she was seen by Dr. Erwin because of some gross hematuria and recurrent urinary tract infections.  She typically grows out E. coli but she did have one that was Pseudomonas.  She had a CT scan in 2018 that shows an atrophic right kidney but that does appear to function and she has an enlarged left kidney.  It is difficult to know if this atrophy occurred in utero or if this is scarring from having had recurrent infections as a child but I am suspicious that this probably occurred in utero given the hypertrophy of the other kidney.  On that CT scan there did not show any stones or obstruction.  April is now pregnant and she has had recent urinalyses that show 3 RBCs per high-powered field so some small amount of blood.  I was asked to see her for follow-up.  She has not had any gross hematuria but she constantly complains of some pain in the right flank area.  She was told at one point that maybe she had a kidney stone but it is difficult to know.  The ultrasound does not show any obvious stones and there is no hydronephrosis on her ultrasound that was done in February and because of her pregnancy we would not want to do a CT at this time.  I cannot explain her pain in the right kidney based on these radiological findings.  The patient has never had a stone in the past, she had no stones on her 2018 CT and she has no family history of stones so  I am less suspicious that this pain could be from the passage of any small stone.    Allergies:    Allergies   Allergen Reactions     Pineapple Hives and Swelling     Throat swelling        Medications:      Prenatal Vit-Fe Fumarate-FA (PRENATAL MULTIVITAMIN W/IRON) 27-0.8 MG tablet  acetaminophen (TYLENOL) 325 MG tablet        Problem List:      Patient Active Problem List    Diagnosis Date Noted     Supervision of normal first pregnancy, antepartum 2019     Priority: Medium     B positive  Atrophic right kidney/Noted kidney stones in R kidney  FOB:  Dominick Downing 3rd floor UA       Microscopic hematuria 2019     Priority: Medium     No anatomic cause noted. Repeat yearly.        Recurrent UTI 2018     Priority: Medium     Renal atrophy, right 2018     Priority: Medium        Past Medical History:      Past Medical History:   Diagnosis Date     Atrophic kidney      Shift work sleep disorder        Past Surgical History:      Past Surgical History:   Procedure Laterality Date     PE TUBES Bilateral     years        Family History:      Family History   Problem Relation Age of Onset     Other - See Comments Mother         bells palsy post auto accident     Heart Disease Father         s/p stents       Social History:    Marital Status:  Single [1]  Social History     Tobacco Use     Smoking status: Former Smoker     Packs/day: 0.25     Types: Cigarettes     Start date: 3/15/2017     Last attempt to quit: 3/15/2018     Years since quittin.1     Smokeless tobacco: Never Used   Substance Use Topics     Alcohol use: No     Alcohol/week: 0.0 standard drinks     Drug use: No        Review of Systems      Physical Exam   Vitals:  Wt 79.8 kg (176 lb)   LMP 10/29/2019   BMI 32.19 kg/m        Physical Exam  Impression: #1 atrophic right kidney, recurrent urinary tract infection, microhematuria    Plan   Plan: April had a thorough evaluation last July with Dr. Erwin.  She has had a recent renal  ultrasound in February that is negative for any hydronephrosis or any significant stone burden.  She does not feel that the infection she had last year were due to intercourse.  Her most recent urinalyses have shown 3 RBCs per high-powered field which is considered within the normal limits although I suspect April has microhematuria and that is normal for her so I do not feel any further evaluation needs to be done at this time since she had a complete work-up less than a year ago.  The patient obviously is being followed closely by her OB to rule out any urinary tract infections which is important.  I am unable to explain the flank pain that  she is having and of course that is frustrating to April.  Recommendations at this time are to do a follow-up after she is delivered, I would like to see her in the office for  A physical exam and review of all of our recent imaging studies.  If she continues to have flank pain and there is concern for possible stone we can always do a CT stone protocol at that time but we will make that decision when we see her.  I suspect April is 1 of those patients that is just susceptible to urinary tract infections.  She has a significant history of these as a child and it will likely continue into her adulthood.  There are other congenital anomalies in the family as well.  26 minutes were spent in this phone consultation      No follow-ups on file.    Evonne Davis MD  [facility] [place]            Again, thank you for allowing me to participate in the care of your patient.      Sincerely,    Evonne Davis MD

## 2020-05-12 ENCOUNTER — PRENATAL OFFICE VISIT (OUTPATIENT)
Dept: OBGYN | Facility: OTHER | Age: 25
End: 2020-05-12
Attending: ADVANCED PRACTICE MIDWIFE
Payer: COMMERCIAL

## 2020-05-12 ENCOUNTER — APPOINTMENT (OUTPATIENT)
Dept: LAB | Facility: OTHER | Age: 25
End: 2020-05-12
Attending: ADVANCED PRACTICE MIDWIFE
Payer: COMMERCIAL

## 2020-05-12 VITALS — BODY MASS INDEX: 33.11 KG/M2 | WEIGHT: 181 LBS | DIASTOLIC BLOOD PRESSURE: 72 MMHG | SYSTOLIC BLOOD PRESSURE: 118 MMHG

## 2020-05-12 DIAGNOSIS — Z23 NEED FOR VACCINATION: ICD-10-CM

## 2020-05-12 DIAGNOSIS — Z34.03 SUPERVISION OF NORMAL FIRST PREGNANCY IN THIRD TRIMESTER: Primary | ICD-10-CM

## 2020-05-12 LAB
ALBUMIN UR-MCNC: NEGATIVE MG/DL
APPEARANCE UR: CLEAR
BACTERIA #/AREA URNS HPF: ABNORMAL /HPF
BILIRUB UR QL STRIP: NEGATIVE
COLOR UR AUTO: ABNORMAL
ERYTHROCYTE [DISTWIDTH] IN BLOOD BY AUTOMATED COUNT: 13 % (ref 10–15)
GLUCOSE 1H P 50 G GLC PO SERPL-MCNC: 160 MG/DL (ref 60–129)
GLUCOSE UR STRIP-MCNC: 30 MG/DL
HCT VFR BLD AUTO: 35.4 % (ref 35–47)
HGB BLD-MCNC: 12.2 G/DL (ref 11.7–15.7)
HGB UR QL STRIP: ABNORMAL
KETONES UR STRIP-MCNC: NEGATIVE MG/DL
LEUKOCYTE ESTERASE UR QL STRIP: NEGATIVE
MCH RBC QN AUTO: 29.8 PG (ref 26.5–33)
MCHC RBC AUTO-ENTMCNC: 34.5 G/DL (ref 31.5–36.5)
MCV RBC AUTO: 87 FL (ref 78–100)
MUCOUS THREADS #/AREA URNS LPF: PRESENT /LPF
NITRATE UR QL: NEGATIVE
PH UR STRIP: 6 PH (ref 4.7–8)
PLATELET # BLD AUTO: 219 10E9/L (ref 150–450)
RBC # BLD AUTO: 4.09 10E12/L (ref 3.8–5.2)
RBC #/AREA URNS AUTO: 2 /HPF (ref 0–2)
SOURCE: ABNORMAL
SP GR UR STRIP: 1.02 (ref 1–1.03)
SQUAMOUS #/AREA URNS AUTO: 1 /HPF (ref 0–1)
UROBILINOGEN UR STRIP-MCNC: NORMAL MG/DL (ref 0–2)
WBC # BLD AUTO: 9.4 10E9/L (ref 4–11)
WBC #/AREA URNS AUTO: 1 /HPF (ref 0–5)

## 2020-05-12 PROCEDURE — 90715 TDAP VACCINE 7 YRS/> IM: CPT | Performed by: ADVANCED PRACTICE MIDWIFE

## 2020-05-12 PROCEDURE — 99207 ZZC PRENATAL VISIT: CPT | Performed by: ADVANCED PRACTICE MIDWIFE

## 2020-05-12 PROCEDURE — 85027 COMPLETE CBC AUTOMATED: CPT | Performed by: ADVANCED PRACTICE MIDWIFE

## 2020-05-12 PROCEDURE — 90471 IMMUNIZATION ADMIN: CPT | Performed by: ADVANCED PRACTICE MIDWIFE

## 2020-05-12 PROCEDURE — 82950 GLUCOSE TEST: CPT | Performed by: ADVANCED PRACTICE MIDWIFE

## 2020-05-12 PROCEDURE — 36415 COLL VENOUS BLD VENIPUNCTURE: CPT | Performed by: ADVANCED PRACTICE MIDWIFE

## 2020-05-12 PROCEDURE — 81001 URINALYSIS AUTO W/SCOPE: CPT | Performed by: ADVANCED PRACTICE MIDWIFE

## 2020-05-12 PROCEDURE — 86780 TREPONEMA PALLIDUM: CPT | Performed by: ADVANCED PRACTICE MIDWIFE

## 2020-05-12 ASSESSMENT — PAIN SCALES - GENERAL: PAINLEVEL: NO PAIN (0)

## 2020-05-12 NOTE — PROGRESS NOTES
Doing well.  Baby active.   Denies contractions, bleeding, or leakage of fluid.     Discussed:  3rd tri labs, 1 hr GTT, Tdap, fetal movement  Anticipatory Guidance [place] care in hospital  Respiratory therapy called for all deliveries  Skin to skin  Immunizations/meds   -Hepatitis B   -Erythromycin   -Vitamin K  Screening   -Hearing   -CCHD   -Bilirubin   -Metabolic  Rooming in  Feeding:  Breast  Car seats  Provider/appointments     Plan:  3rd tri labs  1 hr GTT  Tdap    Return to office in 2 weeks for prenatal care and as needed.    Christopher Anthony, APRN, CNM

## 2020-05-12 NOTE — PATIENT INSTRUCTIONS
Return to office in 2 week(s) for prenatal care and as needed.    If you think your bag of water is broke; have bleeding like a period; think your in labor; or are worried about your baby's movement; please call the labor and delivery unit @ [phone].    Thank you for allowing Christopher DUEÑAS CNM and our OB team to participate in your care.  If you have a scheduling or an appointment question please contact [facility] Unit Coordinator at their direct line [phone].   ALL nursing questions or concerns can be directed to your OB nurse at: [phone] Dione Hamilton/Carmela

## 2020-05-13 LAB — T PALLIDUM AB SER QL: NONREACTIVE

## 2020-05-15 DIAGNOSIS — E74.39 GLUCOSE INTOLERANCE: Primary | ICD-10-CM

## 2020-05-22 DIAGNOSIS — E74.39 GLUCOSE INTOLERANCE: ICD-10-CM

## 2020-05-22 PROBLEM — O99.810 GLUCOSE INTOLERANCE OF PREGNANCY: Status: ACTIVE | Noted: 2020-05-22

## 2020-05-22 LAB
EST. AVERAGE GLUCOSE BLD GHB EST-MCNC: 100 MG/DL
GLUCOSE 1H P 100 G GLC PO SERPL-MCNC: 151 MG/DL (ref 60–179)
GLUCOSE 2H P 100 G GLC PO SERPL-MCNC: 144 MG/DL (ref 60–154)
GLUCOSE 3H P 100 G GLC PO SERPL-MCNC: 77 MG/DL (ref 60–139)
GLUCOSE P FAST SERPL-MCNC: 115 MG/DL (ref 60–94)
HBA1C MFR BLD: 5.1 % (ref 0–5.6)

## 2020-05-22 PROCEDURE — 82952 GTT-ADDED SAMPLES: CPT | Performed by: ADVANCED PRACTICE MIDWIFE

## 2020-05-22 PROCEDURE — 83036 HEMOGLOBIN GLYCOSYLATED A1C: CPT | Performed by: ADVANCED PRACTICE MIDWIFE

## 2020-05-22 PROCEDURE — 82951 GLUCOSE TOLERANCE TEST (GTT): CPT | Performed by: ADVANCED PRACTICE MIDWIFE

## 2020-05-22 PROCEDURE — 36415 COLL VENOUS BLD VENIPUNCTURE: CPT | Performed by: ADVANCED PRACTICE MIDWIFE

## 2020-05-26 ENCOUNTER — PRENATAL OFFICE VISIT (OUTPATIENT)
Dept: OBGYN | Facility: OTHER | Age: 25
End: 2020-05-26
Attending: ADVANCED PRACTICE MIDWIFE
Payer: COMMERCIAL

## 2020-05-26 VITALS
WEIGHT: 182.2 LBS | DIASTOLIC BLOOD PRESSURE: 62 MMHG | SYSTOLIC BLOOD PRESSURE: 102 MMHG | BODY MASS INDEX: 33.53 KG/M2 | HEIGHT: 62 IN

## 2020-05-26 DIAGNOSIS — Z34.03 SUPERVISION OF NORMAL FIRST PREGNANCY IN THIRD TRIMESTER: Primary | ICD-10-CM

## 2020-05-26 DIAGNOSIS — Z34.00 SUPERVISION OF NORMAL FIRST PREGNANCY, ANTEPARTUM: ICD-10-CM

## 2020-05-26 PROCEDURE — 99207 ZZC PRENATAL VISIT: CPT | Performed by: ADVANCED PRACTICE MIDWIFE

## 2020-05-26 ASSESSMENT — MIFFLIN-ST. JEOR: SCORE: 1539.7

## 2020-05-26 ASSESSMENT — PAIN SCALES - GENERAL: PAINLEVEL: NO PAIN (0)

## 2020-05-26 NOTE — PROGRESS NOTES
Doing well.  Baby active.   Denies contractions, bleeding, or leakage of fluid.     Discussed:  GTT screening, glucose intolerance diet, diabetic referral, fetal movement    Plan:  Repeat 3 hr @ 34 w    Return to office in 2 weeks for prenatal care and as needed.    SPENSER Jim, CNM

## 2020-05-26 NOTE — NURSING NOTE
"Chief Complaint   Patient presents with     Prenatal Care       Initial /62   Ht 1.575 m (5' 2\")   Wt 82.6 kg (182 lb 3.2 oz)   LMP 10/29/2019   BMI 33.32 kg/m   Estimated body mass index is 33.32 kg/m  as calculated from the following:    Height as of this encounter: 1.575 m (5' 2\").    Weight as of this encounter: 82.6 kg (182 lb 3.2 oz).  Medication Reconciliation: complete  Pauline Hahn LPN    "

## 2020-05-26 NOTE — PATIENT INSTRUCTIONS
Return to office in 2 week(s) for prenatal care and as needed.    If you think your bag of water is broke; have bleeding like a period; think your in labor; or are worried about your baby's movement; please call the labor and delivery unit @ [phone].    Thank you for allowing Christopher DUEÑAS CNM and our OB team to participate in your care.  If you have a scheduling or an appointment question please contact [facility] Unit Coordinator at their direct line [phone].   ALL nursing questions or concerns can be directed to your OB nurse at: [phone] Dione Hamilton/Carmela

## 2020-06-02 ENCOUNTER — HOSPITAL ENCOUNTER (OUTPATIENT)
Dept: EDUCATION SERVICES | Facility: HOSPITAL | Age: 25
Discharge: HOME | End: 2020-06-02
Attending: ADVANCED PRACTICE MIDWIFE | Admitting: ADVANCED PRACTICE MIDWIFE
Payer: COMMERCIAL

## 2020-06-02 VITALS — BODY MASS INDEX: 33.29 KG/M2 | WEIGHT: 182 LBS

## 2020-06-02 DIAGNOSIS — O99.810 PREGNANCY-INDUCED GLUCOSE INTOLERANCE: Primary | ICD-10-CM

## 2020-06-02 PROCEDURE — 97802 MEDICAL NUTRITION INDIV IN: CPT | Mod: GT | Performed by: DIETITIAN, REGISTERED

## 2020-06-02 ASSESSMENT — PAIN SCALES - GENERAL: PAINLEVEL: NO PAIN (0)

## 2020-06-02 NOTE — PROGRESS NOTES
Pt was seen via video visit today for dx of glucose intolerance in pregnancy.      Pt is a 24 yof 31 weeks gestation with her first pregnancy.      Pt is taking prenatal vitamin.      Wt 82.6 kg (182 lb)   LMP 10/29/2019   BMI 33.29 kg/m     Pt reports prepregnancy weight of 162#.      1 hour glucose tolerance test - 160    3 hour glucose tolerance test:  Fasting-115  1 hour-151  2 hour-144  3 hour-77    Verbal diet recall obtained:    Pt works midnight shift - 7 on/7 off so meal times change.  Breakfast-eggs, leung, 1 toast with butter, milk or water  Snack-fruit or veggies  Lunch-salad with protein and ranch or italian dressing or meat/veggie - sometimes skips lunch  Dinner-meat, veggies - sometimes has pasta or potatoes but limits portions - water  HS snacks-popsicle or ice cream treat    Pt tries to get some walking in 3-4x/week.      Reviewed healthy diet for pregnancy, carbohydrate control, portion sizes and label reading along with benefits of exercise as able/allowed by provider.  Written meal plan mailed to patient today.  Pt listened and participated well during session.     PLAN:   Follow meal planning guidelines discussed.    Call with any concerns.   Repeat 3 hour test planned for 34-36 weeks.    Refer back if glucose monitoring indicated.     Total time spent with pt was 19 minutes.

## 2020-06-02 NOTE — PROGRESS NOTES
"May Maria is a 24 year old female who is being evaluated via a billable video visit.      The patient has been notified of following:     \"This video visit will be conducted via a call between you and your physician/provider. We have found that certain health care needs can be provided without the need for an in-person physical exam.  This service lets us provide the care you need with a video conversation.  If a prescription is necessary we can send it directly to your pharmacy.  If lab work is needed we can place an order for that and you can then stop by our lab to have the test done at a later time.    Video visits are billed at different rates depending on your insurance coverage.  Please reach out to your insurance provider with any questions.    If during the course of the call the physician/provider feels a video visit is not appropriate, you will not be charged for this service.\"    Patient has given verbal consent for Video visit? Yes      Patient would like the video invitation sent by: Text to cell phone: [phone]    Will anyone else be joining your video visit? Yes: family is with her. How would they like to receive their invitation? Other e-mail: na              "

## 2020-06-10 ENCOUNTER — PRENATAL OFFICE VISIT (OUTPATIENT)
Dept: OBGYN | Facility: OTHER | Age: 25
End: 2020-06-10
Attending: ADVANCED PRACTICE MIDWIFE
Payer: COMMERCIAL

## 2020-06-10 VITALS
HEIGHT: 62 IN | WEIGHT: 185 LBS | SYSTOLIC BLOOD PRESSURE: 116 MMHG | DIASTOLIC BLOOD PRESSURE: 74 MMHG | BODY MASS INDEX: 34.04 KG/M2

## 2020-06-10 DIAGNOSIS — Z34.03 SUPERVISION OF NORMAL FIRST PREGNANCY IN THIRD TRIMESTER: Primary | ICD-10-CM

## 2020-06-10 PROCEDURE — 99207 ZZC PRENATAL VISIT: CPT | Performed by: ADVANCED PRACTICE MIDWIFE

## 2020-06-10 ASSESSMENT — MIFFLIN-ST. JEOR: SCORE: 1552.4

## 2020-06-10 ASSESSMENT — PAIN SCALES - GENERAL: PAINLEVEL: NO PAIN (0)

## 2020-06-10 NOTE — NURSING NOTE
"Chief Complaint   Patient presents with     Prenatal Care     32w1d       Initial /74 (BP Location: Left arm, Patient Position: Chair, Cuff Size: Adult Regular)   Ht 1.575 m (5' 2\")   Wt 83.9 kg (185 lb)   LMP 10/29/2019   BMI 33.84 kg/m   Estimated body mass index is 33.84 kg/m  as calculated from the following:    Height as of this encounter: 1.575 m (5' 2\").    Weight as of this encounter: 83.9 kg (185 lb).  Medication Reconciliation: complete  Joy Carter LPN    "

## 2020-06-10 NOTE — PATIENT INSTRUCTIONS
Return to office in 2 week(s) for prenatal care and as needed.    If you think your bag of water is broke; have bleeding like a period; think your in labor; or are worried about your baby's movement; please call the labor and delivery unit @ [phone].    Thank you for allowing Christopher DUEÑAS CNM and our OB team to participate in your care.  If you have a scheduling or an appointment question please contact [facility] Unit Coordinator at their direct line [phone].   ALL nursing questions or concerns can be directed to your OB nurse at: [phone] Dione Hamilton/Carmela

## 2020-06-10 NOTE — PROGRESS NOTES
Doing well.  Baby active.   Denies contractions, bleeding, or leakage of fluid.     Discussed: delayed cord clamping, following GDM diet, hip discomfort, fetal movement    Plan:  Repeat 3 hr GTT @ 34 weeks for late onset  Chiropractor referral    Return to office in 2 weeks for prenatal care and as needed.    SPENSER Jim, CNM

## 2020-06-13 ENCOUNTER — NURSE TRIAGE (OUTPATIENT)
Dept: NURSING | Facility: CLINIC | Age: 25
End: 2020-06-13

## 2020-06-13 ENCOUNTER — HOSPITAL ENCOUNTER (OUTPATIENT)
Facility: HOSPITAL | Age: 25
End: 2020-06-13
Attending: ADVANCED PRACTICE MIDWIFE | Admitting: ADVANCED PRACTICE MIDWIFE
Payer: COMMERCIAL

## 2020-06-13 ENCOUNTER — HOSPITAL ENCOUNTER (OUTPATIENT)
Facility: HOSPITAL | Age: 25
Discharge: HOME | End: 2020-06-13
Attending: OBSTETRICS & GYNECOLOGY | Admitting: OBSTETRICS & GYNECOLOGY
Payer: COMMERCIAL

## 2020-06-13 VITALS
SYSTOLIC BLOOD PRESSURE: 129 MMHG | OXYGEN SATURATION: 99 % | DIASTOLIC BLOOD PRESSURE: 82 MMHG | HEART RATE: 90 BPM | RESPIRATION RATE: 16 BRPM | TEMPERATURE: 98.2 F

## 2020-06-13 DIAGNOSIS — N39.0 RECURRENT UTI: Primary | ICD-10-CM

## 2020-06-13 PROBLEM — Z36.89 ENCOUNTER FOR TRIAGE IN PREGNANT PATIENT: Status: ACTIVE | Noted: 2020-06-13

## 2020-06-13 LAB
ALBUMIN UR-MCNC: 10 MG/DL
APPEARANCE UR: CLEAR
BACTERIA #/AREA URNS HPF: ABNORMAL /HPF
BILIRUB UR QL STRIP: NEGATIVE
COLOR UR AUTO: ABNORMAL
GLUCOSE UR STRIP-MCNC: NEGATIVE MG/DL
HGB UR QL STRIP: ABNORMAL
KETONES UR STRIP-MCNC: NEGATIVE MG/DL
LEUKOCYTE ESTERASE UR QL STRIP: ABNORMAL
MUCOUS THREADS #/AREA URNS LPF: PRESENT /LPF
NITRATE UR QL: NEGATIVE
PH UR STRIP: 6 PH (ref 4.7–8)
RBC #/AREA URNS AUTO: 9 /HPF (ref 0–2)
RUPTURE OF FETAL MEMBRANES BY ROM PLUS: NEGATIVE
SOURCE: ABNORMAL
SP GR UR STRIP: 1.02 (ref 1–1.03)
UROBILINOGEN UR STRIP-MCNC: NORMAL MG/DL (ref 0–2)
WBC #/AREA URNS AUTO: 64 /HPF (ref 0–5)

## 2020-06-13 PROCEDURE — 59025 FETAL NON-STRESS TEST: CPT

## 2020-06-13 PROCEDURE — 84112 EVAL AMNIOTIC FLUID PROTEIN: CPT | Performed by: OBSTETRICS & GYNECOLOGY

## 2020-06-13 PROCEDURE — 25000128 H RX IP 250 OP 636

## 2020-06-13 PROCEDURE — 87086 URINE CULTURE/COLONY COUNT: CPT | Performed by: OBSTETRICS & GYNECOLOGY

## 2020-06-13 PROCEDURE — G0463 HOSPITAL OUTPT CLINIC VISIT: HCPCS | Mod: 25

## 2020-06-13 PROCEDURE — 25800030 ZZH RX IP 258 OP 636: Performed by: OBSTETRICS & GYNECOLOGY

## 2020-06-13 PROCEDURE — 96365 THER/PROPH/DIAG IV INF INIT: CPT

## 2020-06-13 PROCEDURE — 81001 URINALYSIS AUTO W/SCOPE: CPT | Performed by: OBSTETRICS & GYNECOLOGY

## 2020-06-13 RX ORDER — CEFTRIAXONE SODIUM 1 G/50ML
1 INJECTION, SOLUTION INTRAVENOUS EVERY 24 HOURS
Status: DISCONTINUED | OUTPATIENT
Start: 2020-06-13 | End: 2020-06-14 | Stop reason: HOSPADM

## 2020-06-13 RX ORDER — CEFTRIAXONE SODIUM 1 G/50ML
INJECTION, SOLUTION INTRAVENOUS
Status: COMPLETED
Start: 2020-06-13 | End: 2020-06-13

## 2020-06-13 RX ADMIN — SODIUM CHLORIDE, POTASSIUM CHLORIDE, SODIUM LACTATE AND CALCIUM CHLORIDE 500 ML: 600; 310; 30; 20 INJECTION, SOLUTION INTRAVENOUS at 21:40

## 2020-06-13 RX ADMIN — CEFTRIAXONE SODIUM 1 G: 1 INJECTION, SOLUTION INTRAVENOUS at 22:00

## 2020-06-14 DIAGNOSIS — Z87.440 PERSONAL HISTORY OF URINARY TRACT INFECTION: Primary | ICD-10-CM

## 2020-06-14 DIAGNOSIS — Z34.00 SUPERVISION OF NORMAL FIRST PREGNANCY, ANTEPARTUM: ICD-10-CM

## 2020-06-14 PROBLEM — R82.71 GBS BACTERIURIA: Status: ACTIVE | Noted: 2020-06-14

## 2020-06-14 LAB
BACTERIA SPEC CULT: ABNORMAL
SPECIMEN SOURCE: ABNORMAL

## 2020-06-14 PROCEDURE — 96365 THER/PROPH/DIAG IV INF INIT: CPT

## 2020-06-14 PROCEDURE — 59025 FETAL NON-STRESS TEST: CPT

## 2020-06-14 PROCEDURE — G0463 HOSPITAL OUTPT CLINIC VISIT: HCPCS | Mod: 25

## 2020-06-14 RX ORDER — CEPHALEXIN 500 MG/1
500 CAPSULE ORAL DAILY
Qty: 60 CAPSULE | Refills: 0 | Status: SHIPPED | OUTPATIENT
Start: 2020-06-14 | End: 2020-07-29

## 2020-06-14 NOTE — TELEPHONE ENCOUNTER
"Patient called stating having increase discharged \"trickling out\". Clear fluid.  No odor.     Lower back pain and abdominal pain, started this morning. Front pelvic pain has been coming and going but back pain is constant.      Front pelvic pain started about an hour ago. Baby moving.     Advised patient to go to Labor and Delivery per protocol.     Called [facility], spoke with Sheree Charge Nurse informing that patient is on her way.     Lenora Nunes RN/[facility] Nurse Advisors    COVID 19 Nurse Triage Plan/Patient Instructions    Please be aware that novel coronavirus (COVID-19) may be circulating in the community. If you develop symptoms such as fever, cough, or SOB or if you have concerns about the presence of another infection including coronavirus (COVID-19), please contact your health care provider or visit [email or web address].     Disposition/Instructions    Patient to go to ED and follow protocol based instructions.     Bring Your Own Device:  Please also bring your smart device(s) (smart phones, tablets, laptops) and their charging cables for your personal use and to communicate with your care team during your visit.    Thank you for taking steps to prevent the spread of this virus.  o Limit your contact with others.  o Wear a simple mask to cover your cough.  o Wash your hands well and often.    Resources    [facility]: About COVID-19: [email or web address]    CDC: What to Do If You're Sick: www.cdc.gov/coronavirus/2019-ncov/about/steps-when-sick.html    CDC: Ending Home Isolation: www.cdc.gov/coronavirus/2019-ncov/hcp/disposition-in-home-patients.html     CDC: Caring for Someone: www.cdc.gov/coronavirus/2019-ncov/if-you-are-sick/care-for-someone.html     [facility]: Interim Guidance for Hospital Discharge to Home: www.health.[facility].[place]/diseases/coronavirus/hcp/hospdischarge.pdf    [facility] clinical trials (COVID-19 research studies): " "clinicalaffairs.[facility]-clinical-trials     Below are the COVID-19 hotlines at the [place] Department of Health ([facility]). Interpreters are available.   o For health questions: Call 447-512-9486 or 1-836.300.7176 (7 a.m. to 7 p.m.)  o For questions about schools and childcare: Call 669-654-9921 or 1-419.468.2521 (7 a.m. to 7 p.m.)     Reason for Disposition    Leakage of fluid from vagina    Additional Information    [1] Having contractions or other symptoms of labor AND [2] < 37 weeks pregnant (i.e., )    Negative: Passed out (i.e., lost consciousness, collapsed and was not responding)    Negative: Shock suspected (e.g., cold/pale/clammy skin, too weak to stand, low BP, rapid pulse)    Negative: Difficult to awaken or acting confused (e.g., disoriented, slurred speech)    Negative: [1] SEVERE abdominal pain (e.g., excruciating) AND [2] constant AND [3] present > 1 hour    Negative: Severe bleeding (e.g., continuous red blood from vagina, or large blood clots)    Negative: Umbilical cord hanging out of the vagina (shiny, white, curled appearance, \"like telephone cord\")    Negative: Uncontrollable urge to push (i.e., feels like baby is coming out now)    Negative: Can see baby    Negative: Sounds like a life-threatening emergency to the triager    Negative: MODERATE-SEVERE abdominal pain    Negative: Contractions < 10 minutes apart for 1 hour (i.e., 6 or more contractions an hour)    Negative: [1] Contractions > 10 minutes apart AND [2] persist > 24 hours AND [3] no improvement using CARE ADVICE    Negative: [1] Contractions AND [2] any vaginal bleeding (including: red blood, clots, spotting, or pink/brown mucous)    Negative: Vaginal bleeding  (Exception: brief spotting after intercourse or pelvic exam, or slight pinkish or brownish mucous discharge)    Protocols used: PREGNANCY - LABOR - [id number]-KZ, PREGNANCY - VAGINAL [id number]-DT      "

## 2020-06-14 NOTE — PROGRESS NOTES
ROM+ negative.  Informed Dr Cannon of pt arrival and status including complaints of leaking fluids, lower back and pelvic pain, headache, pedal edema,  VS, fetal & uterine status, as well as, right atrophic kidney and UTI history. Orders given for IV LR and Rocephin. Plan to send home after treatment and fax prescription of antibiotics to pharmacy of choice.

## 2020-06-14 NOTE — DISCHARGE INSTRUCTIONS

## 2020-06-14 NOTE — PROGRESS NOTES
May Maria is a 24 year old  and 32w4d patient came in complaining of Rule out rupture of membranes and Back Pain    Patient Active Problem List   Diagnosis     Microscopic hematuria     Recurrent UTI     Renal atrophy, right     Supervision of normal first pregnancy, antepartum     Glucose intolerance     Glucose intolerance of pregnancy     Encounter for triage in pregnant patient       Pt discharged to home at 11:01 PM and encouraged to rest and drink plenty of fluids.  Pt told to call/return if bleeding more than spotting, water breaks, contractions 3-5 minutes apart that she has to breath through.     Nursing education on PTL, UTI, and anitbiotics provided. Self-management instructions reviewed. New antibiotic medications reviewed. AVS given and signed. All questions answered and patient verbalizes understanding.    /82   Pulse 90   Temp 98.2  F (36.8  C) (Oral)   Resp 16   LMP 10/29/2019   SpO2 99%     Cervical status: not examined  Fetal Assessment: Appropriate for Gestational Age    Discharge support:  Independent-Alone    OB History    Para Term  AB Living   1 0 0 0 0 0   SAB TAB Ectopic Multiple Live Births   0 0 0 0 0      # Outcome Date GA Lbr Rayshawn/2nd Weight Sex Delivery Anes PTL Lv   1 Current                Lenora Beckett RN

## 2020-06-14 NOTE — PROGRESS NOTES
"OB Triage Note  May Maria  MRN: [id number]  Gestational Age: 32w4d      May Maria presents for leaking small amount of clear fluids and lower back pain  6/10 since this am; back pain unrelieved with Tylenol. Also complains of occasional pelvic discomfort but not painful. Unsure if she is having contractions. Denies bleeding. Denies recent sexual activity.  Confirms normal / usual fetal movement. Complains of a \"slight\" headache and pedal edema but denies visual disturbance or epigastric pain. 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    Has a right atrophic kidney. Treated for UTI on 2020.   /88   LMP 10/29/2019   SpO2 99%     Oriented patient to surroundings. Given contraction marker button.Call light within reach.     FHT: 140's  NST Start Time:  NST Stop Time:  NST: Reactive with accels present and decels absent.  Uterine Assessment:Contractions:non noted on external monitor or complained of by patient..    Plan:  -Awaiting ROM + results, then will discuss case with Dr Cannon    "

## 2020-06-18 NOTE — PROGRESS NOTES
Fetal Non-Stress Test Results    NST Ordered By: Dr. Cannon  NST Medical Indication: R/O  labor    NST Start & Stop Times  NST Start Time:   NST Stop Time:                             NST Results  Fetus A   Baseline Rate: Normal  Accelerations: Present  Decelerations: None  Interpretation: reactive

## 2020-06-22 ENCOUNTER — PRENATAL OFFICE VISIT (OUTPATIENT)
Dept: OBGYN | Facility: OTHER | Age: 25
End: 2020-06-22
Attending: ADVANCED PRACTICE MIDWIFE
Payer: COMMERCIAL

## 2020-06-22 ENCOUNTER — APPOINTMENT (OUTPATIENT)
Dept: LAB | Facility: OTHER | Age: 25
End: 2020-06-22
Attending: ADVANCED PRACTICE MIDWIFE
Payer: COMMERCIAL

## 2020-06-22 VITALS
WEIGHT: 188 LBS | BODY MASS INDEX: 34.6 KG/M2 | HEIGHT: 62 IN | SYSTOLIC BLOOD PRESSURE: 124 MMHG | DIASTOLIC BLOOD PRESSURE: 86 MMHG

## 2020-06-22 DIAGNOSIS — O24.410 DIET CONTROLLED GESTATIONAL DIABETES MELLITUS (GDM) IN THIRD TRIMESTER: ICD-10-CM

## 2020-06-22 DIAGNOSIS — Z34.00 SUPERVISION OF NORMAL FIRST PREGNANCY, ANTEPARTUM: Primary | ICD-10-CM

## 2020-06-22 LAB
EST. AVERAGE GLUCOSE BLD GHB EST-MCNC: 97 MG/DL
GLUCOSE 1H P 100 G GLC PO SERPL-MCNC: 194 MG/DL (ref 60–179)
GLUCOSE 2H P 100 G GLC PO SERPL-MCNC: 145 MG/DL (ref 60–154)
GLUCOSE 3H P 100 G GLC PO SERPL-MCNC: 108 MG/DL (ref 60–139)
GLUCOSE P FAST SERPL-MCNC: 147 MG/DL (ref 60–94)
HBA1C MFR BLD: 5 % (ref 0–5.6)

## 2020-06-22 PROCEDURE — 83036 HEMOGLOBIN GLYCOSYLATED A1C: CPT | Performed by: ADVANCED PRACTICE MIDWIFE

## 2020-06-22 PROCEDURE — 82952 GTT-ADDED SAMPLES: CPT | Performed by: ADVANCED PRACTICE MIDWIFE

## 2020-06-22 PROCEDURE — 36415 COLL VENOUS BLD VENIPUNCTURE: CPT | Performed by: ADVANCED PRACTICE MIDWIFE

## 2020-06-22 PROCEDURE — 99207 ZZC PRENATAL VISIT: CPT | Performed by: ADVANCED PRACTICE MIDWIFE

## 2020-06-22 PROCEDURE — 82951 GLUCOSE TOLERANCE TEST (GTT): CPT | Performed by: ADVANCED PRACTICE MIDWIFE

## 2020-06-22 ASSESSMENT — MIFFLIN-ST. JEOR: SCORE: 1566.01

## 2020-06-22 ASSESSMENT — PAIN SCALES - GENERAL: PAINLEVEL: NO PAIN (0)

## 2020-06-22 NOTE — PATIENT INSTRUCTIONS
Return to office in 2 week(s) for prenatal care and as needed.    If you think your bag of water is broke; have bleeding like a period; think your in labor; or are worried about your baby's movement; please call the labor and delivery unit @ [phone].    Thank you for allowing Christopher DUEÑAS CNM and our OB team to participate in your care.  If you have a scheduling or an appointment question please contact [facility] Unit Coordinator at their direct line [phone].   ALL nursing questions or concerns can be directed to your OB nurse at: [phone] Dione Hamilton/Carmela

## 2020-06-22 NOTE — NURSING NOTE
"Chief Complaint   Patient presents with     Prenatal Care     33w6d       Initial BP (!) 136/94 (BP Location: Left arm, Patient Position: Chair, Cuff Size: Adult Regular)   Ht 1.575 m (5' 2\")   Wt 85.3 kg (188 lb)   LMP 10/29/2019   BMI 34.39 kg/m   Estimated body mass index is 34.39 kg/m  as calculated from the following:    Height as of this encounter: 1.575 m (5' 2\").    Weight as of this encounter: 85.3 kg (188 lb).  Medication Reconciliation: complete  Joy Carter LPN    "

## 2020-06-25 ENCOUNTER — HOSPITAL ENCOUNTER (OUTPATIENT)
Dept: EDUCATION SERVICES | Facility: HOSPITAL | Age: 25
Discharge: HOME | End: 2020-06-25
Attending: ADVANCED PRACTICE MIDWIFE | Admitting: ADVANCED PRACTICE MIDWIFE
Payer: COMMERCIAL

## 2020-06-25 DIAGNOSIS — O24.410 DIET CONTROLLED GESTATIONAL DIABETES MELLITUS (GDM) IN THIRD TRIMESTER: Primary | ICD-10-CM

## 2020-06-25 PROCEDURE — 97803 MED NUTRITION INDIV SUBSEQ: CPT | Mod: GT | Performed by: DIETITIAN, REGISTERED

## 2020-06-25 RX ORDER — BLOOD SUGAR DIAGNOSTIC
STRIP MISCELLANEOUS
Qty: 200 EACH | Refills: 0 | Status: SHIPPED | OUTPATIENT
Start: 2020-06-25 | End: 2020-07-29

## 2020-06-25 RX ORDER — BLOOD-GLUCOSE METER
1 EACH MISCELLANEOUS CONTINUOUS
Qty: 1 KIT | Refills: 0 | Status: SHIPPED | OUTPATIENT
Start: 2020-06-25 | End: 2020-07-29

## 2020-06-25 RX ORDER — LANCETS
EACH MISCELLANEOUS
Qty: 102 EACH | Refills: 0 | Status: SHIPPED | OUTPATIENT
Start: 2020-06-25 | End: 2020-07-29

## 2020-06-25 ASSESSMENT — PAIN SCALES - GENERAL: PAINLEVEL: NO PAIN (0)

## 2020-06-25 NOTE — LETTER
"    6/25/2020        RE: May Maria  [address] [address]        May Maria is a 24 year old female who is being evaluated via a billable video visit.      The patient has been notified of following:     \"This video visit will be conducted via a call between you and your physician/provider. We have found that certain health care needs can be provided without the need for an in-person physical exam.  This service lets us provide the care you need with a video conversation.  If a prescription is necessary we can send it directly to your pharmacy.  If lab work is needed we can place an order for that and you can then stop by our lab to have the test done at a later time.    Video visits are billed at different rates depending on your insurance coverage.  Please reach out to your insurance provider with any questions.    If during the course of the call the physician/provider feels a video visit is not appropriate, you will not be charged for this service.\"    Patient has given verbal consent for Video visit? Yes    Will anyone else be joining your video visit? No  {If patient encounters technical issues they should call [phone] :[id number]}    Send invitation code to cell phone number [phone]      Video-Visit Details    Type of service:  Video Visit    Video Start Time: {video visit start/end time:152948}  Video End Time: {video visit start/end time:152948}    Originating Location (pt. Location): {video visit patient location:692203::\"Home\"}    Distant Location (provider location):  [facility]     Platform used for Video Visit: {Virtual Visit Platforms:511611::\"Mobile Automation\"}    {signature options:723228}      Patricia Andrews LPN         Diabetes Education Telephone Visit    Subjective/Objective:    May Maria is being evaluated for blood glucose and diabetes management review via a billable video call.      Verbal Consent has been obtained for this service by care team member: " yes.     See the scanned image of consent in the medical record.    I have reviewed and updated the patient's Past Medical History, Social History, Family History and Medication List.    Pt is 34w 2d gestation.  She weighed 188# at last OB visit on 6/22.  Prepregnancy weight reported to be 162#.      Date of last visit was:6/2/2020.    Diabetes is being managed with Lifestyle (diet/activity)    BG/Food Log:   Breakfast-scrambled eggs, breakfast meat- water  Lunch-snacks during day on fruits/veggies  Supper-meat, veggies  Pt drinks mainly water throughout the day.      She walks daily for exercise.      Pt had second 3 hour glucose tolerance test.     Fasting-147  1 hour-194  2 hour-145  3 hour-108    Clinical Decision-Making and Treatment Plan  Assessment:  Pt failed 3 hour glucose tolerance test with two values above target.  Plan is to begin glucose monitoring.  Pt has used glucose meter in the past at her job.      Discussed use of glucose meter, testing technique, testing times and target levels.  Order placed to [facility] for Accu-Chek Guide Me meter and supplies.      Plan/Response:  See Patient Instructions for co-developed, patient-stated behavior change goals.  Meal Plan Recommendation: Continue to follow current low carbohydrate meal plan.   Exercise / activity plan: Continue walking for exercise as able/allowed.   Check blood sugars 4x/day - fasting and 1 hour after each meal.  Keep a blood glucose record for next visit.  Follow up via phone on June 29th to review glucose levels.      Total time of call between patient and provider was 23 minutes     Any diabetes medication dose changes were made via the CDE Protocol and Collaborative Practice Agreement with the patient's referring provider. A copy of this encounter was shared with the provider.      Sincerely,        Ely Du RD

## 2020-06-25 NOTE — PROGRESS NOTES
Diabetes Education Telephone Visit    Subjective/Objective:    May Maria is being evaluated for blood glucose and diabetes management review via a billable video call.      Verbal Consent has been obtained for this service by care team member: yes.     See the scanned image of consent in the medical record.    I have reviewed and updated the patient's Past Medical History, Social History, Family History and Medication List.    Pt is 34w 2d gestation.  She weighed 188# at last OB visit on 6/22.  Prepregnancy weight reported to be 162#.      Date of last visit was:6/2/2020.    Diabetes is being managed with Lifestyle (diet/activity)    BG/Food Log:   Breakfast-scrambled eggs, breakfast meat- water  Lunch-snacks during day on fruits/veggies  Supper-meat, veggies  Pt drinks mainly water throughout the day.      She walks daily for exercise.      Pt had second 3 hour glucose tolerance test.     Fasting-147  1 hour-194  2 hour-145  3 hour-108    Clinical Decision-Making and Treatment Plan  Assessment:  Pt failed 3 hour glucose tolerance test with two values above target.  Plan is to begin glucose monitoring.  Pt has used glucose meter in the past at her job.      Discussed use of glucose meter, testing technique, testing times and target levels.  Order placed to [facility] for Accu-Chek Guide Me meter and supplies.      Plan/Response:  See Patient Instructions for co-developed, patient-stated behavior change goals.  Meal Plan Recommendation: Continue to follow current low carbohydrate meal plan.   Exercise / activity plan: Continue walking for exercise as able/allowed.   Check blood sugars 4x/day - fasting and 1 hour after each meal.  Keep a blood glucose record for next visit.  Follow up via phone on June 29th to review glucose levels.      Total time of call between patient and provider was 23 minutes     Any diabetes medication dose changes were made via the CDE Protocol and Collaborative Practice  Agreement with the patient's referring provider. A copy of this encounter was shared with the provider.

## 2020-06-25 NOTE — PROGRESS NOTES
"May Maria is a 24 year old female who is being evaluated via a billable video visit.      The patient has been notified of following:     \"This video visit will be conducted via a call between you and your physician/provider. We have found that certain health care needs can be provided without the need for an in-person physical exam.  This service lets us provide the care you need with a video conversation.  If a prescription is necessary we can send it directly to your pharmacy.  If lab work is needed we can place an order for that and you can then stop by our lab to have the test done at a later time.    Video visits are billed at different rates depending on your insurance coverage.  Please reach out to your insurance provider with any questions.    If during the course of the call the physician/provider feels a video visit is not appropriate, you will not be charged for this service.\"    Patient has given verbal consent for Video visit? Yes    Will anyone else be joining your video visit? No      Send invitation code to cell phone number [phone]      Patricia Andrews LPN       "

## 2020-06-27 ENCOUNTER — HOSPITAL ENCOUNTER (OUTPATIENT)
Facility: HOSPITAL | Age: 25
Discharge: HOME | End: 2020-06-27
Attending: OBSTETRICS & GYNECOLOGY | Admitting: OBSTETRICS & GYNECOLOGY
Payer: COMMERCIAL

## 2020-06-27 VITALS — TEMPERATURE: 98 F | DIASTOLIC BLOOD PRESSURE: 85 MMHG | SYSTOLIC BLOOD PRESSURE: 137 MMHG | RESPIRATION RATE: 16 BRPM

## 2020-06-27 DIAGNOSIS — Z36.89 ENCOUNTER FOR TRIAGE IN PREGNANT PATIENT: Primary | ICD-10-CM

## 2020-06-27 PROBLEM — E66.811 CLASS 1 OBESITY WITHOUT SERIOUS COMORBIDITY WITH BODY MASS INDEX (BMI) OF 34.0 TO 34.9 IN ADULT: Status: ACTIVE | Noted: 2020-06-27

## 2020-06-27 PROBLEM — R60.0 PERIPHERAL EDEMA: Status: ACTIVE | Noted: 2020-06-27

## 2020-06-27 LAB
ALBUMIN SERPL-MCNC: 2.6 G/DL (ref 3.4–5)
ALBUMIN UR-MCNC: NEGATIVE MG/DL
ALP SERPL-CCNC: 112 U/L (ref 40–150)
ALT SERPL W P-5'-P-CCNC: 20 U/L (ref 0–50)
APPEARANCE UR: CLEAR
AST SERPL W P-5'-P-CCNC: 15 U/L (ref 0–45)
BACTERIA #/AREA URNS HPF: ABNORMAL /HPF
BASOPHILS # BLD AUTO: 0 10E9/L (ref 0–0.2)
BASOPHILS NFR BLD AUTO: 0.4 %
BILIRUB DIRECT SERPL-MCNC: 0.1 MG/DL (ref 0–0.2)
BILIRUB SERPL-MCNC: 0.3 MG/DL (ref 0.2–1.3)
BILIRUB UR QL STRIP: NEGATIVE
COLOR UR AUTO: ABNORMAL
DIFFERENTIAL METHOD BLD: NORMAL
EOSINOPHIL # BLD AUTO: 0.1 10E9/L (ref 0–0.7)
EOSINOPHIL NFR BLD AUTO: 1.2 %
ERYTHROCYTE [DISTWIDTH] IN BLOOD BY AUTOMATED COUNT: 13.3 % (ref 10–15)
GLUCOSE UR STRIP-MCNC: NEGATIVE MG/DL
HCT VFR BLD AUTO: 36.3 % (ref 35–47)
HGB BLD-MCNC: 12.3 G/DL (ref 11.7–15.7)
HGB UR QL STRIP: ABNORMAL
HYALINE CASTS #/AREA URNS LPF: 1 /LPF
IMM GRANULOCYTES # BLD: 0 10E9/L (ref 0–0.4)
IMM GRANULOCYTES NFR BLD: 0.4 %
KETONES UR STRIP-MCNC: NEGATIVE MG/DL
LEUKOCYTE ESTERASE UR QL STRIP: ABNORMAL
LYMPHOCYTES # BLD AUTO: 1.7 10E9/L (ref 0.8–5.3)
LYMPHOCYTES NFR BLD AUTO: 19 %
MCH RBC QN AUTO: 29.5 PG (ref 26.5–33)
MCHC RBC AUTO-ENTMCNC: 33.9 G/DL (ref 31.5–36.5)
MCV RBC AUTO: 87 FL (ref 78–100)
MONOCYTES # BLD AUTO: 0.5 10E9/L (ref 0–1.3)
MONOCYTES NFR BLD AUTO: 5.6 %
MUCOUS THREADS #/AREA URNS LPF: PRESENT /LPF
NEUTROPHILS # BLD AUTO: 6.6 10E9/L (ref 1.6–8.3)
NEUTROPHILS NFR BLD AUTO: 73.4 %
NITRATE UR QL: NEGATIVE
NRBC # BLD AUTO: 0 10*3/UL
NRBC BLD AUTO-RTO: 0 /100
PH UR STRIP: 5.5 PH (ref 4.7–8)
PLATELET # BLD AUTO: 206 10E9/L (ref 150–450)
PROT SERPL-MCNC: 7 G/DL (ref 6.8–8.8)
RBC # BLD AUTO: 4.17 10E12/L (ref 3.8–5.2)
RBC #/AREA URNS AUTO: 1 /HPF (ref 0–2)
SOURCE: ABNORMAL
SP GR UR STRIP: 1.02 (ref 1–1.03)
SQUAMOUS #/AREA URNS AUTO: 2 /HPF (ref 0–1)
UROBILINOGEN UR STRIP-MCNC: NORMAL MG/DL (ref 0–2)
WBC # BLD AUTO: 9 10E9/L (ref 4–11)
WBC #/AREA URNS AUTO: 2 /HPF (ref 0–5)

## 2020-06-27 PROCEDURE — 99213 OFFICE O/P EST LOW 20 MIN: CPT | Mod: 25 | Performed by: OBSTETRICS & GYNECOLOGY

## 2020-06-27 PROCEDURE — 81001 URINALYSIS AUTO W/SCOPE: CPT | Performed by: OBSTETRICS & GYNECOLOGY

## 2020-06-27 PROCEDURE — 36415 COLL VENOUS BLD VENIPUNCTURE: CPT | Performed by: OBSTETRICS & GYNECOLOGY

## 2020-06-27 PROCEDURE — 85025 COMPLETE CBC W/AUTO DIFF WBC: CPT | Performed by: OBSTETRICS & GYNECOLOGY

## 2020-06-27 PROCEDURE — 59025 FETAL NON-STRESS TEST: CPT

## 2020-06-27 PROCEDURE — 59025 FETAL NON-STRESS TEST: CPT | Mod: 26 | Performed by: OBSTETRICS & GYNECOLOGY

## 2020-06-27 PROCEDURE — 80076 HEPATIC FUNCTION PANEL: CPT | Performed by: OBSTETRICS & GYNECOLOGY

## 2020-06-27 NOTE — PLAN OF CARE
OB Triage Note  May Maria  MRN: [id number]  Gestational Age: 34w4d      April YURIY Maria presents for Blood pressure         Denies LOF.  Reports none     Dr. Perez notified of arrival and condition.  Oriented patient to surroundings. Call light within reach.     FHT: reactive  NST Start Time:0800   NST Stop Time: 830  NST: Reactive   Uterine Assessment:Contractions: none  Plan:  -Initial NST, then fetal/uterine monitoring per MD/patient plan.    -Nursing education on 24 hour urine, follow up  provided.

## 2020-06-27 NOTE — LETTER
[facility]  [address]  [place] [address]  Phone: [phone]  Fax: [phone]    June 27, 2020        April YURIY Maria  [address] [place] [address]          To whom it may concern:    RE: May YAN Candace    Please excuse from work 6/27 and 6/28. April's follow up will be Monday 6/29 and further decisions will elda made then.    Please contact me for questions or concerns.      Sincerely,        No name on file.

## 2020-06-27 NOTE — PROGRESS NOTES
OB ON CALL NOTE:      S:   Patient is  at 34w 4d who comes to L&D after her shift this past night with peripheral edema and wants her BP check.  She denies any GI--Pulmonary-Cardiac-Opth-Neuro symptoms of PET.  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  She normal works 7 days on and 7 days off.  She is an OB patient of Christopher Juárez and is a Gestational DM controlled with diet.      O:   WDF in NAD         VSS, /85         Lungs clear         Cardiac RR         Abdomen soft, NOT tender, no masses, liver-epigastrium are normal, fundus is 34          Weeks.         Extremities have 1+ pretibial edema         Neuro is normal         Reflexes are 1+, equal, symmetric, with NO clonus    NST RESULTS:   NST   Reactive  Strip   CAT I             LAB:   CBC with platelets are normal             UA normal without proteinuria, there is small blood, but this is chronic finding.             Other UA perimeters are normal             Hepatic function panel is normal WITHOUT LFT elevation.  Protein is low.      A:    at 34w 4d        Peripheral edema with evidence of Preeclampsia.      P:   Off work with release         Increase rest LEFT side 2 hours in AM, afternoon, evening, and PM         Increase H2O         Cut salt, fats, carbohydrates         Repeat HFP along with 24 hour urine for TP and CrCl this Monday with visit with               Christopher ward.         Preclampsia precautions given.         Daily FM and FCC

## 2020-06-27 NOTE — PLAN OF CARE
May Maria        NST:  reactive  Start: 0800  Stop: 0830    Physician: Chris  Reason For Test: Pregnancy Induced Hypertension (PIH)  EDC:8/4/2020  Gestational Age: 34 weeks 4days  Comments:  To be discharged to home with work release. Patient to return to hospital lab Monday June 29th for Hepatic panel and 24 hour urine.   Patient will then Follow up with SAURABH Anthony CNM Monday morning after labs drawn.    Cuca Mason RN

## 2020-06-27 NOTE — DISCHARGE INSTRUCTIONS
Start 24 hour urine at 0800 Sunday 28 June. End urine collection on Monday 29 June at 0800. Present to Hospital lab after completion.

## 2020-06-29 ENCOUNTER — PATIENT OUTREACH (OUTPATIENT)
Dept: EDUCATION SERVICES | Facility: HOSPITAL | Age: 25
End: 2020-06-29

## 2020-06-29 ENCOUNTER — PRENATAL OFFICE VISIT (OUTPATIENT)
Dept: OBGYN | Facility: OTHER | Age: 25
End: 2020-06-29
Attending: ADVANCED PRACTICE MIDWIFE
Payer: COMMERCIAL

## 2020-06-29 ENCOUNTER — APPOINTMENT (OUTPATIENT)
Dept: ULTRASOUND IMAGING | Facility: HOSPITAL | Age: 25
End: 2020-06-29
Attending: ADVANCED PRACTICE MIDWIFE
Payer: COMMERCIAL

## 2020-06-29 ENCOUNTER — HOSPITAL ENCOUNTER (OUTPATIENT)
Facility: HOSPITAL | Age: 25
Discharge: HOME | End: 2020-06-29
Attending: ADVANCED PRACTICE MIDWIFE | Admitting: ADVANCED PRACTICE MIDWIFE
Payer: COMMERCIAL

## 2020-06-29 VITALS
WEIGHT: 191 LBS | BODY MASS INDEX: 35.15 KG/M2 | HEIGHT: 62 IN | DIASTOLIC BLOOD PRESSURE: 96 MMHG | SYSTOLIC BLOOD PRESSURE: 148 MMHG

## 2020-06-29 VITALS
HEART RATE: 111 BPM | DIASTOLIC BLOOD PRESSURE: 66 MMHG | SYSTOLIC BLOOD PRESSURE: 128 MMHG | TEMPERATURE: 98.2 F | OXYGEN SATURATION: 100 % | RESPIRATION RATE: 16 BRPM

## 2020-06-29 DIAGNOSIS — O09.93 SUPERVISION OF HIGH RISK PREGNANCY, ANTEPARTUM, THIRD TRIMESTER: Primary | ICD-10-CM

## 2020-06-29 DIAGNOSIS — O13.3 PREGNANCY-INDUCED HYPERTENSION IN THIRD TRIMESTER: ICD-10-CM

## 2020-06-29 DIAGNOSIS — Z34.00 SUPERVISION OF NORMAL FIRST PREGNANCY, ANTEPARTUM: ICD-10-CM

## 2020-06-29 DIAGNOSIS — R60.0 PERIPHERAL EDEMA: ICD-10-CM

## 2020-06-29 DIAGNOSIS — Z36.89 ENCOUNTER FOR TRIAGE IN PREGNANT PATIENT: ICD-10-CM

## 2020-06-29 LAB
ALBUMIN SERPL-MCNC: 2.6 G/DL (ref 3.4–5)
ALP SERPL-CCNC: 119 U/L (ref 40–150)
ALT SERPL W P-5'-P-CCNC: 19 U/L (ref 0–50)
AST SERPL W P-5'-P-CCNC: 14 U/L (ref 0–45)
BILIRUB DIRECT SERPL-MCNC: 0.1 MG/DL (ref 0–0.2)
BILIRUB SERPL-MCNC: 0.4 MG/DL (ref 0.2–1.3)
COLLECT DURATION TIME UR: 24 H
CREAT 24H UR-MRATE: 1.63 G/(24.H) (ref 0.8–1.8)
CREAT UR-MCNC: 123 MG/DL
PROT 24H UR-MRATE: 0.33 G/(24.H) (ref 0.04–0.23)
PROT SERPL-MCNC: 6.9 G/DL (ref 6.8–8.8)
PROT UR-MCNC: 0.25 G/L
PROT/CREAT 24H UR: 0.2 G/G CR (ref 0–0.2)
SPECIMEN VOL UR: 1325 ML

## 2020-06-29 PROCEDURE — 80076 HEPATIC FUNCTION PANEL: CPT | Performed by: OBSTETRICS & GYNECOLOGY

## 2020-06-29 PROCEDURE — 76819 FETAL BIOPHYS PROFIL W/O NST: CPT | Mod: TC

## 2020-06-29 PROCEDURE — 36415 COLL VENOUS BLD VENIPUNCTURE: CPT | Performed by: OBSTETRICS & GYNECOLOGY

## 2020-06-29 PROCEDURE — 99207 ZZC PRENATAL VISIT: CPT | Performed by: ADVANCED PRACTICE MIDWIFE

## 2020-06-29 PROCEDURE — 81050 URINALYSIS VOLUME MEASURE: CPT | Performed by: ADVANCED PRACTICE MIDWIFE

## 2020-06-29 PROCEDURE — 59025 FETAL NON-STRESS TEST: CPT | Mod: 26 | Performed by: ADVANCED PRACTICE MIDWIFE

## 2020-06-29 PROCEDURE — 84156 ASSAY OF PROTEIN URINE: CPT | Performed by: ADVANCED PRACTICE MIDWIFE

## 2020-06-29 PROCEDURE — 59025 FETAL NON-STRESS TEST: CPT | Mod: 59

## 2020-06-29 ASSESSMENT — PAIN SCALES - GENERAL: PAINLEVEL: MILD PAIN (3)

## 2020-06-29 ASSESSMENT — MIFFLIN-ST. JEOR: SCORE: 1579.62

## 2020-06-29 NOTE — PLAN OF CARE
OB Triage Note  May Maria  MRN: [id number]  Gestational Age: 34w6d      May Maria presents from clinic per Raj. (sign/symptom/concern).      Denies any contractions, leaking of fluid or bleeding at this time.     Raj notified of arrival and condition.  Oriented patient to surroundings. Call light within reach.     FHT: 150  NST Start Time: 0910  NST Stop Time: 930  NST: Reactive.  Uterine Assessment:Contractions: None  Plan: BPP and serial BP's   -Initial NST, then fetal/uterine monitoring per MD/patient plan.  -Nursing education on  provided.

## 2020-06-29 NOTE — NURSING NOTE
"Chief Complaint   Patient presents with     Prenatal Care     34w6d       Initial BP (!) 148/96 (BP Location: Left arm, Patient Position: Chair, Cuff Size: Adult Regular)   Ht 1.575 m (5' 2\")   Wt 86.6 kg (191 lb)   LMP 10/29/2019   BMI 34.93 kg/m   Estimated body mass index is 34.93 kg/m  as calculated from the following:    Height as of this encounter: 1.575 m (5' 2\").    Weight as of this encounter: 86.6 kg (191 lb).  Medication Reconciliation: complete  Joy Carter LPN    "

## 2020-06-29 NOTE — PROGRESS NOTES
Doing well.  Baby active.    Denies contractions, bleeding, or leakage of fluid.     Discussed:  Elevated B/P, edema, PIH warning signs, reviewed labs, GDM BG results, fetal movement    Plan:  Give PIH warning signs:  HA, distorted vision, epigastric pain, sudden swelling in hands or face  BPP with NST  B/P every 15 minutes x 3 on left side in quiet dark room  Rest for 2 hrs in am, afternoon, and evening  Decrease salt in diet  Adequate water intake  Off of work for remainder of pregnancy  Nurse only B/P check on Thursday    Return to office in 1 weeks for prenatal care and as needed.    Christopher Anthony, SPENSER, CNM

## 2020-06-29 NOTE — PATIENT INSTRUCTIONS
Return to office in 1 week(s) for prenatal care and as needed.    If you think your bag of water is broke; have bleeding like a period; think your in labor; or are worried about your baby's movement; please call the labor and delivery unit @ [phone].    Thank you for allowing Christopher DUEÑAS CNM and our OB team to participate in your care.  If you have a scheduling or an appointment question please contact [facility] Unit Coordinator at their direct line [phone].   ALL nursing questions or concerns can be directed to your OB nurse at: [phone] Dione Hamilton/Carmela

## 2020-06-30 ENCOUNTER — TELEPHONE (OUTPATIENT)
Dept: EDUCATION SERVICES | Facility: HOSPITAL | Age: 25
End: 2020-06-30

## 2020-06-30 NOTE — TELEPHONE ENCOUNTER
Attempted to call patient to get her blood glucose readings for the last week, but the patient states that she is not at home right now. She will send my-chart message later today with her numbers.

## 2020-07-01 NOTE — PROGRESS NOTES
Fetal Non-Stress Test Results    NST Ordered By: SPENSER Tejada CNM    NST Medical Indication: Elevated BP's in clinic    NST Start & Stop Times  NST Start Time: 0910  NST Stop Time: 0930                            NST Results  Fetus A   Baseline Rate: 150  Accelerations: Present  Decelerations: None  Interpretation: reactive

## 2020-07-02 ENCOUNTER — HOSPITAL ENCOUNTER (OUTPATIENT)
Facility: HOSPITAL | Age: 25
Discharge: HOME | End: 2020-07-02
Attending: ADVANCED PRACTICE MIDWIFE | Admitting: ADVANCED PRACTICE MIDWIFE
Payer: COMMERCIAL

## 2020-07-02 ENCOUNTER — HOSPITAL ENCOUNTER (OUTPATIENT)
Dept: ULTRASOUND IMAGING | Facility: HOSPITAL | Age: 25
End: 2020-07-02
Attending: ADVANCED PRACTICE MIDWIFE
Payer: COMMERCIAL

## 2020-07-02 ENCOUNTER — ALLIED HEALTH/NURSE VISIT (OUTPATIENT)
Dept: OBGYN | Facility: OTHER | Age: 25
End: 2020-07-02
Payer: COMMERCIAL

## 2020-07-02 VITALS
HEART RATE: 94 BPM | OXYGEN SATURATION: 98 % | DIASTOLIC BLOOD PRESSURE: 73 MMHG | SYSTOLIC BLOOD PRESSURE: 120 MMHG | BODY MASS INDEX: 35.15 KG/M2 | RESPIRATION RATE: 16 BRPM | HEIGHT: 62 IN | TEMPERATURE: 98 F | WEIGHT: 191 LBS

## 2020-07-02 VITALS — SYSTOLIC BLOOD PRESSURE: 142 MMHG | DIASTOLIC BLOOD PRESSURE: 96 MMHG

## 2020-07-02 DIAGNOSIS — Z36.89 ENCOUNTER FOR TRIAGE IN PREGNANT PATIENT: Primary | ICD-10-CM

## 2020-07-02 DIAGNOSIS — O13.3 PREGNANCY-INDUCED HYPERTENSION IN THIRD TRIMESTER: ICD-10-CM

## 2020-07-02 DIAGNOSIS — R60.0 PERIPHERAL EDEMA: ICD-10-CM

## 2020-07-02 PROCEDURE — 59025 FETAL NON-STRESS TEST: CPT

## 2020-07-02 PROCEDURE — 59025 FETAL NON-STRESS TEST: CPT | Mod: 26 | Performed by: ADVANCED PRACTICE MIDWIFE

## 2020-07-02 PROCEDURE — 99207 ZZC NO CHARGE NURSE ONLY: CPT

## 2020-07-02 PROCEDURE — 76819 FETAL BIOPHYS PROFIL W/O NST: CPT | Mod: TC

## 2020-07-02 ASSESSMENT — PAIN SCALES - GENERAL: PAINLEVEL: NO PAIN (0)

## 2020-07-02 ASSESSMENT — MIFFLIN-ST. JEOR: SCORE: 1579.62

## 2020-07-02 NOTE — PLAN OF CARE
May Maria        NST:reactive  Start:1212  Stop:1240    Physician:Christopher Anthony  Reason For Test: elevated blood pressure  VENESSA:08/04/2020  Gestational Age:35w 2d    Comments:Pt to floor following clinic appt due to elevated blood pressure in clinic. Educated on test and instructed to lay on left side. Serial blood pressures obtained. Placed on monitor and given movement clicker. /79,136/78 and 120 73. NST reactive and order to discharge home obtained from Christopher. AVS reviewed and signed. Denies any needs or questions. Pt discharged home.       Sheree Gudino RN

## 2020-07-02 NOTE — DISCHARGE INSTRUCTIONS

## 2020-07-06 ENCOUNTER — PRENATAL OFFICE VISIT (OUTPATIENT)
Dept: OBGYN | Facility: OTHER | Age: 25
End: 2020-07-06
Attending: ADVANCED PRACTICE MIDWIFE
Payer: COMMERCIAL

## 2020-07-06 ENCOUNTER — HOSPITAL ENCOUNTER (OUTPATIENT)
Facility: HOSPITAL | Age: 25
Discharge: HOME | End: 2020-07-06
Attending: ADVANCED PRACTICE MIDWIFE | Admitting: ADVANCED PRACTICE MIDWIFE
Payer: COMMERCIAL

## 2020-07-06 VITALS
BODY MASS INDEX: 34.96 KG/M2 | SYSTOLIC BLOOD PRESSURE: 117 MMHG | HEART RATE: 102 BPM | OXYGEN SATURATION: 98 % | RESPIRATION RATE: 16 BRPM | HEIGHT: 62 IN | DIASTOLIC BLOOD PRESSURE: 74 MMHG | WEIGHT: 190 LBS | TEMPERATURE: 97.8 F

## 2020-07-06 VITALS — DIASTOLIC BLOOD PRESSURE: 88 MMHG | BODY MASS INDEX: 34.75 KG/M2 | SYSTOLIC BLOOD PRESSURE: 138 MMHG | WEIGHT: 190 LBS

## 2020-07-06 DIAGNOSIS — E66.811 CLASS 1 OBESITY DUE TO EXCESS CALORIES WITHOUT SERIOUS COMORBIDITY WITH BODY MASS INDEX (BMI) OF 34.0 TO 34.9 IN ADULT: ICD-10-CM

## 2020-07-06 DIAGNOSIS — Z34.03 SUPERVISION OF NORMAL FIRST PREGNANCY IN THIRD TRIMESTER: Primary | ICD-10-CM

## 2020-07-06 DIAGNOSIS — E66.09 CLASS 1 OBESITY DUE TO EXCESS CALORIES WITHOUT SERIOUS COMORBIDITY WITH BODY MASS INDEX (BMI) OF 34.0 TO 34.9 IN ADULT: ICD-10-CM

## 2020-07-06 DIAGNOSIS — R60.0 PERIPHERAL EDEMA: ICD-10-CM

## 2020-07-06 LAB
ALBUMIN UR-MCNC: 10 MG/DL
ALT SERPL W P-5'-P-CCNC: 22 U/L (ref 0–50)
APPEARANCE UR: CLEAR
AST SERPL W P-5'-P-CCNC: 14 U/L (ref 0–45)
BACTERIA #/AREA URNS HPF: ABNORMAL /HPF
BILIRUB UR QL STRIP: NEGATIVE
COLOR UR AUTO: YELLOW
GLUCOSE UR STRIP-MCNC: NEGATIVE MG/DL
HGB UR QL STRIP: ABNORMAL
KETONES UR STRIP-MCNC: NEGATIVE MG/DL
LEUKOCYTE ESTERASE UR QL STRIP: NEGATIVE
MUCOUS THREADS #/AREA URNS LPF: PRESENT /LPF
NITRATE UR QL: NEGATIVE
PH UR STRIP: 6 PH (ref 4.7–8)
PLATELET # BLD AUTO: 188 10E9/L (ref 150–450)
RBC #/AREA URNS AUTO: 2 /HPF (ref 0–2)
SOURCE: ABNORMAL
SP GR UR STRIP: 1.02 (ref 1–1.03)
SQUAMOUS #/AREA URNS AUTO: 1 /HPF (ref 0–1)
UROBILINOGEN UR STRIP-MCNC: NORMAL MG/DL (ref 0–2)
WBC #/AREA URNS AUTO: 2 /HPF (ref 0–5)

## 2020-07-06 PROCEDURE — 84460 ALANINE AMINO (ALT) (SGPT): CPT | Performed by: ADVANCED PRACTICE MIDWIFE

## 2020-07-06 PROCEDURE — 81001 URINALYSIS AUTO W/SCOPE: CPT | Performed by: ADVANCED PRACTICE MIDWIFE

## 2020-07-06 PROCEDURE — 99207 ZZC PRENATAL VISIT: CPT | Performed by: ADVANCED PRACTICE MIDWIFE

## 2020-07-06 PROCEDURE — 85049 AUTOMATED PLATELET COUNT: CPT | Performed by: ADVANCED PRACTICE MIDWIFE

## 2020-07-06 PROCEDURE — 36415 COLL VENOUS BLD VENIPUNCTURE: CPT | Performed by: ADVANCED PRACTICE MIDWIFE

## 2020-07-06 PROCEDURE — 84450 TRANSFERASE (AST) (SGOT): CPT | Performed by: ADVANCED PRACTICE MIDWIFE

## 2020-07-06 ASSESSMENT — MIFFLIN-ST. JEOR: SCORE: 1575.08

## 2020-07-06 ASSESSMENT — PAIN SCALES - GENERAL: PAINLEVEL: NO PAIN (0)

## 2020-07-06 NOTE — PROGRESS NOTES
Doing well.  Baby active.   Denies bleeding, or leakage of fluid. Mild contractions    Discussed:  PIH, warning signs, medical IOL, birth plan, fetal movement    IOL:  Risks, benefits, alternatives, and questions answered    SVE:  FT, thick, posterior    Plan:  NST  ALT, AST, PLT, UA  Thursday:  BPP  IOL on 7/15/20  (ripening on 7/14)  Consult with OB    Return to office in 1 weeks for prenatal care and as needed.    Christopher Anthony, APRN, CNM

## 2020-07-06 NOTE — PATIENT INSTRUCTIONS
Return to office in 1 week(s) for prenatal care and as needed.    If you think your bag of water is broke; have bleeding like a period; think your in labor; or are worried about your baby's movement; please call the labor and delivery unit @ [phone].    Thank you for allowing Christopher DUEÑAS CNM and our OB team to participate in your care.  If you have a scheduling or an appointment question please contact [facility] Unit Coordinator at their direct line [phone].   ALL nursing questions or concerns can be directed to your OB nurse at: [phone] Dione Hamilton/Carmela

## 2020-07-06 NOTE — DISCHARGE INSTRUCTIONS

## 2020-07-06 NOTE — PLAN OF CARE
May YAN Drnikki        NST:reactive  Start:0926  Stop:1007    Physician:Christopher Anthony  Reason For Test:Pregnancy Induced Hypertension (PIH)  VENESSA;08/04/2020   Gestational Age:35w 6d    Comments:Pt to floor following clinic appt. Educated on test and placed on left side. Serial BPs obtained per order. Educated on test and labs to be drawn. States understanding and denies any questions. Given movement clicker. NST reactive and labs WDL. Christopher Anthony updated and order to discharge home received. AVS reviewed and signed. Denies any concerns or questions at this time. Pt discharge home.       Sheree Gudino RN

## 2020-07-08 DIAGNOSIS — Z34.03 SUPERVISION OF NORMAL FIRST PREGNANCY IN THIRD TRIMESTER: Primary | ICD-10-CM

## 2020-07-08 DIAGNOSIS — Z34.90 ENCOUNTER FOR PLANNED INDUCTION OF LABOR: ICD-10-CM

## 2020-07-09 ENCOUNTER — ALLIED HEALTH/NURSE VISIT (OUTPATIENT)
Dept: OBGYN | Facility: OTHER | Age: 25
End: 2020-07-09
Attending: ADVANCED PRACTICE MIDWIFE
Payer: COMMERCIAL

## 2020-07-09 ENCOUNTER — HOSPITAL ENCOUNTER (OUTPATIENT)
Dept: ULTRASOUND IMAGING | Facility: HOSPITAL | Age: 25
End: 2020-07-09
Attending: ADVANCED PRACTICE MIDWIFE
Payer: COMMERCIAL

## 2020-07-09 VITALS — DIASTOLIC BLOOD PRESSURE: 90 MMHG | SYSTOLIC BLOOD PRESSURE: 138 MMHG

## 2020-07-09 DIAGNOSIS — R60.0 PERIPHERAL EDEMA: ICD-10-CM

## 2020-07-09 DIAGNOSIS — O13.3 PREGNANCY-INDUCED HYPERTENSION IN THIRD TRIMESTER: ICD-10-CM

## 2020-07-09 DIAGNOSIS — Z01.30 BP CHECK: Primary | ICD-10-CM

## 2020-07-09 DIAGNOSIS — Z34.00 SUPERVISION OF NORMAL FIRST PREGNANCY, ANTEPARTUM: ICD-10-CM

## 2020-07-09 PROCEDURE — 99207 ZZC NO CHARGE NURSE ONLY: CPT

## 2020-07-09 PROCEDURE — 76819 FETAL BIOPHYS PROFIL W/O NST: CPT | Mod: TC

## 2020-07-09 NOTE — NURSING NOTE
"Chief Complaint   Patient presents with     Allied Health Visit     BP check        Initial BP (!) 138/90 (BP Location: Right arm, Patient Position: Chair, Cuff Size: Adult Regular)   LMP 10/29/2019  Estimated body mass index is 34.75 kg/m  as calculated from the following:    Height as of 7/6/20: 1.575 m (5' 2\").    Weight as of 7/6/20: 86.2 kg (190 lb).  Medication Reconciliation: complete  Joy Carter LPN    "

## 2020-07-09 NOTE — PROGRESS NOTES
Fetal Non-Stress Test Results    NST Ordered By: SPENSER Tejada CNM    NST Medical Indication: elevated blood pressure    NST Start & Stop Times  NST Start Time: 1212  NST Stop Time: 1240                            NST Results  Fetus A   Baseline Rate: 130  Accelerations: Present  Decelerations: None  Interpretation: reactive

## 2020-07-10 NOTE — PROGRESS NOTES
Fetal Non-Stress Test Results    NST Ordered By: SPENSER Tejada CNM    NST Medical Indication: PIH    NST Start & Stop Times  NST Start Time: 0926  NST Stop Time: 1007                            NST Results  Fetus A   Baseline Rate: 130  Accelerations: Present  Decelerations: None  Interpretation: reactive

## 2020-07-11 ENCOUNTER — OFFICE VISIT (OUTPATIENT)
Dept: FAMILY MEDICINE | Facility: OTHER | Age: 25
End: 2020-07-11
Attending: FAMILY MEDICINE
Payer: COMMERCIAL

## 2020-07-11 DIAGNOSIS — Z34.03 SUPERVISION OF NORMAL FIRST PREGNANCY IN THIRD TRIMESTER: ICD-10-CM

## 2020-07-11 DIAGNOSIS — Z34.90 ENCOUNTER FOR PLANNED INDUCTION OF LABOR: ICD-10-CM

## 2020-07-11 DIAGNOSIS — Z01.818 PREOP GENERAL PHYSICAL EXAM: ICD-10-CM

## 2020-07-11 PROCEDURE — U0003 INFECTIOUS AGENT DETECTION BY NUCLEIC ACID (DNA OR RNA); SEVERE ACUTE RESPIRATORY SYNDROME CORONAVIRUS 2 (SARS-COV-2) (CORONAVIRUS DISEASE [COVID-19]), AMPLIFIED PROBE TECHNIQUE, MAKING USE OF HIGH THROUGHPUT TECHNOLOGIES AS DESCRIBED BY CMS-2020-01-R: HCPCS | Performed by: ADVANCED PRACTICE MIDWIFE

## 2020-07-12 LAB
SARS-COV-2 RNA SPEC QL NAA+PROBE: NOT DETECTED
SPECIMEN SOURCE: NORMAL

## 2020-07-13 ENCOUNTER — HOSPITAL ENCOUNTER (OUTPATIENT)
Facility: HOSPITAL | Age: 25
End: 2020-07-13
Admitting: ADVANCED PRACTICE MIDWIFE
Payer: COMMERCIAL

## 2020-07-13 ENCOUNTER — HOSPITAL ENCOUNTER (OUTPATIENT)
Facility: HOSPITAL | Age: 25
Discharge: HOME | End: 2020-07-13
Attending: ADVANCED PRACTICE MIDWIFE | Admitting: ADVANCED PRACTICE MIDWIFE
Payer: COMMERCIAL

## 2020-07-13 ENCOUNTER — PRENATAL OFFICE VISIT (OUTPATIENT)
Dept: OBGYN | Facility: OTHER | Age: 25
End: 2020-07-13
Attending: ADVANCED PRACTICE MIDWIFE
Payer: COMMERCIAL

## 2020-07-13 VITALS
HEIGHT: 62 IN | WEIGHT: 195 LBS | DIASTOLIC BLOOD PRESSURE: 76 MMHG | BODY MASS INDEX: 35.88 KG/M2 | SYSTOLIC BLOOD PRESSURE: 136 MMHG

## 2020-07-13 VITALS
SYSTOLIC BLOOD PRESSURE: 132 MMHG | OXYGEN SATURATION: 98 % | TEMPERATURE: 98.1 F | HEART RATE: 93 BPM | HEIGHT: 62 IN | BODY MASS INDEX: 35.88 KG/M2 | RESPIRATION RATE: 16 BRPM | DIASTOLIC BLOOD PRESSURE: 71 MMHG | WEIGHT: 195 LBS

## 2020-07-13 DIAGNOSIS — O09.93 SUPERVISION OF HIGH RISK PREGNANCY, ANTEPARTUM, THIRD TRIMESTER: Primary | ICD-10-CM

## 2020-07-13 PROCEDURE — 59025 FETAL NON-STRESS TEST: CPT

## 2020-07-13 PROCEDURE — 59025 FETAL NON-STRESS TEST: CPT | Mod: 26 | Performed by: ADVANCED PRACTICE MIDWIFE

## 2020-07-13 PROCEDURE — 99207 ZZC PRENATAL VISIT: CPT | Performed by: ADVANCED PRACTICE MIDWIFE

## 2020-07-13 ASSESSMENT — PAIN SCALES - GENERAL: PAINLEVEL: NO PAIN (0)

## 2020-07-13 ASSESSMENT — MIFFLIN-ST. JEOR
SCORE: 1597.76
SCORE: 1597.76

## 2020-07-13 NOTE — DISCHARGE INSTRUCTIONS

## 2020-07-13 NOTE — PATIENT INSTRUCTIONS
Return to office in 2 week(s) for postpartum care and as needed.    If you think your bag of water is broke; have bleeding like a period; think your in labor; or are worried about your baby's movement; please call the labor and delivery unit @ [phone].    Thank you for allowing Christopher DUEÑAS CNM and our OB team to participate in your care.  If you have a scheduling or an appointment question please contact [facility] Unit Coordinator at their direct line [phone].   ALL nursing questions or concerns can be directed to your OB nurse at: [phone] Dione Hamilton/Carmela

## 2020-07-13 NOTE — PROGRESS NOTES
Doing well.  Baby active.   Denies contractions, bleeding, or leakage of fluid. Mild contractions    Discussed:  IOL, GBS treatment, fetal movement    IOL risks, benefits, alternatives, questions answered    Denies headache, distorted vision, epigastric pain, sudden swelling in hands or face.    Plan:  IOL tomorrow    Return to office in 2 weeks postpartum care and as needed.    SPENSER Jim, CNM'

## 2020-07-13 NOTE — NURSING NOTE
"Chief Complaint   Patient presents with     Prenatal Care     36w6d       Initial /76 (BP Location: Left arm, Patient Position: Chair, Cuff Size: Adult Regular)   Ht 1.575 m (5' 2\")   Wt 88.5 kg (195 lb)   LMP 10/29/2019   BMI 35.67 kg/m   Estimated body mass index is 35.67 kg/m  as calculated from the following:    Height as of this encounter: 1.575 m (5' 2\").    Weight as of this encounter: 88.5 kg (195 lb).  Medication Reconciliation: complete  Joy Carter LPN    "

## 2020-07-13 NOTE — PLAN OF CARE
April M Candace        NST:reactive  Start:1555  Stop:1630    Physician Christopher Anthony  Reason For Test: Diabetes Mellitus and Pregnancy Induced Hypertension (PIH)  VENESSA:08/04/2020  Gestational Age:36w 6d    Comments:Pt to floor following weekly clinic appt. Here for NST d/t PIH and diet controlled GDM. Educated on test, rested to position on left side and given ice water. Placed on monitor and given movement clicker. VSS. NST reactive. Christopher updated and order to discharge home given. AVS reviewed and signed. States understanding and denies any questions or concerns. Discharged home.       Sheree Gudino RN

## 2020-07-14 ENCOUNTER — HOSPITAL ENCOUNTER (INPATIENT)
Facility: HOSPITAL | Age: 25
LOS: 4 days | Discharge: HOME | End: 2020-07-18
Attending: ADVANCED PRACTICE MIDWIFE | Admitting: ADVANCED PRACTICE MIDWIFE
Payer: COMMERCIAL

## 2020-07-14 PROBLEM — Z34.90 ENCOUNTER FOR INDUCTION OF LABOR: Status: ACTIVE | Noted: 2020-07-14

## 2020-07-14 PROBLEM — Z34.90 ENCOUNTER FOR PLANNED INDUCTION OF LABOR: Status: ACTIVE | Noted: 2020-07-11

## 2020-07-14 LAB
ABO + RH BLD: NORMAL
ABO + RH BLD: NORMAL
ALBUMIN SERPL-MCNC: 2.4 G/DL (ref 3.4–5)
ALBUMIN UR-MCNC: 10 MG/DL
ALP SERPL-CCNC: 119 U/L (ref 40–150)
ALT SERPL W P-5'-P-CCNC: 19 U/L (ref 0–50)
APPEARANCE UR: CLEAR
AST SERPL W P-5'-P-CCNC: 10 U/L (ref 0–45)
BACTERIA #/AREA URNS HPF: ABNORMAL /HPF
BILIRUB DIRECT SERPL-MCNC: <0.1 MG/DL (ref 0–0.2)
BILIRUB SERPL-MCNC: 0.3 MG/DL (ref 0.2–1.3)
BILIRUB UR QL STRIP: NEGATIVE
BLD GP AB SCN SERPL QL: NORMAL
BLOOD BANK CMNT PATIENT-IMP: NORMAL
COLOR UR AUTO: YELLOW
GLUCOSE UR STRIP-MCNC: NEGATIVE MG/DL
HGB BLD-MCNC: 11.5 G/DL (ref 11.7–15.7)
HGB UR QL STRIP: ABNORMAL
KETONES UR STRIP-MCNC: NEGATIVE MG/DL
LEUKOCYTE ESTERASE UR QL STRIP: NEGATIVE
MUCOUS THREADS #/AREA URNS LPF: PRESENT /LPF
NITRATE UR QL: NEGATIVE
PH UR STRIP: 6 PH (ref 4.7–8)
PLATELET # BLD AUTO: 198 10E9/L (ref 150–450)
PROT SERPL-MCNC: 6.2 G/DL (ref 6.8–8.8)
RBC #/AREA URNS AUTO: 3 /HPF (ref 0–2)
SOURCE: ABNORMAL
SP GR UR STRIP: 1.03 (ref 1–1.03)
SPECIMEN EXP DATE BLD: NORMAL
SQUAMOUS #/AREA URNS AUTO: 1 /HPF (ref 0–1)
UROBILINOGEN UR STRIP-MCNC: NORMAL MG/DL (ref 0–2)
WBC #/AREA URNS AUTO: 2 /HPF (ref 0–5)

## 2020-07-14 PROCEDURE — 86900 BLOOD TYPING SEROLOGIC ABO: CPT | Performed by: ADVANCED PRACTICE MIDWIFE

## 2020-07-14 PROCEDURE — 85049 AUTOMATED PLATELET COUNT: CPT | Performed by: ADVANCED PRACTICE MIDWIFE

## 2020-07-14 PROCEDURE — 12000000 ZZH R&B MED SURG/OB

## 2020-07-14 PROCEDURE — 81001 URINALYSIS AUTO W/SCOPE: CPT | Performed by: ADVANCED PRACTICE MIDWIFE

## 2020-07-14 PROCEDURE — 36415 COLL VENOUS BLD VENIPUNCTURE: CPT | Performed by: ADVANCED PRACTICE MIDWIFE

## 2020-07-14 PROCEDURE — 86850 RBC ANTIBODY SCREEN: CPT | Performed by: ADVANCED PRACTICE MIDWIFE

## 2020-07-14 PROCEDURE — 86780 TREPONEMA PALLIDUM: CPT | Performed by: ADVANCED PRACTICE MIDWIFE

## 2020-07-14 PROCEDURE — 80076 HEPATIC FUNCTION PANEL: CPT | Performed by: ADVANCED PRACTICE MIDWIFE

## 2020-07-14 PROCEDURE — 25000132 ZZH RX MED GY IP 250 OP 250 PS 637: Performed by: ADVANCED PRACTICE MIDWIFE

## 2020-07-14 PROCEDURE — 59025 FETAL NON-STRESS TEST: CPT

## 2020-07-14 PROCEDURE — 85018 HEMOGLOBIN: CPT | Performed by: ADVANCED PRACTICE MIDWIFE

## 2020-07-14 PROCEDURE — 86901 BLOOD TYPING SEROLOGIC RH(D): CPT | Performed by: ADVANCED PRACTICE MIDWIFE

## 2020-07-14 PROCEDURE — 4A1HXCZ MONITORING OF PRODUCTS OF CONCEPTION, CARDIAC RATE, EXTERNAL APPROACH: ICD-10-PCS | Performed by: ADVANCED PRACTICE MIDWIFE

## 2020-07-14 RX ORDER — TERBUTALINE SULFATE 1 MG/ML
0.25 INJECTION, SOLUTION SUBCUTANEOUS
Status: DISCONTINUED | OUTPATIENT
Start: 2020-07-14 | End: 2020-07-16

## 2020-07-14 RX ORDER — OXYTOCIN 10 [USP'U]/ML
10 INJECTION, SOLUTION INTRAMUSCULAR; INTRAVENOUS
Status: DISCONTINUED | OUTPATIENT
Start: 2020-07-14 | End: 2020-07-16

## 2020-07-14 RX ORDER — ONDANSETRON 2 MG/ML
4 INJECTION INTRAMUSCULAR; INTRAVENOUS EVERY 6 HOURS PRN
Status: DISCONTINUED | OUTPATIENT
Start: 2020-07-14 | End: 2020-07-16

## 2020-07-14 RX ORDER — OXYTOCIN/0.9 % SODIUM CHLORIDE 30/500 ML
100-340 PLASTIC BAG, INJECTION (ML) INTRAVENOUS CONTINUOUS PRN
Status: DISCONTINUED | OUTPATIENT
Start: 2020-07-14 | End: 2020-07-16

## 2020-07-14 RX ORDER — LIDOCAINE 50 MG/G
OINTMENT TOPICAL DAILY PRN
Status: DISCONTINUED | OUTPATIENT
Start: 2020-07-14 | End: 2020-07-16

## 2020-07-14 RX ORDER — METHYLERGONOVINE MALEATE 0.2 MG/ML
200 INJECTION INTRAVENOUS
Status: DISCONTINUED | OUTPATIENT
Start: 2020-07-14 | End: 2020-07-16

## 2020-07-14 RX ORDER — LIDOCAINE HYDROCHLORIDE 10 MG/ML
10 INJECTION, SOLUTION EPIDURAL; INFILTRATION; INTRACAUDAL; PERINEURAL ONCE
Status: DISCONTINUED | OUTPATIENT
Start: 2020-07-14 | End: 2020-07-16

## 2020-07-14 RX ORDER — CARBOPROST TROMETHAMINE 250 UG/ML
250 INJECTION, SOLUTION INTRAMUSCULAR
Status: DISCONTINUED | OUTPATIENT
Start: 2020-07-14 | End: 2020-07-16

## 2020-07-14 RX ADMIN — DINOPROSTONE 10 MG: 10 INSERT VAGINAL at 20:27

## 2020-07-14 ASSESSMENT — MIFFLIN-ST. JEOR: SCORE: 1597.76

## 2020-07-15 ENCOUNTER — ANESTHESIA (OUTPATIENT)
Dept: OBGYN | Facility: HOSPITAL | Age: 25
End: 2020-07-15
Payer: COMMERCIAL

## 2020-07-15 ENCOUNTER — ANESTHESIA EVENT (OUTPATIENT)
Dept: OBGYN | Facility: HOSPITAL | Age: 25
End: 2020-07-15
Payer: COMMERCIAL

## 2020-07-15 PROCEDURE — 25000132 ZZH RX MED GY IP 250 OP 250 PS 637: Performed by: OBSTETRICS & GYNECOLOGY

## 2020-07-15 PROCEDURE — 12000000 ZZH R&B MED SURG/OB

## 2020-07-15 PROCEDURE — 25000125 ZZHC RX 250: Performed by: ADVANCED PRACTICE MIDWIFE

## 2020-07-15 PROCEDURE — 25000128 H RX IP 250 OP 636: Performed by: ADVANCED PRACTICE MIDWIFE

## 2020-07-15 PROCEDURE — 25000132 ZZH RX MED GY IP 250 OP 250 PS 637

## 2020-07-15 PROCEDURE — 59400 OBSTETRICAL CARE: CPT | Performed by: NURSE ANESTHETIST, CERTIFIED REGISTERED

## 2020-07-15 PROCEDURE — 25800030 ZZH RX IP 258 OP 636: Performed by: ADVANCED PRACTICE MIDWIFE

## 2020-07-15 PROCEDURE — 25000125 ZZHC RX 250: Performed by: NURSE ANESTHETIST, CERTIFIED REGISTERED

## 2020-07-15 RX ORDER — OXYTOCIN/0.9 % SODIUM CHLORIDE 30/500 ML
1-24 PLASTIC BAG, INJECTION (ML) INTRAVENOUS CONTINUOUS
Status: DISCONTINUED | OUTPATIENT
Start: 2020-07-15 | End: 2020-07-16

## 2020-07-15 RX ORDER — EPHEDRINE SULFATE 50 MG/ML
INJECTION, SOLUTION INTRAMUSCULAR; INTRAVENOUS; SUBCUTANEOUS
Status: DISCONTINUED
Start: 2020-07-15 | End: 2020-07-16 | Stop reason: WASHOUT

## 2020-07-15 RX ORDER — NALOXONE HYDROCHLORIDE 0.4 MG/ML
.1-.4 INJECTION, SOLUTION INTRAMUSCULAR; INTRAVENOUS; SUBCUTANEOUS
Status: DISCONTINUED | OUTPATIENT
Start: 2020-07-15 | End: 2020-07-16

## 2020-07-15 RX ORDER — SODIUM CHLORIDE, SODIUM LACTATE, POTASSIUM CHLORIDE, CALCIUM CHLORIDE 600; 310; 30; 20 MG/100ML; MG/100ML; MG/100ML; MG/100ML
INJECTION, SOLUTION INTRAVENOUS CONTINUOUS
Status: DISCONTINUED | OUTPATIENT
Start: 2020-07-15 | End: 2020-07-16

## 2020-07-15 RX ORDER — FENTANYL/BUPIVACAINE/NS/PF 2-1250MCG
PLASTIC BAG, INJECTION (ML) INJECTION
Status: DISCONTINUED
Start: 2020-07-15 | End: 2020-07-16 | Stop reason: HOSPADM

## 2020-07-15 RX ORDER — LIDOCAINE 40 MG/G
CREAM TOPICAL
Status: DISCONTINUED | OUTPATIENT
Start: 2020-07-15 | End: 2020-07-16

## 2020-07-15 RX ORDER — NALBUPHINE HYDROCHLORIDE 10 MG/ML
2.5-5 INJECTION, SOLUTION INTRAMUSCULAR; INTRAVENOUS; SUBCUTANEOUS EVERY 6 HOURS PRN
Status: DISCONTINUED | OUTPATIENT
Start: 2020-07-15 | End: 2020-07-16

## 2020-07-15 RX ORDER — FENTANYL/BUPIVACAINE/NS/PF 2-1250MCG
PLASTIC BAG, INJECTION (ML) INJECTION CONTINUOUS PRN
Status: DISCONTINUED | OUTPATIENT
Start: 2020-07-15 | End: 2020-07-17

## 2020-07-15 RX ORDER — ACETAMINOPHEN 325 MG/1
TABLET ORAL
Status: COMPLETED
Start: 2020-07-15 | End: 2020-07-15

## 2020-07-15 RX ORDER — EPHEDRINE SULFATE 50 MG/ML
5 INJECTION, SOLUTION INTRAMUSCULAR; INTRAVENOUS; SUBCUTANEOUS
Status: DISCONTINUED | OUTPATIENT
Start: 2020-07-15 | End: 2020-07-16

## 2020-07-15 RX ORDER — LIDOCAINE HYDROCHLORIDE AND EPINEPHRINE 15; 5 MG/ML; UG/ML
INJECTION, SOLUTION EPIDURAL PRN
Status: DISCONTINUED | OUTPATIENT
Start: 2020-07-15 | End: 2020-07-17

## 2020-07-15 RX ORDER — ACETAMINOPHEN 325 MG/1
650 TABLET ORAL EVERY 4 HOURS PRN
Status: DISCONTINUED | OUTPATIENT
Start: 2020-07-15 | End: 2020-07-16

## 2020-07-15 RX ADMIN — ACETAMINOPHEN 650 MG: 325 TABLET, FILM COATED ORAL at 03:59

## 2020-07-15 RX ADMIN — Medication 2 MILLI-UNITS/MIN: at 09:25

## 2020-07-15 RX ADMIN — SODIUM CHLORIDE 2.5 MILLION UNITS: 9 INJECTION, SOLUTION INTRAVENOUS at 16:36

## 2020-07-15 RX ADMIN — Medication 7 MILLI-UNITS/MIN: at 16:48

## 2020-07-15 RX ADMIN — Medication 3 ML: at 13:17

## 2020-07-15 RX ADMIN — SODIUM CHLORIDE, POTASSIUM CHLORIDE, SODIUM LACTATE AND CALCIUM CHLORIDE: 600; 310; 30; 20 INJECTION, SOLUTION INTRAVENOUS at 17:39

## 2020-07-15 RX ADMIN — Medication 3 ML: at 13:10

## 2020-07-15 RX ADMIN — SODIUM CHLORIDE, POTASSIUM CHLORIDE, SODIUM LACTATE AND CALCIUM CHLORIDE 500 ML: 600; 310; 30; 20 INJECTION, SOLUTION INTRAVENOUS at 12:50

## 2020-07-15 RX ADMIN — Medication: at 20:25

## 2020-07-15 RX ADMIN — Medication 6 MILLI-UNITS/MIN: at 23:46

## 2020-07-15 RX ADMIN — Medication 10 MILLI-UNITS/MIN: at 21:24

## 2020-07-15 RX ADMIN — SODIUM CHLORIDE 2.5 MILLION UNITS: 9 INJECTION, SOLUTION INTRAVENOUS at 20:30

## 2020-07-15 RX ADMIN — Medication 5 MILLI-UNITS/MIN: at 22:47

## 2020-07-15 RX ADMIN — SODIUM CHLORIDE, POTASSIUM CHLORIDE, SODIUM LACTATE AND CALCIUM CHLORIDE: 600; 310; 30; 20 INJECTION, SOLUTION INTRAVENOUS at 09:20

## 2020-07-15 RX ADMIN — Medication 7 MILLI-UNITS/MIN: at 14:32

## 2020-07-15 RX ADMIN — ACETAMINOPHEN 650 MG: 325 TABLET, FILM COATED ORAL at 07:54

## 2020-07-15 RX ADMIN — Medication 6 MILLI-UNITS/MIN: at 14:42

## 2020-07-15 RX ADMIN — SODIUM CHLORIDE, POTASSIUM CHLORIDE, SODIUM LACTATE AND CALCIUM CHLORIDE 500 ML: 600; 310; 30; 20 INJECTION, SOLUTION INTRAVENOUS at 22:10

## 2020-07-15 RX ADMIN — Medication 8 MILLI-UNITS/MIN: at 20:53

## 2020-07-15 RX ADMIN — Medication 10 ML/HR: at 13:21

## 2020-07-15 RX ADMIN — SODIUM CHLORIDE 5 MILLION UNITS: 9 INJECTION, SOLUTION INTRAVENOUS at 12:46

## 2020-07-15 NOTE — PROGRESS NOTES
Cervix:  3 cm  Fetal Heart Rate Tracing: Cat I  Contractions  1-2  Comfort level increased discomfort    Assessment:   Some progress  Pitocin @ 6  Increased discomfort  Request Epidural    Plan:   Anesthesia notified  Initiate fluid bolus  Initiate GBS protocol    SPENSER Jim, JIGARM

## 2020-07-15 NOTE — ANESTHESIA PREPROCEDURE EVALUATION
Anesthesia Pre-Procedure Evaluation    Patient: May Maria   MRN: [id number] : 1995          Preoperative Diagnosis: * No pre-op diagnosis entered *    * No procedures listed *    Past Medical History:   Diagnosis Date     Atrophic kidney      Depressive disorder     Therapy during childhood     Shift work sleep disorder      Past Surgical History:   Procedure Laterality Date     PE TUBES Bilateral     years        Anesthesia Evaluation     . Pt has had prior anesthetic.     No history of anesthetic complications          ROS/MED HX    ENT/Pulmonary:  - neg pulmonary ROS     Neurologic:       Cardiovascular:  - neg cardiovascular ROS       METS/Exercise Tolerance:     Hematologic:         Musculoskeletal:         GI/Hepatic:     (+) GERD       Renal/Genitourinary:         Endo:     (+) type II DM Obesity, .      Psychiatric:         Infectious Disease:         Malignancy:         Other:                       (+) pre-eclampsia and gestational diabetes          Physical Exam  Normal systems: cardiovascular, pulmonary and dental    Airway   Mallampati: I  TM distance: > 3 FB  Neck ROM: full  Mouth opening: > 3 cm    Dental     Cardiovascular       Pulmonary             Lab Results   Component Value Date    WBC 9.0 2020    HGB 11.5 (L) 2020    HCT 36.3 2020     2020    CRP <2.9 05/15/2018     2020    POTASSIUM 3.4 2020    CHLORIDE 105 2020    CO2 24 2020    BUN 10 2020    CR 0.66 2020    GLC 91 2020    HUGH 9.1 2020    PHOS 4.6 (H) 2019    ALBUMIN 2.4 (L) 2020    PROTTOTAL 6.2 (L) 2020    ALT 19 2020    AST 10 2020    ALKPHOS 119 2020    BILITOTAL 0.3 2020    LIPASE 113 05/15/2018    TSH 1.42 01/15/2019    HCG Positive (A) 2020       Preop Vitals  BP Readings from Last 3 Encounters:   07/15/20 148/93   20 132/71   20 136/76    Pulse Readings from Last 3 Encounters:  "  07/15/20 85   07/13/20 93   07/06/20 102      Resp Readings from Last 3 Encounters:   07/15/20 16   07/13/20 16   07/06/20 16    SpO2 Readings from Last 3 Encounters:   07/15/20 99%   07/13/20 98%   07/06/20 98%      Temp Readings from Last 1 Encounters:   07/15/20 98.5  F (36.9  C) (Oral)    Ht Readings from Last 1 Encounters:   07/14/20 1.575 m (5' 2\")      Wt Readings from Last 1 Encounters:   07/14/20 88.5 kg (195 lb)    Estimated body mass index is 35.67 kg/m  as calculated from the following:    Height as of this encounter: 1.575 m (5' 2\").    Weight as of this encounter: 88.5 kg (195 lb).       Anesthesia Plan      History & Physical Review      ASA Status:  .  OB Epidural Asa: 2            Postoperative Care      Consents  Anesthetic plan, risks, benefits and alternatives discussed with:  Patient..                 Alessia Gaitan, SPENSER CRNA  "

## 2020-07-15 NOTE — PLAN OF CARE
Pt arrived via ambulation to the unit with security staff.  Pt voided and placed on the monitor.  VS taken.  BP elevated and retaken after a few minutes. BP better, 141/91.    Labor Admission  May Maria  MRN: [id number]  Gestational Age: 37w0d      May Mraia is admitted for cervical ripening.  Denies any contractions, LOF, bleeding or pain at this time.    Raj notified of arrival and condition and Intrapartum orders initiated.       Patient is alert and oriented X 4, Patient oriented to room, unit, hourly rounding, and plan of care.  Call light within reach. Explained admission packet with patient bill of rights brochure. Will continue to monitor and document as needed.     Inpatient nursing criteria listed below was met:    Health care directives status obtained and documented: Yes  Patient identifies a surrogate decision maker: No  Core Measure diagnosis present:: No  Vaccine assessment done and vaccines ordered if appropriate. Yes  Clergy visit ordered if patient requests: No  Skin issues/needs documented:No  Isolation needs addressed, if appropriate: No  Fall Prevention (Med and High risk): Care plan updated, Education given and documented and signage used: No  Care Plan initiated: Yes  Education Documented (Reminder to educate patient if MRSA is present on admission): Yes  Education Assessment documented:Yes  Patient has discharge needs (If yes, please explain): No.    Face to face report given with opportunity to observe patient.    Report given to BRYAN Dumont RN   2020  7:19 PM

## 2020-07-15 NOTE — PLAN OF CARE
May Maria        NST:  reactive  Start: 1900  Stop: 2002    Physician: SAURABH Anthony CNM  Reason For Test: Gestational Diabetes and Pregnancy Induced Hypertension (PIH)  EDC: 8-4-20  Gestational Age: 37w    Comments:  Broken strip and continuous monitoring, not extended time      Tori Lagos RN

## 2020-07-15 NOTE — PLAN OF CARE
Pt up to the bathroom. Reflexes checked and left is brisk with 1 beat of clonus.  Denies any headache, visual changes or epigastric pain.  Christopher DAVID paged and updated.  Christopher DAVID stated she will be up over lunch to evaluate pt.  Pt aware to call with needs.   Contractions every 1-4, but reports mild cramping of a 2/10.

## 2020-07-15 NOTE — PROGRESS NOTES
Cervix:  2-3 cm/40 % effaced/-3 station/mid-posterior/firm  Fetal Heart Rate Tracing: Cat I  Contractions  irregular  Comfort level denies labor pain    Pt reports dull headache.  Improved with Tylenol.  Pt reports she always gets headaches if she doesn't eat.  Will order light breakfast.    Denies severe HA, distorted vision, epigastric pain, or sudden swelling in hands or face.      Assessment:   Cervidil in place  Cervical changes noted  HA improved with Tylenol  Request breakfast    Plan:   Cervidil removed  Light breakfast  Initiate low-dose Pitocin one hour after Cervidil removal  Initiate GBS protocol with labor    SPENSER Jim, CNM

## 2020-07-15 NOTE — PROGRESS NOTES
CLINICAL NUTRITION SERVICES  -  ASSESSMENT NOTE    May Maria : Admission Nutrition Risk Screen - new/uncontrolled diabetes    Note pt had gestational diabetes during pregnancy and was seen in Diabetes Education. Gestational diabetes seem to be diet controlled. Pt's blood sugar should return to normal after the baby is born. Please refer to DM ed with any diabetes concerns.

## 2020-07-15 NOTE — PLAN OF CARE
Pt reported a headache at the start of the shift.  Reflexes +2, no clonus noted.  1+ BLE edema.  Denies any visual changed or epigastric pain.  Tylenol given for headache and pt reported relief.  BP's 130's-140's/70-80's.   CNM here and cervidil removed at 0814.  SVE per CNM, 3/40%.  CNM discussed pitocin and pt was in agreement, denied any questions.  Orders to start pitocin 1 hr after cervidil was removed.  Pt ate breakfast, did have a small emesis, nausea came on quickly and resolved immediately after emesis.  Pt stated this occasionally happens.  She finished breakfast without any further complaints.  Pitocin started at 0925 per orders.  Category I tracing.  Contractions q 1-6 minutes and palpate mild.  Will continue to monitor pt and titrate pitocin as needed.  Pt is aware to call with needs.  Significant other present and supportive.

## 2020-07-15 NOTE — H&P
ADMISSION NOTE FOR May Maria on 2020.    H and P unchanged from prenatal   Lungs: clear  Heart: RRR    ROS:  CONSTITUTIONAL: NEGATIVE for fever, chills, change in weight  RESP: NEGATIVE for significant cough or SOB  CV: NEGATIVE for chest pain, palpitations or peripheral edema  GI: NEGATIVE for nausea, abdominal pain, heartburn, or change in bowel habits  : NEGATIVE for frequency, dysuria, hematuria, vaginal discharge  PSYCHIATRIC: NEGATIVE for changes in mood or affect       May Maria is a 24 year old female  37w0d  Estimated Date of Delivery: Aug 4, 2020 is calculated from Patient's last menstrual period was 10/29/2019. is [facility] on 2020 at 8:11 PM  for induction of labor.  Indication preeclampsia.     Membranes are intact     PRENATAL COURSE   Prenatal vital signs WNL   Prenatal course was complicated by preeclampsia     HISTORIES   Allergies   Allergen Reactions     Pineapple Hives and Swelling     Throat swelling      Past Medical History:   Diagnosis Date     Atrophic kidney      Depressive disorder     Therapy during childhood     Shift work sleep disorder       Past Surgical History:   Procedure Laterality Date     PE TUBES Bilateral     years       Family History   Problem Relation Age of Onset     Other - See Comments Mother         bells palsy post auto accident     Heart Disease Father         s/p stents     Asthma Father      Cancer Father       Social History     Tobacco Use     Smoking status: Former Smoker     Packs/day: 0.25     Types: Cigarettes     Start date: 3/15/2017     Last attempt to quit: 3/15/2018     Years since quittin.3     Smokeless tobacco: Never Used   Substance Use Topics     Alcohol use: No     Alcohol/week: 0.0 standard drinks      OB History    Para Term  AB Living   1 0 0 0 0 0   SAB TAB Ectopic Multiple Live Births   0 0 0 0 0      # Outcome Date GA Lbr Rayshawn/2nd Weight Sex Delivery Anes PTL Lv   1 Current           "       LABS:     Lab Results   Component Value Date    ABO B 01/20/2020           Lab Results   Component Value Date    RH Pos 01/20/2020      Rhogam not indicated   No results found for: RUBELLAABIGG   No results found for: RPR   No results found for: HIV   Lab Results   Component Value Date    HGB 11.5 07/14/2020      Lab Results   Component Value Date    HEPBANG Nonreactive 01/20/2020      No results found for: GBS in urine during pregnancy   Other significant lab: 24 hour urine protein > 3      PHYSICAL EXAM:     /91   Pulse 101   Temp 98.8  F (37.1  C) (Oral)   Resp 18   Ht 1.575 m (5' 2\")   Wt 88.5 kg (195 lb)   LMP 10/29/2019   SpO2 98%   BMI 35.67 kg/m    Neuro: denies headache and visual disturbances   Edema trace bilateral pedal Reflexes +2     Abdomen: gravid, single vertex fetus, non-tender, Estimated Fetal Weight: 7 lb     FETAL HEART TONES Cat I  Cervix 0-1/thick/posterior    ASSESSMENT:   IUP @ 37w0d for induction of labor.  Indication preeclampsia.  Elevated B/P and urine protein > 3  NST reactive.   GBS positive in urine during pregnancy      PLAN:   Cervical ripening  Cervidil placement  Continual fetal monitoring  Initiate GBS protocol when in active labor   Rest, quiet,dark room    SPENSER Jim, JOANN        "

## 2020-07-15 NOTE — PLAN OF CARE
"Vital signs:  Temp: 98.2  F (36.8  C) Temp src: Oral BP: 128/67 Pulse: 80   Resp: 16 SpO2: 98 %     Height: 157.5 cm (5' 2\") Weight: 88.5 kg (195 lb)  Estimated body mass index is 35.67 kg/m  as calculated from the following:    Height as of this encounter: 1.575 m (5' 2\").    Weight as of this encounter: 88.5 kg (195 lb).      Pt denies symptoms of PIH. Assessments as charted. FHT's WDL with continuous monitoring per provider. Cervadil in place. Significant other Dominick in room, supportive of patient. Pt denies pain at this time. Pt makes needs known, call light within reach.   "

## 2020-07-15 NOTE — PROGRESS NOTES
Cervix:  3+ cm/40 % effaced/-3 station/ middle, firm  Fetal Heart Rate Tracing: Cat I (Cat II earlier with variables noted /improved with Pitocin decrease and reposition)  Contractions  Irregular with long breaks  Comfort level denies pain    Assessment:   Some progress  Pitocin @ 6    Plan:   Continue with POC    SPENSER Jim, CNM

## 2020-07-15 NOTE — ANESTHESIA PROCEDURE NOTES
Procedure note : epidural catheter  Staff -       CRNA: Alessia Gaitan APRN CRNA    Performed By: CRNA        Pre-Procedure    Location: OB    Procedure Times:7/15/2020 1:02 PM and 7/15/2020 1:11 PM  Pre-Anesthestic Checklist: patient identified, IV checked, site marked, risks and benefits discussed, informed consent, monitors and equipment checked, pre-op evaluation, at physician/surgeon's request and post-op pain management    Timeout  Correct Patient: Yes   Correct Procedure: Yes   Correct Site: Yes   Correct Laterality: Yes   Correct Position: Yes   Site Marked: Yes   .   Procedure Documentation    .    Procedure: epidural catheter, .   Patient Position:sitting Insertion Site:L3-4  (midline approach) Injection technique: LORT saline   Local skin infiltrated with mL of 1% lidocaine.  SOFIA at 7 cm    Patient Prep/Sterile Barriers; mask, sterile gloves, chlorhexidine gluconate and isopropyl alcohol, patient draped.  .  Needle: Touhy needle   Needle Gauge: 18.    Needle Length (Inches) 3.5   # of attempts: 1 and # of redirects:  .    . .  Catheter threaded easily  4 cm epidural space.  11 cm at skin.   .    Assessment/Narrative  Paresthesias: No.  .  .  Aspiration negative for heme or CSF  . Test dose of 3 mL lidocaine 1.5% w/ 1:200,000 epinephrine at 13:10.  Test dose negative for signs of intravascular, subdural or intrathecal injection.

## 2020-07-15 NOTE — PROGRESS NOTES
OB ON CALL NOTE:      S:  Patient interviewed and chart reviewed.  Patient's headache improved with Tylenol.      O:   BP stable         VSS         Reflexes are 1-2+, equal, symmetric, without clonus    LAB:   Reviewed, normal platelets and LFP, proteinuria present mild.             BPP have been 8/8 with reactive NST    STRIP:   Reactive NST                 CAT I      A:   IUP at 37w 1d        PIH with mild proteinuria        No signs or symptoms of severe Preeclampsia        GBS+ in UTI during prenatal course.        Right Renal Atrophy.      P:   Agree with medically indicated IOL.        Watch for hyperreflexia and treat with Magnesium as indicated.        Needs GBS prophylaxis.        Watch for oliguria during labor.

## 2020-07-15 NOTE — PLAN OF CARE
Face to face report given with opportunity to observe patient.    Report given to Felecia Lagos RN   7/15/2020  7:09 AM

## 2020-07-15 NOTE — PLAN OF CARE
Pt vomited twice.  BP 140s-150s/70-80's, one reading of 164/106, but blood pressure went off while pt was vomiting. Pt denies any nausea.  She stated she has been vomiting on and off throughout her pregnancy, no significant change in BP and FHTs are category I.  Pt denies any headache, visual changes or epigastric pain.  Lopez placed and is draining clear yellow urine.  Pt denies any pain and is comfortable with her epidural.   Pt is resting quietly with easy respirations.   Pitocin infusing and will be titrated as needed for adequate contraction pattern.  Penicillin initiated when pt requested an epidural.  Will continue to monitor pt and provide cares as needed.  Pt is aware to call with needs.  Significant other and support person present at the bedside.

## 2020-07-15 NOTE — PLAN OF CARE
Pt resting in lt lateral position.   Recurrent variables noted.  Pitocin had been increased just prior to variables so rate of pitocin was decreased and pt was repositioned.  FHTs improved after repositioning and decreased in pitocin.  Contractions every 1-4.  Christopher DAVID paged and updated or recurrent variable decles, no new orders obtained.  Will continue to monitor pt and provide cares as needed.

## 2020-07-15 NOTE — PLAN OF CARE
"Pt rating pain with contractions a 5-6/10 in lower abdomen and radiate to her back.  Pt requesting an epidural for pain relief.  CNM updated.  CRNA notified per CNM and ok'd to start 500 ml LR bolus.  CRNA on the unit, discussed procedure with pt and consent was obtained.  Time out complete.  Epidural paced at 1309.  Pt tolerated procedure well.  Pt's HR tachy at 120's-130 during procedure she reported \"being nervous\".   BP elevated while pt was sitting up, CNM aware.  Pt reported feeling pain on the rt side more than on the left.  Pt positioned to rt lateral and encouraged to use per epidural bolus if needed but also inform me if she is having to use the epidural button frequently.   Pt verbalized understanding.    "

## 2020-07-16 ENCOUNTER — ANESTHESIA EVENT (OUTPATIENT)
Dept: SURGERY | Facility: HOSPITAL | Age: 25
End: 2020-07-16
Payer: COMMERCIAL

## 2020-07-16 ENCOUNTER — ANESTHESIA (OUTPATIENT)
Dept: SURGERY | Facility: HOSPITAL | Age: 25
End: 2020-07-16
Payer: COMMERCIAL

## 2020-07-16 LAB — T PALLIDUM AB SER QL: NONREACTIVE

## 2020-07-16 PROCEDURE — 25000132 ZZH RX MED GY IP 250 OP 250 PS 637

## 2020-07-16 PROCEDURE — 59514 CESAREAN DELIVERY ONLY: CPT | Performed by: NURSE ANESTHETIST, CERTIFIED REGISTERED

## 2020-07-16 PROCEDURE — 25000128 H RX IP 250 OP 636: Performed by: ADVANCED PRACTICE MIDWIFE

## 2020-07-16 PROCEDURE — 25000128 H RX IP 250 OP 636: Performed by: NURSE ANESTHETIST, CERTIFIED REGISTERED

## 2020-07-16 PROCEDURE — 27110028 ZZH OR GENERAL SUPPLY NON-STERILE: Performed by: OBSTETRICS & GYNECOLOGY

## 2020-07-16 PROCEDURE — 40000275 ZZH STATISTIC RCP TIME EA 10 MIN

## 2020-07-16 PROCEDURE — C9290 INJ, BUPIVACAINE LIPOSOME: HCPCS | Performed by: NURSE ANESTHETIST, CERTIFIED REGISTERED

## 2020-07-16 PROCEDURE — 71000015 ZZH RECOVERY PHASE 1 LEVEL 2 EA ADDTL HR: Performed by: OBSTETRICS & GYNECOLOGY

## 2020-07-16 PROCEDURE — 25800030 ZZH RX IP 258 OP 636: Performed by: ADVANCED PRACTICE MIDWIFE

## 2020-07-16 PROCEDURE — 25000132 ZZH RX MED GY IP 250 OP 250 PS 637: Performed by: NURSE ANESTHETIST, CERTIFIED REGISTERED

## 2020-07-16 PROCEDURE — 36000058 ZZH SURGERY LEVEL 3 EA 15 ADDTL MIN: Performed by: OBSTETRICS & GYNECOLOGY

## 2020-07-16 PROCEDURE — 37000009 ZZH ANESTHESIA TECHNICAL FEE, EACH ADDTL 15 MIN: Performed by: OBSTETRICS & GYNECOLOGY

## 2020-07-16 PROCEDURE — 25800030 ZZH RX IP 258 OP 636: Performed by: OBSTETRICS & GYNECOLOGY

## 2020-07-16 PROCEDURE — 25000125 ZZHC RX 250: Performed by: ADVANCED PRACTICE MIDWIFE

## 2020-07-16 PROCEDURE — 25000125 ZZHC RX 250: Performed by: OBSTETRICS & GYNECOLOGY

## 2020-07-16 PROCEDURE — 37000008 ZZH ANESTHESIA TECHNICAL FEE, 1ST 30 MIN: Performed by: OBSTETRICS & GYNECOLOGY

## 2020-07-16 PROCEDURE — 36000056 ZZH SURGERY LEVEL 3 1ST 30 MIN: Performed by: OBSTETRICS & GYNECOLOGY

## 2020-07-16 PROCEDURE — 59510 CESAREAN DELIVERY: CPT | Performed by: OBSTETRICS & GYNECOLOGY

## 2020-07-16 PROCEDURE — 71000014 ZZH RECOVERY PHASE 1 LEVEL 2 FIRST HR: Performed by: OBSTETRICS & GYNECOLOGY

## 2020-07-16 PROCEDURE — 25000128 H RX IP 250 OP 636: Performed by: OBSTETRICS & GYNECOLOGY

## 2020-07-16 PROCEDURE — 25000125 ZZHC RX 250: Performed by: NURSE ANESTHETIST, CERTIFIED REGISTERED

## 2020-07-16 PROCEDURE — 25000132 ZZH RX MED GY IP 250 OP 250 PS 637: Performed by: OBSTETRICS & GYNECOLOGY

## 2020-07-16 PROCEDURE — 12000000 ZZH R&B MED SURG/OB

## 2020-07-16 PROCEDURE — 27210794 ZZH OR GENERAL SUPPLY STERILE: Performed by: OBSTETRICS & GYNECOLOGY

## 2020-07-16 RX ORDER — SIMETHICONE 80 MG
80 TABLET,CHEWABLE ORAL 4 TIMES DAILY PRN
Status: DISCONTINUED | OUTPATIENT
Start: 2020-07-16 | End: 2020-07-18 | Stop reason: HOSPADM

## 2020-07-16 RX ORDER — ONDANSETRON 2 MG/ML
4 INJECTION INTRAMUSCULAR; INTRAVENOUS EVERY 6 HOURS PRN
Status: DISCONTINUED | OUTPATIENT
Start: 2020-07-16 | End: 2020-07-18 | Stop reason: HOSPADM

## 2020-07-16 RX ORDER — DEXAMETHASONE SODIUM PHOSPHATE 10 MG/ML
INJECTION, SOLUTION INTRAMUSCULAR; INTRAVENOUS PRN
Status: DISCONTINUED | OUTPATIENT
Start: 2020-07-16 | End: 2020-07-16

## 2020-07-16 RX ORDER — CEFOXITIN SODIUM 1 G/50ML
1 INJECTION, SOLUTION INTRAVENOUS EVERY 6 HOURS
Status: COMPLETED | OUTPATIENT
Start: 2020-07-16 | End: 2020-07-16

## 2020-07-16 RX ORDER — NALOXONE HYDROCHLORIDE 0.4 MG/ML
.1-.4 INJECTION, SOLUTION INTRAMUSCULAR; INTRAVENOUS; SUBCUTANEOUS
Status: DISCONTINUED | OUTPATIENT
Start: 2020-07-16 | End: 2020-07-18 | Stop reason: HOSPADM

## 2020-07-16 RX ORDER — EPHEDRINE SULFATE 50 MG/ML
5 INJECTION, SOLUTION INTRAMUSCULAR; INTRAVENOUS; SUBCUTANEOUS
Status: DISCONTINUED | OUTPATIENT
Start: 2020-07-16 | End: 2020-07-18 | Stop reason: HOSPADM

## 2020-07-16 RX ORDER — PROCHLORPERAZINE 25 MG
25 SUPPOSITORY, RECTAL RECTAL EVERY 12 HOURS PRN
Status: DISCONTINUED | OUTPATIENT
Start: 2020-07-16 | End: 2020-07-18 | Stop reason: HOSPADM

## 2020-07-16 RX ORDER — ACETAMINOPHEN 325 MG/1
975 TABLET ORAL EVERY 6 HOURS
Status: DISCONTINUED | OUTPATIENT
Start: 2020-07-16 | End: 2020-07-18 | Stop reason: HOSPADM

## 2020-07-16 RX ORDER — MODIFIED LANOLIN
OINTMENT (GRAM) TOPICAL
Status: DISCONTINUED | OUTPATIENT
Start: 2020-07-16 | End: 2020-07-18 | Stop reason: HOSPADM

## 2020-07-16 RX ORDER — FENTANYL CITRATE 50 UG/ML
INJECTION, SOLUTION INTRAMUSCULAR; INTRAVENOUS PRN
Status: DISCONTINUED | OUTPATIENT
Start: 2020-07-16 | End: 2020-07-16

## 2020-07-16 RX ORDER — CARBOPROST TROMETHAMINE 250 UG/ML
250 INJECTION, SOLUTION INTRAMUSCULAR
Status: DISCONTINUED | OUTPATIENT
Start: 2020-07-16 | End: 2020-07-18 | Stop reason: HOSPADM

## 2020-07-16 RX ORDER — KETOROLAC TROMETHAMINE 30 MG/ML
30 INJECTION, SOLUTION INTRAMUSCULAR; INTRAVENOUS EVERY 6 HOURS
Status: DISCONTINUED | OUTPATIENT
Start: 2020-07-16 | End: 2020-07-16

## 2020-07-16 RX ORDER — OXYTOCIN 10 [USP'U]/ML
10 INJECTION, SOLUTION INTRAMUSCULAR; INTRAVENOUS
Status: DISCONTINUED | OUTPATIENT
Start: 2020-07-16 | End: 2020-07-18 | Stop reason: HOSPADM

## 2020-07-16 RX ORDER — NALBUPHINE HYDROCHLORIDE 10 MG/ML
2.5-5 INJECTION, SOLUTION INTRAMUSCULAR; INTRAVENOUS; SUBCUTANEOUS EVERY 6 HOURS PRN
Status: DISCONTINUED | OUTPATIENT
Start: 2020-07-16 | End: 2020-07-18 | Stop reason: HOSPADM

## 2020-07-16 RX ORDER — OXYTOCIN/0.9 % SODIUM CHLORIDE 30/500 ML
340 PLASTIC BAG, INJECTION (ML) INTRAVENOUS CONTINUOUS PRN
Status: DISCONTINUED | OUTPATIENT
Start: 2020-07-16 | End: 2020-07-18 | Stop reason: HOSPADM

## 2020-07-16 RX ORDER — OXYTOCIN/0.9 % SODIUM CHLORIDE 30/500 ML
PLASTIC BAG, INJECTION (ML) INTRAVENOUS PRN
Status: DISCONTINUED | OUTPATIENT
Start: 2020-07-16 | End: 2020-07-16

## 2020-07-16 RX ORDER — CITRIC ACID/SODIUM CITRATE 334-500MG
SOLUTION, ORAL ORAL
Status: COMPLETED
Start: 2020-07-16 | End: 2020-07-16

## 2020-07-16 RX ORDER — ONDANSETRON 2 MG/ML
INJECTION INTRAMUSCULAR; INTRAVENOUS PRN
Status: DISCONTINUED | OUTPATIENT
Start: 2020-07-16 | End: 2020-07-16

## 2020-07-16 RX ORDER — SODIUM CHLORIDE, SODIUM LACTATE, POTASSIUM CHLORIDE, CALCIUM CHLORIDE 600; 310; 30; 20 MG/100ML; MG/100ML; MG/100ML; MG/100ML
INJECTION, SOLUTION INTRAVENOUS CONTINUOUS
Status: DISCONTINUED | OUTPATIENT
Start: 2020-07-16 | End: 2020-07-16 | Stop reason: HOSPADM

## 2020-07-16 RX ORDER — CEFAZOLIN SODIUM 2 G/100ML
2 INJECTION, SOLUTION INTRAVENOUS
Status: COMPLETED | OUTPATIENT
Start: 2020-07-16 | End: 2020-07-16

## 2020-07-16 RX ORDER — DEXTROSE, SODIUM CHLORIDE, SODIUM LACTATE, POTASSIUM CHLORIDE, AND CALCIUM CHLORIDE 5; .6; .31; .03; .02 G/100ML; G/100ML; G/100ML; G/100ML; G/100ML
INJECTION, SOLUTION INTRAVENOUS CONTINUOUS
Status: DISCONTINUED | OUTPATIENT
Start: 2020-07-16 | End: 2020-07-18 | Stop reason: HOSPADM

## 2020-07-16 RX ORDER — CITRIC ACID/SODIUM CITRATE 334-500MG
30 SOLUTION, ORAL ORAL
Status: COMPLETED | OUTPATIENT
Start: 2020-07-16 | End: 2020-07-16

## 2020-07-16 RX ORDER — EPHEDRINE SULFATE 50 MG/ML
INJECTION, SOLUTION INTRAVENOUS PRN
Status: DISCONTINUED | OUTPATIENT
Start: 2020-07-16 | End: 2020-07-16

## 2020-07-16 RX ORDER — HYDROCORTISONE 2.5 %
CREAM (GRAM) TOPICAL 3 TIMES DAILY PRN
Status: DISCONTINUED | OUTPATIENT
Start: 2020-07-16 | End: 2020-07-18 | Stop reason: HOSPADM

## 2020-07-16 RX ORDER — VITAMIN A ACETATE, .BETA.-CAROTENE, ASCORBIC ACID, CHOLECALCIFEROL, .ALPHA.-TOCOPHEROL ACETATE, DL-, THIAMINE MONONITRATE, RIBOFLAVIN, NIACINAMIDE, PYRIDOXINE HYDROCHLORIDE, FOLIC ACID, CYANOCOBALAMIN, CALCIUM CARBONATE, FERROUS FUMARATE, ZINC OXIDE, AND CUPRIC OXIDE 2000; 2000; 120; 400; 22; 1.84; 3; 20; 10; 1; 12; 200; 27; 25; 2 [IU]/1; [IU]/1; MG/1; [IU]/1; MG/1; MG/1; MG/1; MG/1; MG/1; MG/1; UG/1; MG/1; MG/1; MG/1; MG/1
1 TABLET ORAL DAILY
Status: DISCONTINUED | OUTPATIENT
Start: 2020-07-16 | End: 2020-07-18 | Stop reason: HOSPADM

## 2020-07-16 RX ORDER — BISACODYL 10 MG
10 SUPPOSITORY, RECTAL RECTAL DAILY PRN
Status: DISCONTINUED | OUTPATIENT
Start: 2020-07-18 | End: 2020-07-18 | Stop reason: HOSPADM

## 2020-07-16 RX ORDER — BUPIVACAINE HYDROCHLORIDE 7.5 MG/ML
INJECTION, SOLUTION INTRASPINAL PRN
Status: DISCONTINUED | OUTPATIENT
Start: 2020-07-16 | End: 2020-07-16

## 2020-07-16 RX ORDER — CEFAZOLIN SODIUM 1 G/50ML
1 INJECTION, SOLUTION INTRAVENOUS SEE ADMIN INSTRUCTIONS
Status: DISCONTINUED | OUTPATIENT
Start: 2020-07-16 | End: 2020-07-16 | Stop reason: HOSPADM

## 2020-07-16 RX ORDER — OXYTOCIN/0.9 % SODIUM CHLORIDE 30/500 ML
100 PLASTIC BAG, INJECTION (ML) INTRAVENOUS CONTINUOUS
Status: DISCONTINUED | OUTPATIENT
Start: 2020-07-16 | End: 2020-07-18 | Stop reason: HOSPADM

## 2020-07-16 RX ORDER — NALOXONE HYDROCHLORIDE 0.4 MG/ML
.1-.4 INJECTION, SOLUTION INTRAMUSCULAR; INTRAVENOUS; SUBCUTANEOUS
Status: ACTIVE | OUTPATIENT
Start: 2020-07-16 | End: 2020-07-17

## 2020-07-16 RX ORDER — OXYCODONE HYDROCHLORIDE 5 MG/1
5 TABLET ORAL EVERY 4 HOURS PRN
Status: DISCONTINUED | OUTPATIENT
Start: 2020-07-16 | End: 2020-07-18 | Stop reason: HOSPADM

## 2020-07-16 RX ORDER — AMOXICILLIN 250 MG
1 CAPSULE ORAL 2 TIMES DAILY
Status: DISCONTINUED | OUTPATIENT
Start: 2020-07-16 | End: 2020-07-18 | Stop reason: HOSPADM

## 2020-07-16 RX ORDER — NALOXONE HYDROCHLORIDE 0.4 MG/ML
0.1-0.4 INJECTION, SOLUTION INTRAMUSCULAR; INTRAVENOUS; SUBCUTANEOUS
Status: DISCONTINUED | OUTPATIENT
Start: 2020-07-16 | End: 2020-07-18 | Stop reason: HOSPADM

## 2020-07-16 RX ORDER — HEPARIN SODIUM 5000 [USP'U]/.5ML
5000 INJECTION, SOLUTION INTRAVENOUS; SUBCUTANEOUS EVERY 12 HOURS
Status: DISCONTINUED | OUTPATIENT
Start: 2020-07-16 | End: 2020-07-18 | Stop reason: HOSPADM

## 2020-07-16 RX ORDER — AMOXICILLIN 250 MG
2 CAPSULE ORAL 2 TIMES DAILY
Status: DISCONTINUED | OUTPATIENT
Start: 2020-07-16 | End: 2020-07-18 | Stop reason: HOSPADM

## 2020-07-16 RX ORDER — LIDOCAINE 40 MG/G
CREAM TOPICAL
Status: DISCONTINUED | OUTPATIENT
Start: 2020-07-16 | End: 2020-07-18 | Stop reason: HOSPADM

## 2020-07-16 RX ORDER — ONDANSETRON 4 MG/1
4 TABLET, ORALLY DISINTEGRATING ORAL EVERY 30 MIN PRN
Status: DISCONTINUED | OUTPATIENT
Start: 2020-07-16 | End: 2020-07-16 | Stop reason: HOSPADM

## 2020-07-16 RX ORDER — IBUPROFEN 800 MG/1
800 TABLET, FILM COATED ORAL EVERY 6 HOURS
Status: DISCONTINUED | OUTPATIENT
Start: 2020-07-17 | End: 2020-07-16

## 2020-07-16 RX ORDER — ONDANSETRON 2 MG/ML
4 INJECTION INTRAMUSCULAR; INTRAVENOUS EVERY 30 MIN PRN
Status: DISCONTINUED | OUTPATIENT
Start: 2020-07-16 | End: 2020-07-16 | Stop reason: HOSPADM

## 2020-07-16 RX ORDER — PHENYLEPHRINE HYDROCHLORIDE 10 MG/ML
INJECTION INTRAVENOUS PRN
Status: DISCONTINUED | OUTPATIENT
Start: 2020-07-16 | End: 2020-07-16

## 2020-07-16 RX ORDER — ROPIVACAINE HYDROCHLORIDE 5 MG/ML
INJECTION, SOLUTION EPIDURAL; INFILTRATION; PERINEURAL PRN
Status: DISCONTINUED | OUTPATIENT
Start: 2020-07-16 | End: 2020-07-16

## 2020-07-16 RX ADMIN — HYDROMORPHONE HYDROCHLORIDE 0.1 MG: 1 INJECTION, SOLUTION INTRAMUSCULAR; INTRAVENOUS; SUBCUTANEOUS at 05:58

## 2020-07-16 RX ADMIN — ROPIVACAINE HYDROCHLORIDE 15 ML: 5 INJECTION, SOLUTION EPIDURAL; INFILTRATION; PERINEURAL at 07:08

## 2020-07-16 RX ADMIN — SODIUM CHLORIDE, POTASSIUM CHLORIDE, SODIUM LACTATE AND CALCIUM CHLORIDE: 600; 310; 30; 20 INJECTION, SOLUTION INTRAVENOUS at 05:21

## 2020-07-16 RX ADMIN — ACETAMINOPHEN 975 MG: 325 TABLET, FILM COATED ORAL at 11:02

## 2020-07-16 RX ADMIN — Medication 3 MILLI-UNITS/MIN: at 01:25

## 2020-07-16 RX ADMIN — SODIUM CHLORIDE 2.5 MILLION UNITS: 9 INJECTION, SOLUTION INTRAVENOUS at 04:25

## 2020-07-16 RX ADMIN — Medication 1 MILLI-UNITS/MIN: at 02:49

## 2020-07-16 RX ADMIN — ROPIVACAINE HYDROCHLORIDE 15 ML: 5 INJECTION, SOLUTION EPIDURAL; INFILTRATION; PERINEURAL at 07:05

## 2020-07-16 RX ADMIN — OXYTOCIN-SODIUM CHLORIDE 0.9% IV SOLN 30 UNIT/500ML 300 ML: 30-0.9/5 SOLUTION at 06:23

## 2020-07-16 RX ADMIN — CEFOXITIN SODIUM 1 G: 1 INJECTION, SOLUTION INTRAVENOUS at 17:34

## 2020-07-16 RX ADMIN — ONDANSETRON 4 MG: 2 INJECTION INTRAMUSCULAR; INTRAVENOUS at 02:49

## 2020-07-16 RX ADMIN — Medication: at 03:16

## 2020-07-16 RX ADMIN — FENTANYL CITRATE 25 MCG: 50 INJECTION, SOLUTION INTRAMUSCULAR; INTRAVENOUS at 07:08

## 2020-07-16 RX ADMIN — ONDANSETRON 4 MG: 2 INJECTION INTRAMUSCULAR; INTRAVENOUS at 06:26

## 2020-07-16 RX ADMIN — CEFAZOLIN SODIUM 2 G: 2 INJECTION, SOLUTION INTRAVENOUS at 05:59

## 2020-07-16 RX ADMIN — AZITHROMYCIN MONOHYDRATE 500 MG: 500 INJECTION, POWDER, LYOPHILIZED, FOR SOLUTION INTRAVENOUS at 06:08

## 2020-07-16 RX ADMIN — ACETAMINOPHEN 975 MG: 325 TABLET, FILM COATED ORAL at 17:12

## 2020-07-16 RX ADMIN — TRANEXAMIC ACID 1 G: 1 INJECTION, SOLUTION INTRAVENOUS at 06:23

## 2020-07-16 RX ADMIN — SODIUM CHLORIDE, SODIUM LACTATE, POTASSIUM CHLORIDE, CALCIUM CHLORIDE AND DEXTROSE MONOHYDRATE: 5; 600; 310; 30; 20 INJECTION, SOLUTION INTRAVENOUS at 08:57

## 2020-07-16 RX ADMIN — EPHEDRINE SULFATE 10 MG: 50 INJECTION, SOLUTION INTRAVENOUS at 06:02

## 2020-07-16 RX ADMIN — SODIUM CHLORIDE 2.5 MILLION UNITS: 9 INJECTION, SOLUTION INTRAVENOUS at 00:19

## 2020-07-16 RX ADMIN — CEFOXITIN SODIUM 1 G: 1 INJECTION, SOLUTION INTRAVENOUS at 23:15

## 2020-07-16 RX ADMIN — ONDANSETRON 4 MG: 2 INJECTION INTRAMUSCULAR; INTRAVENOUS at 05:51

## 2020-07-16 RX ADMIN — HEPARIN SODIUM 5000 UNITS: 5000 INJECTION, SOLUTION INTRAVENOUS; SUBCUTANEOUS at 21:09

## 2020-07-16 RX ADMIN — BUPIVACAINE HYDROCHLORIDE IN DEXTROSE 1.2 ML: 7.5 INJECTION, SOLUTION SUBARACHNOID at 05:58

## 2020-07-16 RX ADMIN — STANDARDIZED SENNA CONCENTRATE AND DOCUSATE SODIUM 1 TABLET: 8.6; 5 TABLET ORAL at 21:04

## 2020-07-16 RX ADMIN — PHENYLEPHRINE HYDROCHLORIDE 100 MCG: 10 INJECTION INTRAVENOUS at 06:34

## 2020-07-16 RX ADMIN — BUPIVACAINE 10 ML: 13.3 INJECTION, SUSPENSION, LIPOSOMAL INFILTRATION at 07:05

## 2020-07-16 RX ADMIN — BUPIVACAINE 10 ML: 13.3 INJECTION, SUSPENSION, LIPOSOMAL INFILTRATION at 07:08

## 2020-07-16 RX ADMIN — MISOPROSTOL 800 MCG: 100 TABLET ORAL at 06:55

## 2020-07-16 RX ADMIN — CEFOXITIN SODIUM 1 G: 1 POWDER, FOR SOLUTION INTRAVENOUS at 12:16

## 2020-07-16 RX ADMIN — Medication 4 MILLI-UNITS/MIN: at 04:25

## 2020-07-16 RX ADMIN — SODIUM CITRATE AND CITRIC ACID MONOHYDRATE 30 ML: 500; 334 SOLUTION ORAL at 05:40

## 2020-07-16 RX ADMIN — Medication 3 MILLI-UNITS/MIN: at 03:53

## 2020-07-16 RX ADMIN — DEXAMETHASONE SODIUM PHOSPHATE 5 MG: 10 INJECTION, SOLUTION INTRAMUSCULAR; INTRAVENOUS at 07:05

## 2020-07-16 RX ADMIN — PHENYLEPHRINE HYDROCHLORIDE 100 MCG: 10 INJECTION INTRAVENOUS at 05:59

## 2020-07-16 RX ADMIN — Medication 7 MILLI-UNITS/MIN: at 00:17

## 2020-07-16 RX ADMIN — ACETAMINOPHEN 975 MG: 325 TABLET, FILM COATED ORAL at 23:15

## 2020-07-16 RX ADMIN — Medication 100 ML/HR: at 08:57

## 2020-07-16 RX ADMIN — EPHEDRINE SULFATE 10 MG: 50 INJECTION, SOLUTION INTRAVENOUS at 06:00

## 2020-07-16 RX ADMIN — DEXAMETHASONE SODIUM PHOSPHATE 5 MG: 10 INJECTION, SOLUTION INTRAMUSCULAR; INTRAVENOUS at 07:08

## 2020-07-16 RX ADMIN — Medication 2 MILLI-UNITS/MIN: at 03:20

## 2020-07-16 RX ADMIN — Medication 30 ML: at 05:40

## 2020-07-16 RX ADMIN — PHENYLEPHRINE HYDROCHLORIDE 100 MCG: 10 INJECTION INTRAVENOUS at 06:37

## 2020-07-16 NOTE — PLAN OF CARE
Face to face report given with opportunity to observe patient.    Report given to Felecia Lagos RN   7/16/2020  7:42 AM

## 2020-07-16 NOTE — PLAN OF CARE
Face to face report given with opportunity to observe patient.    Report given to Tori ADAMS.     Teresa Ma RN   7/15/2020  7:25 PM

## 2020-07-16 NOTE — PLAN OF CARE
Dr. Cannon  has been here to see patient and  has determined that May Maria should be readied for unscheduled  section. Plan of care reviewed with patient and support person. Questions answered and concerns addressed by MD. Patient agrees with plan of care. Consent signed. Anesthesia notified. IV  fluids infusing well. Rito cloth applied to abdomen. Bicitra given. Patient has been NPO since 0400. Ready for surgery. To OR per cart. FHT's 140's    Patient transferred to bed via self. Patient is alert and oriented X 3, denies any pain. pain.   Patient oriented to room, unit, hourly rounding, and plan of care. Explained admission packet with patient bill of rights brochure. Will continue to monitor and document as needed.

## 2020-07-16 NOTE — ANESTHESIA POSTPROCEDURE EVALUATION
Patient:  Candace    Procedure(s):   SECTION    Diagnosis:* No pre-op diagnosis entered *  Diagnosis Additional Information: No value filed.    Anesthesia Type:  Spinal    Note:  Anesthesia Post Evaluation    Patient location during evaluation: PACU  Patient participation: Able to fully participate in evaluation  Level of consciousness: awake and alert  Pain management: adequate  Airway patency: patent  Cardiovascular status: acceptable  Respiratory status: acceptable  Hydration status: acceptable  PONV: none             Last vitals:  Vitals:    20 0828 20 0832 20 0836   BP: 139/95     Pulse:      Resp: 16     Temp: 98.5  F (36.9  C)     SpO2:  99% 93%         Electronically Signed By: SPENSER Mota CRNA  2020  8:52 AM

## 2020-07-16 NOTE — PROGRESS NOTES
Fetal Non-Stress Test Results    NST Ordered By: SPENSER Tejada CNM    NST Medical Indication: PIH and diet controlled GDM    NST Start & Stop Times  NST Start Time: 1555  NST Stop Time: 1630                            NST Results  Fetus A   Baseline Rate: 130  Accelerations: Present  Decelerations: None  Interpretation: reactive

## 2020-07-16 NOTE — PLAN OF CARE
Pt was up with standby assist of 2 for the first time.  Tolerated activity well.  Pt up for a second time this evening.  Up with standby assist of 1, steady on her feet and tolerated activity well.  Pericares completed.  Pt reports adequate pain control with scheduled tylenol.  Clear yellow urine per ortiz, iv fluids infusing.  Pt is tolerating food and fluids well.  Fundus firm at U-2, scant lochia, dressing c/d/I.  Assistance provided with breastfeeding and hand expression each feeding.  Will continue to monitor pt and provide cares as needed.

## 2020-07-16 NOTE — OP NOTE
Section Operative Note  [facility]    Pre-operative diagnosis: Intrauterine pregnancy at 37 2/7 weeks gestation  Fetal intolerance of labor  Failure to progress  Preeclampsia remote from delivery   Post-operative diagnosis: Same   Procedure: Primary low transverse  section   Surgeon: Magdi Cannon MD   Assistant(s): None   Anesthesia: Spinal anesthesia and Tap block   Estimated blood loss: 800ml   Total IV fluids: (See anesthesia record)   Blood transfusion: No transfusion was given during surgery   Total urine output: (See anesthesia record)   Drains: Ortiz catheter   Specimens: placenta    Findings: Vigorous  male.  Apgar's 8/9, intact placenta with 3VC, nml pelvic anatomy   Complications: None   Condition: Mother stable, transfered to post-anesthesia recovery     Procedure Details:  The risks, benefits, complications, treatment options, and expected outcomes were discussed with the patient.  The patient concurred with the proposed plan, giving informed consent.  The patient was taken to Operating Room,  identity confirmed and the procedure verified as  Delivery. A Time Out was held and the above information confirmed.    After uneventful anesthesia placement the patient was prepped and draped in the left lateral tilt  position and a ortiz catheter placed.   A low-transverse skin incision was made with a scalpel and carried down through the subcutaneous tissue to the fascia which was extended transversely. The fascia was  from the underlying rectus tissue superiorly and inferiorly. The peritoneum was identified and entered without difficulty. The utero-vesical peritoneal reflection was incised transversely and the bladder flap was bluntly freed from the lower uterine segment. A low transverse uterine incision was made.  The infant was delivered from the vtx presentation.  After the umbilical cord was clamped and cut the infant was suctioned and handed to  the awaiting resuscitation team.  Cord blood was obtained for evaluation. The placenta was removed intact and the uterus swept free of membranes and debris. The uterine incision was closed with running locked sutures of 1.0 Chromic in a 2 layer fashion.  Hemostasis was excellent. Irrigation with warm normal saline was carried out until clear.The rectus muscles were re approximated with running 0 Vicryl.   The fascia was then reapproximated with running sutures of 0 PDS. The subcutaneous space was irrigated and checked for hemostasis.  The skin was reapproximated with running 3.0 Monocryl and surgical glue.    A vaginal exam was performed at the end of the procedure to evacuate the lower uterine segment and vagina of clots.  There were no complications and the patient was transferred to the recovery room in excellent and stable condition.    Instrument, sponge, and needle counts were correct prior the abdominal closure and at the conclusion of the case.         Magdi Cannon MD

## 2020-07-16 NOTE — ANESTHESIA PROCEDURE NOTES
Procedure note : TAP  Staff -       CRNA: Han Restrepo APRN CRNA    Performed By: CRNA        Pre-Procedure    Location: OR    Procedure Times:2020 7:01 AM and 2020 7:08 AM  Pre-Anesthestic Checklist: patient identified, IV checked, site marked, risks and benefits discussed, informed consent, monitors and equipment checked, pre-op evaluation, at physician/surgeon's request and post-op pain management    Timeout  Correct Patient: Yes   Correct Procedure: Yes   Correct Site: Yes   Correct Laterality: N/A   Correct Position: Yes   Site Marked: N/A   .   Procedure Documentation    Diagnosis:POST .    Procedure: TAP, bilateral.   Patient Position:supine   Ultrasound used to identify targeted nerve, plexus, or vascular marker and placed a needle adjacent to it., Ultrasound was used to visualize the spread of the anesthetic in close proximity to the above stated nerve. A permanent image is entered into the patient's record.  Patient Prep/Sterile Barriers; chlorhexidine gluconate and isopropyl alcohol.  .  Needle: short bevel   Needle Gauge: 20.    Needle Length (Inches) 4   Insertion Method: Single Shot.        Assessment/Narrative  Paresthesias: No.  .  The placement was negative for: blood aspirated and painful injection.  Bolus given via needle. No blood aspirated via catheter.   Secured via.   Complications: none.

## 2020-07-16 NOTE — ANESTHESIA CARE TRANSFER NOTE
Patient:  Candace    Procedure(s):   SECTION    Diagnosis: * No pre-op diagnosis entered *  Diagnosis Additional Information: No value filed.    Anesthesia Type:   Spinal     Note:  Airway :Room Air  Patient transferred to:PACU  Handoff Report: Identifed the Patient, Identified the Reponsible Provider, Reviewed the pertinent medical history, Discussed the surgical course, Reviewed Intra-OP anesthesia mangement and issues during anesthesia, Set expectations for post-procedure period and Allowed opportunity for questions and acknowledgement of understanding      Vitals: (Last set prior to Anesthesia Care Transfer)    CRNA VITALS  2020 0641 - 2020 0723      2020             Resp Rate (set):  8                Electronically Signed By: SPENSER Cooper CRNA  2020  7:23 AM

## 2020-07-16 NOTE — PLAN OF CARE
Pt to room 4238 via cart, accompanied by PACU staff and myself at 0825. Report obtained, assumed care of pt.  Pt assisted to bed via a slider board and assist of 4.  Pt alert and oriented, denies pain.  VSS.  Fundus firm at U-2, scant lochia, dressing C/D/I.  Intermittent emesis but denies any nausea.  Pt is taking bites of applesauce at this time and will take scheduled tylenol if she is able to keep that down.  It pt has another emesis will call Dr Cannon and update him.  Pt initially denied pain and now is rating pain a 3-5/10, denies needing anything at this time.

## 2020-07-16 NOTE — PLAN OF CARE
SVE done per RN.  3/50-60%/ballotable.  CNM paged and updated of SVE as well as difficulty tracing contractions.  Plan to continue with pitocin and increase as able.  No plan of AROM at this time as baby is ballotable.  Will update pt and inform oncoming shift of plan.

## 2020-07-16 NOTE — LACTATION NOTE
"Initial Lactation Consultation    May Maria                                                                                                    [id number]    Consultation Date: 2020    Reason for Lactation Referral:routine lactation assessment.    MATERNAL HISTORY   Maternal History: unscheduled , 37 2/7 gestation  History of Breast Surgery: No  Breast Changes During Pregnancy: Yes  Breast Feeding History: No  Maternal Meds: see eMar    MATERNAL ASSESSMENT    Breast Size: average  Nipple Appearance - Left: intact  Nipple Appearance - Right: intact  Nipple Erectility - Left: erect with stimulation  Nipple Erectility - Right: erect with stimulation  Areolas Compressibility: soft  Nipple Size: average  Milk Supply: colostrum    INFANT ASSESSMENT    Oral Anatomy  Mouth: normal  Palate: normal  Jaw: normal  Tongue: normal  Frenulum: normal  Digital Suck Exam: root    FEEDING   Feeding Time: 1130  Position: cradle, right, left  Effort to Latch: assist to get deep latch  Results: good feeding    FEEDING PLAN    Inpatient Feeding Plan: Nurse on demand, responding to infant's feeding cues. Hand express and give with spoon if short or no feeding. Snuggle in skin-to-skin to learn positioning and infant cues. Rooming-in encouraged.     LACTATION COMMENTS   Anticipatory guidance provided in regard to \"baby's second night.\"    Link provided for Maximizing Milk Production at [employer] by Dr. Betsy Pereyra.    Deep latch explained for proper positioning of breast in infant's mouth, maximizing milk transfer and comfort.  Hand expression taught and return demonstration observed with colostrum present.  Dolomite signs of satiety reviewed.  \"Ways to know that baby is getting enough\" discussed thoroughly.  Follow-up support information provided.        __________________________________________________________________________________  KAMRAN ALARCON RN, IBCLC  2020      "

## 2020-07-16 NOTE — PLAN OF CARE
Labor Shift Note  Data: See Flowsheets activity for contraction and fetal documentation.   Vitals:    07/15/20 1935 07/15/20 2040 07/15/20 2134 07/15/20 2250   BP: 137/78 133/80 129/65 132/76   Pulse:       Resp:   16 16   Temp: 98.6  F (37  C) 98.7  F (37.1  C) 98.4  F (36.9  C) 98.8  F (37.1  C)   TempSrc: Oral Oral Oral Oral   SpO2: 100% 99% 100% 100%   Weight:       Height:       . Signs and symptoms of infection Present.  Pt GBS+ IV antibiotics administered per order  Complications in labor: Present. PIH, GDM   Support person Sivakumar present.  Interventions: Continue uterine/fetal assessment per orders and nursing discretion. Vital Signs per order set. Comfort measures/pain control/labor management: Frequent position changes to facilitate labor with epidural anesthesia.  Labor induction with Pitocin reviewed with pt. Agreeable to plan.  Plan: Anticipate . Provide labor/coping assistance as needed by patient and support person.  Observe for and notify care provider of indications of progressing labor, need for pain medications, or signs of fetal/maternal compromise.

## 2020-07-16 NOTE — PROGRESS NOTES
Nutrition/Diabetes Progress Report    Angelique Joseph was seen from 0950 to 1050 for type 2 diabetes self-management training, including nutrition and diabetes education in an individual setting.  . The patient was seen for 60 minutes, and billed for 60 minutes.  The instruction was given to the patient.    Material was presented using verbal, written, demonstration and return demonstration.  Food models and nutrition labels were used to demonstrate portion sizes and carbohydrate values.    Learning needs were assessed and the patient requires education in diabetes disease process/treatment options, medical nutrition therapy, physical activity, blood glucose monitoring, diabetes medications, acute complications, chronic complications, diabetes psychosocial adjustment and strategies and goal setting.     Highlights of today’s visit:  Patient is here today for follow up DM assessment. She recently met with the DM nurse educator after being diagnosed with type 2 diabetes. Her HgA1c was elevated at 12.5%. 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 Currently taking 30 units Lantus nightly, 1000 mg metformin BID, and 0.75 mg Trulicity weekly. She gave first injection of Trulicity this past weekend. Blood sugars have greatly improved even over the past 5 days. Was having consistent readings of >300. Patient states that she has noticed a decrease in appetite since starting the Trulicity. She has not paid any attention to diet in the past; eats 2-3 small meals each day, mainly at work. Reviewed eating 3 consistent meals especially while being on insulin and including both carbohydrate and protein source at each meal.     Assessment of Blood Sugars:  Home blood sugar ranges:   Fasting blood sugar 213 (states did not take lantus last night because ran out), 180  Ranges of other home blood sugars:  Before dinner 129, 154, 206  Before bedtime 161    Blood sugar at time of office visit:  105 - 1 hour after eating a banana    Patient is having  (S) Comfortable with epidural.  Fetal intolerance to labor with recurrent variable and late when pitocin advanced and no labor progress with failure to achieve active labor.    Stable mild pre-E.  On GBS prophylaxis.    (O)  Vitals:    20 0128 20 0254 20 0429 20 0531   BP: 140/90 139/79 140/82 146/80   Pulse:       Resp:  16     Temp:  98.4  F (36.9  C) 98.5  F (36.9  C)    TempSrc:  Oral Oral    SpO2: 98% 99% 97%    Weight:       Height:         Cervix: Per RN   Membranes: intact   Dilation: 4+   Effacement: 60%   Station:-3    Fetal Heart Rate Tracing: Current reactive and reassuring off pitocin.   Cat:1.           Tocometer: inadequate    (A/P)  Fetal intolerance of labor  Failure to progress  Preeclampsia remote from delivery.      Recommend  delivery.    Reveiwed goals, risks, alternatives of a  section including bleeding, infection, and potential damage to other organs including bowel, bladder, baby, blood vessels and nerves.  Potential need for  in future.  Hosptial stay and recovery period discussed.  All questions were answered.  Consent form was reviewed and signed.  Pt desires to proceed.   Magdi Cannon MD         episodes of hyperglycemia.     Diabetes Medication Update/Changes  none    A1c   Lab Results   Component Value Date    KLJJKZBI1X 12.5 (A) 01/15/2020      Hemoglobin A1C (%)   Date Value   07/15/2011 6.0 (H)     No results found for: 5GLYH     Other Labs:  CHOLESTEROL (mg/dL)   Date Value   12/10/2019 206 (H)     CALCULATED LDL (mg/dL)   Date Value   12/10/2019 131 (H)     HDL (mg/dL)   Date Value   12/10/2019 54      TRIGLYCERIDE (mg/dL)   Date Value   12/10/2019 106       Glucose (mg/dL)   Date Value   01/29/2020 279 (H)     Creatinine (mg/dL)   Date Value   01/29/2020 0.87     GFR Estimate,  (no units)   Date Value   01/29/2020 84     GFR Estimate, Non  (no units)   Date Value   01/29/2020 72     Potassium (mmol/L)   Date Value   01/29/2020 3.6     AST/SGOT (Units/L)   Date Value   01/24/2020 14     ALT/SGPT (Units/L)   Date Value   01/24/2020 26     MICROALBUMIN/CREATININE (mg/g)   Date Value   12/26/2019 15.3     TSH (mcUnits/mL)   Date Value   06/25/2015 1.209      No results found for: VITD25    Medications:   Current Outpatient Medications   Medication Sig Dispense Refill   • metFORMIN (GLUCOPHAGE-XR) 500 MG 24 hr tablet Take 2 tablets by mouth 2 times daily (with meals). 240 tablet 3   • glipiZIDE (GLUCOTROL) 2.5 MG 24 hr tablet Take 2 tablets by mouth daily. 60 tablet 0   • insulin glargine (LANTUS SOLOSTAR, BASAGLAR) 100 UNIT/ML pen-injector Inject 30 Units into the skin nightly. 15 mL 3   • dulaglutide (TRULICITY) 0.75 MG/0.5ML pen-injector Inject 0.75 mg into the skin 1 day a week. 2 mL 0   • Insulin Pen Needle (B-D U/F PEN NEEDLE 5/16\") 31G X 8 MM Misc Use to inject insulin one times daily. Remove needle cover(s) to expose needle before injecting. 100 each 11   • blood glucose lancets Use to test blood sugar twice daily 100 each 11   • blood glucose test strip Test blood sugar 4 times daily as directed. Diagnosis: Type 2 DM newly DX. On insulin Meter: Insurance covered.  100 each 5   • atorvastatin (LIPITOR) 10 MG tablet Take 1 tablet by mouth nightly. Next prescription when due change to this and d/c Pravastatin. 90 tablet 1   • Blood Glucose Monitoring Suppl (FREESTYLE LITE) Device Use to test blood sugar twice daily as directed 1 each 0   • amLODIPine (NORVASC) 10 MG tablet Take 1 tablet by mouth daily. Appointment needed for refills. 90 tablet 3   • losartan (COZAAR) 50 MG tablet Take 1 tablet by mouth daily. Appt needed for refills. 90 tablet 3   • hydrochlorothiazide (HYDRODIURIL) 25 MG tablet Take 1 tablet by mouth daily. Appt needed for refills. 90 tablet 3   • traMADol (ULTRAM) 50 MG tablet Take 1 tablet by mouth every 8 hours as needed for Pain. 20 tablet 0   • Ferrous Gluconate 325 (36 FE) MG Tab Take 1 tablet by mouth 2 times daily. 90 tablet 3     No current facility-administered medications for this visit.        Food and Nutrition Related History:   Oral fluids: water, diet soda, iced tea  Type of food/meals: cafe at work, some homemade, eats out 1-2 times per month, no beef  Meal/Snack pattern: 2-3 small meals, no snacking     Breakfast  At work in cafe -   boiled egg + 2 strips ramirez + 12 oz milk OR piece of fruit OR breakfast skillet + milk   Lunch  Fruit    1/2 Julio Rojas sandwich    Yesterday - diet 7up, 1/2 small bag chips, 1/2 turkey sandwich Dinner  Sometimes skips  Smoothie -1.5-2 cups fruit + 1/2 cup greek yogurt    Chili     Snack     Snack   Snack       Physical Activity:   Type of physical activity: none    Anthropometric Measurements:  Estimated body mass index is 43.15 kg/m² as calculated from the following:    Height as of 1/30/20: 5' 2\" (1.575 m).    Weight as of this encounter: 107 kg.  Wt Readings from Last 4 Encounters:   02/05/20 107 kg   01/30/20 107.4 kg   01/28/20 107.2 kg   01/24/20 108.4 kg       Nutrition Diagnosis:  Inconsistent carbohydrate intake related to knowledge deficit of effect food choices have on blood glucose levels as  evidenced by difficulty identifying foods that raise blood glucose and foods with neutral effect..    Nutrition Prescription:  Meal plan with  45-60 grams of carbohydrates per meal.    Intervention:  Nutrition Education - Application (2)   Skill development in the following areas E-2.2: Nutrition - Effect of portion size,  Understanding of nutrition labels in meal planning,  Understanding of personalized meal plan,  Effect of modest weight loss on health  Nutrition strategies for lipid, blood pressure management including substitute unsaturated fats for saturated/trans fats, increase fiber, omega-3 fats  Limit sodium,  Understanding of healthy food preparation (cooking methods, recipe modification),  Healthy dining out practices,  Skills for adapting meal plan to altered meal times, travel, holidays, and schedule changes,  Plate method.  Needs Review:     Physical Exercise - Effects of physical activity, health benefits and Goals: Increase frequency of physical activity until I am exercising 150-300 minutes a week (30-45 minutes 5-7 days/week).        Nutrition Counseling:      Theoretical Basis/Approach (1)   Cognitive-Behavioral Theory   Utilizing:  Goal setting.    Monitoring and Evaluation:  Patient demonstrated moderate level of knowledge in above areas     Plan to evaluate  1. Follow plate method 3 meals per day - include protein, carb, and vegetables  2. Obtain annual eye exam      Recommended Follow-up:  Follow-up appointment - with SEBLE Harris in 3 weeks.  The patient was encouraged to call back if any questions or concerns.    See Patient Instructions for further information.  Thank you for your referral.  Please contact me with any question/concerns.    The MD referral is located in EMR.  Mary Vasquez RDN

## 2020-07-16 NOTE — PLAN OF CARE
Transfer to OR & C/S Delivery Note  Patient to OR at 0540 via cart.   Delivered viable Male via ASAP  section at 0622 by Dr. Cannon.    To warmer, stimulated and dried by Dr. Ness, RT, nursery RN .   Dr. Ness present at delivery to care for baby.   ID bands placed on baby, mother and father.   Brought to mother and placed skin-to-skin at 0629 with RN in attendance.

## 2020-07-16 NOTE — ANESTHESIA PREPROCEDURE EVALUATION
Anesthesia Pre-Procedure Evaluation    Patient: May Maria   MRN: [id number] : 1995          Preoperative Diagnosis: * No pre-op diagnosis entered *    Procedure(s):   SECTION    Past Medical History:   Diagnosis Date     Atrophic kidney      Depressive disorder     Therapy during childhood     Shift work sleep disorder      Past Surgical History:   Procedure Laterality Date     PE TUBES Bilateral     years        Anesthesia Evaluation     . Pt has not had prior anesthetic            ROS/MED HX    ENT/Pulmonary:     (+)HILARIA risk factors hypertension, obese, , . .    Neurologic:  - neg neurologic ROS     Cardiovascular:     (+) hypertension----. : . . . :. .       METS/Exercise Tolerance:     Hematologic:         Musculoskeletal:         GI/Hepatic:     (+) GERD       Renal/Genitourinary:  - ROS Renal section negative       Endo:     (+) type II DM Obesity, .      Psychiatric:  - neg psychiatric ROS       Infectious Disease:  - neg infectious disease ROS       Malignancy:      - no malignancy   Other:    (+) Possibly pregnant C-spine cleared: N/A, no H/O Chronic Pain,no other significant disability                         Physical Exam  Normal systems: cardiovascular, pulmonary and dental    Airway   Mallampati: I  TM distance: >3 FB  Neck ROM: full    Dental     Cardiovascular   Rhythm and rate: regular and normal      Pulmonary    breath sounds clear to auscultation            Lab Results   Component Value Date    WBC 9.0 2020    HGB 11.5 (L) 2020    HCT 36.3 2020     2020    CRP <2.9 05/15/2018     2020    POTASSIUM 3.4 2020    CHLORIDE 105 2020    CO2 24 2020    BUN 10 2020    CR 0.66 2020    GLC 91 2020    HUGH 9.1 2020    PHOS 4.6 (H) 2019    ALBUMIN 2.4 (L) 2020    PROTTOTAL 6.2 (L) 2020    ALT 19 2020    AST 10 2020    ALKPHOS 119 2020    BILITOTAL 0.3 2020     "LIPASE 113 05/15/2018    TSH 1.42 01/15/2019    HCG Positive (A) 02/07/2020       Preop Vitals  BP Readings from Last 3 Encounters:   07/16/20 146/80   07/13/20 132/71   07/13/20 136/76    Pulse Readings from Last 3 Encounters:   07/15/20 85   07/13/20 93   07/06/20 102      Resp Readings from Last 3 Encounters:   07/16/20 16   07/13/20 16   07/06/20 16    SpO2 Readings from Last 3 Encounters:   07/16/20 97%   07/13/20 98%   07/06/20 98%      Temp Readings from Last 1 Encounters:   07/16/20 98.5  F (36.9  C) (Oral)    Ht Readings from Last 1 Encounters:   07/14/20 1.575 m (5' 2\")      Wt Readings from Last 1 Encounters:   07/14/20 88.5 kg (195 lb)    Estimated body mass index is 35.67 kg/m  as calculated from the following:    Height as of this encounter: 1.575 m (5' 2\").    Weight as of this encounter: 88.5 kg (195 lb).       Anesthesia Plan      History & Physical Review  History and physical reviewed and following examination; no interval change.    ASA Status:  2 .    NPO Status:  Full stomach    Plan for Spinal   PONV prophylaxis:  Ondansetron (or other 5HT-3) and Dexamethasone or Solumedrol         Postoperative Care  Postoperative pain management:  Peripheral nerve block (Single Shot) and Neuraxial analgesia.      Consents  Anesthetic plan, risks, benefits and alternatives discussed with:  Patient..                 SPENSER Balderrama CRNA  "

## 2020-07-17 PROBLEM — E66.09 CLASS 2 OBESITY DUE TO EXCESS CALORIES WITHOUT SERIOUS COMORBIDITY WITH BODY MASS INDEX (BMI) OF 35.0 TO 35.9 IN ADULT: Status: ACTIVE | Noted: 2020-06-27

## 2020-07-17 PROBLEM — E66.812 CLASS 2 OBESITY DUE TO EXCESS CALORIES WITHOUT SERIOUS COMORBIDITY WITH BODY MASS INDEX (BMI) OF 35.0 TO 35.9 IN ADULT: Status: ACTIVE | Noted: 2020-06-27

## 2020-07-17 LAB — HGB BLD-MCNC: 10.4 G/DL (ref 11.7–15.7)

## 2020-07-17 PROCEDURE — 12000000 ZZH R&B MED SURG/OB

## 2020-07-17 PROCEDURE — 25000132 ZZH RX MED GY IP 250 OP 250 PS 637: Performed by: OBSTETRICS & GYNECOLOGY

## 2020-07-17 PROCEDURE — 36415 COLL VENOUS BLD VENIPUNCTURE: CPT | Performed by: OBSTETRICS & GYNECOLOGY

## 2020-07-17 PROCEDURE — 25000128 H RX IP 250 OP 636: Performed by: OBSTETRICS & GYNECOLOGY

## 2020-07-17 PROCEDURE — 85018 HEMOGLOBIN: CPT | Performed by: OBSTETRICS & GYNECOLOGY

## 2020-07-17 RX ADMIN — ACETAMINOPHEN 975 MG: 325 TABLET, FILM COATED ORAL at 05:32

## 2020-07-17 RX ADMIN — OXYCODONE HYDROCHLORIDE 5 MG: 5 TABLET ORAL at 08:49

## 2020-07-17 RX ADMIN — STANDARDIZED SENNA CONCENTRATE AND DOCUSATE SODIUM 1 TABLET: 8.6; 5 TABLET ORAL at 08:48

## 2020-07-17 RX ADMIN — OXYCODONE HYDROCHLORIDE 5 MG: 5 TABLET ORAL at 19:55

## 2020-07-17 RX ADMIN — HEPARIN SODIUM 5000 UNITS: 5000 INJECTION, SOLUTION INTRAVENOUS; SUBCUTANEOUS at 08:50

## 2020-07-17 RX ADMIN — ACETAMINOPHEN 975 MG: 325 TABLET, FILM COATED ORAL at 19:55

## 2020-07-17 RX ADMIN — ACETAMINOPHEN 975 MG: 325 TABLET, FILM COATED ORAL at 13:29

## 2020-07-17 RX ADMIN — OXYCODONE HYDROCHLORIDE 5 MG: 5 TABLET ORAL at 13:30

## 2020-07-17 RX ADMIN — PRENATAL VITAMINS-IRON FUMARATE 27 MG IRON-FOLIC ACID 0.8 MG TABLET 1 TABLET: at 08:49

## 2020-07-17 RX ADMIN — STANDARDIZED SENNA CONCENTRATE AND DOCUSATE SODIUM 1 TABLET: 8.6; 5 TABLET ORAL at 20:46

## 2020-07-17 RX ADMIN — HEPARIN SODIUM 5000 UNITS: 5000 INJECTION, SOLUTION INTRAVENOUS; SUBCUTANEOUS at 20:41

## 2020-07-17 NOTE — PLAN OF CARE
Face to face report given with opportunity to observe patient.    Report given to Claudette Ma RN   7/16/2020  7:21 PM

## 2020-07-17 NOTE — PROGRESS NOTES
[facility]    Obstetrics Post-Op / Progress Note    Assessment & Plan   Assessment:  -1 Day Post-Op  Procedure(s):   SECTION   Obesity  GDM  GBS+    Doing well.  Clean wound dressing.  No immediate surgical complications identified.  No excessive bleeding  Pain well-controlled.  Breast feeding well.    Plan:  Ambulation encouraged  Breast feeding strategies discussed  Monitor wound for signs of infection  Pain control measures as needed  Reportable signs and symptoms dicussed with the patient  Anticipate discharge tomorrow    Isaac Perez     Interval History   Doing well.  Pain is well-controlled.  No fevers.  No history of wound drainage, warmth or significant erythema.  Good appetite.  Denies chest pain, shortness of breath, nausea or vomiting.  Ambulatory.  Breastfeeding well.  Urinating well.    Medications     dextrose 5% lactated ringers 125 mL/hr at 20 0857     - MEDICATION INSTRUCTIONS -       - MEDICATION INSTRUCTIONS -       NO Rho (D) immune globulin (RhoGam) needed - mother Rh POSITIVE       - MEDICATION INSTRUCTIONS -       oxytocin in 0.9% NaCl 100 mL/hr (20 0857)     oxytocin in 0.9% NaCl         acetaminophen  975 mg Oral Q6H     heparin ANTICOAGULANT  5,000 Units Subcutaneous Q12H     PNV Prenatal Plus Multivitamin  1 tablet Oral Daily     senna-docusate  1 tablet Oral BID    Or     senna-docusate  2 tablet Oral BID       Physical Exam   Temp: 97.9  F (36.6  C) Temp src: Oral BP: 137/75 Pulse: 89 Heart Rate: 101 Resp: 16 SpO2: 97 % O2 Device: None (Room air)    Vitals:    20 1902   Weight: 88.5 kg (195 lb)     Vital Signs with Ranges  Temp:  [97.7  F (36.5  C)-98.7  F (37.1  C)] 97.9  F (36.6  C)  Pulse:  [] 89  Heart Rate:  [] 101  Resp:  [12-23] 16  BP: (111-155)/(57-98) 137/75  SpO2:  [93 %-99 %] 97 %  I/O last 3 completed shifts:  In: 2687 [P.O.:1200; I.V.:1487]  Out: 2675 [Urine:1875; Blood:800]    Uterine fundus is firm, non-tender and at the  level of the umbilicus  Incision dressing C/D  Extremities Non-tender, 1+ robert  Heart is regular rate and rhythm and lungs clear to auscultation  Neuro normal  Reflexes 1-2+,equal, symmetric, without clonus  Urine output good and clear    Data   Recent Labs   Lab Test 07/14/20 1951   ABO B   RH Pos   AS Neg     Recent Labs   Lab Test 07/17/20  0543 07/14/20 1951   HGB 10.4* 11.5*     Recent Labs   Lab Test 01/20/20  1056   RUQIGG 29

## 2020-07-17 NOTE — ANESTHESIA POSTPROCEDURE EVALUATION
Patient: May Maria    * No procedures listed *    Diagnosis:* No pre-op diagnosis entered *  Diagnosis Additional Information: No value filed.    Anesthesia Type:  No value filed.    Note:  Anesthesia Post Evaluation    Patient participation: Able to fully participate in evaluation  Level of consciousness: awake  Pain management: adequate  Airway patency: patent  Cardiovascular status: acceptable  Respiratory status: acceptable  Hydration status: acceptable  PONV: none     Anesthetic complications: None          Last vitals:  Vitals:    07/16/20 1804 07/16/20 2319 07/17/20 0900   BP:  137/75 135/74   Pulse:   90   Resp:  16    Temp:  97.9  F (36.6  C) 98  F (36.7  C)   SpO2: 98% 97%          Electronically Signed By: SPENSER Balderrama CRNA  July 17, 2020  10:37 AM

## 2020-07-17 NOTE — PLAN OF CARE
Assessments as charted. B/P: 137/75, T: 97.9, P: 89, R: 16. Rates pain: 4/10 at incision. Incision: no drainage. Voiding without difficulty. Fundus firm and 1 below. Lochia: Light. Activity: moving with mild difficulty d/t incisional pain. Infant feeding: Breast feeding going ok but baby is unable to maintain sugars.      LATCH Score:   Latch: 1 - Repeated Attempts  Audible Swallowin - Few  Type of Nipple: (Breast/Nipple) 2 - Everted  Comfort: 2 - Soft, Nontender  Hold: 1 - Min. Assist   Total LATCH Score: 7      Postpartum breastfeeding assessment completed and education provided, see Patient Education Activity.  Items included in the education are:   proper positioning and latch  effectiveness of feeding  manual expression  handling and storing breastmilk  maintenance of breastfeeding for the first 6 months  sign/symptoms of infant feeding issues requiring referral to qualified health care provider  Postpartum care education provided, see Patient Education activity. Patient denies needs. Will monitor.  Lety Thomas RN

## 2020-07-17 NOTE — ANESTHESIA CARE TRANSFER NOTE
Patient: May Maria    * No procedures listed *    Diagnosis: * No pre-op diagnosis entered *  Diagnosis Additional Information: No value filed.    Anesthesia Type:   No value filed.     Note:  Airway :Room Air  Patient transferred to:Labor and Delivery  Handoff Report: Identifed the Patient, Identified the Reponsible Provider, Reviewed the pertinent medical history, Discussed the surgical course, Reviewed Intra-OP anesthesia mangement and issues during anesthesia, Set expectations for post-procedure period and Allowed opportunity for questions and acknowledgement of understanding      Vitals: (Last set prior to Anesthesia Care Transfer)              Electronically Signed By: SPENSER Balderrama CRNA  July 17, 2020  10:36 AM

## 2020-07-17 NOTE — PLAN OF CARE
Assessments as charted. B/P: 135/74, T: 98, P: 90, R: 16. Rates pain: 7/10 . Incision: Healing well. Voiding without difficulty. Fundus firm at U-1. Lochia: Light. Activity: normal activity as tolerated. Infant feeding: Both breast and formula.     LATCH Score:   Latch: 2 - Good Latch  Audible Swallowin - Few  Type of Nipple: (Breast/Nipple) 2 - Everted  Comfort: 2 - Soft, Nontender  Hold: 1 - Min. Assist   Total LATCH Score: 8    Postpartum breastfeeding assessment completed and education provided, see Patient Education Activity.  Items included in the education are:     proper positioning and latch    effectiveness of feeding    manual expression    handling and storing breastmilk    maintenance of breastfeeding for the first 6 months    sign/symptoms of infant feeding issues requiring referral to qualified health care provider  Postpartum care education provided, see Patient Education activity. Patient denies needs. Will monitor.  Cuca Mason RN

## 2020-07-18 VITALS
BODY MASS INDEX: 35.88 KG/M2 | RESPIRATION RATE: 18 BRPM | TEMPERATURE: 98.4 F | OXYGEN SATURATION: 98 % | DIASTOLIC BLOOD PRESSURE: 89 MMHG | SYSTOLIC BLOOD PRESSURE: 132 MMHG | WEIGHT: 195 LBS | HEART RATE: 89 BPM | HEIGHT: 62 IN

## 2020-07-18 PROCEDURE — 25000128 H RX IP 250 OP 636: Performed by: OBSTETRICS & GYNECOLOGY

## 2020-07-18 PROCEDURE — 25000132 ZZH RX MED GY IP 250 OP 250 PS 637: Performed by: OBSTETRICS & GYNECOLOGY

## 2020-07-18 RX ORDER — OXYCODONE AND ACETAMINOPHEN 5; 325 MG/1; MG/1
1 TABLET ORAL EVERY 4 HOURS PRN
Qty: 45 TABLET | Refills: 0 | Status: SHIPPED | OUTPATIENT
Start: 2020-07-18 | End: 2020-07-29

## 2020-07-18 RX ADMIN — OXYCODONE HYDROCHLORIDE 5 MG: 5 TABLET ORAL at 06:41

## 2020-07-18 RX ADMIN — OXYCODONE HYDROCHLORIDE 5 MG: 5 TABLET ORAL at 02:00

## 2020-07-18 RX ADMIN — PRENATAL VITAMINS-IRON FUMARATE 27 MG IRON-FOLIC ACID 0.8 MG TABLET 1 TABLET: at 10:00

## 2020-07-18 RX ADMIN — ACETAMINOPHEN 975 MG: 325 TABLET, FILM COATED ORAL at 01:59

## 2020-07-18 RX ADMIN — HEPARIN SODIUM 5000 UNITS: 5000 INJECTION, SOLUTION INTRAVENOUS; SUBCUTANEOUS at 10:00

## 2020-07-18 RX ADMIN — ACETAMINOPHEN 975 MG: 325 TABLET, FILM COATED ORAL at 10:00

## 2020-07-18 RX ADMIN — STANDARDIZED SENNA CONCENTRATE AND DOCUSATE SODIUM 1 TABLET: 8.6; 5 TABLET ORAL at 10:00

## 2020-07-18 RX ADMIN — OXYCODONE HYDROCHLORIDE 5 MG: 5 TABLET ORAL at 13:35

## 2020-07-18 NOTE — PLAN OF CARE
Face to face report given with opportunity to observe patient.    Report given to Cuca ADAMS.    Fidelina Dinero RN   7/18/2020  7:00 AM

## 2020-07-18 NOTE — DISCHARGE SUMMARY
[facility]    Discharge Summary  Obstetrics    Date of Admission:  2020  Date of Discharge:  2020  Discharging Provider: Isaac Perez    Discharge Diagnoses   IUP 37w 2d  PIH-Preeclampsia  Obesity w BMI 35.67  GBS bacteria  GDM  Medically indicated IOL  Fetal intolerance to Labor  Failure to progress   Section   2020    Procedure/Surgery Information   Procedure: Procedure(s):   SECTION   Surgeon(s): Surgeon(s) and Role:     * Magdi Cannon MD - Primary     History of Present Illness    Candace is a 24 year old female who presented with PIH-Preeclampsia, medically indicated IOL    Hospital Course   The patient's hospital course included Cervidil cervical ripening, Cytotec IOL, FTP with fetal intolerance with resulting  Section.  She recovered as anticipated and experienced no post-operative complications.  On discharge, her pain was well controlled. Vaginal bleeding is similar to peak menstrual flow.  Voiding without difficulty.  Ambulating well and tolerating a normal diet.  No fever or significant wound drainage.  Breastfeeding well.  Infant is stable.  She was discharged on post-partum day # 2.    Post-partum hemoglobin:   Hemoglobin   Date Value Ref Range Status   2020 10.4 (L) 11.7 - 15.7 g/dL Final       Isaac Perez MD    Discharge Disposition   Discharged to home   Condition at discharge: Good    Pending Results   Final pathology results: Pending at time of discharge  These results will be followed up by [facility]  Unresulted Labs Ordered in the Past 30 Days of this Admission     No orders found from 2020 to 7/15/2020.          Primary Care Physician   Jia Kirk    Consultations This Hospital Stay   ANESTHESIOLOGY IP CONSULT  HOME CARE POST PARTUM/ IP CONSULT  LACTATION IP CONSULT    Discharge Orders      Activity    Review discharge instructions     Reason for your hospital stay    Maternity care     Discharge Instructions -  Postpartum visit    PP check along with your baby next week with Christophre.     Diet    Resume previous diet     Discharge Medications   Current Discharge Medication List      START taking these medications    Details   oxyCODONE-acetaminophen (PERCOCET) 5-325 MG tablet Take 1 tablet by mouth every 4 hours as needed for pain Take with food for primary pain control  Qty: 45 tablet, Refills: 0    Comments: Patient can't take NSAID  Associated Diagnoses:  delivery delivered         CONTINUE these medications which have NOT CHANGED    Details   acetaminophen (TYLENOL) 325 MG tablet Take 325-650 mg by mouth every 6 hours as needed for mild pain      blood glucose (ACCU-CHEK GUIDE) test strip Use to test blood sugar 4 times daily.  Qty: 200 each, Refills: 0    Associated Diagnoses: Diet controlled gestational diabetes mellitus (GDM) in third trimester      blood glucose monitoring (ACCU-CHEK FASTCLIX) lancets Use to test blood sugar 4 times daily.  Qty: 102 each, Refills: 0    Associated Diagnoses: Diet controlled gestational diabetes mellitus (GDM) in third trimester      Blood Glucose Monitoring Suppl (ACCU-CHEK GUIDE ME) w/Device KIT 1 each continuous  Qty: 1 kit, Refills: 0    Associated Diagnoses: Diet controlled gestational diabetes mellitus (GDM) in third trimester      cephALEXin (KEFLEX) 500 MG capsule Take 1 capsule (500 mg) by mouth daily  Qty: 60 capsule, Refills: 0    Associated Diagnoses: Supervision of normal first pregnancy, antepartum; Personal history of urinary tract infection      Prenatal Vit-Fe Fumarate-FA (PRENATAL MULTIVITAMIN W/IRON) 27-0.8 MG tablet Take 1 tablet by mouth daily           Allergies   Allergies   Allergen Reactions     Pineapple Hives and Swelling     Throat swelling             DISCHARGE PHYSICAL EXAMINATION:    WDF in NAD  VSS, afebrile  Hgb stable  Lungs clear  Cardiac rr  Abdomen soft, not tender, fundus firm, flow scant,  Incision is healing well, clean, dry  Extremities  normal, 1+ edema  Neuro normal  Reflexes 1-2+,equal, symmetric, without clonus  Urine output is good and clear

## 2020-07-18 NOTE — PLAN OF CARE
Patient discharged at 1345 PM via ambulation accompanied by significant other and son and staff. Prescriptions filled and sent with patient upon discharge. All belongings sent with patient.     Discharge instructions reviewed with patient. Listed belongings gathered and returned to patient. N/A    Patient discharged to home.     Core Measures and Vaccines  Core Measures applicable during stay:   Pneumonia and Influenza given prior to discharge, if indicated: N/A    Surgical Patient   Surgical Procedures during stay: yes  Did patient receive discharge instruction on wound care and recognition of infection symptoms? Yes    MISC  Follow up appointment made:  No (it's the weekend)  Home and hospital aquired medications returned to patient: N/A  Patient reports pain was well managed at discharge: Yes

## 2020-07-18 NOTE — PLAN OF CARE
Assessments as charted. B/P: 136/91, T: 98.3, P: 92, R: 16. Rates pain: 3/10 . Incision: well approximated and without signs of infection. Voiding without difficulty. Fundus FIRM and U/U. Lochia: Light. Activity: unrestricted and steady of feet. Infant feeding: Both breast and formula. Infant supplementing formula post breast feed.    LATCH Score:   Latch: 2 - Good Latch  Audible Swallowin - Spontaneous & frequent  Type of Nipple: (Breast/Nipple) 2 - Everted  Comfort: 2 - Soft, Nontender  Hold: 2 - No Assist   Total LATCH Score: 10    Postpartum breastfeeding assessment completed and education provided, see Patient Education Activity.  Items included in the education are:     proper positioning and latch    effectiveness of feeding    manual expression    handling and storing breastmilk    maintenance of breastfeeding for the first 6 months    sign/symptoms of infant feeding issues requiring referral to qualified health care provider  Postpartum care education provided, see Patient Education activity. Patient denies needs. Will monitor.  Fidelina Dinero RN

## 2020-07-18 NOTE — PLAN OF CARE
Assessments as charted. B/P: 132/89, T: 98.4, P: 89, R: 18. Rates pain: 3/10 states good relif of pain with medications ordered. Incision: Healing well. Voiding without difficulty. Fundus firm at U-1. Lochia: Light. Activity: moving with difficulty due to post op surgical pain. Infant feeding: Both breast and formula.     LATCH Score:   Latch: 2 - Good Latch  Audible Swallowin - Spontaneous & frequent  Type of Nipple: (Breast/Nipple) 2 - Everted  Comfort: 2 - Soft, Nontender  Hold: 2 - No Assist   Total LATCH Score:     Postpartum breastfeeding assessment completed and education provided, see Patient Education Activity.  Items included in the education are:     proper positioning and latch    effectiveness of feeding    manual expression    handling and storing breastmilk    maintenance of breastfeeding for the first 6 months    sign/symptoms of infant feeding issues requiring referral to qualified health care provider  Postpartum care education provided, see Patient Education activity. Patient denies needs. Will monitor.  Cuca Mason RN

## 2020-07-29 ENCOUNTER — PRENATAL OFFICE VISIT (OUTPATIENT)
Dept: OBGYN | Facility: OTHER | Age: 25
End: 2020-07-29
Attending: ADVANCED PRACTICE MIDWIFE
Payer: COMMERCIAL

## 2020-07-29 VITALS
WEIGHT: 171 LBS | HEART RATE: 60 BPM | HEIGHT: 62 IN | DIASTOLIC BLOOD PRESSURE: 86 MMHG | SYSTOLIC BLOOD PRESSURE: 126 MMHG | BODY MASS INDEX: 31.47 KG/M2

## 2020-07-29 PROCEDURE — 99207 ZZC NO CHARGE LOS: CPT | Performed by: ADVANCED PRACTICE MIDWIFE

## 2020-07-29 ASSESSMENT — ANXIETY QUESTIONNAIRES
IF YOU CHECKED OFF ANY PROBLEMS ON THIS QUESTIONNAIRE, HOW DIFFICULT HAVE THESE PROBLEMS MADE IT FOR YOU TO DO YOUR WORK, TAKE CARE OF THINGS AT HOME, OR GET ALONG WITH OTHER PEOPLE: NOT DIFFICULT AT ALL
7. FEELING AFRAID AS IF SOMETHING AWFUL MIGHT HAPPEN: NOT AT ALL
3. WORRYING TOO MUCH ABOUT DIFFERENT THINGS: NOT AT ALL
2. NOT BEING ABLE TO STOP OR CONTROL WORRYING: NOT AT ALL
1. FEELING NERVOUS, ANXIOUS, OR ON EDGE: NOT AT ALL
6. BECOMING EASILY ANNOYED OR IRRITABLE: NOT AT ALL
GAD7 TOTAL SCORE: 0
5. BEING SO RESTLESS THAT IT IS HARD TO SIT STILL: NOT AT ALL

## 2020-07-29 ASSESSMENT — PATIENT HEALTH QUESTIONNAIRE - PHQ9
5. POOR APPETITE OR OVEREATING: NOT AT ALL
SUM OF ALL RESPONSES TO PHQ QUESTIONS 1-9: 0

## 2020-07-29 ASSESSMENT — PAIN SCALES - GENERAL: PAINLEVEL: NO PAIN (0)

## 2020-07-29 ASSESSMENT — MIFFLIN-ST. JEOR: SCORE: 1488.9

## 2020-07-29 NOTE — NURSING NOTE
"Chief Complaint   Patient presents with     Prenatal Care       Initial /86   Pulse 60   Ht 1.575 m (5' 2\")   Wt 77.6 kg (171 lb)   LMP 10/29/2019   BMI 31.28 kg/m   Estimated body mass index is 31.28 kg/m  as calculated from the following:    Height as of this encounter: 1.575 m (5' 2\").    Weight as of this encounter: 77.6 kg (171 lb).  Medication Reconciliation: complete  Carmela Mccoy LPN    "

## 2020-07-29 NOTE — PROGRESS NOTES
"May Maria is a 24 year old female  /86   Pulse 60   Ht 1.575 m (5' 2\")   Wt 77.6 kg (171 lb)   LMP 10/29/2019   BMI 31.28 kg/m    Pleasant without acute distress  Incision clean  Breast feeding:  y        Baby name: Chico   Spontaneous vaginal delivery: n  PCS  PHQ9:  0  Bleeding:  Light to moderate  Vulva:  na  Family planning:  Abstinence then progesterone only pill  Other concerns:  Education on self-care post-op    Assessment:    PP 2 w  Post-op 2 w  Incision clean  BF      Plan:   Continue BF  Return to office: 4 wks for PP care    Greater than 20 were spent with this patient  Christopher DUEÑAS CNM    "

## 2020-07-30 ASSESSMENT — ANXIETY QUESTIONNAIRES: GAD7 TOTAL SCORE: 0

## 2020-08-26 ENCOUNTER — PRENATAL OFFICE VISIT (OUTPATIENT)
Dept: OBGYN | Facility: OTHER | Age: 25
End: 2020-08-26
Attending: ADVANCED PRACTICE MIDWIFE
Payer: COMMERCIAL

## 2020-08-26 VITALS
HEIGHT: 62 IN | BODY MASS INDEX: 31.1 KG/M2 | DIASTOLIC BLOOD PRESSURE: 76 MMHG | SYSTOLIC BLOOD PRESSURE: 106 MMHG | WEIGHT: 169 LBS | HEART RATE: 60 BPM

## 2020-08-26 DIAGNOSIS — Z30.09 FAMILY PLANNING: ICD-10-CM

## 2020-08-26 PROBLEM — R60.0 PERIPHERAL EDEMA: Status: RESOLVED | Noted: 2020-06-27 | Resolved: 2020-08-26

## 2020-08-26 PROBLEM — E66.812 CLASS 2 OBESITY DUE TO EXCESS CALORIES WITHOUT SERIOUS COMORBIDITY WITH BODY MASS INDEX (BMI) OF 35.0 TO 35.9 IN ADULT: Status: RESOLVED | Noted: 2020-06-27 | Resolved: 2020-08-26

## 2020-08-26 PROBLEM — E66.09 CLASS 2 OBESITY DUE TO EXCESS CALORIES WITHOUT SERIOUS COMORBIDITY WITH BODY MASS INDEX (BMI) OF 35.0 TO 35.9 IN ADULT: Status: RESOLVED | Noted: 2020-06-27 | Resolved: 2020-08-26

## 2020-08-26 PROCEDURE — 99207 ZZC POST PARTUM EXAM: CPT | Performed by: ADVANCED PRACTICE MIDWIFE

## 2020-08-26 RX ORDER — ACETAMINOPHEN AND CODEINE PHOSPHATE 120; 12 MG/5ML; MG/5ML
0.35 SOLUTION ORAL DAILY
Qty: 90 TABLET | Refills: 3 | Status: SHIPPED | OUTPATIENT
Start: 2020-08-26 | End: 2020-10-18

## 2020-08-26 ASSESSMENT — ANXIETY QUESTIONNAIRES
1. FEELING NERVOUS, ANXIOUS, OR ON EDGE: NOT AT ALL
7. FEELING AFRAID AS IF SOMETHING AWFUL MIGHT HAPPEN: NOT AT ALL
5. BEING SO RESTLESS THAT IT IS HARD TO SIT STILL: NOT AT ALL
IF YOU CHECKED OFF ANY PROBLEMS ON THIS QUESTIONNAIRE, HOW DIFFICULT HAVE THESE PROBLEMS MADE IT FOR YOU TO DO YOUR WORK, TAKE CARE OF THINGS AT HOME, OR GET ALONG WITH OTHER PEOPLE: NOT DIFFICULT AT ALL
6. BECOMING EASILY ANNOYED OR IRRITABLE: NOT AT ALL
GAD7 TOTAL SCORE: 0
2. NOT BEING ABLE TO STOP OR CONTROL WORRYING: NOT AT ALL
3. WORRYING TOO MUCH ABOUT DIFFERENT THINGS: NOT AT ALL

## 2020-08-26 ASSESSMENT — PATIENT HEALTH QUESTIONNAIRE - PHQ9
SUM OF ALL RESPONSES TO PHQ QUESTIONS 1-9: 3
5. POOR APPETITE OR OVEREATING: NOT AT ALL

## 2020-08-26 ASSESSMENT — MIFFLIN-ST. JEOR: SCORE: 1479.83

## 2020-08-26 ASSESSMENT — PAIN SCALES - GENERAL: PAINLEVEL: NO PAIN (0)

## 2020-08-26 NOTE — NURSING NOTE
"Chief Complaint   Patient presents with     Post Partum Exam       Initial /76   Pulse 60   Ht 1.575 m (5' 2\")   Wt 76.7 kg (169 lb)   LMP 10/29/2019   BMI 30.91 kg/m   Estimated body mass index is 30.91 kg/m  as calculated from the following:    Height as of this encounter: 1.575 m (5' 2\").    Weight as of this encounter: 76.7 kg (169 lb).  Medication Reconciliation: complete  Carmela Mccoy LPN    "

## 2020-08-26 NOTE — PATIENT INSTRUCTIONS
Return to office in one year for well woman care and as needed.    Thank you for allowing Christopher DUEÑAS CNM and our OB team to participate in your care.  If you have a scheduling or an appointment question please contact [facility] Unit Coordinator at their direct line [phone].   ALL nursing questions or concerns can be directed to your OB nurse at: [phone] Dione Hamilton/Carmela

## 2020-08-26 NOTE — PROGRESS NOTES
"May Maria is a 24 year old female is 6 weeks post partum  /76   Pulse 60   Ht 1.575 m (5' 2\")   Wt 76.7 kg (169 lb)   LMP 10/29/2019   BMI 30.91 kg/m    Pleasant without acute distress  Incision clean  Breast feeding:  y        Baby name: Chico   Spontaneous vaginal delivery: n  PCS   PHQ9:  3  Bleeding:  none  Vulva:  na  Family planning:  Progesterone pill  Other concerns:  n    Assessment:    PP 6 week  Post-op C/S 6 w  Incision clean  BF  Family planning:  Request Progesterone only pill (POP0    Plan:   Assess incision  Micronor PO daily    Greater than 20 were spent with this patient on PP talon Anthony, SPENSER, CNM    "

## 2020-08-27 ASSESSMENT — ANXIETY QUESTIONNAIRES: GAD7 TOTAL SCORE: 0

## 2020-10-18 ENCOUNTER — MYC REFILL (OUTPATIENT)
Dept: OBGYN | Facility: OTHER | Age: 25
End: 2020-10-18

## 2020-10-18 DIAGNOSIS — Z30.09 FAMILY PLANNING: ICD-10-CM

## 2020-10-20 RX ORDER — ACETAMINOPHEN AND CODEINE PHOSPHATE 120; 12 MG/5ML; MG/5ML
0.35 SOLUTION ORAL DAILY
Qty: 90 TABLET | Refills: 3 | Status: SHIPPED | OUTPATIENT
Start: 2020-10-20 | End: 2021-01-14

## 2020-12-20 ENCOUNTER — HEALTH MAINTENANCE LETTER (OUTPATIENT)
Age: 25
End: 2020-12-20

## 2021-01-14 ENCOUNTER — OFFICE VISIT (OUTPATIENT)
Dept: OBGYN | Facility: OTHER | Age: 26
End: 2021-01-14
Attending: ADVANCED PRACTICE MIDWIFE
Payer: COMMERCIAL

## 2021-01-14 VITALS
DIASTOLIC BLOOD PRESSURE: 62 MMHG | SYSTOLIC BLOOD PRESSURE: 107 MMHG | HEIGHT: 62 IN | WEIGHT: 169 LBS | BODY MASS INDEX: 31.1 KG/M2

## 2021-01-14 DIAGNOSIS — Z30.09 ENCOUNTER FOR COUNSELING REGARDING CONTRACEPTION: ICD-10-CM

## 2021-01-14 DIAGNOSIS — Z12.4 ENCOUNTER FOR SCREENING FOR CERVICAL CANCER: ICD-10-CM

## 2021-01-14 DIAGNOSIS — Z01.419 WELL WOMAN EXAM WITH ROUTINE GYNECOLOGICAL EXAM: Primary | ICD-10-CM

## 2021-01-14 DIAGNOSIS — Z23 NEED FOR PROPHYLACTIC VACCINATION AND INOCULATION AGAINST INFLUENZA: ICD-10-CM

## 2021-01-14 DIAGNOSIS — R30.0 DYSURIA: ICD-10-CM

## 2021-01-14 DIAGNOSIS — Z34.00 SUPERVISION OF NORMAL FIRST PREGNANCY, ANTEPARTUM: ICD-10-CM

## 2021-01-14 LAB
ALBUMIN UR-MCNC: NEGATIVE MG/DL
APPEARANCE UR: CLEAR
BACTERIA #/AREA URNS HPF: ABNORMAL /HPF
BILIRUB UR QL STRIP: NEGATIVE
COLOR UR AUTO: YELLOW
GLUCOSE UR STRIP-MCNC: NEGATIVE MG/DL
HGB UR QL STRIP: ABNORMAL
KETONES UR STRIP-MCNC: NEGATIVE MG/DL
LEUKOCYTE ESTERASE UR QL STRIP: NEGATIVE
NITRATE UR QL: NEGATIVE
NON-SQ EPI CELLS #/AREA URNS LPF: ABNORMAL /LPF
PH UR STRIP: 6.5 PH (ref 5–7)
RBC #/AREA URNS AUTO: ABNORMAL /HPF
SOURCE: ABNORMAL
SP GR UR STRIP: 1.02 (ref 1–1.03)
UROBILINOGEN UR STRIP-ACNC: 0.2 EU/DL (ref 0.2–1)
WBC #/AREA URNS AUTO: ABNORMAL /HPF

## 2021-01-14 PROCEDURE — G0123 SCREEN CERV/VAG THIN LAYER: HCPCS | Mod: ZL | Performed by: ADVANCED PRACTICE MIDWIFE

## 2021-01-14 PROCEDURE — 90471 IMMUNIZATION ADMIN: CPT

## 2021-01-14 PROCEDURE — 99395 PREV VISIT EST AGE 18-39: CPT | Performed by: ADVANCED PRACTICE MIDWIFE

## 2021-01-14 PROCEDURE — 81001 URINALYSIS AUTO W/SCOPE: CPT | Mod: ZL | Performed by: ADVANCED PRACTICE MIDWIFE

## 2021-01-14 ASSESSMENT — ANXIETY QUESTIONNAIRES
1. FEELING NERVOUS, ANXIOUS, OR ON EDGE: MORE THAN HALF THE DAYS
GAD7 TOTAL SCORE: 7
6. BECOMING EASILY ANNOYED OR IRRITABLE: NOT AT ALL
IF YOU CHECKED OFF ANY PROBLEMS ON THIS QUESTIONNAIRE, HOW DIFFICULT HAVE THESE PROBLEMS MADE IT FOR YOU TO DO YOUR WORK, TAKE CARE OF THINGS AT HOME, OR GET ALONG WITH OTHER PEOPLE: SOMEWHAT DIFFICULT
3. WORRYING TOO MUCH ABOUT DIFFERENT THINGS: SEVERAL DAYS
7. FEELING AFRAID AS IF SOMETHING AWFUL MIGHT HAPPEN: NOT AT ALL
2. NOT BEING ABLE TO STOP OR CONTROL WORRYING: SEVERAL DAYS
5. BEING SO RESTLESS THAT IT IS HARD TO SIT STILL: SEVERAL DAYS
4. TROUBLE RELAXING: MORE THAN HALF THE DAYS

## 2021-01-14 ASSESSMENT — PATIENT HEALTH QUESTIONNAIRE - PHQ9: SUM OF ALL RESPONSES TO PHQ QUESTIONS 1-9: 6

## 2021-01-14 ASSESSMENT — MIFFLIN-ST. JEOR: SCORE: 1474.83

## 2021-01-14 ASSESSMENT — PAIN SCALES - GENERAL: PAINLEVEL: NO PAIN (0)

## 2021-01-14 NOTE — NURSING NOTE
"Chief Complaint   Patient presents with     Gyn Exam       Initial /62 (BP Location: Left arm, Patient Position: Chair, Cuff Size: Adult Regular)   Ht 1.575 m (5' 2\")   Wt 76.7 kg (169 lb)   BMI 30.91 kg/m   Estimated body mass index is 30.91 kg/m  as calculated from the following:    Height as of this encounter: 1.575 m (5' 2\").    Weight as of this encounter: 76.7 kg (169 lb).  Medication Reconciliation: complete  Joy Carter LPN    "

## 2021-01-14 NOTE — PROGRESS NOTES
ANNUAL    April is a 25 year old  female who presents for annual exam.   Youngest child 6 months  Largest Child 5 lb 13.8 oz  GDM y diet controlled  Hypertension y  No LMP recorded.  Menses:  Cycles 28 days When did they start 15 How many days bleed 4 days with 2 heavy pain severe Contraception condoms.  Planning pregnancy: n  Concerns in addition to routine health maintenance?  contraception.  GYNECOLOGIC HISTORY:   Surgery: PCS  History sexually transmitted infections:No STD history   Safe?  y  STI testing offered?  y  History of abnormal Pap smear:  n  Problems with sex? (bleeding/pain) n  Family history of: breast cancer: mgm   Uterine cancer: n ovarian cancer: n   colon cancer: n    HEALTH MAINTENANCE:  Cholesterol: (No results found for: CHOL History of abnormal lipids: n  Mammo: n . History of abnormal Mammo: na   Regular Self Breast Exams: y Calcium/Vitamin D or Vitamin: n Exercise y, walking, treadmill    TSH: (  TSH   Date Value Ref Range Status   01/15/2019 1.42 0.40 - 4.00 mU/L Final    )  Pap; (  Lab Results   Component Value Date    PAP NIL 2019    )    HISTORY:  OB History    Para Term  AB Living   1 1 1 0 0 1   SAB TAB Ectopic Multiple Live Births   0 0 0 0 1      # Outcome Date GA Lbr Rayshawn/2nd Weight Sex Delivery Anes PTL Lv   1 Term 20 37w2d  2.66 kg (5 lb 13.8 oz) M  EPI N ALEXIS      Name: EDIL REMY-APRIL      Apgar1: 8  Apgar5: 9     Past Medical History:   Diagnosis Date     Atrophic kidney      Depressive disorder     Therapy during childhood     Shift work sleep disorder      Past Surgical History:   Procedure Laterality Date      SECTION N/A 2020    Procedure:  SECTION;  Surgeon: Magdi Cannon MD;  Location: [facility]     PE TUBES Bilateral     years      Family History   Problem Relation Age of Onset     Other - See Comments Mother         bells palsy post auto accident     Heart Disease Father         s/p stents     Asthma Father      Cancer  Father      Social History     Socioeconomic History     Marital status: Single     Spouse name: None     Number of children: None     Years of education: None     Highest education level: None   Occupational History     None   Social Needs     Financial resource strain: None     Food insecurity     Worry: None     Inability: None     Transportation needs     Medical: None     Non-medical: None   Tobacco Use     Smoking status: Former Smoker     Packs/day: 0.25     Types: Cigarettes     Start date: 3/15/2017     Quit date: 3/15/2018     Years since quittin.8     Smokeless tobacco: Never Used   Substance and Sexual Activity     Alcohol use: No     Alcohol/week: 0.0 standard drinks     Drug use: No     Sexual activity: Not Currently     Partners: Male     Birth control/protection: Pill   Lifestyle     Physical activity     Days per week: None     Minutes per session: None     Stress: None   Relationships     Social connections     Talks on phone: None     Gets together: None     Attends [employer] service: None     Active member of club or organization: None     Attends meetings of clubs or organizations: None     Relationship status: None     Intimate partner violence     Fear of current or ex partner: None     Emotionally abused: None     Physically abused: None     Forced sexual activity: None   Other Topics Concern     Parent/sibling w/ CABG, MI or angioplasty before 65F 55M? Yes     Comment: MI age late 30's for father   Social History Narrative     None       Current Outpatient Medications:      acetaminophen (TYLENOL) 325 MG tablet, Take 325-650 mg by mouth every 6 hours as needed for mild pain, Disp: , Rfl:      Allergies   Allergen Reactions     Pineapple Hives and Swelling     Throat swelling       Past medical, surgical, social and family history were reviewed and updated in [facility].    ROS:    CONSTITUTIONAL:     NEGATIVE for fever, chills, change in weight  INTEGUMENTARY/SKIN:       NEGATIVE for worrisome  "rashes, moles or lesions  EYES:     NEGATIVE for vision changes or irritation  ENT/MOUTH: NEGATIVE for ear, mouth and throat problems  RESP:     NEGATIVE for significant cough or SOB  CV:   NEGATIVE for chest pain, palpitations or peripheral edema  GI:     NEGATIVE for nausea, abdominal pain, heartburn, or change in bowel habits  :   NEGATIVE for frequency, hematuria, vaginal discharge, or irregular bleeding,incontinence. Hx of atrophic right kidney.  Dysuria   MUSCULOSKELETAL:     NEGATIVE for significant arthralgias or myalgia  NEURO:      NEGATIVE for weakness, dizziness or paresthesias  ENDOCRINE:      NEGATIVE for temperature intolerance, skin/hair changes  PSYCHIATRIC:      NEGATIVE for changes in mood or affect. Trouble sleeping    EXAM:   /62 (BP Location: Left arm, Patient Position: Chair, Cuff Size: Adult Regular)   Ht 1.575 m (5' 2\")   Wt 76.7 kg (169 lb)   BMI 30.91 kg/m     BMI: Body mass index is 30.91 kg/m .  Constitutional: healthy, alert and no distress  Head: Normocephalic. No masses, lesions, tenderness or abnormalities  Neck: Neck supple. Trachea midline. No adenopathy. Thyroid symmetric, normal size.   Cardiovascular: RRR.   Respiratory: lungs clear   Breast: Breasts reveal mild symmetric fibrocystic densities, but there are no dominant, discrete, fixed or suspicious masses found.  Gastrointestinal: Abdomen soft, non-tender, non-distended. No masses, organomegaly.  :  Vulva:  No external lesions, normal female hair distribution, no inguinal adenopathy.    Urethra:  Midline, non-tender, well supported, no discharge  Vagina:  Moist, pink, no abnormal discharge, no lesions  Cervix: clean   Uterus:  Normal size, non-tender, freely mobile  Ovaries:  No masses appreciated  Rectal Exam: deferred  Musculoskeletal: extremities normal  Skin: no suspicious lesions or rashes  Psychiatric: Affect appropriate, cooperative,mentation appears normal.     COUNSELING:   regular exercise  healthy " diet/nutrition  Immunizations   contraception  family planning:  Options, risks, benefits, side effects, effectiveness  Folic Acid Counseling   reports that she quit smoking about 2 years ago. Her smoking use included cigarettes. She started smoking about 3 years ago. She smoked 0.25 packs per day. She has never used smokeless tobacco.  Tobacco Cessation Action Plan: na  Body mass index is 30.91 kg/m .  Weight management plan: healthy eating and exercise  FRAX Risk Assessment    ASSESSMENT:  25 year old female with satisfactory annual exam  Need of cervical cancer screening  Hx of PCS/ 6 month old/ not breastfeeding  Family planning:  Condoms  Reports mood changes with the progesterone only pill and Depo  Hx of atrophic right kidney/ Needs to schedule appt with nephrologist   Dysuria  Hx of difficulty sleepng  Need of influenza vaccine  PLAN:   Pap with High Risk hpv  UA with micro reflex to culture  Pt to schedule appt with nephrologist (will call today)  Family planning:  Condoms  Contraception counseling  Flu shot  Schedule appt for IUD insertion:  Instructed NO sexual intercourse for 7 days prior to insertion  Follow up with PCP for sleeping issues      Return to office: 1 year for well woman care and as needed    Total visit greater than 35 minutes with 25 minutes spent discussing well woman care, health promotion, and disease prevention.    SPENSER Jim, CNM

## 2021-01-14 NOTE — PATIENT INSTRUCTIONS
Return to office in one year for well woman care and as needed.    Thank you for allowing Christopher DUEÑAS CNM and our OB team to participate in your care.  If you have a scheduling or an appointment question please contact [facility] Coordinator at their direct line [phone].   ALL nursing questions or concerns can be directed to your OB nurse at: [phone] Dione Hamilton/Carmela

## 2021-01-15 ASSESSMENT — ANXIETY QUESTIONNAIRES: GAD7 TOTAL SCORE: 7

## 2021-01-18 LAB
COPATH REPORT: NORMAL
PAP: NORMAL

## 2021-01-18 NOTE — PROGRESS NOTES
Assessment & Plan     Diet controlled gestational diabetes mellitus (GDM) in third trimester  Noted in chart for monitoring. Pt is higher risk of DM2    Shift work sleep disorder / Primary insomnia  Pt has struggled with sleep maintenance for years. 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 Continues to struggle and requesting refill of ambien. She does have 6 mo old at home who is sleeping through the night. Has not had sleep behaviors with this medication in the past.   - zolpidem (AMBIEN) 5 MG tablet; Take 1 tablet (5 mg) by mouth nightly as needed for sleep    Flank pain / Microhematuria  Persistent right lower quadrant and flank pain that is intermittent. Unclear cause. Seems to correlate with hematuria somewhat. Cysto in the past has been normal. Has atrophic kidney. Renal US negative during pregnancy. Will rule out stone disease with CT and get her back to see urology. Unclear if further work up indicated.   - CT Abdomen Pelvis w/o Contrast; Future    20 minutes spent on the date of the encounter doing chart review, patient visit and documentation     Jia Kirk MD  [facility] - [facility]     April is a 25 year old who presents to clinic today for the following health issues:    HPI       Concern - KIDNEY  Onset: follow up  Description: right renal atrophy   Intensity: mild  Progression of Symptoms:  intermittent  Accompanying Signs & Symptoms: hematuria, abdominal pain and flank pain, kidney infections, uti. Right flank pain/pressure when urinating  Previous history of similar problem: yes   Precipitating factors:        Worsened by: none   Alleviating factors:        Improved by: tylenol- minimal relief   Therapies tried and outcome: multiple abx.     Insomnia  Onset/Duration: chronic  Description:   Frequency of insomnia:  nightly  Time to fall asleep (sleep latency): 45 minutes  Middle  "of night awakening:  YES  Early morning awakening:  YES  Progression of Symptoms:  Worsening since stopping ambien.   Accompanying Signs & Symptoms:  Daytime sleepiness/napping: YES  Excessive snoring/apnea: no  Restless legs: no  Waking to urinate: no  Chronic pain:  no  Depression symptoms (if yes, do PHQ9): no  Anxiety symptoms (if yes, do MARIA FERNANDA-7): no  History:  Prior Insomnia: YES  New stressful situation: YES- new baby, but no PDD, baby sleeping very well.   Precipitating factors:   Caffeine intake: YES, AM  OTC decongestants: no  Any new medications: no  Alleviating factors:  Self medicating (alcohol, etc.):  no  Stress-reduction (exercise, yoga, meditation etc): YES  Therapies tried and outcome: unisom, trazodone, melatonin, not effective. Ambien, effective, no side effects at 5mg dose.       Review of Systems   Constitutional, HEENT, cardiovascular, pulmonary, gi and gu systems are negative, except as otherwise noted.      Objective    /88 (Patient Position: Sitting)   Pulse 96   Ht 1.575 m (5' 2\")   Wt 79.8 kg (176 lb)   SpO2 99%   BMI 32.19 kg/m    Body mass index is 32.19 kg/m .  Physical Exam   GENERAL: alert and no distress  NECK: no adenopathy, no asymmetry, masses, or scars and thyroid normal to palpation  RESP: lungs clear to auscultation - no rales, rhonchi or wheezes  CV: regular rates and rhythm, normal S1 S2, no S3 or S4, no murmur, click or rub and no peripheral edema  ABDOMEN: soft, nontender, no hepatosplenomegaly, no masses and bowel sounds normal  MS: no gross musculoskeletal defects noted, no edema  PSYCH: mentation appears normal, affect normal/bright    No results found for any visits on 01/19/21.      "

## 2021-01-19 ENCOUNTER — OFFICE VISIT (OUTPATIENT)
Dept: FAMILY MEDICINE | Facility: OTHER | Age: 26
End: 2021-01-19
Attending: FAMILY MEDICINE
Payer: COMMERCIAL

## 2021-01-19 VITALS
DIASTOLIC BLOOD PRESSURE: 88 MMHG | WEIGHT: 176 LBS | BODY MASS INDEX: 32.39 KG/M2 | HEART RATE: 96 BPM | OXYGEN SATURATION: 99 % | HEIGHT: 62 IN | SYSTOLIC BLOOD PRESSURE: 130 MMHG

## 2021-01-19 DIAGNOSIS — G47.26 SHIFT WORK SLEEP DISORDER: ICD-10-CM

## 2021-01-19 DIAGNOSIS — F51.01 PRIMARY INSOMNIA: Primary | ICD-10-CM

## 2021-01-19 DIAGNOSIS — R10.9 FLANK PAIN: ICD-10-CM

## 2021-01-19 DIAGNOSIS — O24.410 DIET CONTROLLED GESTATIONAL DIABETES MELLITUS (GDM) IN THIRD TRIMESTER: ICD-10-CM

## 2021-01-19 DIAGNOSIS — R31.29 MICROHEMATURIA: ICD-10-CM

## 2021-01-19 DIAGNOSIS — Z23 NEED FOR VACCINATION: ICD-10-CM

## 2021-01-19 PROBLEM — Z34.00 SUPERVISION OF NORMAL FIRST PREGNANCY, ANTEPARTUM: Status: RESOLVED | Noted: 2019-12-30 | Resolved: 2021-01-19

## 2021-01-19 PROBLEM — Z34.90 ENCOUNTER FOR PLANNED INDUCTION OF LABOR: Status: RESOLVED | Noted: 2020-07-11 | Resolved: 2021-01-19

## 2021-01-19 PROBLEM — Z36.89 ENCOUNTER FOR TRIAGE IN PREGNANT PATIENT: Status: RESOLVED | Noted: 2020-06-13 | Resolved: 2021-01-19

## 2021-01-19 PROCEDURE — 99214 OFFICE O/P EST MOD 30 MIN: CPT | Performed by: FAMILY MEDICINE

## 2021-01-19 PROCEDURE — G0463 HOSPITAL OUTPT CLINIC VISIT: HCPCS | Mod: 25

## 2021-01-19 PROCEDURE — G0463 HOSPITAL OUTPT CLINIC VISIT: HCPCS

## 2021-01-19 PROCEDURE — 90651 9VHPV VACCINE 2/3 DOSE IM: CPT

## 2021-01-19 RX ORDER — ZOLPIDEM TARTRATE 5 MG/1
5 TABLET ORAL
Qty: 90 TABLET | Refills: 0 | Status: SHIPPED | OUTPATIENT
Start: 2021-01-19 | End: 2021-08-06

## 2021-01-19 ASSESSMENT — PAIN SCALES - GENERAL: PAINLEVEL: MODERATE PAIN (5)

## 2021-01-19 ASSESSMENT — MIFFLIN-ST. JEOR: SCORE: 1506.58

## 2021-01-19 NOTE — Clinical Note
This note wont close. It keeps saying to document why I am not sharing, but I do want to share the note. Thanks.

## 2021-01-19 NOTE — Clinical Note
I have messaged Justin lagos this note x 2. I cannot close without documenting why I am not sharing, however, I am sharing. This has occurred a couple times in the last week. Could you help me out wit hthis?

## 2021-01-19 NOTE — Clinical Note
Can you help me close this. I do want to share the note, but it sill is requiring that I document a reason for not sharing.

## 2021-01-19 NOTE — NURSING NOTE
"Chief Complaint   Patient presents with     Kidney Problem       Initial /88 (Patient Position: Sitting)   Pulse 96   Ht 1.575 m (5' 2\")   Wt 79.8 kg (176 lb)   SpO2 99%   BMI 32.19 kg/m   Estimated body mass index is 32.19 kg/m  as calculated from the following:    Height as of this encounter: 1.575 m (5' 2\").    Weight as of this encounter: 79.8 kg (176 lb).  Medication Reconciliation: complete  Anita Jade LPN  "

## 2021-01-21 ENCOUNTER — OFFICE VISIT (OUTPATIENT)
Dept: OBGYN | Facility: OTHER | Age: 26
End: 2021-01-21
Attending: ADVANCED PRACTICE MIDWIFE
Payer: COMMERCIAL

## 2021-01-21 VITALS
DIASTOLIC BLOOD PRESSURE: 70 MMHG | WEIGHT: 176 LBS | BODY MASS INDEX: 32.39 KG/M2 | SYSTOLIC BLOOD PRESSURE: 116 MMHG | HEIGHT: 62 IN

## 2021-01-21 DIAGNOSIS — Z30.430 ENCOUNTER FOR INSERTION OF INTRAUTERINE CONTRACEPTIVE DEVICE (IUD): Primary | ICD-10-CM

## 2021-01-21 LAB
HCG UR QL: NEGATIVE
SPECIMEN SOURCE: NORMAL
WET PREP SPEC: NORMAL

## 2021-01-21 PROCEDURE — 87210 SMEAR WET MOUNT SALINE/INK: CPT | Mod: ZL | Performed by: ADVANCED PRACTICE MIDWIFE

## 2021-01-21 PROCEDURE — 87591 N.GONORRHOEAE DNA AMP PROB: CPT | Mod: ZL | Performed by: ADVANCED PRACTICE MIDWIFE

## 2021-01-21 PROCEDURE — 87491 CHLMYD TRACH DNA AMP PROBE: CPT | Mod: ZL | Performed by: ADVANCED PRACTICE MIDWIFE

## 2021-01-21 PROCEDURE — 58300 INSERT INTRAUTERINE DEVICE: CPT

## 2021-01-21 PROCEDURE — 58300 INSERT INTRAUTERINE DEVICE: CPT | Performed by: ADVANCED PRACTICE MIDWIFE

## 2021-01-21 PROCEDURE — G0463 HOSPITAL OUTPT CLINIC VISIT: HCPCS | Mod: 25

## 2021-01-21 PROCEDURE — 81025 URINE PREGNANCY TEST: CPT | Mod: ZL | Performed by: ADVANCED PRACTICE MIDWIFE

## 2021-01-21 RX ADMIN — LEVONORGESTREL 13.5 MG: 13.5 INTRAUTERINE DEVICE INTRAUTERINE at 11:42

## 2021-01-21 ASSESSMENT — MIFFLIN-ST. JEOR: SCORE: 1506.58

## 2021-01-21 ASSESSMENT — PAIN SCALES - GENERAL: PAINLEVEL: NO PAIN (0)

## 2021-01-21 NOTE — NURSING NOTE
"Chief Complaint   Patient presents with     IUD       Initial /70 (BP Location: Left arm, Patient Position: Chair, Cuff Size: Adult Regular)   Ht 1.575 m (5' 2\")   Wt 79.8 kg (176 lb)   BMI 32.19 kg/m   Estimated body mass index is 32.19 kg/m  as calculated from the following:    Height as of this encounter: 1.575 m (5' 2\").    Weight as of this encounter: 79.8 kg (176 lb).  Medication Reconciliation: complete  Joy Carter LPN    "

## 2021-01-21 NOTE — PATIENT INSTRUCTIONS
Return to office in 3 weeks for follow up care and as needed.    Thank you for allowing Christopher DUEÑAS CNM and our OB team to participate in your care.  If you have a scheduling or an appointment question please contact [facility] Coordinator at their direct line [phone].   ALL nursing questions or concerns can be directed to your OB nurse at: [phone] Dione Hamilton/Carmela

## 2021-01-21 NOTE — PROGRESS NOTES
"CC:  IUD insertion for family planning or bleeding control  HPI:  April YURIY Maria is a 25 year old female No LMP recorded.  No other c/o.  She is here for an IUD insertion. Patient has verbalized understanding of risks and benefits and has signed the consent form.          Prior ectopic n  Prior CT n    Allergies: Pineapple    EXAM:  Blood pressure 116/70, height 1.575 m (5' 2\"), weight 79.8 kg (176 lb), unknown if currently breastfeeding.  General - pleasant female in no acute distress.  Pelvic - External Genitalia: normal adult female; Bartholin,urethral and [place]: within normal limits,   Vagina: well rugated, no discharge,   Cervix: no lesions or Cervical Motion Tenderness,   Uterus: firm, normal sized and nontender, Adnexae: no masses or tenderness.    PROCEDURE:  After informed consent was obtained from the patient, a speculum was placed in the vagina to visualized the cervix  Tenaculum was placed at the 12 o'clock.  The Khadijah  IUD was then placed in the usual fashion.  Strings were clipped about 2-3 cm from the cervical os.  Tenaculum was removed and cervix was hemostatic. There were no complications. The patient tolerated the procedure well.    4/10 went to 2/10 immediately after.    ASSESSMENT:  Contraception management  Request IUD  No sex for past month  HCG negative    PLAN:  Khadijah IUD insertion  Wet prep, Gc/CT  Urine hCG    The patient should feel for the IUD strings after her next menses.  If unable to locate them, she should return to clinic for a speculum examination for confirmation that the IUD is in place. Bleeding pattern of this particular IUD was discussed with the patient. She is aware that the IUD will need to be removed in 3 years or PRN.  She is to return in 3 wks  to clinic and for her next annual or PRN.      .  Christopher Anthony, SPENSER, JOANN      "

## 2021-01-22 ENCOUNTER — HOSPITAL ENCOUNTER (OUTPATIENT)
Dept: CT IMAGING | Facility: HOSPITAL | Age: 26
Discharge: HOME | End: 2021-01-22
Attending: FAMILY MEDICINE | Admitting: FAMILY MEDICINE
Payer: COMMERCIAL

## 2021-01-22 DIAGNOSIS — R10.9 FLANK PAIN: ICD-10-CM

## 2021-01-22 LAB
C TRACH DNA SPEC QL NAA+PROBE: NOT DETECTED
N GONORRHOEA DNA SPEC QL NAA+PROBE: NOT DETECTED
SPECIMEN SOURCE: NORMAL

## 2021-01-22 PROCEDURE — 74176 CT ABD & PELVIS W/O CONTRAST: CPT

## 2021-01-25 ENCOUNTER — OFFICE VISIT (OUTPATIENT)
Dept: UROLOGY | Facility: OTHER | Age: 26
End: 2021-01-25
Attending: UROLOGY
Payer: COMMERCIAL

## 2021-01-25 ENCOUNTER — TELEPHONE (OUTPATIENT)
Dept: FAMILY MEDICINE | Facility: OTHER | Age: 26
End: 2021-01-25

## 2021-01-25 VITALS
TEMPERATURE: 97.8 F | HEART RATE: 104 BPM | HEIGHT: 62 IN | BODY MASS INDEX: 32.39 KG/M2 | WEIGHT: 176 LBS | OXYGEN SATURATION: 99 % | DIASTOLIC BLOOD PRESSURE: 67 MMHG | SYSTOLIC BLOOD PRESSURE: 112 MMHG

## 2021-01-25 DIAGNOSIS — R10.11 ABDOMINAL PAIN, RIGHT UPPER QUADRANT: Primary | ICD-10-CM

## 2021-01-25 DIAGNOSIS — K52.9 INFLAMMATION OF SMALL INTESTINE: Primary | ICD-10-CM

## 2021-01-25 PROCEDURE — G0463 HOSPITAL OUTPT CLINIC VISIT: HCPCS | Performed by: COUNSELOR

## 2021-01-25 PROCEDURE — 99213 OFFICE O/P EST LOW 20 MIN: CPT | Performed by: UROLOGY

## 2021-01-25 ASSESSMENT — PAIN SCALES - GENERAL: PAINLEVEL: MODERATE PAIN (4)

## 2021-01-25 ASSESSMENT — ENCOUNTER SYMPTOMS
CONSTIPATION: 1
DIARRHEA: 1
BACK PAIN: 1
FLANK PAIN: 1
NAUSEA: 1

## 2021-01-25 ASSESSMENT — MIFFLIN-ST. JEOR: SCORE: 1506.58

## 2021-01-25 NOTE — LETTER
2021      RE: May Maria  [address] [place] [facility] [address]         History     Chief Complaint:      Flank Pain (Follow up )      HPI   May Maria is a 25 year old female who presents for follow-up of her abdominal pain.  April continues to have persistent right upper quadrant and mid abdominal pain.  When I visited with her in May she was pregnant so now she has delivered and has a little boy named Chico and she is here for further follow-up.  We did a CT scan 21  to rule out any renal abnormalities.  The CT scan shows an atrophic right kidney with a large left kidney consistent with compensatory hypertrophy.  There is a small calcification in the right kidney which appears to be in the parenchyma.  There is no hydronephrosis.  The CT did show some possible enteritis which could be a cause of her abdominal discomfort I expect.  She is scheduled for a colonoscopy for some further evaluation.  He also has some known microscopic hematuria but she underwent an evaluation a year and a half ago with cystoscopy by Dr. Erwin and no abnormalities were noted so she does not need any evaluation for that at this time.    Allergies:    Allergies   Allergen Reactions     Pineapple Hives and Swelling     Throat swelling        Medications:           acetaminophen (TYLENOL) 325 MG tablet       zolpidem (AMBIEN) 5 MG tablet        Problem List:      Patient Active Problem List    Diagnosis Date Noted      delivery delivered 2020     Priority: Medium     Diet controlled gestational diabetes mellitus (GDM) in third trimester 2020     Priority: Medium     Late-onset       GBS bacteriuria 2020     Priority: Medium     Treat with pcn in labor.        Glucose intolerance of pregnancy 2020     Priority: Medium     Glucose intolerance 05/15/2020     Priority: Medium     Microscopic hematuria 2019     Priority: Medium     No anatomic cause noted. Repeat yearly.        Recurrent UTI  "2018     Priority: Medium     Renal atrophy, right 2018     Priority: Medium        Past Medical History:      Past Medical History:   Diagnosis Date     Atrophic kidney      Depressive disorder      Shift work sleep disorder        Past Surgical History:      Past Surgical History:   Procedure Laterality Date      SECTION N/A 2020    Procedure:  SECTION;  Surgeon: Magdi Cannon MD;  Location: [facility]     PE TUBES Bilateral     years        Family History:      Family History   Problem Relation Age of Onset     Other - See Comments Mother         bells palsy post auto accident     Heart Disease Father         s/p stents     Asthma Father      Cancer Father        Social History:    Marital Status:  Single [1]  Social History     Tobacco Use     Smoking status: Former Smoker     Packs/day: 0.25     Types: Cigarettes     Start date: 3/15/2017     Quit date: 3/15/2018     Years since quittin.8     Smokeless tobacco: Never Used   Substance Use Topics     Alcohol use: No     Alcohol/week: 0.0 standard drinks     Drug use: No        Review of Systems   Gastrointestinal: Positive for constipation, diarrhea and nausea.   Genitourinary: Positive for flank pain.   Musculoskeletal: Positive for back pain.   All other systems reviewed and are negative.        Physical Exam   Vitals:  /67 (BP Location: Left arm, Patient Position: Sitting, Cuff Size: Adult Regular)   Pulse 104   Temp 97.8  F (36.6  C) (Tympanic)   Ht 1.575 m (5' 2\")   Wt 79.8 kg (176 lb)   SpO2 99%   BMI 32.19 kg/m        Physical Exam    Study Result    CT ABDOMEN PELVIS W/O CONTRAST     CLINICAL HISTORY: Female, age 25 years,  Flank pain, recurrent stone  disease suspected - right; Flank pain;     Comparison:  CT scan abdomen and pelvis 5/15/2018     TECHNIQUE:  CT was performed of the abdomen and pelvis without   contrast. Sagittal, coronal, axial and MIP reconstructions were  reviewed.      FINDINGS:  Lower " thorax:. Lung bases are clear. Visualized portions of the heart  are normal.     Liver: Normal.     Gallbladder: Normal.     Spleen: Normal.     Pancreas: Normal.     STOMACH: The stomach is moderately distended and grossly normal.  Duodenum is normal as is the distal esophagus.     Adrenal glands: Normal.     Kidneys: Atrophic right kidney and compensatory hypertrophy of the  left kidney again seen.     Ureters: Normal.  Urinary bladder: Normal.     Large and small bowel: There are a few diverticula seen in the distal  colon without acute abnormality. The appendix is normal.     Lymph nodes: No evidence of pathologic lymph node enlargement.     Pelvic organs: Intrauterine device appears satisfactory position. The  left and right ovaries are grossly normal on this noncontrast enhanced  examination. No evidence of free fluid.     Bony structures: Normal.     Inguinal lymph nodes are normal.                                                                      IMPRESSION:   There are number of normal-sized lymph nodes throughout the mesentery  with mild fat stranding adjacent to a number of small bowel loops  suggesting enteritis.     No evidence of radiodense calculus in the ureters or bladder.     Right renal atrophy and compensatory hypertrophy of the left kidney is  again seen. A tiny cortical calcification is seen in the upper pole  the right kidney.     HUNG PAYAN MD     Impression: #1 atrophic right kidney #2 microscopic hematuria  Plan   Plan: I told April that it is likely she was either born with this atrophic kidney or developed it early in her infancy because she has significant hypertrophy of the left kidney.  There is a small calcification in the right kidney which likely is in the parenchyma and probably will never cause any issues but she just needs to be aware of this.  There are no findings on this CT scan from a kidney perspective that would cause her pain.  She does have some questionable  enteritis and she is going to be evaluated for that further.  Because of the microscopic hematuria and the atrophic kidney I recommend she have an annual urinalysis with her primary care and be checked for hypertension.  If she ever has gross hematuria I had be happy to see her back or if the microscopic hematuria becomes worse then she would need repeat evaluation.      No follow-ups on file.    Evonne Davis MD  [facility] [place]              Evonne Davis MD

## 2021-01-25 NOTE — NURSING NOTE
Review of Systems:    Weight loss:    No     Recent fever/chills:  No   Night sweats:   No  Current skin rash:  No   Recent hair loss:  No  Heat intolerance:  No   Cold intolerance:  No  Chest pain:   No   Palpitations:   No  Shortness of breath:  No   Wheezing:   No  Constipation:    YES   Diarrhea:   No   Nausea:   YES   Vomiting:   No   Kidney/side pain:  YES   Back pain:   No  Frequent headaches:  No   Dizziness:     No  Leg swelling:   No   Calf pain:    No    Parents, brothers or sisters with history of kidney cancer?   No  Parents, brothers or sisters with history of bladder cancer: No    Lety Sanders LPN

## 2021-01-25 NOTE — PROGRESS NOTES
History     Chief Complaint:      Flank Pain (Follow up )      HPI   May Maria is a 25 year old female who presents for follow-up of her abdominal pain.  April continues to have persistent right upper quadrant and mid abdominal pain.  When I visited with her in May she was pregnant so now she has delivered and has a little boy named Chico and she is here for further follow-up.  We did a CT scan 21  to rule out any renal abnormalities.  The CT scan shows an atrophic right kidney with a large left kidney consistent with compensatory hypertrophy.  There is a small calcification in the right kidney which appears to be in the parenchyma.  There is no hydronephrosis.  The CT did show some possible enteritis which could be a cause of her abdominal discomfort I expect.  She is scheduled for a colonoscopy for some further evaluation.  He also has some known microscopic hematuria but she underwent an evaluation a year and a half ago with cystoscopy by Dr. Erwin and no abnormalities were noted so she does not need any evaluation for that at this time.    Allergies:    Allergies   Allergen Reactions     Pineapple Hives and Swelling     Throat swelling        Medications:           acetaminophen (TYLENOL) 325 MG tablet       zolpidem (AMBIEN) 5 MG tablet        Problem List:      Patient Active Problem List    Diagnosis Date Noted      delivery delivered 2020     Priority: Medium     Diet controlled gestational diabetes mellitus (GDM) in third trimester 2020     Priority: Medium     Late-onset       GBS bacteriuria 2020     Priority: Medium     Treat with pcn in labor.        Glucose intolerance of pregnancy 2020     Priority: Medium     Glucose intolerance 05/15/2020     Priority: Medium     Microscopic hematuria 2019     Priority: Medium     No anatomic cause noted. Repeat yearly.        Recurrent UTI 2018     Priority: Medium     Renal atrophy, right 2018      "Priority: Medium        Past Medical History:      Past Medical History:   Diagnosis Date     Atrophic kidney      Depressive disorder      Shift work sleep disorder        Past Surgical History:      Past Surgical History:   Procedure Laterality Date      SECTION N/A 2020    Procedure:  SECTION;  Surgeon: Magdi Cannon MD;  Location: [facility]     PE TUBES Bilateral     years        Family History:      Family History   Problem Relation Age of Onset     Other - See Comments Mother         bells palsy post auto accident     Heart Disease Father         s/p stents     Asthma Father      Cancer Father        Social History:    Marital Status:  Single [1]  Social History     Tobacco Use     Smoking status: Former Smoker     Packs/day: 0.25     Types: Cigarettes     Start date: 3/15/2017     Quit date: 3/15/2018     Years since quittin.8     Smokeless tobacco: Never Used   Substance Use Topics     Alcohol use: No     Alcohol/week: 0.0 standard drinks     Drug use: No        Review of Systems   Gastrointestinal: Positive for constipation, diarrhea and nausea.   Genitourinary: Positive for flank pain.   Musculoskeletal: Positive for back pain.   All other systems reviewed and are negative.        Physical Exam   Vitals:  /67 (BP Location: Left arm, Patient Position: Sitting, Cuff Size: Adult Regular)   Pulse 104   Temp 97.8  F (36.6  C) (Tympanic)   Ht 1.575 m (5' 2\")   Wt 79.8 kg (176 lb)   SpO2 99%   BMI 32.19 kg/m        Physical Exam    Study Result    CT ABDOMEN PELVIS W/O CONTRAST     CLINICAL HISTORY: Female, age 25 years,  Flank pain, recurrent stone  disease suspected - right; Flank pain;     Comparison:  CT scan abdomen and pelvis 5/15/2018     TECHNIQUE:  CT was performed of the abdomen and pelvis without   contrast. Sagittal, coronal, axial and MIP reconstructions were  reviewed.      FINDINGS:  Lower thorax:. Lung bases are clear. Visualized portions of the heart  are " normal.     Liver: Normal.     Gallbladder: Normal.     Spleen: Normal.     Pancreas: Normal.     STOMACH: The stomach is moderately distended and grossly normal.  Duodenum is normal as is the distal esophagus.     Adrenal glands: Normal.     Kidneys: Atrophic right kidney and compensatory hypertrophy of the  left kidney again seen.     Ureters: Normal.  Urinary bladder: Normal.     Large and small bowel: There are a few diverticula seen in the distal  colon without acute abnormality. The appendix is normal.     Lymph nodes: No evidence of pathologic lymph node enlargement.     Pelvic organs: Intrauterine device appears satisfactory position. The  left and right ovaries are grossly normal on this noncontrast enhanced  examination. No evidence of free fluid.     Bony structures: Normal.     Inguinal lymph nodes are normal.                                                                      IMPRESSION:   There are number of normal-sized lymph nodes throughout the mesentery  with mild fat stranding adjacent to a number of small bowel loops  suggesting enteritis.     No evidence of radiodense calculus in the ureters or bladder.     Right renal atrophy and compensatory hypertrophy of the left kidney is  again seen. A tiny cortical calcification is seen in the upper pole  the right kidney.     HUNG PAYAN MD     Impression: #1 atrophic right kidney #2 microscopic hematuria  Plan   Plan: I told April that it is likely she was either born with this atrophic kidney or developed it early in her infancy because she has significant hypertrophy of the left kidney.  There is a small calcification in the right kidney which likely is in the parenchyma and probably will never cause any issues but she just needs to be aware of this.  There are no findings on this CT scan from a kidney perspective that would cause her pain.  She does have some questionable enteritis and she is going to be evaluated for that further.  Because of  the microscopic hematuria and the atrophic kidney I recommend she have an annual urinalysis with her primary care and be checked for hypertension.  If she ever has gross hematuria I had be happy to see her back or if the microscopic hematuria becomes worse then she would need repeat evaluation.      No follow-ups on file.    Evonne Davis MD  [facility]

## 2021-01-25 NOTE — TELEPHONE ENCOUNTER
Patient notified of results. Orders pending for Colonoscopy , patient agrees.     MYLES Castelan

## 2021-02-03 ENCOUNTER — PREP FOR PROCEDURE (OUTPATIENT)
Dept: SURGERY | Facility: OTHER | Age: 26
End: 2021-02-03

## 2021-02-03 ENCOUNTER — OFFICE VISIT (OUTPATIENT)
Dept: SURGERY | Facility: OTHER | Age: 26
End: 2021-02-03
Attending: FAMILY MEDICINE
Payer: COMMERCIAL

## 2021-02-03 VITALS
BODY MASS INDEX: 32.39 KG/M2 | HEIGHT: 62 IN | TEMPERATURE: 99 F | SYSTOLIC BLOOD PRESSURE: 112 MMHG | DIASTOLIC BLOOD PRESSURE: 70 MMHG | HEART RATE: 100 BPM | WEIGHT: 176 LBS | OXYGEN SATURATION: 98 %

## 2021-02-03 DIAGNOSIS — K52.9 INFLAMMATION OF SMALL INTESTINE: ICD-10-CM

## 2021-02-03 DIAGNOSIS — R10.11 ABDOMINAL PAIN, RIGHT UPPER QUADRANT: Primary | ICD-10-CM

## 2021-02-03 DIAGNOSIS — K50.10 REGIONAL ENTERITIS OF LARGE INTESTINE (H): Primary | ICD-10-CM

## 2021-02-03 DIAGNOSIS — Z83.79 FAMILY HISTORY OF CROHN'S DISEASE: ICD-10-CM

## 2021-02-03 PROCEDURE — 99203 OFFICE O/P NEW LOW 30 MIN: CPT | Performed by: SURGERY

## 2021-02-03 PROCEDURE — G0463 HOSPITAL OUTPT CLINIC VISIT: HCPCS

## 2021-02-03 ASSESSMENT — MIFFLIN-ST. JEOR: SCORE: 1506.58

## 2021-02-03 ASSESSMENT — PAIN SCALES - GENERAL: PAINLEVEL: MILD PAIN (3)

## 2021-02-03 NOTE — NURSING NOTE
"Chief Complaint   Patient presents with     Consult For     inflammation of small intestine on CT done 1/22/2021. Referred by Dr. Kirk       Initial /70 (BP Location: Right arm, Cuff Size: Adult Regular)   Pulse 100   Temp 99  F (37.2  C) (Tympanic)   Ht 1.575 m (5' 2\")   Wt 79.8 kg (176 lb)   SpO2 98%   BMI 32.19 kg/m   Estimated body mass index is 32.19 kg/m  as calculated from the following:    Height as of this encounter: 1.575 m (5' 2\").    Weight as of this encounter: 79.8 kg (176 lb).  Medication Reconciliation: complete  Zaynab Russell LPN  "

## 2021-02-03 NOTE — PATIENT INSTRUCTIONS
We want to your Colonoscopy and Upper Endoscopy to be as pleasant as possible. Please review the instructions below. If you have questions, you may contact us at the any of the following numbers:     Clinic Health Unit Coordinator: [phone]  Clinic Nurse (Veronica): [phone]  Surgery Education Nurse: [phone]      Date of procedure: Thursday, February 18, 2021 with Dr. Nayak    Admit Time: Hospital Surgery will call you the day before your procedure by 5pm with your arrival time. If your surgery is on Monday, expect a call on Friday.  If you are not contacted before 5PM, please call admitting at [phone].   After hours or on weekends, please call [phone] to postpone.   Call the clinic nurse if you become ill within 1 week of your procedure to reschedule.     Your covid-19 test is scheduled for:  February 14, 2021 at 8:30 AM    COVID-19 test is needed 4 days before procedure in the morning. This testing is done in the [facility] on the West side of [facility] (weekdays and weekends) or in [facility] at the [facility] (weekdays only).  Follow the signage for parking and bring your mobile phone if you have one to call the phone number on the sign outside the testing site for check-in. If you do not have a cell phone, please call the nurse for instructions on checking in.    Please  the following over the counter items for your bowel prep:      Two 5 mg Dulcolax (bisacodyl) tablets   One 8.3 ounce (238 g) bottle of Miralax   One 10 ounce bottle of liquid Magnesium Citrate-not capsules.   One 64 ounce bottle of Gatorade-Not red, purple, or powdered.      7 DAYS BEFORE THE EXAM:   February 11, 2021  Call the Surgery Education Nurse at [phone] and have a medication list ready.   Stop Aspirin or NSAIDS (Ibuprofen, Celebrex, Naproxen, etc) 7 days before surgery.  Stop fiber supplements, herbals, vitamins, and iron. Stop eating corn, nuts and seeds.  If you  "are prescribed a daily 81mg Aspirin, you may continue this.  If you are prescribed blood thinners or insulin, talk to your primary provider for instructions.    2 DAYS BEFORE THE EXAM:   February 16, 2021  Low fiber diet.   See list of low fiber foods on page 3 of the \"Miralax, Dulcolax and Magnesium Citrate\" packet.   Drink at least 4-6 large glasses of sports drink today and tomorrow. Avoid red and purple.    1 DAY BEFORE THE EXAM:   February 17, 2021  No solid food/milk products after 12:01 AM. Drink only clear liquids all day, at least 8-10 glasses.   Please see list of clear liquids on page 2 of \"Miralax, Dulcolax and Magnesium Citrate\" packet.    Avoid anything red or purple. No alcohol.            AT 12:00 PM NOON THE DAY BEFORE EXAM:  Take 2 Dulcolax tablets by mouth with clear liquids.            AT 6:00 PM THE DAY BEFORE EXAM:  Mix the bottle of Miralax and 64 oz. of Gatorade in a pitcher.   Drink one 8 oz. glass every 10-15 minutes until gone. Stay near a toilet.     DAY OF COLONOSCOPY:   February 18, 2021              6 HOURS PRIOR TO EXAM ON DAY OF PROCEDURE:  Drink the bottle of Magnesium Citrate followed by a full glass of water.   You may have clear liquids up until 4 hours before arrival.  If you need to take any medications after this, take them with a tiny sip of water.   If you have asthma, bring your inhaler with you.  Shower before arrival and wear clean, comfortable clothes.   No jewelry, make-up, nail polish, hair spray, lotions, or perfumes.   [place] in Admitting through the [facility].   You must have a responsible adult to drive and to stay with you for 4 hours at home.         TIPS FOR COLON CLEANSING BEFORE YOUR COLONOSCOPY  To get accurate results from your exam, your colon must be completely empty or you may need to repeat the colon prep and exam. If you followed instructions and your stool is clear or yellow liquid, you are ready. If you are not sure if your colon is clean, " please call the clinic nurse.    You may use Tucks wipes, hemorrhoid treatments, hydrocortisone cream or alcohol-free baby wipes to ease anal irritation. You may also use Vaseline to help protect the skin.     Quickly drink each glass. Even when you are sitting on the toilet, keep drinking every 15 minutes. If you have nausea or vomiting, take a break for 30 minutes and then resume drinking.    You will have loose watery stools and may also have chills. Dress for comfort. Expect to feel bloating, nausea and other discomfort until the stool clears from your colon.

## 2021-02-08 NOTE — PROGRESS NOTES
[facility] Surgery Consultation    CC:  Abdominal bloating, fecal urgency, right sided flank abdominal pain     HPI:  This 25 year old year old female is seen at the request of Dr. Kirk for evaluation of acute on chronic right sided abdominal pain. She reports that she has had issues with her bowels for most of her adult life. Worse now since having her first child. She reports chronic right flank pain, investigations for this has resulted in finding an atrophic kidney on this side. She denies relation of pain with eating though she will have bloating and increased frequency and urgency of her bowel movements. No blood, father with crohn's.     Past Medical History:   Diagnosis Date     Atrophic kidney      Depressive disorder     Therapy during childhood     Shift work sleep disorder        Past Surgical History:   Procedure Laterality Date      SECTION N/A 2020    Procedure:  SECTION;  Surgeon: Magdi Cannon MD;  Location: [facility]     PE TUBES Bilateral     years        Allergies   Allergen Reactions     Pineapple Hives and Swelling     Throat swelling       Current Outpatient Medications   Medication     acetaminophen (TYLENOL) 325 MG tablet     zolpidem (AMBIEN) 5 MG tablet     No current facility-administered medications for this visit.        HABITS:    Social History     Tobacco Use     Smoking status: Former Smoker     Packs/day: 0.25     Types: Cigarettes     Start date: 3/15/2017     Quit date: 3/15/2018     Years since quittin.9     Smokeless tobacco: Never Used   Substance Use Topics     Alcohol use: No     Alcohol/week: 0.0 standard drinks     Drug use: No       Family History   Problem Relation Age of Onset     Other - See Comments Mother         bells palsy post auto accident     Heart Disease Father         s/p stents     Asthma Father      Cancer Father        REVIEW OF SYSTEMS:  Ten point review of systems negative except those mentioned in the HPI.     OBJECTIVE:     "/70 (BP Location: Right arm, Cuff Size: Adult Regular)   Pulse 100   Temp 99  F (37.2  C) (Tympanic)   Ht 1.575 m (5' 2\")   Wt 79.8 kg (176 lb)   SpO2 98%   BMI 32.19 kg/m      GENERAL: Generally appears well, in no distress with appropriate affect.  HEENT:   Sclerae anicteric - normocephalic atraumatic   Respiratory:  No acute distress, no splinting, CTAB   Cardiovascular:  Regular Rate and Rhythm, no murmur   Abdomen: non-distended   :  deferred  Extremities:  Extremities normal. No deformities, edema, or skin discoloration.  Skin:  no suspicious lesions or rashes  Neurological: grossly intact  Psych:  Alert, oriented, affect appropriate with normal decision making ability.    IMPRESSION:    25 y.o. female with acute on chronic right sided abdominal pain, bloating and fecal urgency. She denies much for bleeding, but stewart have family history of crohn's in her father. Recent CT scan shows some enlarged nodes in mesentery consistent with enteritis. Differential includes IBS, IBD and possible biliary colic.       PLAN:    Upper and lower endoscopy   US of gallbladder, if all are negative possible HIDA scan.     Joe Nayak MD,     2/8/2021  10:32 AM        "

## 2021-02-11 ENCOUNTER — OFFICE VISIT (OUTPATIENT)
Dept: OBGYN | Facility: OTHER | Age: 26
End: 2021-02-11
Attending: ADVANCED PRACTICE MIDWIFE
Payer: COMMERCIAL

## 2021-02-11 ENCOUNTER — ANESTHESIA EVENT (OUTPATIENT)
Dept: SURGERY | Facility: HOSPITAL | Age: 26
End: 2021-02-11
Payer: COMMERCIAL

## 2021-02-11 VITALS
DIASTOLIC BLOOD PRESSURE: 62 MMHG | SYSTOLIC BLOOD PRESSURE: 105 MMHG | HEIGHT: 62 IN | BODY MASS INDEX: 32.39 KG/M2 | WEIGHT: 176 LBS

## 2021-02-11 DIAGNOSIS — Z30.431 SURVEILLANCE OF INTRAUTERINE CONTRACEPTIVE DEVICE: Primary | ICD-10-CM

## 2021-02-11 PROBLEM — E74.39 GLUCOSE INTOLERANCE: Status: RESOLVED | Noted: 2020-05-15 | Resolved: 2021-02-11

## 2021-02-11 PROBLEM — O24.410 DIET CONTROLLED GESTATIONAL DIABETES MELLITUS (GDM) IN THIRD TRIMESTER: Status: RESOLVED | Noted: 2020-06-22 | Resolved: 2021-02-11

## 2021-02-11 PROBLEM — O99.810 GLUCOSE INTOLERANCE OF PREGNANCY: Status: RESOLVED | Noted: 2020-05-22 | Resolved: 2021-02-11

## 2021-02-11 PROCEDURE — 99212 OFFICE O/P EST SF 10 MIN: CPT | Performed by: ADVANCED PRACTICE MIDWIFE

## 2021-02-11 PROCEDURE — G0463 HOSPITAL OUTPT CLINIC VISIT: HCPCS

## 2021-02-11 PROCEDURE — G0463 HOSPITAL OUTPT CLINIC VISIT: HCPCS | Mod: 25

## 2021-02-11 RX ORDER — SODIUM CHLORIDE, SODIUM LACTATE, POTASSIUM CHLORIDE, CALCIUM CHLORIDE 600; 310; 30; 20 MG/100ML; MG/100ML; MG/100ML; MG/100ML
INJECTION, SOLUTION INTRAVENOUS CONTINUOUS
Status: CANCELLED | OUTPATIENT
Start: 2021-02-11

## 2021-02-11 RX ORDER — NALOXONE HYDROCHLORIDE 0.4 MG/ML
0.2 INJECTION, SOLUTION INTRAMUSCULAR; INTRAVENOUS; SUBCUTANEOUS
Status: CANCELLED | OUTPATIENT
Start: 2021-02-11 | End: 2021-02-12

## 2021-02-11 RX ORDER — LEVONORGESTREL 13.5 MG/1
1 INTRAUTERINE DEVICE INTRAUTERINE ONCE
COMMUNITY
Start: 2021-01-21 | End: 2021-08-06

## 2021-02-11 RX ORDER — ONDANSETRON 4 MG/1
4 TABLET, ORALLY DISINTEGRATING ORAL EVERY 30 MIN PRN
Status: CANCELLED | OUTPATIENT
Start: 2021-02-11

## 2021-02-11 RX ORDER — ONDANSETRON 2 MG/ML
4 INJECTION INTRAMUSCULAR; INTRAVENOUS EVERY 30 MIN PRN
Status: CANCELLED | OUTPATIENT
Start: 2021-02-11

## 2021-02-11 RX ORDER — HYDROMORPHONE HYDROCHLORIDE 1 MG/ML
.3-.5 INJECTION, SOLUTION INTRAMUSCULAR; INTRAVENOUS; SUBCUTANEOUS EVERY 10 MIN PRN
Status: CANCELLED | OUTPATIENT
Start: 2021-02-11

## 2021-02-11 RX ORDER — FENTANYL CITRATE 50 UG/ML
25-50 INJECTION, SOLUTION INTRAMUSCULAR; INTRAVENOUS EVERY 5 MIN PRN
Status: CANCELLED | OUTPATIENT
Start: 2021-02-11

## 2021-02-11 RX ORDER — NALOXONE HYDROCHLORIDE 0.4 MG/ML
0.4 INJECTION, SOLUTION INTRAMUSCULAR; INTRAVENOUS; SUBCUTANEOUS
Status: CANCELLED | OUTPATIENT
Start: 2021-02-11 | End: 2021-02-12

## 2021-02-11 RX ORDER — MEPERIDINE HYDROCHLORIDE 25 MG/ML
12.5 INJECTION INTRAMUSCULAR; INTRAVENOUS; SUBCUTANEOUS
Status: CANCELLED | OUTPATIENT
Start: 2021-02-11

## 2021-02-11 RX ORDER — LABETALOL 20 MG/4 ML (5 MG/ML) INTRAVENOUS SYRINGE
10
Status: CANCELLED | OUTPATIENT
Start: 2021-02-11

## 2021-02-11 RX ORDER — ALBUTEROL SULFATE 0.83 MG/ML
2.5 SOLUTION RESPIRATORY (INHALATION) EVERY 4 HOURS PRN
Status: CANCELLED | OUTPATIENT
Start: 2021-02-11

## 2021-02-11 ASSESSMENT — PAIN SCALES - GENERAL: PAINLEVEL: MILD PAIN (3)

## 2021-02-11 ASSESSMENT — MIFFLIN-ST. JEOR: SCORE: 1506.58

## 2021-02-11 ASSESSMENT — LIFESTYLE VARIABLES: TOBACCO_USE: 1

## 2021-02-11 NOTE — PATIENT INSTRUCTIONS
Return to office in one year for well woman care and as needed.    Thank you for allowing Christopher DUEÑAS CNM and our OB team to participate in your care.  If you have a scheduling or an appointment question please contact [facility] Health Unit Coordinator at their direct line [phone].   ALL nursing questions or concerns can be directed to your OB nurse at: [phone] Dione Hamilton/Carmela

## 2021-02-11 NOTE — NURSING NOTE
"Chief Complaint   Patient presents with     IUD     IUD check        Initial /62 (BP Location: Left arm, Patient Position: Chair, Cuff Size: Adult Regular)   Ht 1.575 m (5' 2\")   Wt 79.8 kg (176 lb)   BMI 32.19 kg/m   Estimated body mass index is 32.19 kg/m  as calculated from the following:    Height as of this encounter: 1.575 m (5' 2\").    Weight as of this encounter: 79.8 kg (176 lb).  Medication Reconciliation: complete  Joy Carter LPN    " Otc Regimen: Differin Gel - Apply to affected area of face QD Detail Level: Simple Otc Regimen: Amlactin - Apply to affected area of arms QD\\ncerave salicylic acid - apply to affected area of arms QD Plan: Discussed with patient potentially prescribing ketoconazole in the future if worsens

## 2021-02-11 NOTE — PROGRESS NOTES
"May Maria is a 25 year old female  here today for contraception management with routine IUD check @ 3 weeks post insertion.     Pt denies pain or abnormal bleeding .  Denies pain with sexual intercourse .           O:   /62 (BP Location: Left arm, Patient Position: Chair, Cuff Size: Adult Regular)   Ht 1.575 m (5' 2\")   Wt 79.8 kg (176 lb)   BMI 32.19 kg/m     Pleasant without acute distress .  Pelvic:  Vagina and vulva are normal;  no discharge is noted.    Cervix: normal without lesions. IUD strings visible  Uterus:  mobile, normal in size and shape without tenderness.  Adnexa: without masses or tenderness.        A:    Contraception management  IUD strings visualized     P:    Pelvic exam  Routine IUD check     Total visit greater than 15 minutes with 10 minutes spent face to face counseling this patient on Khadijah IUD risks, benefits, side effects, effectiveness, abnormal bleeding or pain.     Christopher Anthony, APRN, CNM  "

## 2021-02-11 NOTE — ANESTHESIA PREPROCEDURE EVALUATION
Anesthesia Pre-Procedure Evaluation    Patient: May Maria   MRN: [id number] : 1995        Preoperative Diagnosis: Regional enteritis of large intestine (H) [K50.10]  Family history of Crohn's disease [Z83.79]   Procedure : Procedure(s):  colonoscopy with biopsy possible polypectomy, upper endoscopy with biopsy     Past Medical History:   Diagnosis Date     Atrophic kidney      Depressive disorder     Therapy during childhood     Shift work sleep disorder       Past Surgical History:   Procedure Laterality Date      SECTION N/A 2020    Procedure:  SECTION;  Surgeon: Magdi Cannon MD;  Location: [facility] OR     PE TUBES Bilateral     years       Allergies   Allergen Reactions     Pineapple Hives and Swelling     Throat swelling      Social History     Tobacco Use     Smoking status: Former Smoker     Packs/day: 0.25     Types: Cigarettes     Start date: 3/15/2017     Quit date: 3/15/2018     Years since quittin.9     Smokeless tobacco: Never Used   Substance Use Topics     Alcohol use: No     Alcohol/week: 0.0 standard drinks      Wt Readings from Last 1 Encounters:   21 79.8 kg (176 lb)        Anesthesia Evaluation   Pt has had prior anesthetic. Type: MAC and Regional.        ROS/MED HX  ENT/Pulmonary:     (+) tobacco use, Past use,     Neurologic:  - neg neurologic ROS     Cardiovascular: Comment: Preeclampsia while pregnant      METS/Exercise Tolerance: >4 METS    Hematologic: Comments: Microscopic hematuria      Musculoskeletal:  - neg musculoskeletal ROS     GI/Hepatic:     (+) bowel prep,     Renal/Genitourinary: Comment: One kidney atrophied    (+) renal disease (atrophic kidney),     Endo: Comment: gestational    (+) Obesity,     Psychiatric/Substance Use:     (+) psychiatric history depression     Infectious Disease:  - neg infectious disease ROS     Malignancy:  - neg malignancy ROS     Other:  - neg other ROS          Physical Exam    Airway        Mallampati: I   TM  distance: > 3 FB   Neck ROM: full   Mouth opening: > 3 cm    Respiratory Devices and Support         Dental  no notable dental history         Cardiovascular   cardiovascular exam normal       Rhythm and rate: regular and normal     Pulmonary   pulmonary exam normal        breath sounds clear to auscultation           OUTSIDE LABS:  CBC:   Lab Results   Component Value Date    WBC 9.0 06/27/2020    WBC 9.4 05/12/2020    HGB 10.4 (L) 07/17/2020    HGB 11.5 (L) 07/14/2020    HCT 36.3 06/27/2020    HCT 35.4 05/12/2020     07/14/2020     07/06/2020     BMP:   Lab Results   Component Value Date     02/07/2020     02/04/2020    POTASSIUM 3.4 02/07/2020    POTASSIUM 3.2 (L) 02/04/2020    CHLORIDE 105 02/07/2020    CHLORIDE 106 02/04/2020    CO2 24 02/07/2020    CO2 23 02/04/2020    BUN 10 02/07/2020    BUN 6 (L) 02/04/2020    CR 0.66 02/07/2020    CR 0.60 02/04/2020    GLC 91 02/07/2020     (H) 02/04/2020     COAGS: No results found for: PTT, INR, FIBR  POC:   Lab Results   Component Value Date    HCG Negative 01/21/2021     HEPATIC:   Lab Results   Component Value Date    ALBUMIN 2.4 (L) 07/14/2020    PROTTOTAL 6.2 (L) 07/14/2020    ALT 19 07/14/2020    AST 10 07/14/2020    ALKPHOS 119 07/14/2020    BILITOTAL 0.3 07/14/2020     OTHER:   Lab Results   Component Value Date    LACT 1.3 05/15/2018    A1C 5.0 06/22/2020    HUGH 9.1 02/07/2020    PHOS 4.6 (H) 06/18/2019    LIPASE 113 05/15/2018    TSH 1.42 01/15/2019    CRP <2.9 05/15/2018       Anesthesia Plan    ASA Status:  3      Anesthesia Type: MAC.     - Reason for MAC: Deep or markedly invasive procedure (G8)   Induction: Propofol.           Consents    Anesthesia Plan(s) and associated risks, benefits, and realistic alternatives discussed. Questions answered and patient/representative(s) expressed understanding.     - Discussed with:  Patient         Postoperative Care            Comments:                SPENSER Cardona CRNA

## 2021-02-12 DIAGNOSIS — N26.1 RENAL ATROPHY, RIGHT: Primary | ICD-10-CM

## 2021-02-14 ENCOUNTER — OFFICE VISIT (OUTPATIENT)
Dept: FAMILY MEDICINE | Facility: OTHER | Age: 26
End: 2021-02-14
Attending: SURGERY
Payer: COMMERCIAL

## 2021-02-14 DIAGNOSIS — R10.11 ABDOMINAL PAIN, RIGHT UPPER QUADRANT: ICD-10-CM

## 2021-02-14 DIAGNOSIS — K52.9 INFLAMMATION OF SMALL INTESTINE: ICD-10-CM

## 2021-02-14 LAB
SARS-COV-2 RNA RESP QL NAA+PROBE: NORMAL
SPECIMEN SOURCE: NORMAL

## 2021-02-14 PROCEDURE — U0003 INFECTIOUS AGENT DETECTION BY NUCLEIC ACID (DNA OR RNA); SEVERE ACUTE RESPIRATORY SYNDROME CORONAVIRUS 2 (SARS-COV-2) (CORONAVIRUS DISEASE [COVID-19]), AMPLIFIED PROBE TECHNIQUE, MAKING USE OF HIGH THROUGHPUT TECHNOLOGIES AS DESCRIBED BY CMS-2020-01-R: HCPCS | Mod: ZL | Performed by: SURGERY

## 2021-02-14 PROCEDURE — U0005 INFEC AGEN DETEC AMPLI PROBE: HCPCS | Mod: ZL | Performed by: SURGERY

## 2021-02-15 LAB
LABORATORY COMMENT REPORT: NORMAL
SARS-COV-2 RNA RESP QL NAA+PROBE: NEGATIVE
SPECIMEN SOURCE: NORMAL

## 2021-02-17 ENCOUNTER — HOSPITAL ENCOUNTER (OUTPATIENT)
Dept: ULTRASOUND IMAGING | Facility: HOSPITAL | Age: 26
Discharge: HOME | End: 2021-02-17
Attending: SURGERY | Admitting: SURGERY
Payer: COMMERCIAL

## 2021-02-17 DIAGNOSIS — R10.11 ABDOMINAL PAIN, RIGHT UPPER QUADRANT: ICD-10-CM

## 2021-02-17 PROCEDURE — 76705 ECHO EXAM OF ABDOMEN: CPT

## 2021-02-18 ENCOUNTER — HOSPITAL ENCOUNTER (OUTPATIENT)
Facility: HOSPITAL | Age: 26
Discharge: HOME | End: 2021-02-18
Attending: SURGERY | Admitting: SURGERY
Payer: COMMERCIAL

## 2021-02-18 ENCOUNTER — ANESTHESIA (OUTPATIENT)
Dept: SURGERY | Facility: HOSPITAL | Age: 26
End: 2021-02-18
Payer: COMMERCIAL

## 2021-02-18 ENCOUNTER — APPOINTMENT (OUTPATIENT)
Dept: LAB | Facility: HOSPITAL | Age: 26
End: 2021-02-18
Attending: FAMILY MEDICINE
Payer: COMMERCIAL

## 2021-02-18 VITALS
BODY MASS INDEX: 31.94 KG/M2 | RESPIRATION RATE: 16 BRPM | OXYGEN SATURATION: 100 % | HEART RATE: 76 BPM | SYSTOLIC BLOOD PRESSURE: 125 MMHG | HEIGHT: 62 IN | DIASTOLIC BLOOD PRESSURE: 73 MMHG | TEMPERATURE: 98.3 F | WEIGHT: 173.6 LBS

## 2021-02-18 DIAGNOSIS — K50.10 REGIONAL ENTERITIS OF LARGE INTESTINE (H): ICD-10-CM

## 2021-02-18 DIAGNOSIS — Z83.79 FAMILY HISTORY OF CROHN'S DISEASE: ICD-10-CM

## 2021-02-18 LAB — HCG UR QL: NEGATIVE

## 2021-02-18 PROCEDURE — 88305 TISSUE EXAM BY PATHOLOGIST: CPT | Mod: TC | Performed by: SURGERY

## 2021-02-18 PROCEDURE — 360N000075 HC SURGERY LEVEL 2, PER MIN: Performed by: SURGERY

## 2021-02-18 PROCEDURE — 81025 URINE PREGNANCY TEST: CPT | Performed by: NURSE ANESTHETIST, CERTIFIED REGISTERED

## 2021-02-18 PROCEDURE — 272N000001 HC OR GENERAL SUPPLY STERILE: Performed by: SURGERY

## 2021-02-18 PROCEDURE — 258N000003 HC RX IP 258 OP 636: Performed by: NURSE ANESTHETIST, CERTIFIED REGISTERED

## 2021-02-18 PROCEDURE — 45380 COLONOSCOPY AND BIOPSY: CPT | Performed by: SURGERY

## 2021-02-18 PROCEDURE — 43239 EGD BIOPSY SINGLE/MULTIPLE: CPT | Performed by: NURSE ANESTHETIST, CERTIFIED REGISTERED

## 2021-02-18 PROCEDURE — 710N000012 HC RECOVERY PHASE 2, PER MINUTE: Performed by: SURGERY

## 2021-02-18 PROCEDURE — 43239 EGD BIOPSY SINGLE/MULTIPLE: CPT | Performed by: SURGERY

## 2021-02-18 PROCEDURE — 250N000009 HC RX 250: Performed by: SURGERY

## 2021-02-18 PROCEDURE — 370N000017 HC ANESTHESIA TECHNICAL FEE, PER MIN: Performed by: SURGERY

## 2021-02-18 PROCEDURE — 999N000141 HC STATISTIC PRE-PROCEDURE NURSING ASSESSMENT: Performed by: SURGERY

## 2021-02-18 PROCEDURE — 250N000011 HC RX IP 250 OP 636: Performed by: NURSE ANESTHETIST, CERTIFIED REGISTERED

## 2021-02-18 PROCEDURE — 250N000009 HC RX 250: Performed by: NURSE ANESTHETIST, CERTIFIED REGISTERED

## 2021-02-18 RX ORDER — FENTANYL CITRATE 50 UG/ML
INJECTION, SOLUTION INTRAMUSCULAR; INTRAVENOUS PRN
Status: DISCONTINUED | OUTPATIENT
Start: 2021-02-18 | End: 2021-02-18

## 2021-02-18 RX ORDER — NALOXONE HYDROCHLORIDE 0.4 MG/ML
0.4 INJECTION, SOLUTION INTRAMUSCULAR; INTRAVENOUS; SUBCUTANEOUS
Status: CANCELLED | OUTPATIENT
Start: 2021-02-18 | End: 2021-02-19

## 2021-02-18 RX ORDER — LIDOCAINE 40 MG/G
CREAM TOPICAL
Status: DISCONTINUED | OUTPATIENT
Start: 2021-02-18 | End: 2021-02-18 | Stop reason: HOSPADM

## 2021-02-18 RX ORDER — PROPOFOL 10 MG/ML
INJECTION, EMULSION INTRAVENOUS PRN
Status: DISCONTINUED | OUTPATIENT
Start: 2021-02-18 | End: 2021-02-18

## 2021-02-18 RX ORDER — NALOXONE HYDROCHLORIDE 0.4 MG/ML
0.2 INJECTION, SOLUTION INTRAMUSCULAR; INTRAVENOUS; SUBCUTANEOUS
Status: CANCELLED | OUTPATIENT
Start: 2021-02-18 | End: 2021-02-19

## 2021-02-18 RX ORDER — SODIUM CHLORIDE, SODIUM LACTATE, POTASSIUM CHLORIDE, CALCIUM CHLORIDE 600; 310; 30; 20 MG/100ML; MG/100ML; MG/100ML; MG/100ML
INJECTION, SOLUTION INTRAVENOUS CONTINUOUS
Status: DISCONTINUED | OUTPATIENT
Start: 2021-02-18 | End: 2021-02-18 | Stop reason: HOSPADM

## 2021-02-18 RX ORDER — LIDOCAINE HYDROCHLORIDE 20 MG/ML
INJECTION, SOLUTION INFILTRATION; PERINEURAL PRN
Status: DISCONTINUED | OUTPATIENT
Start: 2021-02-18 | End: 2021-02-18

## 2021-02-18 RX ORDER — FLUMAZENIL 0.1 MG/ML
0.2 INJECTION, SOLUTION INTRAVENOUS
Status: CANCELLED | OUTPATIENT
Start: 2021-02-18 | End: 2021-02-18

## 2021-02-18 RX ADMIN — PROPOFOL 50 MG: 10 INJECTION, EMULSION INTRAVENOUS at 11:01

## 2021-02-18 RX ADMIN — PROPOFOL 50 MG: 10 INJECTION, EMULSION INTRAVENOUS at 11:07

## 2021-02-18 RX ADMIN — LIDOCAINE HYDROCHLORIDE 40 MG: 20 INJECTION, SOLUTION INFILTRATION; PERINEURAL at 11:01

## 2021-02-18 RX ADMIN — PROPOFOL 50 MG: 10 INJECTION, EMULSION INTRAVENOUS at 11:16

## 2021-02-18 RX ADMIN — FENTANYL CITRATE 50 MCG: 50 INJECTION, SOLUTION INTRAMUSCULAR; INTRAVENOUS at 11:04

## 2021-02-18 RX ADMIN — PROPOFOL 50 MG: 10 INJECTION, EMULSION INTRAVENOUS at 11:06

## 2021-02-18 RX ADMIN — PROPOFOL 50 MG: 10 INJECTION, EMULSION INTRAVENOUS at 11:21

## 2021-02-18 RX ADMIN — PROPOFOL 50 MG: 10 INJECTION, EMULSION INTRAVENOUS at 11:04

## 2021-02-18 RX ADMIN — SODIUM CHLORIDE, POTASSIUM CHLORIDE, SODIUM LACTATE AND CALCIUM CHLORIDE: 600; 310; 30; 20 INJECTION, SOLUTION INTRAVENOUS at 10:15

## 2021-02-18 RX ADMIN — FENTANYL CITRATE 50 MCG: 50 INJECTION, SOLUTION INTRAMUSCULAR; INTRAVENOUS at 10:57

## 2021-02-18 ASSESSMENT — MIFFLIN-ST. JEOR: SCORE: 1495.69

## 2021-02-18 NOTE — DISCHARGE INSTRUCTIONS
UPPER ENDOSCOPY    AFTER THE PROCEDURE    You will return to Same Day Surgery to rest for about an hour before you go home.    The doctor will talk with you and your family.    A family member/friend may visit with you.    You may burp up any air remaining in your stomach.    You may feel dizzy or light-headed from the medicine.    Your nurse will go over the discharge instructions with you and your caregiver and answer any of your questions.      You will be contacted the next day to check on how you are doing.    If biopsies were taken, you will be contacted with the results usually within 3 days.  BACK AT HOME    Rest for an hour or two after you get home.    When your throat is no longer numb and you have a gag reflex, take a few sips of cool water.  If you can swallow comfortably, you may start eating again.    You may have a mild sore throat for the rest of the day.    You will be contacted with results by the surgeons office within a week. If not contacted in one week, call the surgeons office.  WHAT TO WATCH FOR:  Problems rarely occur after the exam, but it is important to be aware of the early signs of a complication.  Call your doctor immediately if you have:    Difficulty swallowing or breathing    Unusual pain in your stomach or chest    Vomiting blood or dark material that looks like coffee grounds    Black or tarry stools    Temperature over 101.5 degrees    MORE QUESTIONS?  Please ask your doctor or nurse before the exam begins  or call your doctor at the clinic.    IF YOU MUST CANCEL YOUR PROCEDURE THE EVENING/NIGHT BEFORE, PLEASE CALL HOSPITAL ADMITTING -[phone] OR TOLL FREE [phone], [phone].    Phone Numbers:  [facility] - [phone] [phone]  [facility] - [phone]  Surgery Patient Education - [phone] or toll free [phone]    INSTRUCTIONS AFTER COLONOSCOPY    WHEN YOU ARE BACK HOME:    Plan to rest for an hour or two after you get home.    You may have  some cramping or pressure until you pass gas.    You may resume your regular medications.    Eat a small, light meal at first, and then gradually return to normal meal sizes.    You will be contacted the next day to see how you are doing.  If you had a polyp removed:    Slight bleeding may occur.  You may have a slight blood stain on the toilet paper after a bowel movement.    To lessen the chance of bleeding, avoid heavy exercise for ONE WEEK.  This includes heavy lifting, vigorous sport activities, and heavy physical labor.  You may resume your normal sexual activity.      Avoid aspirin or aspirin products if instructed by your doctor.    If there is a polyp or biopsy, you will be contacted with results within one week.     WHAT TO WATCH FOR:  Problems rarely occur after the exam; however, it is important for you to watch for early signs of possible problems.  If you have     Unusual pain in your abdomen    Nausea and vomiting that persists    Excessive bleeding    Black or bloody bowel movements    Fever or temperature above 100.6 F  Please call your doctor ([facility] [phone]) or go to the nearest hospital emergency room.    Post-Anesthesia Patient Instructions    IMMEDIATELY FOLLOWING SURGERY:  Do not drive or operate machinery for the first twenty four hours after surgery.  Do not make any important decisions for twenty four hours after surgery or while taking narcotic pain medications or sedatives.  If you develop intractable nausea and vomiting or a severe headache please notify your doctor immediately.    FOLLOW-UP:  Please make an appointment with your surgeon as instructed. You do not need to follow up with anesthesia unless specifically instructed to do so.    WOUND CARE INSTRUCTIONS (if applicable):  Keep a dry clean dressing on the anesthesia/puncture wound site if there is drainage.  Once the wound has quit draining you may leave it open to air.  Generally you should leave the bandage intact  for twenty four hours unless there is drainage.  If the epidural site drains for more than 36-48 hours please call the anesthesia department.    QUESTIONS?:  Please feel free to call your physician or the hospital  if you have any questions, and they will be happy to assist you.   You had one colon polyp removed today and biopsies taken in your stomach.  Dr. Nayak's office will call you with the pathology results when they are available to us.

## 2021-02-18 NOTE — BRIEF OP NOTE
[facility]    Brief Operative Note    Pre-operative diagnosis: Regional enteritis of large intestine (H) [K50.10]  Family history of Crohn's disease [Z83.79]  Post-operative diagnosis Normal upper and lower Gi tract.     Procedure: Procedure(s):  colonoscopy with biopsy and polypectomy, upper endoscopy with biopsy  Surgeon: Surgeon(s) and Role:     * Joe Nayak MD - Primary  Anesthesia: Monitor Anesthesia Care   Estimated blood loss: Minimal  Drains: None  Specimens:   ID Type Source Tests Collected by Time Destination   A :  Biopsy Small Intestine, Duodenum SURGICAL PATHOLOGY EXAM Joe Nayak MD 2/18/2021 11:07 AM    B :  Biopsy Stomach, Antrum SURGICAL PATHOLOGY EXAM Joe Nayak MD 2/18/2021 11:08 AM    C :  Biopsy Small Intestine, Ileum SURGICAL PATHOLOGY EXAM Joe Nayak MD 2/18/2021 11:21 AM    D :  Polyp Large Intestine, Rectum SURGICAL PATHOLOGY EXAM Joe Nayak MD 2/18/2021 11:29 AM      Findings:   No inflammation, 35 cm, Intubated TI no concerns.  Complications: None.  Implants: * No implants in log *

## 2021-02-18 NOTE — OR NURSING
Patient and responsible adult given discharge instructions with no questions regarding instructions. Meghana score 18/18. Pain level 0/10.  Discharged from unit via ambulation and mom here to transport pt home.

## 2021-02-18 NOTE — ANESTHESIA CARE TRANSFER NOTE
Patient: April M Candace    Procedure(s):  colonoscopy with biopsy and polypectomy, upper endoscopy with biopsy    Diagnosis: Regional enteritis of large intestine (H) [K50.10]  Family history of Crohn's disease [Z83.79]  Diagnosis Additional Information: No value filed.    Anesthesia Type:   MAC     Note:    Oropharynx: oropharynx clear of all foreign objects and spontaneously breathing  Level of Consciousness: drowsy  Oxygen Supplementation: nasal cannula  Level of Supplemental Oxygen (L/min / FiO2): 3  Independent Airway: airway patency satisfactory and stable  Dentition: dentition unchanged  Vital Signs Stable: post-procedure vital signs reviewed and stable  Report to RN Given: handoff report given  Patient transferred to: Phase II    Handoff Report: Identifed the Patient, Identified the Reponsible Provider, Reviewed the pertinent medical history, Discussed the surgical course, Reviewed Intra-OP anesthesia mangement and issues during anesthesia, Set expectations for post-procedure period and Allowed opportunity for questions and acknowledgement of understanding      Vitals: (Last set prior to Anesthesia Care Transfer)  CRNA VITALS  2/18/2021 1102 - 2/18/2021 1133      2/18/2021             NIBP:  102/50    NIBP Mean:  72    Resp Rate (set):  8        Electronically Signed By: SPENSER Cooper CRNA  February 18, 2021  11:33 AM

## 2021-02-18 NOTE — ANESTHESIA POSTPROCEDURE EVALUATION
Patient: April M Candace    Procedure(s):  colonoscopy with biopsy and polypectomy, upper endoscopy with biopsy    Diagnosis:Regional enteritis of large intestine (H) [K50.10]  Family history of Crohn's disease [Z83.79]  Diagnosis Additional Information: No value filed.    Anesthesia Type:  MAC    Note:  Disposition: Outpatient   Postop Pain Control: Uneventful            Sign Out: Well controlled pain   PONV: No   Neuro/Psych: Uneventful            Sign Out: Acceptable/Baseline neuro status   Airway/Respiratory: Uneventful            Sign Out: Acceptable/Baseline resp. status   CV/Hemodynamics: Uneventful            Sign Out: Acceptable CV status   Other NRE: NONE   DID A NON-ROUTINE EVENT OCCUR? No         Last vitals:  Vitals:    02/18/21 1215 02/18/21 1220 02/18/21 1225   BP: 117/88 118/81 125/73   Pulse: 80 78 76   Resp:      Temp:      SpO2: 99% 100% 100%       Last vitals prior to Anesthesia Care Transfer:  CRNA VITALS  2/18/2021 1102 - 2/18/2021 1202      2/18/2021             NIBP:  102/50    NIBP Mean:  72    Resp Rate (set):  8          Electronically Signed By: SPENSER Cooper CRNA  February 18, 2021  12:59 PM

## 2021-02-19 LAB — COPATH REPORT: NORMAL

## 2021-02-19 NOTE — OP NOTE
May Maria MRN# [id number]   YOB: 1995 Age: 25 year old      Date of Admission:  2/18/2021  Date of Service:   2/18/21    Primary care provider: Jia Kirk    PREOPERATIVE DIAGNOSIS:  Chronic abdominal pain        POSTOPERATIVE DIAGNOSIS:  Normal upper and lower Gi tract, one small polyp at recto-sigmoid junction.          PROCEDURE:  Colonoscopy with biopsy and polypectomy, Upper endoscopy with biopsy.            INDICATIONS:  Chronic abdominal pain.     Specimen:   ID Type Source Tests Collected by Time Destination   A :  Biopsy Small Intestine, Duodenum SURGICAL PATHOLOGY EXAM Joe Nayak MD 2/18/2021 11:07 AM    B :  Biopsy Stomach, Antrum SURGICAL PATHOLOGY EXAM Joe Nayak MD 2/18/2021 11:08 AM    C :  Biopsy Small Intestine, Ileum SURGICAL PATHOLOGY EXAM Joe Nayak MD 2/18/2021 11:21 AM    D :  Polyp Large Intestine, Rectum SURGICAL PATHOLOGY EXAM Joe Nayak MD 2/18/2021 11:29 AM        SURGEON: Joe Nayak MD    PROCEDURE:  With the patient in the supine position on the transport cart, IV sedation was administered by the nurse anesthetist.  The patient's correct identity and planned procedure were confirmed during a requisite timeout pause.  A bite block was placed and the fiberoptic endoscope was introduced and negotiated through the cricopharyngeus without difficulty.  The length of the esophagus was examined without findings.  The gastroesophageal junction was noted 35 cm from the incisors; the Z line was regular without ulceration, bleeding source or stricture.  There was no  evidence of Bennett's change.  The stomach was entered and the pylorus was traversed easily.  The gastric mucosa was normal in appearence ; there was no evidence of polyps, ulcer or bleeding source.   The duodenum was similarly without finding. Random duodenal biopsies were taken for celiac though did not have blunted appearance.  The endoscope was returned to the body of the  stomach and retroflex confirmed the absence of abnormality in the cardia.      Random cold forceps biopsies were obtained of the antrum for H. pylori.  Hemostasis was judged adequate on visualization.   The endoscope was returned to the GE junction.  A second circumferential examination of the esophagus was performed on slow withdrawal of the endoscope without additional finding.       Our attention then turned to the colonoscopy the external anus was inspected and was normal. Digital rectal exam was normal. The colonoscope was inserted and advanced under direct visualization to the level of the cecum which was identified by the appendiceal orifice and the ileocecal valve. The IC valve was intubated and traversed for aprox 30 cm without abnormal findings. Biopsies were taken. The prep was excellent.. Upon slow withdrawal of the colonoscope, approximately 95% of the mucosa was directly visualized. There was a sessile polyp at the junction of the sigmoid and rectum removed with biopsy forceps. The rest of the colon was without mucosal abnormality. There was no evidence of further polyps, inflammation, bleeding or AVMs. Retroflexion was normal. The extra air was removed from the colon, and the colonoscope withdrawn. The patient tolerated the procedure well and was taken to postanesthesia care unit.     We invite the patient to return in 5-10 years for follow up screening evaluation, pending pathology related to polyp removed.    Joe Nayak MD

## 2021-02-22 ENCOUNTER — TELEPHONE (OUTPATIENT)
Dept: SURGERY | Facility: OTHER | Age: 26
End: 2021-02-22

## 2021-02-22 DIAGNOSIS — R10.84 GENERALIZED ABDOMINAL PAIN: Primary | ICD-10-CM

## 2021-02-22 NOTE — TELEPHONE ENCOUNTER
Pt would like a referral to GI for IBS treatment.  Pt did not specify a preference in where she goes.  Please fill out and sign referral.  Thanks  IMER HUTSON LPN

## 2021-03-01 DIAGNOSIS — N26.1 RENAL ATROPHY, RIGHT: ICD-10-CM

## 2021-03-01 LAB
ALBUMIN SERPL-MCNC: 3.8 G/DL (ref 3.4–5)
ALBUMIN UR-MCNC: NEGATIVE MG/DL
ANION GAP SERPL CALCULATED.3IONS-SCNC: 4 MMOL/L (ref 3–14)
APPEARANCE UR: CLEAR
BACTERIA #/AREA URNS HPF: ABNORMAL /HPF
BILIRUB UR QL STRIP: NEGATIVE
BUN SERPL-MCNC: 11 MG/DL (ref 7–30)
CALCIUM SERPL-MCNC: 9.2 MG/DL (ref 8.5–10.1)
CHLORIDE SERPL-SCNC: 105 MMOL/L (ref 94–109)
CO2 SERPL-SCNC: 29 MMOL/L (ref 20–32)
COLOR UR AUTO: ABNORMAL
CREAT SERPL-MCNC: 0.73 MG/DL (ref 0.52–1.04)
GFR SERPL CREATININE-BSD FRML MDRD: >90 ML/MIN/{1.73_M2}
GLUCOSE SERPL-MCNC: 81 MG/DL (ref 70–99)
GLUCOSE UR STRIP-MCNC: NEGATIVE MG/DL
HGB UR QL STRIP: ABNORMAL
KETONES UR STRIP-MCNC: NEGATIVE MG/DL
LEUKOCYTE ESTERASE UR QL STRIP: NEGATIVE
MUCOUS THREADS #/AREA URNS LPF: PRESENT /LPF
NITRATE UR QL: NEGATIVE
PH UR STRIP: 5.5 PH (ref 4.7–8)
PHOSPHATE SERPL-MCNC: 3.1 MG/DL (ref 2.5–4.5)
POTASSIUM SERPL-SCNC: 3.6 MMOL/L (ref 3.4–5.3)
RBC #/AREA URNS AUTO: 1 /HPF (ref 0–2)
SODIUM SERPL-SCNC: 138 MMOL/L (ref 133–144)
SOURCE: ABNORMAL
SP GR UR STRIP: 1.02 (ref 1–1.03)
SQUAMOUS #/AREA URNS AUTO: 1 /HPF (ref 0–1)
UROBILINOGEN UR STRIP-MCNC: NORMAL MG/DL (ref 0–2)
WBC #/AREA URNS AUTO: 2 /HPF (ref 0–5)

## 2021-03-01 PROCEDURE — 81001 URINALYSIS AUTO W/SCOPE: CPT | Mod: ZL | Performed by: INTERNAL MEDICINE

## 2021-03-01 PROCEDURE — 36415 COLL VENOUS BLD VENIPUNCTURE: CPT | Mod: ZL | Performed by: INTERNAL MEDICINE

## 2021-03-01 PROCEDURE — 80069 RENAL FUNCTION PANEL: CPT | Mod: ZL | Performed by: INTERNAL MEDICINE

## 2021-04-20 ENCOUNTER — OFFICE VISIT (OUTPATIENT)
Dept: OBGYN | Facility: OTHER | Age: 26
End: 2021-04-20
Attending: ADVANCED PRACTICE MIDWIFE
Payer: COMMERCIAL

## 2021-04-20 VITALS
DIASTOLIC BLOOD PRESSURE: 66 MMHG | SYSTOLIC BLOOD PRESSURE: 114 MMHG | BODY MASS INDEX: 33.11 KG/M2 | OXYGEN SATURATION: 98 % | HEART RATE: 80 BPM | WEIGHT: 181 LBS | TEMPERATURE: 98.2 F

## 2021-04-20 DIAGNOSIS — Z30.432 ENCOUNTER FOR IUD REMOVAL: ICD-10-CM

## 2021-04-20 DIAGNOSIS — Z30.09 ENCOUNTER FOR OTHER GENERAL COUNSELING AND ADVICE ON CONTRACEPTION: Primary | ICD-10-CM

## 2021-04-20 PROCEDURE — 58301 REMOVE INTRAUTERINE DEVICE: CPT | Performed by: ADVANCED PRACTICE MIDWIFE

## 2021-04-20 PROCEDURE — G0463 HOSPITAL OUTPT CLINIC VISIT: HCPCS | Mod: 25

## 2021-04-20 PROCEDURE — 58301 REMOVE INTRAUTERINE DEVICE: CPT

## 2021-04-20 PROCEDURE — 99213 OFFICE O/P EST LOW 20 MIN: CPT | Mod: 25 | Performed by: ADVANCED PRACTICE MIDWIFE

## 2021-04-20 ASSESSMENT — PAIN SCALES - GENERAL: PAINLEVEL: MILD PAIN (3)

## 2021-04-20 NOTE — PATIENT INSTRUCTIONS
Return to office in one year for well woman care and as needed.    Thank you for allowing Christopher DUEÑAS CNM and our OB team to participate in your care.  If you have a scheduling or an appointment question please contact [facility] Unit Coordinator at their direct line [phone].   ALL nursing questions or concerns can be directed to your OB nurse at: [phone] Dione Hamilton/Carmela

## 2021-04-20 NOTE — PROGRESS NOTES
May Maria is a 25 year old female  Here today for contraception counseling and IUD removal.  Pt reports spotting to light bleeding and cramping most days with the IUD.  Pt also is planning a future pregnancy.  Request IUD removal.  Discussed contraception options.  Pt plans to use condoms.      O:   /66   Pulse 80   Temp 98.2  F (36.8  C)   Wt 82.1 kg (181 lb)   SpO2 98%   BMI 33.11 kg/m     Pleasant without acute distress  Pelvic:  Vagina and vulva are normal;  no discharge is noted.    Cervix: normal without lesions. IUD strings visible     Procedure:  After verbal consent was obtained from the patient, IUD strings were grasped with ring forcep and IUD easily removed intact with minimal patient discomfort noted.  No bleeding noted.    A:   Contraception counseling and management  IUD strings visualized  Request IUD removal  Plans for future pregnancy  Hx of C/S 9 months ago      P:    Discussed contraception options  Discussed spacing between pregnancies and  sections  IUD removal  Condoms    20 minutes spent on the date of the encounter doing chart review, history and exam, documentation and further activities per the note in addition to IUD removal.    Christopher Anthony, SPENSER, CNM

## 2021-04-20 NOTE — NURSING NOTE
"Chief Complaint   Patient presents with     Contraception     iud removal       Initial /66   Pulse 80   Temp 98.2  F (36.8  C)   Wt 82.1 kg (181 lb)   SpO2 98%   BMI 33.11 kg/m   Estimated body mass index is 33.11 kg/m  as calculated from the following:    Height as of 2/18/21: 1.575 m (5' 2\").    Weight as of this encounter: 82.1 kg (181 lb).  Medication Reconciliation: complete  Jessa Behrman, LPN  "

## 2021-05-03 DIAGNOSIS — N39.0 RECURRENT UTI: ICD-10-CM

## 2021-05-03 DIAGNOSIS — N26.1 RIGHT RENAL ATROPHY: Primary | ICD-10-CM

## 2021-05-05 DIAGNOSIS — N39.0 RECURRENT UTI: ICD-10-CM

## 2021-05-05 DIAGNOSIS — N26.1 RIGHT RENAL ATROPHY: ICD-10-CM

## 2021-05-05 LAB
ALBUMIN SERPL-MCNC: 3.8 G/DL (ref 3.4–5)
ALBUMIN UR-MCNC: NEGATIVE MG/DL
ANION GAP SERPL CALCULATED.3IONS-SCNC: 7 MMOL/L (ref 3–14)
APPEARANCE UR: CLEAR
BACTERIA #/AREA URNS HPF: ABNORMAL /HPF
BILIRUB UR QL STRIP: NEGATIVE
BUN SERPL-MCNC: 12 MG/DL (ref 7–30)
CALCIUM SERPL-MCNC: 9 MG/DL (ref 8.5–10.1)
CHLORIDE SERPL-SCNC: 106 MMOL/L (ref 94–109)
CO2 SERPL-SCNC: 24 MMOL/L (ref 20–32)
COLOR UR AUTO: ABNORMAL
CREAT SERPL-MCNC: 0.72 MG/DL (ref 0.52–1.04)
CREAT UR-MCNC: 126 MG/DL
ERYTHROCYTE [DISTWIDTH] IN BLOOD BY AUTOMATED COUNT: 13.1 % (ref 10–15)
GFR SERPL CREATININE-BSD FRML MDRD: >90 ML/MIN/{1.73_M2}
GLUCOSE SERPL-MCNC: 86 MG/DL (ref 70–99)
GLUCOSE UR STRIP-MCNC: NEGATIVE MG/DL
HCT VFR BLD AUTO: 42.1 % (ref 35–47)
HGB BLD-MCNC: 14.2 G/DL (ref 11.7–15.7)
HGB UR QL STRIP: ABNORMAL
KETONES UR STRIP-MCNC: NEGATIVE MG/DL
LEUKOCYTE ESTERASE UR QL STRIP: NEGATIVE
MCH RBC QN AUTO: 28.8 PG (ref 26.5–33)
MCHC RBC AUTO-ENTMCNC: 33.7 G/DL (ref 31.5–36.5)
MCV RBC AUTO: 85 FL (ref 78–100)
MICROALBUMIN UR-MCNC: 28 MG/L
MICROALBUMIN/CREAT UR: 22.3 MG/G CR (ref 0–25)
MUCOUS THREADS #/AREA URNS LPF: PRESENT /LPF
NITRATE UR QL: NEGATIVE
PH UR STRIP: 6 PH (ref 4.7–8)
PHOSPHATE SERPL-MCNC: 2.7 MG/DL (ref 2.5–4.5)
PLATELET # BLD AUTO: 304 10E9/L (ref 150–450)
POTASSIUM SERPL-SCNC: 4.2 MMOL/L (ref 3.4–5.3)
PROT UR-MCNC: 0.1 G/L
PROT/CREAT 24H UR: 0.08 G/G CR (ref 0–0.2)
RBC # BLD AUTO: 4.93 10E12/L (ref 3.8–5.2)
RBC #/AREA URNS AUTO: 8 /HPF (ref 0–2)
SODIUM SERPL-SCNC: 137 MMOL/L (ref 133–144)
SOURCE: ABNORMAL
SP GR UR STRIP: 1.02 (ref 1–1.03)
SQUAMOUS #/AREA URNS AUTO: 0 /HPF (ref 0–1)
UROBILINOGEN UR STRIP-MCNC: NORMAL MG/DL (ref 0–2)
WBC # BLD AUTO: 7.4 10E9/L (ref 4–11)
WBC #/AREA URNS AUTO: 2 /HPF (ref 0–5)

## 2021-05-05 PROCEDURE — 80069 RENAL FUNCTION PANEL: CPT | Mod: ZL | Performed by: INTERNAL MEDICINE

## 2021-05-05 PROCEDURE — 82043 UR ALBUMIN QUANTITATIVE: CPT | Mod: ZL | Performed by: INTERNAL MEDICINE

## 2021-05-05 PROCEDURE — 85027 COMPLETE CBC AUTOMATED: CPT | Mod: ZL | Performed by: INTERNAL MEDICINE

## 2021-05-05 PROCEDURE — 84156 ASSAY OF PROTEIN URINE: CPT | Mod: ZL | Performed by: INTERNAL MEDICINE

## 2021-05-05 PROCEDURE — 36415 COLL VENOUS BLD VENIPUNCTURE: CPT | Mod: ZL | Performed by: INTERNAL MEDICINE

## 2021-05-05 PROCEDURE — 81001 URINALYSIS AUTO W/SCOPE: CPT | Mod: ZL | Performed by: INTERNAL MEDICINE

## 2021-07-12 ENCOUNTER — TELEPHONE (OUTPATIENT)
Dept: OBGYN | Facility: OTHER | Age: 26
End: 2021-07-12

## 2021-07-12 DIAGNOSIS — Z32.01 PREGNANCY TEST POSITIVE: Primary | ICD-10-CM

## 2021-07-12 NOTE — TELEPHONE ENCOUNTER
Christopher Anthony Patient:     Positive pregnancy test : yes       P:1  Vaginal or C/S:c/s  LMP : 6/15 GA: 3w6d  Prenatal vitamins?: yes   Bleeding?: no  Cramping?: no  1-sided pelvic pain?: no   Advised patient to be seen ASAP if any of the above symptoms.    Order pended for dating US.     Charbel appt scheduled with Christopher on-   Will schedule after US

## 2021-07-15 ENCOUNTER — LAB (OUTPATIENT)
Dept: LAB | Facility: OTHER | Age: 26
End: 2021-07-15
Payer: COMMERCIAL

## 2021-07-15 DIAGNOSIS — R30.0 DYSURIA: ICD-10-CM

## 2021-07-15 LAB
ALBUMIN UR-MCNC: NEGATIVE MG/DL
APPEARANCE UR: CLEAR
BACTERIA #/AREA URNS HPF: ABNORMAL /HPF
BILIRUB UR QL STRIP: NEGATIVE
COLOR UR AUTO: YELLOW
GLUCOSE UR STRIP-MCNC: NEGATIVE MG/DL
HGB UR QL STRIP: ABNORMAL
KETONES UR STRIP-MCNC: NEGATIVE MG/DL
LEUKOCYTE ESTERASE UR QL STRIP: ABNORMAL
NITRATE UR QL: NEGATIVE
PH UR STRIP: 5.5 [PH] (ref 5–7)
RBC #/AREA URNS AUTO: ABNORMAL /HPF
SP GR UR STRIP: >=1.03 (ref 1–1.03)
SQUAMOUS #/AREA URNS AUTO: ABNORMAL /LPF
UROBILINOGEN UR STRIP-ACNC: 0.2 E.U./DL
WBC #/AREA URNS AUTO: ABNORMAL /HPF

## 2021-07-15 PROCEDURE — 81001 URINALYSIS AUTO W/SCOPE: CPT | Mod: ZL

## 2021-07-27 ENCOUNTER — HOSPITAL ENCOUNTER (OUTPATIENT)
Dept: ULTRASOUND IMAGING | Facility: HOSPITAL | Age: 26
Discharge: HOME | End: 2021-07-27
Attending: ADVANCED PRACTICE MIDWIFE | Admitting: ADVANCED PRACTICE MIDWIFE
Payer: COMMERCIAL

## 2021-07-27 DIAGNOSIS — Z32.01 PREGNANCY TEST POSITIVE: ICD-10-CM

## 2021-07-27 PROCEDURE — 76801 OB US < 14 WKS SINGLE FETUS: CPT

## 2021-08-06 ENCOUNTER — APPOINTMENT (OUTPATIENT)
Dept: ULTRASOUND IMAGING | Facility: HOSPITAL | Age: 26
End: 2021-08-06
Attending: STUDENT IN AN ORGANIZED HEALTH CARE EDUCATION/TRAINING PROGRAM
Payer: COMMERCIAL

## 2021-08-06 ENCOUNTER — HOSPITAL ENCOUNTER (EMERGENCY)
Facility: HOSPITAL | Age: 26
Discharge: HOME | End: 2021-08-06
Attending: STUDENT IN AN ORGANIZED HEALTH CARE EDUCATION/TRAINING PROGRAM | Admitting: STUDENT IN AN ORGANIZED HEALTH CARE EDUCATION/TRAINING PROGRAM
Payer: COMMERCIAL

## 2021-08-06 VITALS
RESPIRATION RATE: 16 BRPM | OXYGEN SATURATION: 98 % | DIASTOLIC BLOOD PRESSURE: 95 MMHG | SYSTOLIC BLOOD PRESSURE: 134 MMHG | HEART RATE: 77 BPM | TEMPERATURE: 98.6 F

## 2021-08-06 DIAGNOSIS — O46.90 VAGINAL BLEEDING IN PREGNANCY: ICD-10-CM

## 2021-08-06 DIAGNOSIS — R10.9 ABDOMINAL CRAMPING: ICD-10-CM

## 2021-08-06 LAB
ABO/RH(D): NORMAL
ALBUMIN UR-MCNC: NEGATIVE MG/DL
ANION GAP SERPL CALCULATED.3IONS-SCNC: 6 MMOL/L (ref 3–14)
ANTIBODY SCREEN: NEGATIVE
APPEARANCE UR: ABNORMAL
B-HCG SERPL-ACNC: ABNORMAL IU/L (ref 0–5)
BASOPHILS # BLD AUTO: 0.1 10E3/UL (ref 0–0.2)
BASOPHILS NFR BLD AUTO: 1 %
BILIRUB UR QL STRIP: NEGATIVE
BUN SERPL-MCNC: 10 MG/DL (ref 7–30)
CALCIUM SERPL-MCNC: 9.2 MG/DL (ref 8.5–10.1)
CHLORIDE BLD-SCNC: 107 MMOL/L (ref 94–109)
CO2 SERPL-SCNC: 23 MMOL/L (ref 20–32)
COLOR UR AUTO: ABNORMAL
CREAT SERPL-MCNC: 0.75 MG/DL (ref 0.52–1.04)
EOSINOPHIL # BLD AUTO: 0.1 10E3/UL (ref 0–0.7)
EOSINOPHIL NFR BLD AUTO: 1 %
ERYTHROCYTE [DISTWIDTH] IN BLOOD BY AUTOMATED COUNT: 12.7 % (ref 10–15)
GFR SERPL CREATININE-BSD FRML MDRD: >90 ML/MIN/1.73M2
GLUCOSE BLD-MCNC: 98 MG/DL (ref 70–99)
GLUCOSE UR STRIP-MCNC: NEGATIVE MG/DL
HCT VFR BLD AUTO: 39 % (ref 35–47)
HGB BLD-MCNC: 13.5 G/DL (ref 11.7–15.7)
HGB UR QL STRIP: ABNORMAL
IMM GRANULOCYTES # BLD: 0 10E3/UL
IMM GRANULOCYTES NFR BLD: 0 %
KETONES UR STRIP-MCNC: NEGATIVE MG/DL
LEUKOCYTE ESTERASE UR QL STRIP: ABNORMAL
LYMPHOCYTES # BLD AUTO: 2.6 10E3/UL (ref 0.8–5.3)
LYMPHOCYTES NFR BLD AUTO: 26 %
MCH RBC QN AUTO: 29.5 PG (ref 26.5–33)
MCHC RBC AUTO-ENTMCNC: 34.6 G/DL (ref 31.5–36.5)
MCV RBC AUTO: 85 FL (ref 78–100)
MONOCYTES # BLD AUTO: 0.5 10E3/UL (ref 0–1.3)
MONOCYTES NFR BLD AUTO: 5 %
MUCOUS THREADS #/AREA URNS LPF: PRESENT /LPF
NEUTROPHILS # BLD AUTO: 6.9 10E3/UL (ref 1.6–8.3)
NEUTROPHILS NFR BLD AUTO: 67 %
NITRATE UR QL: NEGATIVE
NRBC # BLD AUTO: 0 10E3/UL
NRBC BLD AUTO-RTO: 0 /100
PH UR STRIP: 6.5 [PH] (ref 4.7–8)
PLATELET # BLD AUTO: 342 10E3/UL (ref 150–450)
POTASSIUM BLD-SCNC: 3.8 MMOL/L (ref 3.4–5.3)
RBC # BLD AUTO: 4.58 10E6/UL (ref 3.8–5.2)
RBC URINE: 3 /HPF
SODIUM SERPL-SCNC: 136 MMOL/L (ref 133–144)
SP GR UR STRIP: 1.02 (ref 1–1.03)
SPECIMEN EXPIRATION DATE: NORMAL
SQUAMOUS EPITHELIAL: 7 /HPF
UROBILINOGEN UR STRIP-MCNC: NORMAL MG/DL
WBC # BLD AUTO: 10.2 10E3/UL (ref 4–11)
WBC URINE: 11 /HPF

## 2021-08-06 PROCEDURE — 84702 CHORIONIC GONADOTROPIN TEST: CPT | Performed by: STUDENT IN AN ORGANIZED HEALTH CARE EDUCATION/TRAINING PROGRAM

## 2021-08-06 PROCEDURE — 81001 URINALYSIS AUTO W/SCOPE: CPT | Performed by: STUDENT IN AN ORGANIZED HEALTH CARE EDUCATION/TRAINING PROGRAM

## 2021-08-06 PROCEDURE — 87086 URINE CULTURE/COLONY COUNT: CPT | Performed by: STUDENT IN AN ORGANIZED HEALTH CARE EDUCATION/TRAINING PROGRAM

## 2021-08-06 PROCEDURE — 99284 EMERGENCY DEPT VISIT MOD MDM: CPT | Performed by: STUDENT IN AN ORGANIZED HEALTH CARE EDUCATION/TRAINING PROGRAM

## 2021-08-06 PROCEDURE — 36415 COLL VENOUS BLD VENIPUNCTURE: CPT | Performed by: STUDENT IN AN ORGANIZED HEALTH CARE EDUCATION/TRAINING PROGRAM

## 2021-08-06 PROCEDURE — 99284 EMERGENCY DEPT VISIT MOD MDM: CPT | Mod: 25

## 2021-08-06 PROCEDURE — 80048 BASIC METABOLIC PNL TOTAL CA: CPT | Performed by: STUDENT IN AN ORGANIZED HEALTH CARE EDUCATION/TRAINING PROGRAM

## 2021-08-06 PROCEDURE — 76801 OB US < 14 WKS SINGLE FETUS: CPT

## 2021-08-06 PROCEDURE — 86901 BLOOD TYPING SEROLOGIC RH(D): CPT | Performed by: STUDENT IN AN ORGANIZED HEALTH CARE EDUCATION/TRAINING PROGRAM

## 2021-08-06 PROCEDURE — 85025 COMPLETE CBC W/AUTO DIFF WBC: CPT | Performed by: STUDENT IN AN ORGANIZED HEALTH CARE EDUCATION/TRAINING PROGRAM

## 2021-08-06 ASSESSMENT — ENCOUNTER SYMPTOMS
GASTROINTESTINAL NEGATIVE: 1
EYES NEGATIVE: 1
CONSTITUTIONAL NEGATIVE: 1
DYSURIA: 0
ENDOCRINE NEGATIVE: 1
PSYCHIATRIC NEGATIVE: 1
NEUROLOGICAL NEGATIVE: 1
RESPIRATORY NEGATIVE: 1
MUSCULOSKELETAL NEGATIVE: 1
FLANK PAIN: 0
CARDIOVASCULAR NEGATIVE: 1
HEMATURIA: 0

## 2021-08-06 NOTE — ED NOTES
Reports she is pregnant, 7 weeks 3 days along. Last US showed small small subchorionic hemorrhage. Very slight cramping present. No pain to palpation of abdomen.

## 2021-08-06 NOTE — DISCHARGE INSTRUCTIONS
- Please take Tylenol for your symptoms  - Please return to the Emergency Room if you do not improve, feel worse, or have any new or concerning symptoms. We would especially want to see you back if you experience worsening bleeding, shortness of breath, or worsening pain.  - Please follow up with your OB in 4-5 days.

## 2021-08-06 NOTE — ED NOTES
MD aware of urine. No ABX prescribed at this time. Patient given discharge instructions to follow up with OB in the next few days. Monitor BP at home. Return for any new or worsening symptoms. Take tylenol as needed for discomfort.     Patient verbalized understanding. Patient condition stable upon discharge.

## 2021-08-06 NOTE — ED PROVIDER NOTES
History     Chief Complaint   Patient presents with     Vaginal Bleeding     c/o slight vaginal bleeding, notes brownish while wiping. c/o cramping. notes 7 weeks pregnant     HPI  April YURIY Maria is a 25 year old female  who Zentz with vaginal spotting and lower abdominal cramping.  She is 7 weeks and a few days pregnant.  She had an ultrasound in late July that showed a single intrauterine gestation as well as a small subchorionic hemorrhage.  This morning she noted some spotting but passed no clots.  He has gone through 2 pads today.  She denies any chest pain or lightheadedness.  She is unsure of her blood type.  Has a history of frequent urinary tract infections and an atrophic kidney.  She had diet-controlled gestational diabetes.  No hematuria/dysuria.  No fevers at home.  No chest pain or shortness of breath.  No vomiting/diarrhea but does have some mild nausea but declines medications.  No history abdominal surgery besides a previous  section.  She does have a tree of enteritis as well.  She has taken Tylenol at home to help with pain but this is very mild and declines any further pain meds at this time.   She has changed a pad once today for the vaginal bleeding. No concern for STD/STI.    Allergies:  Allergies   Allergen Reactions     Pineapple Hives and Swelling     Throat swelling       Problem List:    Patient Active Problem List    Diagnosis Date Noted     Regional enteritis of large intestine (H) 2021     Priority: Medium     Added automatically from request for surgery [id number]       Family history of Crohn's disease 2021     Priority: Medium     Added automatically from request for surgery [id number]        delivery delivered 2020     Priority: Medium     GBS bacteriuria 2020     Priority: Medium     Treat with pcn in labor.        Microscopic hematuria 2019     Priority: Medium     No anatomic cause noted. Repeat yearly.        Recurrent UTI  2018     Priority: Medium     Renal atrophy, right 2018     Priority: Medium        Past Medical History:    Past Medical History:   Diagnosis Date     Atrophic kidney      Depressive disorder      Diet controlled gestational diabetes mellitus (GDM) in third trimester 2020     Glucose intolerance 5/15/2020     Glucose intolerance of pregnancy 2020     Shift work sleep disorder        Past Surgical History:    Past Surgical History:   Procedure Laterality Date      SECTION N/A 2020    Procedure:  SECTION;  Surgeon: Magdi Cannon MD;  Location: [facility] OR     ENDOSCOPY UPPER, COLONOSCOPY, COMBINED N/A 2021    Procedure: colonoscopy with biopsy and polypectomy, upper endoscopy with biopsy;  Surgeon: Joe Nayak MD;  Location: [facility] OR     PE TUBES Bilateral     years        Family History:    Family History   Problem Relation Age of Onset     Other - See Comments Mother         bells palsy post auto accident     Heart Disease Father         s/p stents     Asthma Father      Cancer Father        Social History:  Marital Status:  Single [1]  Social History     Tobacco Use     Smoking status: Former Smoker     Packs/day: 0.25     Types: Cigarettes     Start date: 3/15/2017     Quit date: 3/15/2018     Years since quitting: 3.4     Smokeless tobacco: Never Used   Substance Use Topics     Alcohol use: No     Alcohol/week: 0.0 standard drinks     Drug use: No        Medications:    acetaminophen (TYLENOL) 325 MG tablet  Prenatal Vit-Fe Fumarate-FA (PRENATAL MULTIVITAMIN W/IRON) 27-0.8 MG tablet      Review of Systems   Constitutional: Negative.    HENT: Negative.    Eyes: Negative.    Respiratory: Negative.    Cardiovascular: Negative.    Gastrointestinal: Negative.    Endocrine: Negative.    Genitourinary: Positive for vaginal bleeding. Negative for dysuria, enuresis, flank pain, hematuria and vaginal discharge.   Musculoskeletal: Negative.    Skin: Negative.    Neurological:  Negative.    Psychiatric/Behavioral: Negative.        Physical Exam   BP: 139/89  Pulse: 89  Temp: 98.6  F (37  C)  Resp: 16  SpO2: 98 %      Physical Exam  Vitals and nursing note reviewed.   Constitutional:       General: She is not in acute distress.     Appearance: Normal appearance. She is normal weight. She is not ill-appearing.   HENT:      Head: Normocephalic and atraumatic.      Right Ear: External ear normal.      Left Ear: External ear normal.      Nose: Nose normal. No congestion.      Mouth/Throat:      Mouth: Mucous membranes are moist.      Pharynx: Oropharynx is clear.   Eyes:      Pupils: Pupils are equal, round, and reactive to light.   Cardiovascular:      Rate and Rhythm: Normal rate and regular rhythm.      Pulses: Normal pulses.   Pulmonary:      Effort: Pulmonary effort is normal.      Breath sounds: Normal breath sounds.   Abdominal:      General: Abdomen is flat. Bowel sounds are normal.   Musculoskeletal:         General: No swelling or tenderness. Normal range of motion.      Cervical back: Normal range of motion and neck supple.   Skin:     General: Skin is warm and dry.      Capillary Refill: Capillary refill takes less than 2 seconds.   Neurological:      General: No focal deficit present.      Mental Status: She is alert and oriented to person, place, and time.   Psychiatric:         Mood and Affect: Mood normal.         Behavior: Behavior normal.      exam deferred. No concern for STDs.    ED Course        ED Course as of Aug 12 1557   Fri Aug 06, 2021   1750 Hemoglobin stable.   Hemoglobin: 13.5   1800 Rh+, no indication for RhoGam.   ABO/Rh(D): B POS   1816 . Single living intra-uterine gestation with subchorionic hemorrhage.      1816 Findings were discussed with the patient including non-emergent imaging/lab results. Additional verbal instructions were discussed with the patient as well. Instructed to follow up with OB in 3-4 days. Also discussed specific warning signs  and instructed to return to the ED if there are any concerns. Patient voiced understanding of instructions, questions were answered and the patient was discharged home in stable condition      1827 Possible contaminated urine. Will defer to culture as she does not have urinary symptoms.   UA with Microscopic reflex to Culture(!)            No results found for this or any previous visit (from the past 24 hour(s)).  Medications - No data to display    Assessments & Plan (with Medical Decision Making)     I have reviewed the nursing notes.    I have reviewed the findings, diagnosis, plan and need for follow up with the patient.  Vaginal bleeding in early pregnancy differential includes threatened/complete/etc miscarriage, subchorionic hemmorhage. Plan for labs and imaging work-up. Lower concern for torsion and had ultrasound already confirming intrauterine pregnancy. If work-up negative, will plan for close PCP follow-up as her blood loss sounds relatively minor.  US with intra-uterine gestation living and normal FHR. Discussed need for OB follow-up. No uterine rupture. No ovarian abnormalities noted.  See ED Course for further MDM.  Discharge Medication List as of 8/6/2021  6:33 PM          Final diagnoses:   Vaginal bleeding in pregnancy   Abdominal cramping       8/6/2021   [facility] EM[facility]     Eros Jonas MD  08/06/21 1818       Eros Jonas MD  08/06/21 1828       Eros Jonas MD  08/12/21 1556

## 2021-08-08 LAB — BACTERIA UR CULT: NO GROWTH

## 2021-08-10 ENCOUNTER — PRENATAL OFFICE VISIT (OUTPATIENT)
Dept: OBGYN | Facility: OTHER | Age: 26
End: 2021-08-10
Attending: ADVANCED PRACTICE MIDWIFE
Payer: COMMERCIAL

## 2021-08-10 VITALS
DIASTOLIC BLOOD PRESSURE: 74 MMHG | SYSTOLIC BLOOD PRESSURE: 122 MMHG | BODY MASS INDEX: 33.13 KG/M2 | RESPIRATION RATE: 16 BRPM | HEIGHT: 62 IN | OXYGEN SATURATION: 100 % | WEIGHT: 180 LBS | HEART RATE: 82 BPM

## 2021-08-10 DIAGNOSIS — O26.851 SPOTTING AFFECTING PREGNANCY IN FIRST TRIMESTER: Primary | ICD-10-CM

## 2021-08-10 PROCEDURE — 99213 OFFICE O/P EST LOW 20 MIN: CPT | Performed by: ADVANCED PRACTICE MIDWIFE

## 2021-08-10 PROCEDURE — G0463 HOSPITAL OUTPT CLINIC VISIT: HCPCS

## 2021-08-10 RX ORDER — PRENATAL VIT/IRON FUM/FOLIC AC 27MG-0.8MG
1 TABLET ORAL DAILY
COMMUNITY
End: 2022-08-10

## 2021-08-10 ASSESSMENT — MIFFLIN-ST. JEOR: SCORE: 1524.72

## 2021-08-10 ASSESSMENT — PAIN SCALES - GENERAL: PAINLEVEL: MODERATE PAIN (5)

## 2021-08-10 NOTE — PROGRESS NOTES
"May Maria is a 25 year old female here today for Emergency Department (ED) follow up.  Pt reports having bleeding with wiping on Friday, August 6th.  US shows live intrauterine pregnancy with small subchorionic hemorrhage.  Pt reports increased B/P during ED visit.  Pt reports seeing very slight brown spots since Friday.  Reports mild cramping with bleeding episode.      O:   /74   Pulse 82   Resp 16   Ht 1.575 m (5' 2\")   Wt 81.6 kg (180 lb)   LMP 06/15/2021   SpO2 100%   BMI 32.92 kg/m     Pleasant without acute distress.      A:    ED follow up  Bleeding in early pregnancy  Live intrauterine pregnancy per US on 8/7/21 at 7 w 3 d  US shows small subchorionic hemorrhage  Elevated B/P in ED    P:    Reviewed US  Bleeding Parameters:  If soaking a pad in an hour for greater than 2 hours seek medical care   Keep scheduled appt with CNM next week  US at next appt  Monitor B/P throughout prenatal care    25 minutes spent on the date of the encounter doing chart review, history and exam, documentation and further activities per the note          SPENSER Jim, CNM    "

## 2021-08-10 NOTE — PATIENT INSTRUCTIONS
Return to office in one week for prenatal care and as needed.    Thank you for allowing Christopher DUEÑAS CNM and our OB team to participate in your care.  If you have a scheduling or an appointment question please contact [facility] Coordinator at their direct line [phone].   ALL nursing questions or concerns can be directed to your OB nurse at: [phone] Dione Hamilton/Carmela

## 2021-08-10 NOTE — NURSING NOTE
"Chief Complaint   Patient presents with     Prenatal Care       Initial /74   Pulse 82   Resp 16   Ht 1.575 m (5' 2\")   Wt 81.6 kg (180 lb)   LMP 06/15/2021   SpO2 100%   BMI 32.92 kg/m   Estimated body mass index is 32.92 kg/m  as calculated from the following:    Height as of this encounter: 1.575 m (5' 2\").    Weight as of this encounter: 81.6 kg (180 lb).  Medication Reconciliation: complete  Pauline Hahn LPN    "

## 2021-08-16 ENCOUNTER — PRENATAL OFFICE VISIT (OUTPATIENT)
Dept: OBGYN | Facility: OTHER | Age: 26
End: 2021-08-16
Attending: ADVANCED PRACTICE MIDWIFE
Payer: COMMERCIAL

## 2021-08-16 VITALS
DIASTOLIC BLOOD PRESSURE: 74 MMHG | WEIGHT: 178 LBS | HEART RATE: 60 BPM | HEIGHT: 62 IN | BODY MASS INDEX: 32.76 KG/M2 | SYSTOLIC BLOOD PRESSURE: 118 MMHG

## 2021-08-16 DIAGNOSIS — Z34.81 ENCOUNTER FOR SUPERVISION OF OTHER NORMAL PREGNANCY, FIRST TRIMESTER: Primary | ICD-10-CM

## 2021-08-16 DIAGNOSIS — Z34.80 SUPERVISION OF OTHER NORMAL PREGNANCY, ANTEPARTUM: ICD-10-CM

## 2021-08-16 LAB
CLUE CELLS: ABNORMAL
TRICHOMONAS, WET PREP: ABNORMAL
WBC'S/HIGH POWER FIELD, WET PREP: ABNORMAL
YEAST, WET PREP: ABNORMAL

## 2021-08-16 PROCEDURE — 76817 TRANSVAGINAL US OBSTETRIC: CPT | Performed by: ADVANCED PRACTICE MIDWIFE

## 2021-08-16 PROCEDURE — 99207 PR PRENATAL VISIT: CPT | Performed by: ADVANCED PRACTICE MIDWIFE

## 2021-08-16 PROCEDURE — G0463 HOSPITAL OUTPT CLINIC VISIT: HCPCS | Mod: 25

## 2021-08-16 PROCEDURE — 87210 SMEAR WET MOUNT SALINE/INK: CPT | Mod: ZL | Performed by: ADVANCED PRACTICE MIDWIFE

## 2021-08-16 PROCEDURE — 87491 CHLMYD TRACH DNA AMP PROBE: CPT | Mod: ZL | Performed by: ADVANCED PRACTICE MIDWIFE

## 2021-08-16 ASSESSMENT — PAIN SCALES - GENERAL: PAINLEVEL: NO PAIN (0)

## 2021-08-16 ASSESSMENT — MIFFLIN-ST. JEOR: SCORE: 1515.65

## 2021-08-16 NOTE — NURSING NOTE
"Chief Complaint   Patient presents with     Prenatal Care       Initial /74   Pulse 60   Ht 1.575 m (5' 2\")   Wt 80.7 kg (178 lb)   LMP 06/15/2021   BMI 32.56 kg/m   Estimated body mass index is 32.56 kg/m  as calculated from the following:    Height as of this encounter: 1.575 m (5' 2\").    Weight as of this encounter: 80.7 kg (178 lb).  Medication Reconciliation: complete  Carmela Mccoy LPN    "

## 2021-08-16 NOTE — PROGRESS NOTES
"May Maria is a 25 year old  presenting after a positive pregnancy test.  /74   Pulse 60   Ht 1.575 m (5' 2\")   Wt 80.7 kg (178 lb)   LMP 06/15/2021   BMI 32.56 kg/m      Lmp: 6/15/21   Regular: y Oral Contraceptive Agent's: n  Bleeding: spotting x one  Pain: n  Smoker: n  Cats: n  Folate: y  Other meds: n    Major Medical Illness: One functioning kidney    Transvaginal Ultrasound by SPENSER Jim CNM  FHT: +  Gestational Age: 8 w 6 d  Scan = dates  Done by SPENSER Jim CNM    Assessment:   Need of prenatal STI screening      Plan:   Wet prep, GC/CT    Total visit greater than 25 minutes with 20 minutes spent face to face with patient discussing first trimester of pregnancy education including medications, alcohol, foods, activities, and travel; prenatal care, questions answered.        SPENSER Jim CNM    "

## 2021-08-17 LAB
C TRACH DNA SPEC QL PROBE+SIG AMP: NEGATIVE
N GONORRHOEA DNA SPEC QL NAA+PROBE: NEGATIVE

## 2021-09-14 ENCOUNTER — PRENATAL OFFICE VISIT (OUTPATIENT)
Dept: OBGYN | Facility: OTHER | Age: 26
End: 2021-09-14
Attending: ADVANCED PRACTICE MIDWIFE
Payer: COMMERCIAL

## 2021-09-14 VITALS
SYSTOLIC BLOOD PRESSURE: 116 MMHG | WEIGHT: 174 LBS | DIASTOLIC BLOOD PRESSURE: 73 MMHG | HEIGHT: 62 IN | BODY MASS INDEX: 32.02 KG/M2

## 2021-09-14 DIAGNOSIS — Z34.82 ENCOUNTER FOR SUPERVISION OF OTHER NORMAL PREGNANCY, SECOND TRIMESTER: Primary | ICD-10-CM

## 2021-09-14 DIAGNOSIS — N26.1 RENAL ATROPHY, RIGHT: ICD-10-CM

## 2021-09-14 DIAGNOSIS — Z34.80 SUPERVISION OF OTHER NORMAL PREGNANCY, ANTEPARTUM: ICD-10-CM

## 2021-09-14 LAB
ERYTHROCYTE [DISTWIDTH] IN BLOOD BY AUTOMATED COUNT: 12.6 % (ref 10–15)
HCT VFR BLD AUTO: 36.8 % (ref 35–47)
HGB BLD-MCNC: 13.1 G/DL (ref 11.7–15.7)
MCH RBC QN AUTO: 29.8 PG (ref 26.5–33)
MCHC RBC AUTO-ENTMCNC: 35.6 G/DL (ref 31.5–36.5)
MCV RBC AUTO: 84 FL (ref 78–100)
PLATELET # BLD AUTO: 299 10E3/UL (ref 150–450)
RBC # BLD AUTO: 4.4 10E6/UL (ref 3.8–5.2)
WBC # BLD AUTO: 8.2 10E3/UL (ref 4–11)

## 2021-09-14 PROCEDURE — 87340 HEPATITIS B SURFACE AG IA: CPT | Mod: ZL | Performed by: ADVANCED PRACTICE MIDWIFE

## 2021-09-14 PROCEDURE — G0463 HOSPITAL OUTPT CLINIC VISIT: HCPCS | Mod: 25

## 2021-09-14 PROCEDURE — 86780 TREPONEMA PALLIDUM: CPT | Mod: ZL | Performed by: ADVANCED PRACTICE MIDWIFE

## 2021-09-14 PROCEDURE — 76815 OB US LIMITED FETUS(S): CPT | Performed by: ADVANCED PRACTICE MIDWIFE

## 2021-09-14 PROCEDURE — 85014 HEMATOCRIT: CPT | Mod: ZL | Performed by: ADVANCED PRACTICE MIDWIFE

## 2021-09-14 PROCEDURE — 86803 HEPATITIS C AB TEST: CPT | Mod: ZL | Performed by: ADVANCED PRACTICE MIDWIFE

## 2021-09-14 PROCEDURE — 86762 RUBELLA ANTIBODY: CPT | Mod: ZL | Performed by: ADVANCED PRACTICE MIDWIFE

## 2021-09-14 PROCEDURE — G0463 HOSPITAL OUTPT CLINIC VISIT: HCPCS

## 2021-09-14 PROCEDURE — 99207 PR FIRST OB VISIT: CPT | Performed by: ADVANCED PRACTICE MIDWIFE

## 2021-09-14 PROCEDURE — 87389 HIV-1 AG W/HIV-1&-2 AB AG IA: CPT | Mod: ZL | Performed by: ADVANCED PRACTICE MIDWIFE

## 2021-09-14 ASSESSMENT — PATIENT HEALTH QUESTIONNAIRE - PHQ9: SUM OF ALL RESPONSES TO PHQ QUESTIONS 1-9: 0

## 2021-09-14 ASSESSMENT — ANXIETY QUESTIONNAIRES
1. FEELING NERVOUS, ANXIOUS, OR ON EDGE: NOT AT ALL
6. BECOMING EASILY ANNOYED OR IRRITABLE: NOT AT ALL
5. BEING SO RESTLESS THAT IT IS HARD TO SIT STILL: NOT AT ALL
2. NOT BEING ABLE TO STOP OR CONTROL WORRYING: NOT AT ALL
4. TROUBLE RELAXING: NOT AT ALL
GAD7 TOTAL SCORE: 0
7. FEELING AFRAID AS IF SOMETHING AWFUL MIGHT HAPPEN: NOT AT ALL
3. WORRYING TOO MUCH ABOUT DIFFERENT THINGS: NOT AT ALL

## 2021-09-14 ASSESSMENT — PAIN SCALES - GENERAL: PAINLEVEL: NO PAIN (0)

## 2021-09-14 ASSESSMENT — MIFFLIN-ST. JEOR: SCORE: 1497.51

## 2021-09-14 NOTE — PATIENT INSTRUCTIONS
Return to office in 4 week(s) for prenatal care and as needed.    If you think your bag of water is broke; have bleeding like a period; think your in labor; or are worried about your baby's movement; please call the labor and delivery unit @ [phone].    Thank you for allowing Christopher DUEÑAS CNM and our OB team to participate in your care.  If you have a scheduling or an appointment question please contact [facility] Coordinator at their direct line [phone].   ALL nursing questions or concerns can be directed to your OB nurse at: [phone] Dione Hamilton/Carmela
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
## 2021-09-14 NOTE — NURSING NOTE
"Chief Complaint   Patient presents with     Prenatal Care     13w0d       Initial /73 (BP Location: Left arm, Patient Position: Chair, Cuff Size: Adult Regular)   Ht 1.575 m (5' 2\")   Wt 78.9 kg (174 lb)   LMP 06/15/2021   BMI 31.83 kg/m   Estimated body mass index is 31.83 kg/m  as calculated from the following:    Height as of this encounter: 1.575 m (5' 2\").    Weight as of this encounter: 78.9 kg (174 lb).  Medication Reconciliation: complete  Joy Carter LPN    "

## 2021-09-15 LAB
RUBV IGG SERPL QL IA: 2.7 INDEX
RUBV IGG SERPL QL IA: POSITIVE
T PALLIDUM AB SER QL: NONREACTIVE

## 2021-09-15 ASSESSMENT — ANXIETY QUESTIONNAIRES: GAD7 TOTAL SCORE: 0

## 2021-09-16 LAB
HBV SURFACE AG SERPL QL IA: NONREACTIVE
HCV AB SERPL QL IA: NONREACTIVE
HIV 1+2 AB+HIV1 P24 AG SERPL QL IA: NONREACTIVE

## 2021-09-21 LAB — SCANNED LAB RESULT: NORMAL

## 2021-09-28 ENCOUNTER — HOSPITAL ENCOUNTER (EMERGENCY)
Facility: HOSPITAL | Age: 26
Discharge: HOME | End: 2021-09-28
Attending: STUDENT IN AN ORGANIZED HEALTH CARE EDUCATION/TRAINING PROGRAM | Admitting: STUDENT IN AN ORGANIZED HEALTH CARE EDUCATION/TRAINING PROGRAM
Payer: COMMERCIAL

## 2021-09-28 VITALS
DIASTOLIC BLOOD PRESSURE: 90 MMHG | HEART RATE: 86 BPM | OXYGEN SATURATION: 99 % | TEMPERATURE: 98.2 F | RESPIRATION RATE: 16 BRPM | SYSTOLIC BLOOD PRESSURE: 119 MMHG

## 2021-09-28 DIAGNOSIS — O21.9 NAUSEA AND VOMITING IN PREGNANCY: ICD-10-CM

## 2021-09-28 LAB
ALBUMIN SERPL-MCNC: 3.4 G/DL (ref 3.4–5)
ALBUMIN UR-MCNC: 10 MG/DL
ALP SERPL-CCNC: 81 U/L (ref 40–150)
ALT SERPL W P-5'-P-CCNC: 17 U/L (ref 0–50)
ANION GAP SERPL CALCULATED.3IONS-SCNC: 7 MMOL/L (ref 3–14)
APPEARANCE UR: CLEAR
AST SERPL W P-5'-P-CCNC: 7 U/L (ref 0–45)
BACTERIA #/AREA URNS HPF: ABNORMAL /HPF
BILIRUB SERPL-MCNC: 0.6 MG/DL (ref 0.2–1.3)
BILIRUB UR QL STRIP: NEGATIVE
BUN SERPL-MCNC: 8 MG/DL (ref 7–30)
CALCIUM SERPL-MCNC: 9.1 MG/DL (ref 8.5–10.1)
CHLORIDE BLD-SCNC: 106 MMOL/L (ref 94–109)
CO2 SERPL-SCNC: 22 MMOL/L (ref 20–32)
COLOR UR AUTO: YELLOW
CREAT SERPL-MCNC: 0.71 MG/DL (ref 0.52–1.04)
ERYTHROCYTE [DISTWIDTH] IN BLOOD BY AUTOMATED COUNT: 13.2 % (ref 10–15)
GFR SERPL CREATININE-BSD FRML MDRD: >90 ML/MIN/1.73M2
GLUCOSE BLD-MCNC: 91 MG/DL (ref 70–99)
GLUCOSE UR STRIP-MCNC: NEGATIVE MG/DL
HCT VFR BLD AUTO: 38.6 % (ref 35–47)
HGB BLD-MCNC: 13.5 G/DL (ref 11.7–15.7)
HGB UR QL STRIP: ABNORMAL
KETONES UR STRIP-MCNC: NEGATIVE MG/DL
LEUKOCYTE ESTERASE UR QL STRIP: ABNORMAL
LIPASE SERPL-CCNC: 90 U/L (ref 73–393)
MCH RBC QN AUTO: 29.2 PG (ref 26.5–33)
MCHC RBC AUTO-ENTMCNC: 35 G/DL (ref 31.5–36.5)
MCV RBC AUTO: 83 FL (ref 78–100)
MUCOUS THREADS #/AREA URNS LPF: PRESENT /LPF
NITRATE UR QL: NEGATIVE
PH UR STRIP: 5.5 [PH] (ref 4.7–8)
PLATELET # BLD AUTO: 302 10E3/UL (ref 150–450)
POTASSIUM BLD-SCNC: 3.6 MMOL/L (ref 3.4–5.3)
PROT SERPL-MCNC: 7.9 G/DL (ref 6.8–8.8)
RBC # BLD AUTO: 4.63 10E6/UL (ref 3.8–5.2)
RBC URINE: 1 /HPF
SODIUM SERPL-SCNC: 135 MMOL/L (ref 133–144)
SP GR UR STRIP: 1.02 (ref 1–1.03)
SQUAMOUS EPITHELIAL: 0 /HPF
UROBILINOGEN UR STRIP-MCNC: NORMAL MG/DL
WBC # BLD AUTO: 7.1 10E3/UL (ref 4–11)
WBC URINE: 3 /HPF

## 2021-09-28 PROCEDURE — 99284 EMERGENCY DEPT VISIT MOD MDM: CPT | Mod: 25

## 2021-09-28 PROCEDURE — 83690 ASSAY OF LIPASE: CPT | Performed by: STUDENT IN AN ORGANIZED HEALTH CARE EDUCATION/TRAINING PROGRAM

## 2021-09-28 PROCEDURE — 96361 HYDRATE IV INFUSION ADD-ON: CPT

## 2021-09-28 PROCEDURE — 96374 THER/PROPH/DIAG INJ IV PUSH: CPT

## 2021-09-28 PROCEDURE — 81001 URINALYSIS AUTO W/SCOPE: CPT | Performed by: STUDENT IN AN ORGANIZED HEALTH CARE EDUCATION/TRAINING PROGRAM

## 2021-09-28 PROCEDURE — 82040 ASSAY OF SERUM ALBUMIN: CPT | Performed by: STUDENT IN AN ORGANIZED HEALTH CARE EDUCATION/TRAINING PROGRAM

## 2021-09-28 PROCEDURE — 36415 COLL VENOUS BLD VENIPUNCTURE: CPT | Performed by: STUDENT IN AN ORGANIZED HEALTH CARE EDUCATION/TRAINING PROGRAM

## 2021-09-28 PROCEDURE — 250N000013 HC RX MED GY IP 250 OP 250 PS 637: Performed by: STUDENT IN AN ORGANIZED HEALTH CARE EDUCATION/TRAINING PROGRAM

## 2021-09-28 PROCEDURE — 258N000003 HC RX IP 258 OP 636: Performed by: STUDENT IN AN ORGANIZED HEALTH CARE EDUCATION/TRAINING PROGRAM

## 2021-09-28 PROCEDURE — 96375 TX/PRO/DX INJ NEW DRUG ADDON: CPT

## 2021-09-28 PROCEDURE — 99284 EMERGENCY DEPT VISIT MOD MDM: CPT | Performed by: STUDENT IN AN ORGANIZED HEALTH CARE EDUCATION/TRAINING PROGRAM

## 2021-09-28 PROCEDURE — 85027 COMPLETE CBC AUTOMATED: CPT | Performed by: STUDENT IN AN ORGANIZED HEALTH CARE EDUCATION/TRAINING PROGRAM

## 2021-09-28 PROCEDURE — 250N000011 HC RX IP 250 OP 636: Performed by: STUDENT IN AN ORGANIZED HEALTH CARE EDUCATION/TRAINING PROGRAM

## 2021-09-28 RX ORDER — SODIUM CHLORIDE 9 MG/ML
INJECTION, SOLUTION INTRAVENOUS CONTINUOUS
Status: DISCONTINUED | OUTPATIENT
Start: 2021-09-28 | End: 2021-09-28 | Stop reason: HOSPADM

## 2021-09-28 RX ORDER — METOCLOPRAMIDE HYDROCHLORIDE 5 MG/ML
10 INJECTION INTRAMUSCULAR; INTRAVENOUS ONCE
Status: COMPLETED | OUTPATIENT
Start: 2021-09-28 | End: 2021-09-28

## 2021-09-28 RX ORDER — DIPHENHYDRAMINE HYDROCHLORIDE 50 MG/ML
50 INJECTION INTRAMUSCULAR; INTRAVENOUS ONCE
Status: COMPLETED | OUTPATIENT
Start: 2021-09-28 | End: 2021-09-28

## 2021-09-28 RX ORDER — DIPHENHYDRAMINE HYDROCHLORIDE 50 MG/ML
25 INJECTION INTRAMUSCULAR; INTRAVENOUS ONCE
Status: DISCONTINUED | OUTPATIENT
Start: 2021-09-28 | End: 2021-09-28

## 2021-09-28 RX ORDER — PYRIDOXINE HCL (VITAMIN B6) 25 MG
25 TABLET ORAL ONCE
Status: COMPLETED | OUTPATIENT
Start: 2021-09-28 | End: 2021-09-28

## 2021-09-28 RX ORDER — METOCLOPRAMIDE 5 MG/1
5 TABLET ORAL
Qty: 16 TABLET | Refills: 0 | Status: SHIPPED | OUTPATIENT
Start: 2021-09-28 | End: 2022-08-10

## 2021-09-28 RX ADMIN — DIPHENHYDRAMINE HYDROCHLORIDE 50 MG: 50 INJECTION, SOLUTION INTRAMUSCULAR; INTRAVENOUS at 09:43

## 2021-09-28 RX ADMIN — Medication 25 MG: at 10:01

## 2021-09-28 RX ADMIN — SODIUM CHLORIDE 1000 ML: 9 INJECTION, SOLUTION INTRAVENOUS at 10:55

## 2021-09-28 RX ADMIN — METOCLOPRAMIDE HYDROCHLORIDE 10 MG: 5 INJECTION INTRAMUSCULAR; INTRAVENOUS at 09:58

## 2021-09-28 RX ADMIN — SODIUM CHLORIDE 1000 ML: 9 INJECTION, SOLUTION INTRAVENOUS at 09:41

## 2021-09-28 NOTE — DISCHARGE INSTRUCTIONS
Return to the emergency department if you have significant worsening of her symptoms or are unable to tolerate food and drink at home.  You can use the Reglan as needed for ongoing symptoms.  Please continue to use the Unisom and the vitamin B6 as well.  Stay well-hydrated.  Follow-up with OB within the next week.  Call to schedule an appointment.

## 2021-09-28 NOTE — ED PROVIDER NOTES
History     Chief Complaint   Patient presents with     Vomiting     15 weeks pregnant     HPI   Candace is a 25 year old female with no relevant past medical medical history who presents to the emergency department today complaining of nausea and vomiting for the past 2 weeks during her pregnancy that feels similar to her previous hyperemesis gravidarum, but is continued longer than during her last pregnancy.  Here she denies abdominal pain, diarrhea, fevers, urinary symptoms.  She states that she has had some bile in her vomit but no blood.  No blood in her stool.  No other complaints at this time.  Denies headache as well.    Allergies:  Allergies   Allergen Reactions     Pineapple Hives and Swelling     Throat swelling       Problem List:    Patient Active Problem List    Diagnosis Date Noted     Supervision of other normal pregnancy, antepartum 2021     Priority: Medium     NIPT negative  Hx PCS  Hx preeclampsia   Hx of GDM diet controlled  Hx of one functioning kidney (Left).  Atrophy of right kidney  Baby doc:  Chas  FOB:  Dominick  Son:  [place] 1               Regional enteritis of large intestine (H) 2021     Priority: Medium     Added automatically from request for surgery [id number]       Family history of Crohn's disease 2021     Priority: Medium     Added automatically from request for surgery [id number]        delivery delivered 2020     Priority: Medium     GBS bacteriuria 2020     Priority: Medium     Treat with pcn in labor.        Microscopic hematuria 2019     Priority: Medium     No anatomic cause noted. Repeat yearly.        Recurrent UTI 2018     Priority: Medium     Renal atrophy, right 2018     Priority: Medium        Past Medical History:    Past Medical History:   Diagnosis Date     Atrophic kidney      Depressive disorder      Diet controlled gestational diabetes mellitus (GDM) in third trimester 2020     Glucose intolerance  5/15/2020     Glucose intolerance of pregnancy 2020     Shift work sleep disorder        Past Surgical History:    Past Surgical History:   Procedure Laterality Date      SECTION N/A 2020    Procedure:  SECTION;  Surgeon: Magdi Cannon MD;  Location: [facility] OR     ENDOSCOPY UPPER, COLONOSCOPY, COMBINED N/A 2021    Procedure: colonoscopy with biopsy and polypectomy, upper endoscopy with biopsy;  Surgeon: Joe Nayak MD;  Location: [facility] OR     PE TUBES Bilateral     years        Family History:    Family History   Problem Relation Age of Onset     Other - See Comments Mother         bells palsy post auto accident     Heart Disease Father         s/p stents     Asthma Father      Cancer Father        Social History:  Marital Status:  Single [1]  Social History     Tobacco Use     Smoking status: Former Smoker     Packs/day: 0.25     Types: Cigarettes     Start date: 3/15/2017     Quit date: 3/15/2018     Years since quitting: 3.5     Smokeless tobacco: Never Used   Substance Use Topics     Alcohol use: No     Alcohol/week: 0.0 standard drinks     Drug use: No        Medications:    acetaminophen (TYLENOL) 325 MG tablet  metoclopramide (REGLAN) 5 MG tablet  Prenatal Vit-Fe Fumarate-FA (PRENATAL MULTIVITAMIN W/IRON) 27-0.8 MG tablet          Review of Systems  A complete review of systems was performed and is otherwise negative.     Physical Exam   BP: 134/92  Pulse: 100  Temp: 98.9  F (37.2  C)  Resp: 16  SpO2: 98 %      Physical Exam  Constitutional: Alert and conversant. NAD   HENT: NCAT   Eyes: Normal pupils   Neck: supple   CV: Normal rate, regular rhythm, no murmur   Pulmonary/Chest: Non-labored respirations, clear to auscultation bilaterally   Abdominal: Soft, non-tender, non-distended, fetal heart rate 157.8 on M-mode   MSK: MURPHY.   Neuro: Alert and appropriate   Skin: Warm and dry. No diaphoresis. No rashes on exposed skin    Psych: Appropriate mood and affect     ED Course     ED  Course as of Sep 28 1312   Tue Sep 28, 2021   1106 15 weeks pregnant with nausea and vomiting, abdominal exam reassuring, none surgical abdomen      1116 No evidence of UTI at this time   WBC Urine: 3   1135 Differential includes but is not limited to food poisoning, gastroenteritis, pancreatitis, appendicitis, GERD, SBO, gastroparesis, gastric outlet obstruction, gastritis, biliary colic, renal colic/pyelonephritis, intracranial etiology (e.g. stroke, ich, increased ICP), peripheral vertigo       1136 No headaches to raise concern for intracranial etiologies, no vertigo.  Pain not consistent with renal colic or biliary colic.  Lipase negative not pancreatitis.  No pain at McBurney's point, doubt appendicitis.  Most likely this is hyperemesis gravidarum, patient feeling better after B6, diphenhydramine and Reglan.  It seems like the Reglan to the heavy lifting here, she has been taking Unisom and B6 at home.  Will discharge with Reglan, recommending close OB follow-up within the next week.  Patient discharged in stable condition with all questions answered return precautions given.  Fluids given 2 L total.  Vitals remained normal throughout stay.        Procedures           Results for orders placed or performed during the hospital encounter of 09/28/21 (from the past 24 hour(s))   CBC with platelets   Result Value Ref Range    WBC Count 7.1 4.0 - 11.0 10e3/uL    RBC Count 4.63 3.80 - 5.20 10e6/uL    Hemoglobin 13.5 11.7 - 15.7 g/dL    Hematocrit 38.6 35.0 - 47.0 %    MCV 83 78 - 100 fL    MCH 29.2 26.5 - 33.0 pg    MCHC 35.0 31.5 - 36.5 g/dL    RDW 13.2 10.0 - 15.0 %    Platelet Count 302 150 - 450 10e3/uL   Comprehensive metabolic panel   Result Value Ref Range    Sodium 135 133 - 144 mmol/L    Potassium 3.6 3.4 - 5.3 mmol/L    Chloride 106 94 - 109 mmol/L    Carbon Dioxide (CO2) 22 20 - 32 mmol/L    Anion Gap 7 3 - 14 mmol/L    Urea Nitrogen 8 7 - 30 mg/dL    Creatinine 0.71 0.52 - 1.04 mg/dL    Calcium 9.1 8.5 -  10.1 mg/dL    Glucose 91 70 - 99 mg/dL    Alkaline Phosphatase 81 40 - 150 U/L    AST 7 0 - 45 U/L    ALT 17 0 - 50 U/L    Protein Total 7.9 6.8 - 8.8 g/dL    Albumin 3.4 3.4 - 5.0 g/dL    Bilirubin Total 0.6 0.2 - 1.3 mg/dL    GFR Estimate >90 >60 mL/min/1.73m2   Lipase   Result Value Ref Range    Lipase 90 73 - 393 U/L   UA with Microscopic reflex to Culture    Specimen: Urine, Midstream   Result Value Ref Range    Color Urine Yellow Colorless, Straw, Light Yellow, Yellow    Appearance Urine Clear Clear    Glucose Urine Negative Negative mg/dL    Bilirubin Urine Negative Negative    Ketones Urine Negative Negative mg/dL    Specific Gravity Urine 1.016 1.003 - 1.035    Blood Urine Moderate (A) Negative    pH Urine 5.5 4.7 - 8.0    Protein Albumin Urine 10  (A) Negative mg/dL    Urobilinogen Urine Normal Normal, 2.0 mg/dL    Nitrite Urine Negative Negative    Leukocyte Esterase Urine Small (A) Negative    Bacteria Urine Few (A) None Seen /HPF    Mucus Urine Present (A) None Seen /LPF    RBC Urine 1 <=2 /HPF    WBC Urine 3 <=5 /HPF    Squamous Epithelials Urine 0 <=1 /HPF    Narrative    Urine Culture not indicated       Medications   0.9% sodium chloride BOLUS (0 mLs Intravenous Stopped 9/28/21 1055)     Followed by   0.9% sodium chloride BOLUS (0 mLs Intravenous Stopped 9/28/21 1202)     Followed by   sodium chloride 0.9% infusion (has no administration in time range)   pyridOXINE (VITAMIN B6) tablet 25 mg (25 mg Oral Given 9/28/21 1001)   diphenhydrAMINE (BENADRYL) injection 50 mg (50 mg Intravenous Given 9/28/21 0943)   metoclopramide (REGLAN) injection 10 mg (10 mg Intravenous Given 9/28/21 0958)       Assessments & Plan (with Medical Decision Making)     I have reviewed the nursing notes.    I have reviewed the findings, diagnosis, plan and need for follow up with the patient.    Discharge Medication List as of 9/28/2021 12:10 PM      START taking these medications    Details   metoclopramide (REGLAN) 5 MG  tablet Take 1 tablet (5 mg) by mouth 4 times daily (before meals and nightly), Disp-16 tablet, R-0, E-Prescribe             Final diagnoses:   Nausea and vomiting in pregnancy       9/28/2021   [facility]     Isaias Beck MD  09/28/21 8386

## 2021-10-03 ENCOUNTER — HEALTH MAINTENANCE LETTER (OUTPATIENT)
Age: 26
End: 2021-10-03

## 2021-10-12 ENCOUNTER — PRENATAL OFFICE VISIT (OUTPATIENT)
Dept: OBGYN | Facility: OTHER | Age: 26
End: 2021-10-12
Attending: ADVANCED PRACTICE MIDWIFE
Payer: COMMERCIAL

## 2021-10-12 VITALS
DIASTOLIC BLOOD PRESSURE: 58 MMHG | SYSTOLIC BLOOD PRESSURE: 98 MMHG | HEIGHT: 62 IN | BODY MASS INDEX: 32.2 KG/M2 | WEIGHT: 175 LBS

## 2021-10-12 DIAGNOSIS — Z34.82 ENCOUNTER FOR SUPERVISION OF OTHER NORMAL PREGNANCY, SECOND TRIMESTER: Primary | ICD-10-CM

## 2021-10-12 DIAGNOSIS — Z34.80 SUPERVISION OF OTHER NORMAL PREGNANCY, ANTEPARTUM: ICD-10-CM

## 2021-10-12 LAB
ABO/RH(D): NORMAL
ALBUMIN UR-MCNC: NEGATIVE MG/DL
AMPHETAMINES UR QL: NOT DETECTED
ANTIBODY SCREEN: NEGATIVE
APPEARANCE UR: CLEAR
BARBITURATES UR QL SCN: NOT DETECTED
BENZODIAZ UR QL SCN: NOT DETECTED
BILIRUB UR QL STRIP: NEGATIVE
BUPRENORPHINE UR QL: NOT DETECTED
CANNABINOIDS UR QL: NOT DETECTED
COCAINE UR QL SCN: NOT DETECTED
COLOR UR AUTO: ABNORMAL
D-METHAMPHET UR QL: NOT DETECTED
GLUCOSE 1H P 50 G GLC PO SERPL-MCNC: 119 MG/DL (ref 70–129)
GLUCOSE UR STRIP-MCNC: NEGATIVE MG/DL
HGB UR QL STRIP: ABNORMAL
KETONES UR STRIP-MCNC: NEGATIVE MG/DL
LEUKOCYTE ESTERASE UR QL STRIP: ABNORMAL
METHADONE UR QL SCN: NOT DETECTED
NITRATE UR QL: NEGATIVE
OPIATES UR QL SCN: NOT DETECTED
OXYCODONE UR QL SCN: NOT DETECTED
PCP UR QL SCN: NOT DETECTED
PH UR STRIP: 6.5 [PH] (ref 5–7)
PROPOXYPH UR QL: NOT DETECTED
RBC #/AREA URNS AUTO: ABNORMAL /HPF
SP GR UR STRIP: 1.02 (ref 1–1.03)
SPECIMEN EXPIRATION DATE: NORMAL
SQUAMOUS #/AREA URNS AUTO: ABNORMAL /LPF
TRICYCLICS UR QL SCN: NOT DETECTED
UROBILINOGEN UR STRIP-ACNC: 0.2 E.U./DL
WBC #/AREA URNS AUTO: ABNORMAL /HPF

## 2021-10-12 PROCEDURE — 99207 PR PRENATAL VISIT: CPT | Performed by: ADVANCED PRACTICE MIDWIFE

## 2021-10-12 PROCEDURE — 82105 ALPHA-FETOPROTEIN SERUM: CPT | Mod: ZL | Performed by: ADVANCED PRACTICE MIDWIFE

## 2021-10-12 PROCEDURE — G0463 HOSPITAL OUTPT CLINIC VISIT: HCPCS

## 2021-10-12 PROCEDURE — 80306 DRUG TEST PRSMV INSTRMNT: CPT | Mod: ZL | Performed by: ADVANCED PRACTICE MIDWIFE

## 2021-10-12 PROCEDURE — 82952 GTT-ADDED SAMPLES: CPT | Mod: ZL | Performed by: ADVANCED PRACTICE MIDWIFE

## 2021-10-12 PROCEDURE — 81015 MICROSCOPIC EXAM OF URINE: CPT | Mod: ZL | Performed by: ADVANCED PRACTICE MIDWIFE

## 2021-10-12 PROCEDURE — 86900 BLOOD TYPING SEROLOGIC ABO: CPT | Performed by: ADVANCED PRACTICE MIDWIFE

## 2021-10-12 ASSESSMENT — PAIN SCALES - GENERAL: PAINLEVEL: NO PAIN (0)

## 2021-10-12 ASSESSMENT — MIFFLIN-ST. JEOR: SCORE: 1502.04

## 2021-10-12 NOTE — PATIENT INSTRUCTIONS
Return to office in 4 week(s) for prenatal care and as needed.    If you think your bag of water is broke; have bleeding like a period; think your in labor; or are worried about your baby's movement; please call the labor and delivery unit @ [phone].    Thank you for allowing Christopher DUEÑAS CNM and our OB team to participate in your care.  If you have a scheduling or an appointment question please contact [facility] Coordinator at their direct line [phone].   ALL nursing questions or concerns can be directed to your OB nurse at: [phone] Dione Hamilton/Carmela

## 2021-10-12 NOTE — NURSING NOTE
"Chief Complaint   Patient presents with     Prenatal Care       Initial BP 98/58   Ht 1.575 m (5' 2\")   Wt 79.4 kg (175 lb)   LMP 06/15/2021   BMI 32.01 kg/m   Estimated body mass index is 32.01 kg/m  as calculated from the following:    Height as of this encounter: 1.575 m (5' 2\").    Weight as of this encounter: 79.4 kg (175 lb).  Medication Reconciliation: complete  Pauline Hahn LPN    "

## 2021-10-12 NOTE — PROGRESS NOTES
Doing well.  Maybe a flutter.  Denies contractions, bleeding, or leakage of fluid.     Discussed:  AFP, Nausea improving, anatomy US, fetal movement    Plan:  AFP  Complete prenatal labs  1 hr GTT  Anatomy US 11/8/21    Return to office in 4 weeks for prenatal care and as needed.    Christopher Anthony, SPENSER, CNM

## 2021-10-14 LAB
# FETUSES US: NORMAL
AFP MOM SERPL: 0.99
AFP SERPL-MCNC: 35 NG/ML
AGE - REPORTED: 24.3 YR
CURRENT SMOKER: NO
FAMILY MEMBER DISEASES HX: NO
GA METHOD: NORMAL
GA: NORMAL WK
IDDM PATIENT QL: NO
INTEGRATED SCN PATIENT-IMP: NORMAL
SPECIMEN DRAWN SERPL: NORMAL

## 2021-10-21 ENCOUNTER — NURSE TRIAGE (OUTPATIENT)
Dept: NURSING | Facility: CLINIC | Age: 26
End: 2021-10-21

## 2021-10-21 DIAGNOSIS — O21.9 NAUSEA AND VOMITING IN PREGNANCY: Primary | ICD-10-CM

## 2021-10-21 RX ORDER — ONDANSETRON 4 MG/1
4 TABLET, ORALLY DISINTEGRATING ORAL EVERY 8 HOURS PRN
Qty: 30 TABLET | Refills: 1 | Status: SHIPPED | OUTPATIENT
Start: 2021-10-21 | End: 2022-08-10

## 2021-10-21 NOTE — TELEPHONE ENCOUNTER
Contacted pt via telephone.  Pt instructed to go to Urgent Care/ER related to hyperemesis as soon as possible.  Pt verbalized understanding.    SPENSER Jim, JIGARM

## 2021-10-21 NOTE — TELEPHONE ENCOUNTER
"18w2d,  - Range - pt reports she has hyperemesis. Has vomited 12x today, lightheadedness and tachycardia when standing. Says Zofran not helpful.Taking sips of fluids. Last urine output today 10 AM (6 hr ago) Says she has kidney disease \"so I have to be careful about dehydration\"  States OB Dr told her to go to ED if she had dehydration sx. Triaged to ED now or office w/ PCP approval. Clinics still open. Tried to reach pt's OB provider Dr Noguera; was transferred to Dr Noguera nurse and got v/m; [facility]TR[facility] to [facility]. Called pt back and told her we LM for her provider. Rec pt go to ED if any worse or may go to ED now. Pt then said she cannot go to ED now and may not be able to go today/tonight at all as she has other small children and no sitter. Advised pt it is very important she get to care soon. Advised 911 if needed.   Reason for Disposition    SEVERE vomiting (e.g., > 10 times / day) and present > 8 hours    Additional Information    Negative: Sounds like a life-threatening emergency to the triager    Negative: Vaginal bleeding and pregnant < 20 weeks    Negative: Abdominal pain and pregnant < 20 weeks    Negative: Headache is main symptom    Negative: Vomiting red blood or black (coffee ground) material    Negative: Insulin-dependent diabetes (Type I) and glucose > 400 mg/dL (22 mmol/L)    Negative: Recent head injury (within last 3 days)    Negative: Recent abdominal injury (within last 3 days)    Negative: Severe pain in one eye    Protocols used: PREGNANCY - MORNING SICKNESS (NAUSEA AND VOMITING OF PREGNANCY)-A-[place]      "

## 2021-10-21 NOTE — CONFIDENTIAL NOTE
Pt reports Unisom and B 6 is not working for nausea.  Ordered Zofran PRN.  Pt aware of risks.    Pt instructed to seek care if unable to keep fluids down for 24 hours or greater.  Advised to try small meals throughout the day.

## 2022-03-20 ENCOUNTER — HEALTH MAINTENANCE LETTER (OUTPATIENT)
Age: 27
End: 2022-03-20

## 2022-08-08 NOTE — PROGRESS NOTES
Assessment & Plan     Dysuria  UA clear today, treat for yeast, as below. Increase hydration  - UA reflex to Microscopic and Culture - [place]  - UA reflex to Microscopic and Culture - [place]    Chronic upper back pain / Hypertrophy of breast  PT referral for chronic and worsening upper back and neck pain. Second pregnancy with significant increase in breast size and discomfort in the back, neck and shoulders. Pt exhibits typical shoulder indentation from bra straps. Pt currently weaning her son.   - Physical Therapy Referral    Yeast vaginitis  External itching, dysuria. Also noting frequent vaginal yeast infections, treating with otc creams about once a month. Treat with diflucan. Consider longer course, if needed.   - fluconazole (DIFLUCAN) 150 MG tablet  Dispense: 1 tablet; Refill: 0    25 minutes spent on the date of the encounter doing chart review, review of test results, interpretation of tests, patient visit and documentation     Return in about 6 weeks (around 9/21/2022) for Back and neck pain.    Jia Kirk MD  [facility] - [place] Subjective April is a 26 year old, presenting for the following health issues:  UTI      HPI     Genitourinary - Female  Onset/Duration: 1 week  Description:   Painful urination (Dysuria): YES           Frequency: YES  Blood in urine (Hematuria): No  Delay in urine (Hesitency): YES  Intensity: moderate  Progression of Symptoms:  worsening  Accompanying Signs & Symptoms:  Fever/chills: No  Flank pain: YES  Nausea and vomiting: YES- nausea   Vaginal symptoms: itching and burning, discharge little different   Abdominal/Pelvic Pain: No  History:   History of frequent UTI s: YES  History of kidney stones: YES  Sexually Active: YES  Possibility of pregnancy: No  Precipitating or alleviating factors: None  Therapies tried and outcome: Cranberry juice prn (contraindicated in Coumadin patients), Increase fluid intake      Concern - back pain  Onset:  chronic  Description: neck and back pain, poor posture, shoulder pain due to bra straps  Intensity: moderate  Progression of Symptoms:  worsening  Accompanying Signs & Symptoms: frequent headaches  Previous history of similar problem: yes, but breasts enlarged after breastfeeding second child significantly. Currently weakning    Review of Systems   Constitutional, HEENT, cardiovascular, pulmonary, gi and gu systems are negative, except as otherwise noted.      Objective    /78 (BP Location: Left arm, Patient Position: Chair, Cuff Size: Adult Large)   Pulse 90   Temp 97.7  F (36.5  C) (Tympanic)   Resp 16   Wt 82.1 kg (181 lb)   SpO2 99%   BMI 33.11 kg/m    Body mass index is 33.11 kg/m .  Physical Exam   GENERAL: alert and no distress  EYES: Eyes grossly normal to inspection  NECK: no adenopathy, no asymmetry, masses, or scars and thyroid normal to palpation  CHEST: Large breast bilaterally, bra is supportive, properly fitted, indentations bilateral shoulders from straps.   RESP: lungs clear to auscultation - no rales, rhonchi or wheezes  CV: regular rates and rhythm, normal S1 S2, no S3 or S4, no murmur, click or rub and no peripheral edema  MS: extremities normal- no gross deformities noted, ttp over bilateral trap area upper back/neck, ROM is full    Results for orders placed or performed in visit on 08/10/22   UA reflex to Microscopic and Culture - [place]     Status: Abnormal    Specimen: Urine, Clean Catch   Result Value Ref Range    Color Urine Light Yellow Colorless, Straw, Light Yellow, Yellow    Appearance Urine Clear Clear    Glucose Urine Negative Negative mg/dL    Bilirubin Urine Negative Negative    Ketones Urine Negative Negative mg/dL    Specific Gravity Urine 1.017 1.003 - 1.035    Blood Urine Moderate (A) Negative    pH Urine 5.0 4.7 - 8.0    Protein Albumin Urine Negative Negative mg/dL    Urobilinogen Urine Normal Normal, 2.0 mg/dL    Nitrite Urine Negative Negative    Leukocyte  Esterase Urine Negative Negative    RBC Urine 2 <=2 /HPF    WBC Urine 1 <=5 /HPF    Squamous Epithelials Urine 0 <=1 /HPF    Narrative    Urine Culture not indicated

## 2022-08-10 ENCOUNTER — OFFICE VISIT (OUTPATIENT)
Dept: FAMILY MEDICINE | Facility: OTHER | Age: 27
End: 2022-08-10
Attending: FAMILY MEDICINE
Payer: COMMERCIAL

## 2022-08-10 VITALS
RESPIRATION RATE: 16 BRPM | HEART RATE: 90 BPM | SYSTOLIC BLOOD PRESSURE: 116 MMHG | WEIGHT: 181 LBS | TEMPERATURE: 97.7 F | BODY MASS INDEX: 33.11 KG/M2 | DIASTOLIC BLOOD PRESSURE: 78 MMHG | OXYGEN SATURATION: 99 %

## 2022-08-10 DIAGNOSIS — B37.31 YEAST VAGINITIS: ICD-10-CM

## 2022-08-10 DIAGNOSIS — G89.29 CHRONIC UPPER BACK PAIN: Primary | ICD-10-CM

## 2022-08-10 DIAGNOSIS — R30.0 DYSURIA: ICD-10-CM

## 2022-08-10 DIAGNOSIS — N62 HYPERTROPHY OF BREAST: ICD-10-CM

## 2022-08-10 DIAGNOSIS — M54.9 CHRONIC UPPER BACK PAIN: Primary | ICD-10-CM

## 2022-08-10 LAB
ALBUMIN UR-MCNC: NEGATIVE MG/DL
APPEARANCE UR: CLEAR
BILIRUB UR QL STRIP: NEGATIVE
COLOR UR AUTO: ABNORMAL
GLUCOSE UR STRIP-MCNC: NEGATIVE MG/DL
HGB UR QL STRIP: ABNORMAL
KETONES UR STRIP-MCNC: NEGATIVE MG/DL
LEUKOCYTE ESTERASE UR QL STRIP: NEGATIVE
NITRATE UR QL: NEGATIVE
PH UR STRIP: 5 [PH] (ref 4.7–8)
RBC URINE: 2 /HPF
SP GR UR STRIP: 1.02 (ref 1–1.03)
SQUAMOUS EPITHELIAL: 0 /HPF
UROBILINOGEN UR STRIP-MCNC: NORMAL MG/DL
WBC URINE: 1 /HPF

## 2022-08-10 PROCEDURE — G0463 HOSPITAL OUTPT CLINIC VISIT: HCPCS

## 2022-08-10 PROCEDURE — 99213 OFFICE O/P EST LOW 20 MIN: CPT | Performed by: FAMILY MEDICINE

## 2022-08-10 PROCEDURE — 81001 URINALYSIS AUTO W/SCOPE: CPT | Performed by: FAMILY MEDICINE

## 2022-08-10 RX ORDER — FLUCONAZOLE 150 MG/1
150 TABLET ORAL ONCE
Qty: 1 TABLET | Refills: 0 | Status: SHIPPED | OUTPATIENT
Start: 2022-08-10 | End: 2022-08-10

## 2022-08-10 RX ORDER — LANCETS
EACH MISCELLANEOUS
COMMUNITY
Start: 2022-05-09

## 2022-08-10 ASSESSMENT — PAIN SCALES - GENERAL: PAINLEVEL: MODERATE PAIN (4)

## 2022-08-10 NOTE — NURSING NOTE
"Chief Complaint   Patient presents with     UTI       Initial /78 (BP Location: Left arm, Patient Position: Chair, Cuff Size: Adult Large)   Pulse 90   Temp 97.7  F (36.5  C) (Tympanic)   Resp 16   Wt 82.1 kg (181 lb)   SpO2 99%   BMI 33.11 kg/m   Estimated body mass index is 33.11 kg/m  as calculated from the following:    Height as of 10/12/21: 1.575 m (5' 2\").    Weight as of this encounter: 82.1 kg (181 lb).  Medication Reconciliation: complete  Mag Dailey LPN    "

## 2022-08-24 ENCOUNTER — HOSPITAL ENCOUNTER (OUTPATIENT)
Dept: PHYSICAL THERAPY | Facility: OTHER | Age: 27
Discharge: HOME | End: 2022-08-24
Attending: FAMILY MEDICINE | Admitting: FAMILY MEDICINE
Payer: COMMERCIAL

## 2022-08-24 DIAGNOSIS — G89.29 CHRONIC UPPER BACK PAIN: ICD-10-CM

## 2022-08-24 DIAGNOSIS — M54.9 CHRONIC UPPER BACK PAIN: ICD-10-CM

## 2022-08-24 PROCEDURE — 97162 PT EVAL MOD COMPLEX 30 MIN: CPT | Mod: GP

## 2022-08-24 PROCEDURE — 97110 THERAPEUTIC EXERCISES: CPT | Mod: GP

## 2022-08-24 NOTE — PROGRESS NOTES
08/24/22 1021   General Information   Type of Visit Initial OP Ortho PT Evaluation   Start of Care Date 08/24/22   Referring Physician Dr. Kirk   Patient/Family Goals Statement Decreased neck shoulder upper back pain   Orders Evaluate and Treat   Date of Order 08/10/22   Certification Required? Yes   Medical Diagnosis Chronic upper back pain   Surgical/Medical history reviewed Yes   Precautions/Limitations no known precautions/limitations   General Information Comments Past medical history-see chart   Body Part(s)   Body Part(s) Cervical Spine   Presentation and Etiology   Pertinent history of current problem (include personal factors and/or comorbidities that impact the POC) Patient reports she has been having neck shoulder upper back pain for a few years but that it has definitely increased with her pregnancies especially with increased in her breast size.  She has tried several different bras without any relief.  She has tried doing some stretches on her own.  She reports she would consider breast reduction if symptoms do not improve.  She was seen by her primary care provider on 8/10/2 to and is now referred to physical therapy   Impairments A. Pain;D. Decreased ROM;E. Decreased flexibility;N. Headaches   Functional Limitations perform activities of daily living;perform desired leisure / sports activities   Symptom Location Chief complaint of pain along along the neck upper back and also along the shoulders bilaterally   How/Where did it occur From insidious onset   Chronicity Chronic   Pain rating (0-10 point scale) Best (/10);Worst (/10)   Best (/10) 2   Worst (/10) 7   Pain quality B. Dull;C. Aching   Frequency of pain/symptoms A. Constant   Pain/symptoms are: The same all the time   Pain/symptoms exacerbated by H. Overhead reach;J. ADL;L. Work tasks;M. Other   Pain exacerbation comment Grooming hair, reading   Pain/symptoms eased by C. Rest;I. OTC medication(s)   Progression of symptoms since onset:  Worsened   Current Level of Function   Patient role/employment history C. Homemaker   Fall Risk Screen   Fall screen completed by PT   Have you fallen 2 or more times in the past year? No   Have you fallen and had an injury in the past year? No   Is patient a fall risk? No   Abuse Screen (yes response referral indicated)   Feels Unsafe at Home or Work/School no   Feels Threatened by Someone no   Does Anyone Try to Keep You From Having Contact with Others or Doing Things Outside Your Home? no   Physical Signs of Abuse Present no   Patient needs abuse support services and resources No   Cervical Spine   Cervical Left Side Bending ROM 0 to 24 degrees with contralateral pull   Cervical Right Rotation ROM 75% from full rotation with contralateral pull   Cervical Left Rotation ROM 75% from full rotation with contralateral pull   Cervical Flexion ROM Full range of motion with pulling along the CT region   Cervical Extension ROM Within normal limits   Cervical Right Side Bending ROM 0 to 34 degrees with contralateral pull   Thoracic Extension ROM Hypomobile   Shoulder AROM Screen Within normal limits range of motion   Upper Trapezius Flexibility Hypomobile right more so than left   Levator Scapula Flexibility Hypomobile right more so than left   Scalene Flexibility Hypomobile left more so than right   Pectoralis Minor Flexibility Hypomobile bilaterally   Cervical Flexibility Comments Good upper cervical mobility   Vertebral Artery Test Negative   Alar Ligament Test Negative   Transverse Ligament Test Negative   Spurling Test Negative   Cervical Distraction Test Negative   Neer Impingement Test Negative   Cervical/Shoulder Special Tests Comments Weakness of the scapular stabilizing muscles bilaterally   Segmental Mobility-Thoracic ERS right T1   Palpation Significant soft tissue tension with tenderness along the neck upper back interscapular muscles especially along the upper trapezius levator scapula scalene and  interscapular muscles   Observation No acute distress   Posture Poor-rounded shoulder forward head posture with increased CT kyphosis.  The right shoulder was elevated as compared to the left   Planned Therapy Interventions   Planned Therapy Interventions manual therapy;ROM;strengthening;stretching   Planned Modality Interventions   Planned Modality Interventions Comments Modalities as indicated   Clinical Impression   Criteria for Skilled Therapeutic Interventions Met yes, treatment indicated   PT Diagnosis Neck/upper back pain   Influenced by the following impairments Pain, decreased mobility, decreased strength, headaches   Functional limitations due to impairments Reaching, ADLs, grooming hair, reading, recreational activities   Clinical Presentation Evolving/Changing   Clinical Presentation Rationale Clinical judgment-progression of pain and functional limitations   Clinical Decision Making (Complexity) Moderate complexity   Therapy Frequency 2 times/Week   Predicted Duration of Therapy Intervention (days/wks) Up to 6 weeks   Risk & Benefits of therapy have been explained Yes   Patient, Family & other staff in agreement with plan of care Yes   Clinical Impression Comments Patient presents with neck and upper back pain especially with increased breast size.  She has significant soft tissue restrictions and mechanical dysfunction found in the cervical/thoracic spine   Education Assessment   Barriers to Learning No barriers   ORTHO GOALS   PT Ortho Eval Goals 1;3;2   Ortho Goal 1   Goal Identifier STG 1   Goal Description Decreased pain when at its worst was 6/10   Target Date 09/07/22   Ortho Goal 2   Goal Identifier LTG 1   Goal Description Patient will demonstrate independence with home exercise program   Target Date 10/05/22   Ortho Goal 3   Goal Identifier LTG #2   Goal Description Patient will be able to read as much as she wants with minimal to no neck pain   Target Date 10/05/22   Total Evaluation Time    PT [place], Moderate Complexity Minutes (37806) 25   Therapy Certification   Certification date from 08/24/22   Certification date to 11/24/22   Medical Diagnosis Chronic upper back pain   I certify the need for these services furnished under this plan of treatment and while under my care. (Physician co-signature of this document indicates review and certification of the therapy plan).

## 2022-08-28 ENCOUNTER — E-VISIT (OUTPATIENT)
Dept: URGENT CARE | Facility: CLINIC | Age: 27
End: 2022-08-28

## 2022-08-28 DIAGNOSIS — H10.32 ACUTE BACTERIAL CONJUNCTIVITIS OF LEFT EYE: Primary | ICD-10-CM

## 2022-08-28 PROCEDURE — 99421 OL DIG E/M SVC 5-10 MIN: CPT | Performed by: NURSE PRACTITIONER

## 2022-08-28 RX ORDER — POLYMYXIN B SULFATE AND TRIMETHOPRIM 1; 10000 MG/ML; [USP'U]/ML
1-2 SOLUTION OPHTHALMIC EVERY 4 HOURS
Qty: 10 ML | Refills: 0 | Status: SHIPPED | OUTPATIENT
Start: 2022-08-28 | End: 2022-09-04

## 2022-08-29 NOTE — PATIENT INSTRUCTIONS
Thank you for choosing us for your care. I have placed an order for a prescription so that you can start treatment. View your full visit summary for details by clicking on the link below. Your pharmacist will able to address any questions you may have about the medication.     If you re not feeling better within 2-3 days, please schedule an appointment.  You can schedule an appointment right here in [facility], or call [phone]  If the visit is for the same symptoms as your eVisit, we ll refund the cost of your eVisit if seen within seven days.      Bacterial Conjunctivitis    You have an infection in the membranes covering the white part of the eye. This part of the eye is called the conjunctiva. The infection is called conjunctivitis. The most common symptoms of conjunctivitis include a thick, pus-like discharge from the eye, swollen eyelids, redness, eyelids sticking together upon awakening, and a gritty or scratchy feeling in the eye. Your infection was caused by bacteria. It may be treated with medicine. With treatment, the infection takes about 7 to 10 days to resolve.   Home care    Use prescribed antibiotic eye drops or ointment as directed to treat the infection.    Apply a warm compress (towel soaked in warm water) to the affected eye 3 to 4 times a day. Do this just before applying medicine to the eye.    Use a warm, wet cloth to wipe away crusting of the eyelids in the morning. This is caused by mucus drainage during the night. You may also use saline irrigating solution or artificial tears to rinse away mucus in the eye. Do not put a patch over the eye.    Wash your hands before and after touching the infected eye. This is to prevent spreading the infection to the other eye, and to other people. Don't share your towels or washcloths with others.    You may use acetaminophen or ibuprofen to control pain, unless another medicine was prescribed. Talk with your healthcare provider before using these  medicines if you have chronic liver or kidney disease. Also talk with your provider if you have ever had a stomach ulcer or digestive bleeding.    Don't wear contact lenses until your eyes have healed and all symptoms are gone.    Follow-up care  Follow up with your healthcare provider, or as advised.  When to seek medical advice  Call your healthcare provider right away if any of these occur:    Worsening vision    Increasing pain in the eye    Increasing swelling or redness of the eyelid    Redness spreading around the eye  [employer] last reviewed this educational content on 4/1/2020 2000-2021 [employer]. All rights reserved. This information is not intended as a substitute for professional medical care. Always follow your healthcare professional's instructions.

## 2022-08-31 ENCOUNTER — HOSPITAL ENCOUNTER (OUTPATIENT)
Dept: PHYSICAL THERAPY | Facility: OTHER | Age: 27
Discharge: HOME | End: 2022-08-31
Attending: FAMILY MEDICINE | Admitting: FAMILY MEDICINE
Payer: COMMERCIAL

## 2022-08-31 PROCEDURE — 97035 APP MDLTY 1+ULTRASOUND EA 15: CPT | Mod: GP

## 2022-08-31 PROCEDURE — 97140 MANUAL THERAPY 1/> REGIONS: CPT | Mod: GP

## 2022-09-02 ENCOUNTER — MYC MEDICAL ADVICE (OUTPATIENT)
Dept: FAMILY MEDICINE | Facility: OTHER | Age: 27
End: 2022-09-02

## 2022-09-05 ENCOUNTER — E-VISIT (OUTPATIENT)
Dept: URGENT CARE | Facility: CLINIC | Age: 27
End: 2022-09-05

## 2022-09-05 DIAGNOSIS — R19.7 DIARRHEA, UNSPECIFIED TYPE: Primary | ICD-10-CM

## 2022-09-05 PROCEDURE — 99207 PR NON-BILLABLE SERV PER CHARTING: CPT | Performed by: NURSE PRACTITIONER

## 2022-09-08 ENCOUNTER — HOSPITAL ENCOUNTER (OUTPATIENT)
Dept: PHYSICAL THERAPY | Facility: OTHER | Age: 27
Discharge: HOME | End: 2022-09-08
Attending: FAMILY MEDICINE | Admitting: FAMILY MEDICINE
Payer: COMMERCIAL

## 2022-09-08 PROCEDURE — 97140 MANUAL THERAPY 1/> REGIONS: CPT | Mod: GP

## 2022-09-08 PROCEDURE — 97035 APP MDLTY 1+ULTRASOUND EA 15: CPT | Mod: GP

## 2022-09-10 ENCOUNTER — HEALTH MAINTENANCE LETTER (OUTPATIENT)
Age: 27
End: 2022-09-10

## 2022-09-22 ENCOUNTER — MEDICAL CORRESPONDENCE (OUTPATIENT)
Dept: HEALTH INFORMATION MANAGEMENT | Facility: HOSPITAL | Age: 27
End: 2022-09-22

## 2022-09-28 ENCOUNTER — HOSPITAL ENCOUNTER (OUTPATIENT)
Dept: PHYSICAL THERAPY | Facility: OTHER | Age: 27
Discharge: HOME | End: 2022-09-28
Attending: FAMILY MEDICINE | Admitting: FAMILY MEDICINE
Payer: COMMERCIAL

## 2022-09-28 PROCEDURE — 97140 MANUAL THERAPY 1/> REGIONS: CPT | Mod: GP

## 2022-09-28 PROCEDURE — 97035 APP MDLTY 1+ULTRASOUND EA 15: CPT | Mod: GP

## 2022-10-10 ENCOUNTER — NURSE TRIAGE (OUTPATIENT)
Dept: FAMILY MEDICINE | Facility: OTHER | Age: 27
End: 2022-10-10

## 2022-10-10 ENCOUNTER — OFFICE VISIT (OUTPATIENT)
Dept: FAMILY MEDICINE | Facility: OTHER | Age: 27
End: 2022-10-10
Attending: NURSE PRACTITIONER
Payer: COMMERCIAL

## 2022-10-10 VITALS
OXYGEN SATURATION: 98 % | HEART RATE: 83 BPM | DIASTOLIC BLOOD PRESSURE: 74 MMHG | TEMPERATURE: 98.7 F | BODY MASS INDEX: 33.13 KG/M2 | RESPIRATION RATE: 16 BRPM | SYSTOLIC BLOOD PRESSURE: 110 MMHG | HEIGHT: 62 IN | WEIGHT: 180 LBS

## 2022-10-10 DIAGNOSIS — H65.92 OME (OTITIS MEDIA WITH EFFUSION), LEFT: Primary | ICD-10-CM

## 2022-10-10 DIAGNOSIS — H91.92 DECREASED HEARING OF LEFT EAR: ICD-10-CM

## 2022-10-10 DIAGNOSIS — H93.12 TINNITUS, LEFT: ICD-10-CM

## 2022-10-10 PROBLEM — O14.93 PRE-ECLAMPSIA IN THIRD TRIMESTER: Status: ACTIVE | Noted: 2022-02-26

## 2022-10-10 PROBLEM — O36.8390 NON-REASSURING ELECTRONIC FETAL MONITORING TRACING: Status: ACTIVE | Noted: 2022-02-26

## 2022-10-10 PROBLEM — O35.9XX0 FETAL ANOMALY NECESSITATING DELIVERY: Status: ACTIVE | Noted: 2022-02-26

## 2022-10-10 PROCEDURE — 99214 OFFICE O/P EST MOD 30 MIN: CPT | Performed by: NURSE PRACTITIONER

## 2022-10-10 ASSESSMENT — PAIN SCALES - GENERAL: PAINLEVEL: NO PAIN (0)

## 2022-10-10 NOTE — NURSING NOTE
"Chief Complaint   Patient presents with     Ear Problem       Initial /74 (BP Location: Left arm, Patient Position: Chair, Cuff Size: Adult Regular)   Pulse 83   Temp 98.7  F (37.1  C) (Tympanic)   Resp 16   Ht 1.575 m (5' 2\")   Wt 81.6 kg (180 lb)   LMP 10/06/2022 (Exact Date)   SpO2 98%   Breastfeeding No   BMI 32.92 kg/m   Estimated body mass index is 32.92 kg/m  as calculated from the following:    Height as of this encounter: 1.575 m (5' 2\").    Weight as of this encounter: 81.6 kg (180 lb).  Medication Reconciliation: complete  Pamela M. Lechevalier, LPN    "

## 2022-10-10 NOTE — PATIENT INSTRUCTIONS
"  Assessment & Plan     1. OME (otitis media with effusion), left  - amoxicillin-clavulanate (AUGMENTIN) 875-125 MG tablet; Take 1 tablet by mouth 2 times daily for 10 days  Dispense: 20 tablet; Refill: 0    2. Decreased hearing of left ear  [place]  - Adult Audiology  Referral; Future    3. Tinnitus, left  See handout.        BMI:   Estimated body mass index is 32.92 kg/m  as calculated from the following:    Height as of this encounter: 1.575 m (5' 2\").    Weight as of this encounter: 81.6 kg (180 lb).       Follow-up if no improvement or any worsening.     Brandy Wise NP  [facility] [place] [facility]  "

## 2022-10-10 NOTE — PROGRESS NOTES
"  Assessment & Plan     1. OME (otitis media with effusion), left  - amoxicillin-clavulanate (AUGMENTIN) 875-125 MG tablet; Take 1 tablet by mouth 2 times daily for 10 days  Dispense: 20 tablet; Refill: 0    2. Decreased hearing of left ear  [place]  - Adult Audiology  Referral; Future    3. Tinnitus, left  See handout.        BMI:   Estimated body mass index is 32.92 kg/m  as calculated from the following:    Height as of this encounter: 1.575 m (5' 2\").    Weight as of this encounter: 81.6 kg (180 lb).       Follow-up if no improvement or any worsening.     Brandy JOHNNY SIRISHA Wise  [facility]    Subjective   April is a 26 year old, presenting for the following health issues:  Ear Problem      HPI     Concern - Ringing in ear (Left)  Onset: noticed about a week and a half   Description: off and on now loud   Intensity: mild  Progression of Symptoms:  worsening  Accompanying Signs & Symptoms: hearing loss in left ear   Previous history of similar problem: when was younger reconstruction of left ear and frequent ear infections.     Precipitating factors:        Worsened by: unknown   Alleviating factors:        Improved by: nothing   Therapies tried and outcome: None              Review of Systems   Constitutional, HEENT, cardiovascular, pulmonary, gi and gu systems are negative, except as otherwise noted.      Objective    /74 (BP Location: Left arm, Patient Position: Chair, Cuff Size: Adult Regular)   Pulse 83   Temp 98.7  F (37.1  C) (Tympanic)   Resp 16   Ht 1.575 m (5' 2\")   Wt 81.6 kg (180 lb)   LMP 10/06/2022 (Exact Date)   SpO2 98%   Breastfeeding No   BMI 32.92 kg/m    Body mass index is 32.92 kg/m .  Physical Exam   GENERAL: healthy, alert and no distress  HENT: normal cephalic/atraumatic, right ear: normal TM mobility on insufflation and left ear: erythematous and sclerotic with yellow pus  RESP: lungs clear to auscultation - no rales, rhonchi or " wheezes  CV: regular rate and rhythm, normal S1 S2, no S3 or S4, no murmur, click or rub, no peripheral edema and peripheral pulses strong  MS: no gross musculoskeletal defects noted, no edema  PSYCH: mentation appears normal, affect normal/bright

## 2022-11-10 NOTE — PROGRESS NOTES
[facility]    Outpatient Physical Therapy Discharge Note  Patient: April YURIY Maria  : 1995    Beginning/End Dates of Reporting Period:  22 to 11/10/22    Referring Provider: Dr. Kirk    Therapy Diagnosis: chronic  Upper back pain     Client Self Report: pt reports no significant changes in her neck pain. SHe has been doing her exs and is working on her posture.    Objective Measurements:                                          Outcome Measures (most recent score):      Goals:  Goal Identifier STG 1   Goal Description Decreased pain when at its worst was 6/10   Target Date 22   Date Met  22   Progress (detail required for progress note):       Goal Identifier LTG 1   Goal Description Patient will demonstrate independence with home exercise program   Target Date 10/05/22   Date Met      Progress (detail required for progress note): NA     Goal Identifier LTG #2   Goal Description Patient will be able to read as much as she wants with minimal to no neck pain   Target Date 10/05/22   Date Met      Progress (detail required for progress note): NA     Goal Identifier     Goal Description     Target Date     Date Met      Progress (detail required for progress note):       Goal Identifier     Goal Description     Target Date     Date Met      Progress (detail required for progress note):       Goal Identifier     Goal Description     Target Date     Date Met      Progress (detail required for progress note):       Goal Identifier     Goal Description     Target Date     Date Met      Progress (detail required for progress note):       Goal Identifier     Goal Description     Target Date     Date Met      Progress (detail required for progress note):             Plan:  Discharge from therapy.    Discharge:    Reason for Discharge: Patient has failed to schedule further  appointments.    Equipment Issued: none    Discharge Plan: Patient to continue home program.

## 2022-12-02 ENCOUNTER — MYC MEDICAL ADVICE (OUTPATIENT)
Dept: FAMILY MEDICINE | Facility: OTHER | Age: 27
End: 2022-12-02

## 2022-12-02 DIAGNOSIS — Z86.69 HISTORY OF FREQUENT EAR INFECTIONS: Primary | ICD-10-CM

## 2023-01-05 ENCOUNTER — OFFICE VISIT (OUTPATIENT)
Dept: FAMILY MEDICINE | Facility: OTHER | Age: 28
End: 2023-01-05
Payer: COMMERCIAL

## 2023-01-05 VITALS
HEART RATE: 87 BPM | BODY MASS INDEX: 34.17 KG/M2 | TEMPERATURE: 98.2 F | WEIGHT: 186.8 LBS | OXYGEN SATURATION: 99 % | RESPIRATION RATE: 16 BRPM | DIASTOLIC BLOOD PRESSURE: 86 MMHG | SYSTOLIC BLOOD PRESSURE: 116 MMHG

## 2023-01-05 DIAGNOSIS — J20.9 ACUTE BRONCHITIS, UNSPECIFIED ORGANISM: Primary | ICD-10-CM

## 2023-01-05 PROCEDURE — 99213 OFFICE O/P EST LOW 20 MIN: CPT | Performed by: FAMILY MEDICINE

## 2023-01-05 PROCEDURE — G0463 HOSPITAL OUTPT CLINIC VISIT: HCPCS

## 2023-01-05 RX ORDER — ALBUTEROL SULFATE 90 UG/1
2 AEROSOL, METERED RESPIRATORY (INHALATION) EVERY 4 HOURS PRN
Qty: 18 G | Refills: 1 | Status: SHIPPED | OUTPATIENT
Start: 2023-01-05

## 2023-01-05 RX ORDER — AZITHROMYCIN 250 MG/1
TABLET, FILM COATED ORAL
Qty: 6 TABLET | Refills: 0 | Status: SHIPPED | OUTPATIENT
Start: 2023-01-05 | End: 2023-01-10

## 2023-01-05 RX ORDER — PREDNISONE 20 MG/1
60 TABLET ORAL DAILY
Qty: 15 TABLET | Refills: 0 | Status: SHIPPED | OUTPATIENT
Start: 2023-01-05 | End: 2023-01-10

## 2023-01-05 ASSESSMENT — ENCOUNTER SYMPTOMS
CHEST TIGHTNESS: 1
SHORTNESS OF BREATH: 0
WHEEZING: 1
SINUS PRESSURE: 0
CHILLS: 0
RHINORRHEA: 0
SORE THROAT: 0
FEVER: 0
COUGH: 1
DIAPHORESIS: 0

## 2023-01-05 ASSESSMENT — PAIN SCALES - GENERAL: PAINLEVEL: NO PAIN (0)

## 2023-01-05 NOTE — PROGRESS NOTES
Assessment & Plan     Acute bronchitis, unspecified organism  - albuterol (PROAIR HFA/PROVENTIL HFA/VENTOLIN HFA) 108 (90 Base) MCG/ACT inhaler; Inhale 2 puffs into the lungs every 4 hours as needed for shortness of breath, wheezing or cough  - azithromycin (ZITHROMAX) 250 MG tablet; Take 2 tablets (500 mg) by mouth daily for 1 day, THEN 1 tablet (250 mg) daily for 4 days.  - predniSONE (DELTASONE) 20 MG tablet; Take 3 tablets (60 mg) by mouth daily for 5 days    Prednisone, Zpak, Inhaler.  Follow-up for repeat exam if not doing better in 2 weeks, sooner if worsening or new concerns.  If symptoms persist, consider CXR and potential PFTs (reports no previous and strong FamHx asthma).      SHAZIA SCOTT, [facility] [place]   April is a 27 year old presenting for the following health issues:  Cough      HPI     She reports frequent ear infections.  Quit smoking 6 years ago, was a light smoker for a couple years.  No history of asthma but this runs in her family.  Denies past inhalers.  She states she was prone to bronchitis as a child.    States she developed a cough about 6 weeks ago.  Intermittent, dry, barking, chest tightness.  Ongoing right ear tinnitus, sees ENT in a couple weeks (1/19).  She was prescribed Augmentin via telehealth on 11/28/22 for an ear infection.  Denies any sinus issues.    Acute Illness  Acute illness concerns: Cough  Onset/Duration: Right after thanksgiving  Symptoms:  Fever: No  Chills/Sweats: No  Headache (location?): No  Sinus Pressure: No  Conjunctivitis:  No  Ear Pain: YES- Ringing in ear, right side, sees ENT in couple weeks  Rhinorrhea: No  Congestion: Tightness in chest  Sore Throat: No  Cough: YES-non-productive, barking, staying the same, has not gotten worse or better  Wheeze: YES  Decreased Appetite: No  Nausea: YES little bit  Vomiting: No  Diarrhea: No  Dysuria/Freq.: No  Dysuria or Hematuria: No  Fatigue/Achiness: No,, been more  tired  Sick/Strep Exposure: No  Therapies tried and outcome: Steam, humidifier, cold medicine, still coughing and feels tight in chest      Review of Systems   Constitutional: Negative for chills, diaphoresis and fever.   HENT: Negative for ear pain, rhinorrhea, sinus pressure and sore throat.    Respiratory: Positive for cough, chest tightness and wheezing. Negative for shortness of breath.    Cardiovascular: Negative for chest pain.            Objective    /86   Pulse 87   Temp 98.2  F (36.8  C) (Tympanic)   Resp 16   Wt 84.7 kg (186 lb 12.8 oz)   SpO2 99%   BMI 34.17 kg/m    Body mass index is 34.17 kg/m .  Physical Exam  Constitutional:       General: She is not in acute distress.     Appearance: Normal appearance.   HENT:      Head: Normocephalic and atraumatic.      Ears:      Comments: Right TM very light pink with scattered light reflex.  Left TM very link pink, difficult to tell is small hole in TM vs fluid bubbles.     Mouth/Throat:      Pharynx: Posterior oropharyngeal erythema present. No oropharyngeal exudate.   Eyes:      Conjunctiva/sclera: Conjunctivae normal.      Pupils: Pupils are equal, round, and reactive to light.   Cardiovascular:      Rate and Rhythm: Normal rate and regular rhythm.      Heart sounds: Normal heart sounds. No murmur heard.  Pulmonary:      Effort: Pulmonary effort is normal.      Comments: Diffuse coarse wheezing heard on lung exam, also some wheezing heard without stethoscope when she coughs.  Lymphadenopathy:      Cervical: No cervical adenopathy.   Neurological:      Mental Status: She is alert and oriented to person, place, and time.

## 2023-04-04 ENCOUNTER — OFFICE VISIT (OUTPATIENT)
Dept: AUDIOLOGY | Facility: OTHER | Age: 28
End: 2023-04-04
Attending: AUDIOLOGIST
Payer: COMMERCIAL

## 2023-04-04 ENCOUNTER — OFFICE VISIT (OUTPATIENT)
Dept: OTOLARYNGOLOGY | Facility: OTHER | Age: 28
End: 2023-04-04
Attending: AUDIOLOGIST
Payer: COMMERCIAL

## 2023-04-04 VITALS
SYSTOLIC BLOOD PRESSURE: 128 MMHG | WEIGHT: 190 LBS | BODY MASS INDEX: 34.96 KG/M2 | HEART RATE: 105 BPM | DIASTOLIC BLOOD PRESSURE: 78 MMHG | OXYGEN SATURATION: 98 % | TEMPERATURE: 98.4 F | HEIGHT: 62 IN

## 2023-04-04 DIAGNOSIS — H73.893 RETRACTED TYMPANIC MEMBRANE, BILATERAL: ICD-10-CM

## 2023-04-04 DIAGNOSIS — H91.92 DECREASED HEARING OF LEFT EAR: ICD-10-CM

## 2023-04-04 DIAGNOSIS — H65.91 OME (OTITIS MEDIA WITH EFFUSION), RIGHT: ICD-10-CM

## 2023-04-04 DIAGNOSIS — H90.6 MIXED CONDUCTIVE AND SENSORINEURAL HEARING LOSS OF BOTH EARS: Primary | ICD-10-CM

## 2023-04-04 DIAGNOSIS — H72.92 PERFORATION OF TYMPANIC MEMBRANE, LEFT: ICD-10-CM

## 2023-04-04 DIAGNOSIS — H93.11 TINNITUS OF RIGHT EAR: ICD-10-CM

## 2023-04-04 DIAGNOSIS — H90.0 CONDUCTIVE HEARING LOSS, BILATERAL: Primary | ICD-10-CM

## 2023-04-04 PROCEDURE — 92557 COMPREHENSIVE HEARING TEST: CPT | Performed by: AUDIOLOGIST

## 2023-04-04 PROCEDURE — 92567 TYMPANOMETRY: CPT | Performed by: AUDIOLOGIST

## 2023-04-04 PROCEDURE — 92504 EAR MICROSCOPY EXAMINATION: CPT | Performed by: NURSE PRACTITIONER

## 2023-04-04 PROCEDURE — 31231 NASAL ENDOSCOPY DX: CPT | Performed by: NURSE PRACTITIONER

## 2023-04-04 PROCEDURE — G0463 HOSPITAL OUTPT CLINIC VISIT: HCPCS | Mod: 25

## 2023-04-04 PROCEDURE — 99214 OFFICE O/P EST MOD 30 MIN: CPT | Mod: 25 | Performed by: NURSE PRACTITIONER

## 2023-04-04 ASSESSMENT — PAIN SCALES - GENERAL: PAINLEVEL: NO PAIN (0)

## 2023-04-04 NOTE — PROGRESS NOTES
Audiology Evaluation Completed. Please refer SCANNED AUDIOGRAM and/or TYMPANOGRAM for BACKGROUND, RESULTS, RECOMMENDATIONS.      Lindsey HERNANDEZ, [facility]  Audiologist #[id number]

## 2023-04-04 NOTE — PATIENT INSTRUCTIONS
Start Flonase as prescribed  Keep the left ear dry   Start over the counter decongestant a few days before flying and continue until after you come home  Follow up with Dr Lucas for recheck  Complete CT scan of the ears (CT Temporal Bone)       Thank you for allowing Pauline HENRY and our ENT team to participate in your care.  If your medications are too expensive, please call my nurse at the number listed below.  We can possibly change this medication.    If you have a scheduling or an appointment question please contact our Health Unit Coordinator at their direct line [phone] ext [phone]

## 2023-04-04 NOTE — PROGRESS NOTES
Otolaryngology Note         Chief Complaint:     Patient presents with:  Consult: History of Frequent Ear Infections; Referred by Dr. Kirk           History of Present Illness:     May Maria is a 27 year old female seen today for concerns for decreased hearing for the past 9+ months.      She has a history of recurrent OM as a child with tubes and reported left ear drum repair, but is not positive what she had completed.     She had 2 children within 2+ years and started having increased ear infections.     Right sided tinnitus.  Constant non-pulsatile.  Nothing makes it worse  It does keep her up at night.  She already struggles with sleep due to anxiety, etc.    She has not been using any white noise, etc.    No recent Otalgia or otorrhea.    She feels like her hearing has decreased recently, but no concerns for sudden hearing loss.    No aural fullness or flux hearing.   No vertigo.     October to December has had off and on ear infections.    No concerns for chronic otorrhea.      She is a former smoker - light smoker for about a year in the distant past. She found out she had kidney issues and stopped.      No family hx of hearing loss or ear infections.    No history of noise exposure  No vertigo, facial numbness or tingling.      Audiogram completed 4/4/23  Tympanograms are Type C negative pressure right ear and Type B left ear reduced eardrum mobility. Volume left larger than right.  Thresholds are using inserts and TDH- moderate right rising to normal range and left moderate-severe rising to mild range-mixed loss both  ears.  Speech reception thresholds are in good agreement with pure tone average.  Word discrimination scores are excellent at supra-thresholds level.    Audiogram copied from [facility] Record Encounter date 7/20/09:  13 year old referred by ENT. April was referred to Audiology by Dr. Gonzalez in March of 2009 at which time a bilateral conductive loss was identified with loss greater in  the left ear. Today she is seen for follow-up with Dr. Hopson and a hearing recheck. Hearing levels today are significantly reduced from results in March. Audiogram shows a mild conductive loss in the left ear and a moderate conductive loss in the right ear. Speech reception thresholds were in good agreement with the audiogram and testing reliability was felt to be good. Tympanometry showed type B tympanograms bilaterally.             Medications:     Current Outpatient Rx   Medication Sig Dispense Refill     acetaminophen (TYLENOL) 325 MG tablet Take 325-650 mg by mouth every 6 hours as needed for mild pain       albuterol (PROAIR HFA/PROVENTIL HFA/VENTOLIN HFA) 108 (90 Base) MCG/ACT inhaler Inhale 2 puffs into the lungs every 4 hours as needed for shortness of breath, wheezing or cough 18 g 1     CONTOUR NEXT TEST test strip        Microlet Lancets MISC               Allergies:     Allergies: Pineapple          Past Medical History:     Past Medical History:   Diagnosis Date     Atrophic kidney      Depressive disorder     Therapy during childhood     Diet controlled gestational diabetes mellitus (GDM) in third trimester 2020    Late-onset     Glucose intolerance 5/15/2020     Glucose intolerance of pregnancy 2020     Shift work sleep disorder             Past Surgical History:     Past Surgical History:   Procedure Laterality Date      SECTION N/A 2020    Procedure:  SECTION;  Surgeon: Magdi Cannon MD;  Location: [facility] OR     ENDOSCOPY UPPER, COLONOSCOPY, COMBINED N/A 2021    Procedure: colonoscopy with biopsy and polypectomy, upper endoscopy with biopsy;  Surgeon: Joe Nayak MD;  Location: [facility] OR     PE TUBES Bilateral     years        ENT family history reviewed         Social History:     Social History     Tobacco Use     Smoking status: Former     Packs/day: 0.25     Types: Cigarettes     Start date: 3/15/2017     Quit date: 3/15/2018     Years since quittin.0      "Smokeless tobacco: Never   Substance Use Topics     Alcohol use: No     Alcohol/week: 0.0 standard drinks of alcohol     Drug use: No            Review of Systems:     ROS: See HPI         Physical Exam:     /78   Pulse 105   Temp 98.4  F (36.9  C) (Tympanic)   Ht 1.575 m (5' 2\")   Wt 86.2 kg (190 lb)   SpO2 98%   BMI 34.75 kg/m      General - The patient is well nourished and well developed, and appears to have good nutritional status.  Alert and oriented to person and place, answers questions and cooperates with examination appropriately.   Head and Face - Normocephalic and atraumatic, with no gross asymmetry noted.  The facial nerve is intact, with strong symmetric movements.  Voice and Breathing - The patient was breathing comfortably without the use of accessory muscles. There was no wheezing, stridor. The patients voice was clear and strong, and had appropriate pitch and quality.  Ears - External ear normal. The ears were examined under binocular microscopy and with otoscope.  The right canal has minimal cerumen that was removed with cupped forceps.  The right TM is retracted with a superior fluid pocket.  The left canal is patent, the left TM is thickened, retracted with anterior small perforation vs deep retraction pocket.  She refuses to valsalva, states she has severe pain that brings tears to her eyes with valsalva  Eyes - Extraocular movements intact, sclera were not icteric or injected, conjunctiva were pink and moist.  Mouth - Examination of the oral cavity showed pink, healthy oral mucosa. Dentition in good condition. No lesions or ulcerations noted. The tongue was mobile and midline.   Throat - The walls of the oropharynx were smooth, pink, moist, symmetric, and had no lesions or ulcerations.  The tonsillar pillars and soft palate were symmetric. The uvula was midline on elevation.    Neck - Normal midline excursion of the laryngotracheal complex during swallowing.  Full range of motion " on passive movement.  Palpation of the occipital, submental, submandibular, internal jugular chain, and supraclavicular nodes did not demonstrate any abnormal lymph nodes or masses.  Palpation of the thyroid was soft and smooth, with no nodules or goiter appreciated.  The trachea was mobile and midline.  Nose - External contour is symmetric, no gross deflection or scars.  Nasal mucosa is pink and moist with no abnormal mucus.  The septum and turbinates were evaluated with nasal speculum, no polyps, masses, or purulence noted on examination.    To evaluate the nose and sinuses, I performed rigid nasal endoscopy.  I sprayed both nares with 2 sprays lidocaine and neosynephrine.     I began with the RIGHT side using a 0 degree rigid nasal endoscope, and then similarly examined the LEFT side     Findings:  Inferior turbinates:  moderately enlarged  Middle turbinate and middle meatus: unremarkable  The superior meatus is examined and unremarkable  Mucosa is healthy throughout,  no polyps nor polypoid degeneration  No sphenoethmoidal recess purulence.   Nasopharynx clear, ET patent, left ET appears slight edematous  The patient tolerated the procedure well            Assessment and Plan:       ICD-10-CM    1. Conductive hearing loss, bilateral  H90.0 CT Temporal Bones wo Contrast      2. Retracted tympanic membrane, bilateral  H73.893 CT Temporal Bones wo Contrast      3. Perforation of tympanic membrane, left  H72.92 CT Temporal Bones wo Contrast      4. OME (otitis media with effusion), right  H65.91 CT Temporal Bones wo Contrast        Start Flonase as prescribed  Keep the left ear dry   Start over the counter decongestant a few days before flying and continue until after you come home  Follow up with Dr Lucas for recheck, surgical consult.  She does not feel she would tolerate tubes in clinic.  Consider ET dilation.    Complete CT scan of the ears (CT Temporal Bone)   Keep left ear dry for now.     Pauline  Caio HENRY  [facility] ENT

## 2023-04-04 NOTE — LETTER
4/4/2023         RE: May Maria  [address] [address]        Dear Colleague,    Thank you [facility] referring your patient, May Maria, [facility]. Please see a copy of my visit note below.    Otolaryngology Note         Chief Complaint:     Patient presents with:  Consult: History of Frequent Ear Infections; Referred by Dr. Kirk           History of Present Illness:     May Maria is a 27 year old female seen today for concerns for decreased hearing for the past 9+ months.      She has a history of recurrent OM as a child with tubes and reported left ear drum repair, but is not positive what she had completed.     She had 2 children within 2+ years and started having increased ear infections.     Right sided tinnitus.  Constant non-pulsatile.  Nothing makes it worse  It does keep her up at night.  She already struggles with sleep due to anxiety, etc.    She has not been using any white noise, etc.    No recent Otalgia or otorrhea.    She feels like her hearing has decreased recently, but no concerns for sudden hearing loss.    No aural fullness or flux hearing.   No vertigo.     October to December has had off and on ear infections.    No concerns for chronic otorrhea.      She is a former smoker - light smoker for about a year in the distant past. She found out she had kidney issues and stopped.      No family hx of hearing loss or ear infections.    No history of noise exposure  No vertigo, facial numbness or tingling.      Audiogram completed 4/4/23  Tympanograms are Type C negative pressure right ear and Type B left ear reduced eardrum mobility. Volume left larger than right.  Thresholds are using inserts and TDH- moderate right rising to normal range and left moderate-severe rising to mild range-mixed loss both  ears.  Speech reception thresholds are in good agreement with pure tone average.  Word discrimination scores are excellent at supra-thresholds  level.    Audiogram copied from [facility] Record Encounter date 09:  13 year old referred by ENT. April was referred to Audiology by Dr. Gonzalez in 2009 at which time a bilateral conductive loss was identified with loss greater in the left ear. Today she is seen for follow-up with Dr. Hopson and a hearing recheck. Hearing levels today are significantly reduced from results in March. Audiogram shows a mild conductive loss in the left ear and a moderate conductive loss in the right ear. Speech reception thresholds were in good agreement with the audiogram and testing reliability was felt to be good. Tympanometry showed type B tympanograms bilaterally.             Medications:     Current Outpatient Rx   Medication Sig Dispense Refill     acetaminophen (TYLENOL) 325 MG tablet Take 325-650 mg by mouth every 6 hours as needed for mild pain       albuterol (PROAIR HFA/PROVENTIL HFA/VENTOLIN HFA) 108 (90 Base) MCG/ACT inhaler Inhale 2 puffs into the lungs every 4 hours as needed for shortness of breath, wheezing or cough 18 g 1     CONTOUR NEXT TEST test strip        Microlet Lancets MISC               Allergies:     Allergies: Pineapple          Past Medical History:     Past Medical History:   Diagnosis Date     Atrophic kidney      Depressive disorder     Therapy during childhood     Diet controlled gestational diabetes mellitus (GDM) in third trimester 2020    Late-onset     Glucose intolerance 5/15/2020     Glucose intolerance of pregnancy 2020     Shift work sleep disorder             Past Surgical History:     Past Surgical History:   Procedure Laterality Date      SECTION N/A 2020    Procedure:  SECTION;  Surgeon: Magdi Cannon MD;  Location: [facility] OR     ENDOSCOPY UPPER, COLONOSCOPY, COMBINED N/A 2021    Procedure: colonoscopy with biopsy and polypectomy, upper endoscopy with biopsy;  Surgeon: Joe Nayak MD;  Location: [facility] OR     PE TUBES Bilateral     years   "      ENT family history reviewed         Social History:     Social History     Tobacco Use     Smoking status: Former     Packs/day: 0.25     Types: Cigarettes     Start date: 3/15/2017     Quit date: 3/15/2018     Years since quittin.0     Smokeless tobacco: Never   Substance Use Topics     Alcohol use: No     Alcohol/week: 0.0 standard drinks of alcohol     Drug use: No            Review of Systems:     ROS: See HPI         Physical Exam:     /78   Pulse 105   Temp 98.4  F (36.9  C) (Tympanic)   Ht 1.575 m (5' 2\")   Wt 86.2 kg (190 lb)   SpO2 98%   BMI 34.75 kg/m      General - The patient is well nourished and well developed, and appears to have good nutritional status.  Alert and oriented to person and place, answers questions and cooperates with examination appropriately.   Head and Face - Normocephalic and atraumatic, with no gross asymmetry noted.  The facial nerve is intact, with strong symmetric movements.  Voice and Breathing - The patient was breathing comfortably without the use of accessory muscles. There was no wheezing, stridor. The patients voice was clear and strong, and had appropriate pitch and quality.  Ears - External ear normal. The ears were examined under binocular microscopy and with otoscope.  The right canal has minimal cerumen that was removed with cupped forceps.  The right TM is retracted with a superior fluid pocket.  The left canal is patent, the left TM is thickened, retracted with anterior small perforation vs deep retraction pocket.  She refuses to valsalva, states she has severe pain that brings tears to her eyes with valsalva  Eyes - Extraocular movements intact, sclera were not icteric or injected, conjunctiva were pink and moist.  Mouth - Examination of the oral cavity showed pink, healthy oral mucosa. Dentition in good condition. No lesions or ulcerations noted. The tongue was mobile and midline.   Throat - The walls of the oropharynx were smooth, pink, " moist, symmetric, and had no lesions or ulcerations.  The tonsillar pillars and soft palate were symmetric. The uvula was midline on elevation.    Neck - Normal midline excursion of the laryngotracheal complex during swallowing.  Full range of motion on passive movement.  Palpation of the occipital, submental, submandibular, internal jugular chain, and supraclavicular nodes did not demonstrate any abnormal lymph nodes or masses.  Palpation of the thyroid was soft and smooth, with no nodules or goiter appreciated.  The trachea was mobile and midline.  Nose - External contour is symmetric, no gross deflection or scars.  Nasal mucosa is pink and moist with no abnormal mucus.  The septum and turbinates were evaluated with nasal speculum, no polyps, masses, or purulence noted on examination.    To evaluate the nose and sinuses, I performed rigid nasal endoscopy.  I sprayed both nares with 2 sprays lidocaine and neosynephrine.     I began with the RIGHT side using a 0 degree rigid nasal endoscope, and then similarly examined the LEFT side     Findings:  Inferior turbinates:  moderately enlarged  Middle turbinate and middle meatus: unremarkable  The superior meatus is examined and unremarkable  Mucosa is healthy throughout,  no polyps nor polypoid degeneration  No sphenoethmoidal recess purulence.   Nasopharynx clear, ET patent, left ET appears slight edematous  The patient tolerated the procedure well            Assessment and Plan:       ICD-10-CM    1. Conductive hearing loss, bilateral  H90.0 CT Temporal Bones wo Contrast      2. Retracted tympanic membrane, bilateral  H73.893 CT Temporal Bones wo Contrast      3. Perforation of tympanic membrane, left  H72.92 CT Temporal Bones wo Contrast      4. OME (otitis media with effusion), right  H65.91 CT Temporal Bones wo Contrast        Start Flonase as prescribed  Keep the left ear dry   Start over the counter decongestant a few days before flying and continue until after  you come home  Follow up with Dr Lucas for recheck, surgical consult.  She does not feel she would tolerate tubes in clinic.  Consider ET dilation.    Complete CT scan of the ears (CT Temporal Bone)   Keep left ear dry for now.     Pauline HENRY  [facility] ENT          Again, thank you for allowing me to participate in the care of your patient.        Sincerely,        Pauline Kearney NP

## 2023-04-06 ENCOUNTER — HOSPITAL ENCOUNTER (OUTPATIENT)
Dept: CT IMAGING | Facility: HOSPITAL | Age: 28
Discharge: HOME | End: 2023-04-06
Attending: NURSE PRACTITIONER | Admitting: NURSE PRACTITIONER
Payer: COMMERCIAL

## 2023-04-06 DIAGNOSIS — H73.893 RETRACTED TYMPANIC MEMBRANE, BILATERAL: ICD-10-CM

## 2023-04-06 DIAGNOSIS — H65.91 OME (OTITIS MEDIA WITH EFFUSION), RIGHT: ICD-10-CM

## 2023-04-06 DIAGNOSIS — H72.92 PERFORATION OF TYMPANIC MEMBRANE, LEFT: ICD-10-CM

## 2023-04-06 DIAGNOSIS — H90.0 CONDUCTIVE HEARING LOSS, BILATERAL: ICD-10-CM

## 2023-04-06 PROCEDURE — 70480 CT ORBIT/EAR/FOSSA W/O DYE: CPT

## 2023-04-30 ENCOUNTER — HEALTH MAINTENANCE LETTER (OUTPATIENT)
Age: 28
End: 2023-04-30

## 2023-05-04 ENCOUNTER — MYC MEDICAL ADVICE (OUTPATIENT)
Dept: FAMILY MEDICINE | Facility: OTHER | Age: 28
End: 2023-05-04

## 2023-05-05 NOTE — TELEPHONE ENCOUNTER
Attempt # 1  Outcome: Left Message   Comment: LM for patient to return call to clinic to schedule an appointment with Geovanna Chandler CNP to discuss the next steps for a breast reduction.  Geovanna Chandler CNP has reviewed and has agreed to see patient.

## 2023-05-05 NOTE — TELEPHONE ENCOUNTER
I can see the patient in the next few weeks if willing.     SPENSER Buckley CNP on 5/5/2023 at 2:59 PM

## 2023-05-15 ENCOUNTER — OFFICE VISIT (OUTPATIENT)
Dept: OTOLARYNGOLOGY | Facility: OTHER | Age: 28
End: 2023-05-15
Attending: OTOLARYNGOLOGY
Payer: COMMERCIAL

## 2023-05-15 ENCOUNTER — PREP FOR PROCEDURE (OUTPATIENT)
Dept: OTOLARYNGOLOGY | Facility: OTHER | Age: 28
End: 2023-05-15

## 2023-05-15 VITALS
BODY MASS INDEX: 35.33 KG/M2 | OXYGEN SATURATION: 99 % | HEIGHT: 62 IN | WEIGHT: 192 LBS | TEMPERATURE: 98.8 F | HEART RATE: 91 BPM | DIASTOLIC BLOOD PRESSURE: 76 MMHG | SYSTOLIC BLOOD PRESSURE: 124 MMHG

## 2023-05-15 DIAGNOSIS — J30.89 PERENNIAL ALLERGIC RHINITIS: Primary | ICD-10-CM

## 2023-05-15 DIAGNOSIS — J34.3 NASAL TURBINATE HYPERTROPHY: ICD-10-CM

## 2023-05-15 DIAGNOSIS — H73.893 TYMPANIC MEMBRANE RETRACTION, BILATERAL: ICD-10-CM

## 2023-05-15 DIAGNOSIS — H72.92 PERFORATION OF TYMPANIC MEMBRANE, LEFT: ICD-10-CM

## 2023-05-15 DIAGNOSIS — H90.2 CONDUCTIVE HEARING LOSS: Primary | ICD-10-CM

## 2023-05-15 DIAGNOSIS — H90.6 MIXED HEARING LOSS, BILATERAL: ICD-10-CM

## 2023-05-15 DIAGNOSIS — H69.93 ETD (EUSTACHIAN TUBE DYSFUNCTION), BILATERAL: ICD-10-CM

## 2023-05-15 DIAGNOSIS — J32.4 CHRONIC PANSINUSITIS: ICD-10-CM

## 2023-05-15 PROCEDURE — 86003 ALLG SPEC IGE CRUDE XTRC EA: CPT | Mod: ZL | Performed by: OTOLARYNGOLOGY

## 2023-05-15 PROCEDURE — 92504 EAR MICROSCOPY EXAMINATION: CPT | Performed by: OTOLARYNGOLOGY

## 2023-05-15 PROCEDURE — 82785 ASSAY OF IGE: CPT | Mod: ZL | Performed by: OTOLARYNGOLOGY

## 2023-05-15 PROCEDURE — G0463 HOSPITAL OUTPT CLINIC VISIT: HCPCS

## 2023-05-15 PROCEDURE — 36415 COLL VENOUS BLD VENIPUNCTURE: CPT | Mod: ZL | Performed by: OTOLARYNGOLOGY

## 2023-05-15 PROCEDURE — 99215 OFFICE O/P EST HI 40 MIN: CPT | Mod: 25 | Performed by: OTOLARYNGOLOGY

## 2023-05-15 RX ORDER — CETIRIZINE HYDROCHLORIDE 10 MG/1
TABLET ORAL
Qty: 60 TABLET | Status: SHIPPED | OUTPATIENT
Start: 2023-05-15

## 2023-05-15 RX ORDER — FLUTICASONE PROPIONATE 50 MCG
2 SPRAY, SUSPENSION (ML) NASAL DAILY
Qty: 16 G | Refills: 12 | Status: ON HOLD | OUTPATIENT
Start: 2023-05-15 | End: 2023-06-28

## 2023-05-15 ASSESSMENT — PAIN SCALES - GENERAL: PAINLEVEL: MILD PAIN (3)

## 2023-05-15 NOTE — PROGRESS NOTES
"Otolaryngology Progress Note          May Maria is a 27 year old female seen today for evaluation of left hearing loss with tympanic membrane retraction and left tympanic membrane perforation.  She has a known history of conductive hearing loss and a lifelong history of recurrent acute otitis media with prior distant otologic surgery reported as left tympanoplasty.    April notes a slow decline in her left hearing and is having difficulty with conversation.    She saw Pauline on 4/4/2023.    She has right chronic tinnitus that is bothersome.  She denies fluctuating hearing loss vertigo aural fullness no otorrhea or otalgia.    She suffers from chronic congestion and seasonal exacerbation of perennial allergic rhinitis with symptoms worse in the fall and spring.  She takes Flonase and a nonsedating antihistamine  No prior allergy testing    No history of excess noise exposure  Audiogram dated 4/4/2023 shows a left worse than right mixed hearing loss.  On the left this is moderate to severe upsloping to mild.  On the right moderate upsloping to near normal thresholds SRT 45 dB left.    SRT 35 dB right 100% discrimination right 92% left.    Type B flat tympanogram larger pressure compared to the contralateral side on the left and type C right      She does not feel she would tolerate office ear procedures    Nasopharyngoscopy by Pauline showed moderate turbinate hypertrophy.  The nasopharynx was clear.  Left eustachian tube was edematous    CT temporal bone dated 4/6/23 negative for bone erosion or mastoid effusions  Petrous carotids not dehiscent  Diffuse mucoperiosteal thickening of the anterior and posterior ethmoids and maxillary sinuses    ROS: 12 point review of systems is negative aside from that mentioned in the history of present illness    Physical Exam  /76   Pulse 91   Temp 98.8  F (37.1  C)   Ht 1.575 m (5' 2\")   Wt 87.1 kg (192 lb)   SpO2 99%   BMI 35.12 kg/m    General - The patient is " well nourished and well developed, and appears to have good nutritional status.  Alert and oriented to person and place, interactive.  Head and Face - Normocephalic and atraumatic, with no gross asymmetry noted of the contour of the facial features.  The facial nerve is intact, with strong symmetric movements.  Neck-no palpable lymphadenopathy or thyroid mass.  Trachea is midline.  Eyes - Extraocular movements intact.   Ears- examined under microscopy bilaterally  Left-anterior inferior 5% anterior inferior dry perforation  Severe retraction involving attic and posterior pars tensa with atelectasis, no effusions, no otorrhea, mastoid non-tender, no postauricular incision    Right-retracted posterior pars tensa, mild attic retraction, no effusion, TM thin    Nose - Nasal mucosa is pink and moist with no abnormal mucus. 4+  inferior turbinate hypertrophy   Mouth - Examination of the oral cavity shows pink, healthy, moist mucosa.  No lesions or ulceration noted.  The dentition are in good repair.  The tongue is mobile and midline.  Throat - The walls of the oropharynx were smooth, pink, moist, symmetric, and had no lesions or ulcerations.  The tonsillar pillars and soft palate were symmetric.  The uvula was midline on elevation.        Impression/Plan  May Maria is a 27 year old female    ICD-10-CM    1. Perennial allergic rhinitis  J30.89 Inhalent Panel [place] ([employer])     Total IgE ([employer])     fluticasone (FLONASE) 50 MCG/ACT nasal spray     cetirizine (ZYRTEC) 10 MG tablet      2. Nasal turbinate hypertrophy  J34.3       3. Chronic pansinusitis  J32.4       4. Perforation of tympanic membrane, left  H72.92       5. Tympanic membrane retraction, bilateral  H73.893       6. ETD (Eustachian tube dysfunction), bilateral  H69.83       7. Mixed hearing loss, bilateral  H90.6           Complete Allergy Blood Testing  Continue Flonase and Antihistamines  Follow up in surgery    environmental serum specific  IgE  Continue nasal steroids and Non-Sedating/second generation antihistamines    Will require CT sinus and probable FESS in future, d/w her today    I discussed the risks and complications of bilateral eustachian tube dilation, LEFT cartilage tympanoplasty, possible fat harvest,  including anesthesia, bleeding, infection, change in sense of taste, hearing loss, dizziness, non-take of graft, otorrhea (ear drainage), facial nerve injury, change in the appearance of the ear, scar formation: hypertrophic or keloid, numbness to area and need for additional surgery in the setting of graft non-take, cholesteatoma or other unforseen complications.   Theoretic injury to the carotid artery with eustachian tube dilation was discussed.  There is no dehiscent petrous carotid on imaging.    I do not recommend any surgery on the right which is her better hearing ear, at least at this juncture.    Hearing preservation reinforced      All questions are answered and wishes are to proceed with surgical intervention.      Total exam time spent on the day of the visit including review of the chart, reviewing pertinent prior imaging and notes, obtaining a detailed history and physical exam, education, discussion with the patient and documenting in epic was over 40 minutes .  This did not include procedure time.    Tinnitus education was provided.  Tinnitus is widely considered a disorder of cental auditory processing.    Hearing preservation was reinforced  I also cautioned the patient against investing in any oral supplements advertised to cure tinnitus.     I have also recommended yearly audiograms, masking devices or apps.  For worsening symptoms, I recommend online or in person cognitive behavioral therapy (CBT) referral.     The patient will follow up as necessary for worsening symptoms or changes in symptoms.              Yamel Lucas D.O.  Otolaryngology/Head and Neck Surgery  Allergy

## 2023-05-15 NOTE — LETTER
5/15/2023         RE: May Maria  [address] [facility] [address]        Dear Colleague,    Thank you for referring your patient, May Maria, to the [facility]. Please see a copy of my visit note below.    Otolaryngology Progress Note          May Maria is a 27 year old female seen today for evaluation of left hearing loss with tympanic membrane retraction and left tympanic membrane perforation.  She has a known history of conductive hearing loss and a lifelong history of recurrent acute otitis media with prior distant otologic surgery reported as left tympanoplasty.    April notes a slow decline in her left hearing and is having difficulty with conversation.    She saw Pauline on 4/4/2023.    She has right chronic tinnitus that is bothersome.  She denies fluctuating hearing loss vertigo aural fullness no otorrhea or otalgia.    She suffers from chronic congestion and seasonal exacerbation of perennial allergic rhinitis with symptoms worse in the fall and spring.  She takes Flonase and a nonsedating antihistamine  No prior allergy testing    No history of excess noise exposure  Audiogram dated 4/4/2023 shows a left worse than right mixed hearing loss.  On the left this is moderate to severe upsloping to mild.  On the right moderate upsloping to near normal thresholds SRT 45 dB left.    SRT 35 dB right 100% discrimination right 92% left.    Type B flat tympanogram larger pressure compared to the contralateral side on the left and type C right      She does not feel she would tolerate office ear procedures    Nasopharyngoscopy by Pauline showed moderate turbinate hypertrophy.  The nasopharynx was clear.  Left eustachian tube was edematous    CT temporal bone dated 4/6/23 negative for bone erosion or mastoid effusions  Petrous carotids not dehiscent  Diffuse mucoperiosteal thickening of the anterior and posterior ethmoids and maxillary sinuses    ROS: 12 point review of  "systems is negative aside from that mentioned in the history of present illness    Physical Exam  /76   Pulse 91   Temp 98.8  F (37.1  C)   Ht 1.575 m (5' 2\")   Wt 87.1 kg (192 lb)   SpO2 99%   BMI 35.12 kg/m    General - The patient is well nourished and well developed, and appears to have good nutritional status.  Alert and oriented to person and place, interactive.  Head and Face - Normocephalic and atraumatic, with no gross asymmetry noted of the contour of the facial features.  The facial nerve is intact, with strong symmetric movements.  Neck-no palpable lymphadenopathy or thyroid mass.  Trachea is midline.  Eyes - Extraocular movements intact.   Ears- examined under microscopy bilaterally  Left-anterior inferior 5% anterior inferior dry perforation  Severe retraction involving attic and posterior pars tensa with atelectasis, no effusions, no otorrhea, mastoid non-tender, no postauricular incision    Right-retracted posterior pars tensa, mild attic retraction, no effusion, TM thin    Nose - Nasal mucosa is pink and moist with no abnormal mucus. 4+  inferior turbinate hypertrophy   Mouth - Examination of the oral cavity shows pink, healthy, moist mucosa.  No lesions or ulceration noted.  The dentition are in good repair.  The tongue is mobile and midline.  Throat - The walls of the oropharynx were smooth, pink, moist, symmetric, and had no lesions or ulcerations.  The tonsillar pillars and soft palate were symmetric.  The uvula was midline on elevation.        Impression/Plan  May Maria is a 27 year old female    ICD-10-CM    1. Perennial allergic rhinitis  J30.89 Inhalent Panel [place] (Serolab)     Total IgE (Serolab)     fluticasone (FLONASE) 50 MCG/ACT nasal spray     cetirizine (ZYRTEC) 10 MG tablet      2. Nasal turbinate hypertrophy  J34.3       3. Chronic pansinusitis  J32.4       4. Perforation of tympanic membrane, left  H72.92       5. Tympanic membrane retraction, bilateral  " H73.893       6. ETD (Eustachian tube dysfunction), bilateral  H69.83       7. Mixed hearing loss, bilateral  H90.6           Complete Allergy Blood Testing  Continue Flonase and Antihistamines  Follow up in surgery    environmental serum specific IgE  Continue nasal steroids and Non-Sedating/second generation antihistamines    Will require CT sinus and probable FESS in future, d/w her today    I discussed the risks and complications of bilateral eustachian tube dilation, LEFT cartilage tympanoplasty, possible fat harvest,  including anesthesia, bleeding, infection, change in sense of taste, hearing loss, dizziness, non-take of graft, otorrhea (ear drainage), facial nerve injury, change in the appearance of the ear, scar formation: hypertrophic or keloid, numbness to area and need for additional surgery in the setting of graft non-take, cholesteatoma or other unforseen complications.   Theoretic injury to the carotid artery with eustachian tube dilation was discussed.  There is no dehiscent petrous carotid on imaging.    I do not recommend any surgery on the right which is her better hearing ear, at least at this juncture.    Hearing preservation reinforced      All questions are answered and wishes are to proceed with surgical intervention.      Total exam time spent on the day of the visit including review of the chart, reviewing pertinent prior imaging and notes, obtaining a detailed history and physical exam, education, discussion with the patient and documenting in epic was over 40 minutes .  This did not include procedure time.    Tinnitus education was provided.  Tinnitus is widely considered a disorder of cental auditory processing.    Hearing preservation was reinforced  I also cautioned the patient against investing in any oral supplements advertised to cure tinnitus.     I have also recommended yearly audiograms, masking devices or apps.  For worsening symptoms, I recommend online or in person cognitive  behavioral therapy (CBT) referral.     The patient will follow up as necessary for worsening symptoms or changes in symptoms.              Yamel Lucas D.O.  Otolaryngology/Head and Neck Surgery  Allergy              Again, thank you for allowing me to participate in the care of your patient.        Sincerely,        Yamel Lucas MD

## 2023-05-15 NOTE — PATIENT INSTRUCTIONS
Thank you for allowing Dr. Lucas and our ENT team to participate in your care.  If your medications are too expensive, please give the nurse a call.  We can possibly change this medication.  If you have a scheduling or an appointment question please contact our Health Unit Coordinator at their direct line [phone].   ALL nursing questions or concerns can be directed to your ENT nurse at: [phone] Dione Rosangela    Complete Allergy Blood Testing  Continue Flonase and Antihistamines  Follow up in surgery        HOW TO PREPARE-    You need to have a scheduled Pre-Op with your primary care physician within 30 days of your scheduled surgery.      You need a friend or family member available to drive you home AND stay with you for 24 hours after you leave the hospital. You will not be allowed to drive yourself. IF you need to take a taxi or the bus you MUST have a responsible person to ride with you. YOUR PROCEDURE WILL BE CANCELLED IF YOU DO NOT HAVE A RESPONSIBLE ADULT TO DRIVE YOU HOME.     You CANNOT have anything to eat or drink after midnight the night before your surgery, including water and coffee. Your stomach needs to be completely empty. Do NOT chew gum, suck on hard candy, or smoke. You can brush your teeth the morning of surgery.     The Surgery Education Nurses will call you  1-2 weeks prior to your surgery date at  [phone] or toll free [phone]. Please have your medication and allergy lists ready.    Stop your aspirin or other NSAIDs(Ibuprofen, Motrin, Aleve, Celebrex, Naproxen, etc...) 7 days before your surgery.    Hospital admitting will call you the day before your surgery with your exact arrival time.     Please call your primary care physician if you should become ill within 24 hours of scheduled surgery. (ex.vomiting, diarrhea, fever)        You will need to wash the night before AND the morning of you procedure with a liquid antibacterial soap, like Dial.

## 2023-05-30 LAB — SCANNED LAB RESULT: NORMAL

## 2023-06-01 ENCOUNTER — TELEPHONE (OUTPATIENT)
Dept: OTOLARYNGOLOGY | Facility: OTHER | Age: 28
End: 2023-06-01

## 2023-06-01 NOTE — TELEPHONE ENCOUNTER
The patient had a recent allergy blood test completed. Per Dr. Lucas, this was all negative. The patient was notified of these results.

## 2023-06-23 ENCOUNTER — ANESTHESIA EVENT (OUTPATIENT)
Dept: SURGERY | Facility: HOSPITAL | Age: 28
End: 2023-06-23
Payer: COMMERCIAL

## 2023-06-23 NOTE — H&P (VIEW-ONLY)
[facility] [place]  [address]  [facility] [place] [address]  Phone: [phone]  Primary Provider: Jia Kirk  Pre-op Performing Provider: DEBBIE RUSSELL      PREOPERATIVE EVALUATION:  Today's date: 6/26/2023    May Maria is a 27 year old female who presents for a preoperative evaluation.       No data to display              Surgical Information:  Surgery/Procedure: Left Cartilage Tympanoplasty  [place] [facility] [place]geon: Dr. Lucas  Surgery Date: 6/28/23  Time of Surgery: TBD  Where patient plans to recover: At home with family  Fax number for surgical facility: Note does not need to be faxed, will be available electronically in Epic.    Assessment & Plan     The proposed surgical procedure is considered INTERMEDIATE risk.    Preop general physical exam  Mixed conductive and sensorineural hearing loss of both ears  Cleared for procedure   - CBC with platelets and differential; Future  - Basic metabolic panel; Future  - HCG Qual, Urine ([id number]); Future      Possible Sleep Apnea: snores - can assess in the future - possible related to chronic sinusitis          - No identified additional risk factors other than previously addressed    Antiplatelet or Anticoagulation Medication Instructions:   - Patient is on no antiplatelet or anticoagulation medications.    Additional Medication Instructions:  Patient is to take all scheduled medications on the day of surgery EXCEPT for modifications listed below:   - sucralfate: HOLD day of surgery.   - rescue Inhaler: Continue PRN. Bring to hospital on the day of surgery.    RECOMMENDATION:  APPROVAL GIVEN to proceed with proposed procedure, without further diagnostic evaluation.    Ordering of each unique test  Prescription drug management    Subjective       HPI related to upcoming procedure: pressure and pain and hearing loss in left ear           6/25/2023     9:14 PM   Preop Questions   1. Have you ever had a heart attack or stroke?  No   2. Have you ever had surgery on your heart or blood vessels, such as a stent placement, a coronary artery bypass, or surgery on an artery in your head, neck, heart, or legs? No   3. Do you have chest pain with activity? No   4. Do you have a history of  heart failure? No   5. Do you currently have a cold, bronchitis or symptoms of other infection? No   6. Do you have a cough, shortness of breath, or wheezing? No   7. Do you or anyone in your family have previous history of blood clots? No   8. Do you or does anyone in your family have a serious bleeding problem such as prolonged bleeding following surgeries or cuts? No   9. Have you ever had problems with anemia or been told to take iron pills? Yes, after C section for about a week    10. Have you had any abnormal blood loss such as black, tarry or bloody stools, or abnormal vaginal bleeding? No   11. Have you ever had a blood transfusion? No   12. Are you willing to have a blood transfusion if it is medically needed before, during, or after your surgery? Yes   13. Have you or any of your relatives ever had problems with anesthesia? No   14. Do you have sleep apnea, excessive snoring or daytime drowsiness? Snoring    15. Do you have any artifical heart valves or other implanted medical devices like a pacemaker, defibrillator, or continuous glucose monitor? No   16. Do you have artificial joints? No   17. Are you allergic to latex? No   18. Is there any chance that you may be pregnant? No     Health Care Directive:  Patient does not have a Health Care Directive or Living Will: Discussed advance care planning with patient; however, patient declined at this time.    Preoperative Review of :   reviewed - no record of controlled substances prescribed.      Status of Chronic Conditions:  See problem list for active medical problems.  Problems all longstanding and stable, except as noted/documented.  See ROS for pertinent symptoms related to these  conditions.      Review of Systems  CONSTITUTIONAL: NEGATIVE for fever, chills, change in weight  INTEGUMENTARY/SKIN: NEGATIVE for worrisome rashes, moles or lesions  EYES: NEGATIVE for vision changes or irritation  ENT/MOUTH: NEGATIVE for ear, mouth and throat problems  RESP: NEGATIVE for significant cough or SOB  CV: NEGATIVE for chest pain, palpitations or peripheral edema  GI: NEGATIVE for nausea, abdominal pain, heartburn, or change in bowel habits  : NEGATIVE for frequency, dysuria, or hematuria  MUSCULOSKELETAL: NEGATIVE for significant arthralgias or myalgia  NEURO: NEGATIVE for weakness, dizziness or paresthesias  ENDOCRINE: NEGATIVE for temperature intolerance, skin/hair changes  HEME: NEGATIVE for bleeding problems  PSYCHIATRIC: NEGATIVE for changes in mood or affect    Patient Active Problem List    Diagnosis Date Noted     Fetal anomaly necessitating delivery 2022     Priority: Medium     Non-reassuring electronic fetal monitoring tracing 2022     Priority: Medium     Postpartum anemia 2022     Priority: Medium     Pre-eclampsia in third trimester 2022     Priority: Medium     Supervision of other normal pregnancy, antepartum 2021     Priority: Medium     B positive  BOY  NIPT negative  Hx PCS  Hx preeclampsia   Hx of GDM diet controlled  Hx of one functioning kidney (Left).  Atrophy of right kidney  Baby doc:  Chas  FOB:  Dominick  Son:  [place] 1               Regional enteritis of large intestine (H) 2021     Priority: Medium     Added automatically from request for surgery [id number]       Family history of Crohn's disease 2021     Priority: Medium     Added automatically from request for surgery [id number]        delivery delivered 2020     Priority: Medium     GBS bacteriuria 2020     Priority: Medium     Treat with pcn in labor.        Microscopic hematuria 2019     Priority: Medium     No anatomic cause noted. Repeat yearly.         Recurrent UTI 2018     Priority: Medium     Renal atrophy, right 2018     Priority: Medium      Past Medical History:   Diagnosis Date     Atrophic kidney      Depressive disorder     Therapy during childhood     Diet controlled gestational diabetes mellitus (GDM) in third trimester 2020    Late-onset     Glucose intolerance 05/15/2020     Glucose intolerance of pregnancy 2020     Hypertension 24     Shift work sleep disorder      Past Surgical History:   Procedure Laterality Date      SECTION N/A 2020    Procedure:  SECTION;  Surgeon: Magdi Cannon MD;  Location: [facility] OR     COLONOSCOPY       ENDOSCOPY UPPER, COLONOSCOPY, COMBINED N/A 2021    Procedure: colonoscopy with biopsy and polypectomy, upper endoscopy with biopsy;  Surgeon: Joe Nayak MD;  Location: [facility] OR     PE TUBES Bilateral     years      Current Outpatient Medications   Medication Sig Dispense Refill     acetaminophen (TYLENOL) 325 MG tablet Take 325-650 mg by mouth every 6 hours as needed for mild pain       albuterol (PROAIR HFA/PROVENTIL HFA/VENTOLIN HFA) 108 (90 Base) MCG/ACT inhaler Inhale 2 puffs into the lungs every 4 hours as needed for shortness of breath, wheezing or cough 18 g 1     cetirizine (ZYRTEC) 10 MG tablet Take 10 mg every evening 60 tablet prn     CONTOUR NEXT TEST test strip        Microlet Lancets MISC        pantoprazole (PROTONIX) 40 MG EC tablet Take 40 mg by mouth       sucralfate (CARAFATE) 1 GM tablet TAKE 1 TABLET BY MOUTH THREE TIMES DAILY 1 HOUR BEFORE MEALS AND 1 TO 2 TABLETS AT BEDTIME. TAKE AS NEEDED FOR ABDOMINAL PAIN       fluticasone (FLONASE) 50 MCG/ACT nasal spray Spray 2 sprays into both nostrils daily (Patient not taking: Reported on 2023) 16 g 12       Allergies   Allergen Reactions     Pineapple Hives and Swelling     Throat swelling        Social History     Tobacco Use     Smoking status: Former     Packs/day: 0.50     Years: 5.00     Pack  "years: 2.50     Types: Cigarettes     Start date: 2014     Quit date: 3/1/2018     Years since quittin.3     Smokeless tobacco: Never   Substance Use Topics     Alcohol use: No     Family History   Problem Relation Age of Onset     Other - See Comments Mother         bells palsy post auto accident     Heart Disease Father         s/p stents     Asthma Father      Cancer Father      Coronary Artery Disease Father      Hypertension Father      Other Cancer Father      Depression Father      Anxiety Disorder Father      Diabetes Maternal Grandmother      Breast Cancer Paternal Grandmother      History   Drug Use No         Objective     /70   Pulse 92   Temp 97.9  F (36.6  C) (Tympanic)   Ht 1.575 m (5' 2\")   Wt 86.2 kg (190 lb)   SpO2 98%   BMI 34.75 kg/m      Physical Exam    GENERAL APPEARANCE: healthy, alert and no distress     EYES: EOMI, PERRL     HENT: nose and mouth without ulcers or lesions and normal ear canals Right TM scar tissue left retracted      NECK: no adenopathy, no asymmetry, masses, or scars and thyroid normal to palpation     RESP: lungs clear to auscultation - no rales, rhonchi or wheezes     CV: regular rates and rhythm, normal S1 S2, no S3 or S4 and no murmur, click or rub     ABDOMEN:  soft, nontender, no HSM or masses and bowel sounds normal     MS: extremities normal- no gross deformities noted, no evidence of inflammation in joints, FROM in all extremities.     SKIN: no suspicious lesions or rashes     NEURO: Normal strength and tone, sensory exam grossly normal, mentation intact and speech normal     PSYCH: mentation appears normal. and affect normal/bright     LYMPHATICS: No cervical adenopathy    Recent Labs   Lab Test 21  0924 21  1114 21  1659   HGB 13.5 13.1 13.5    299 342     --  136   POTASSIUM 3.6  --  3.8   CR 0.71  --  0.75        Diagnostics:  Recent Results (from the past 24 hour(s))   Basic metabolic panel    Collection Time: " 06/26/23  9:22 AM   Result Value Ref Range    Sodium 138 136 - 145 mmol/L    Potassium 4.0 3.4 - 5.3 mmol/L    Chloride 103 98 - 107 mmol/L    Carbon Dioxide (CO2) 25 22 - 29 mmol/L    Anion Gap 10 7 - 15 mmol/L    Urea Nitrogen 11.3 6.0 - 20.0 mg/dL    Creatinine 0.77 0.51 - 0.95 mg/dL    Calcium 9.5 8.6 - 10.0 mg/dL    Glucose 98 70 - 99 mg/dL    GFR Estimate >90 >60 mL/min/1.73m2   HCG Qual, Urine ([id number])    Collection Time: 06/26/23  9:22 AM   Result Value Ref Range    hCG Urine Qualitative Negative Negative   CBC with platelets and differential    Collection Time: 06/26/23  9:22 AM   Result Value Ref Range    WBC Count 6.1 4.0 - 11.0 10e3/uL    RBC Count 4.91 3.80 - 5.20 10e6/uL    Hemoglobin 13.9 11.7 - 15.7 g/dL    Hematocrit 41.6 35.0 - 47.0 %    MCV 85 78 - 100 fL    MCH 28.3 26.5 - 33.0 pg    MCHC 33.4 31.5 - 36.5 g/dL    RDW 12.7 10.0 - 15.0 %    Platelet Count 317 150 - 450 10e3/uL    % Neutrophils 55 %    % Lymphocytes 36 %    % Monocytes 6 %    % Eosinophils 2 %    % Basophils 1 %    % Immature Granulocytes 0 %    NRBCs per 100 WBC 0 <1 /100    Absolute Neutrophils 3.4 1.6 - 8.3 10e3/uL    Absolute Lymphocytes 2.2 0.8 - 5.3 10e3/uL    Absolute Monocytes 0.4 0.0 - 1.3 10e3/uL    Absolute Eosinophils 0.1 0.0 - 0.7 10e3/uL    Absolute Basophils 0.1 0.0 - 0.2 10e3/uL    Absolute Immature Granulocytes 0.0 <=0.4 10e3/uL    Absolute NRBCs 0.0 10e3/uL      No EKG required, no history of coronary heart disease, significant arrhythmia, peripheral arterial disease or other structural heart disease.    Revised Cardiac Risk Index (RCRI):  The patient has the following serious cardiovascular risks for perioperative complications:   - No serious cardiac risks = 0 points     RCRI Interpretation: 1 point: Class II (low risk - 0.9% complication rate)           Signed Electronically by: SPENSER Hurt CNP  Copy of this evaluation report is provided to requesting physician.

## 2023-06-23 NOTE — PROGRESS NOTES
[facility] [place]  [address]  [place] [address]  Phone: [phone]  Primary Provider: Jia Kirk  Pre-op Performing Provider: DEBBIE RUSSELL      PREOPERATIVE EVALUATION:  Today's date: 6/26/2023    May Maria is a 27 year old female who presents for a preoperative evaluation.       No data to display              Surgical Information:  Surgery/Procedure: Left Cartilage Tympanoplasty  Surgery Location: [facility] [place]  Surgeon: Dr. Lucas  Surgery Date: 6/28/23  Time of Surgery: TBD  Where patient plans to recover: At home with family  Fax number for surgical facility: Note does not need to be faxed, will be available electronically in Epic.    Assessment & Plan     The proposed surgical procedure is considered INTERMEDIATE risk.    Preop general physical exam  Mixed conductive and sensorineural hearing loss of both ears  Cleared for procedure   - CBC with platelets and differential; Future  - Basic metabolic panel; Future  - HCG Qual, Urine ([id number]); Future      Possible Sleep Apnea: snores - can assess in the future - possible related to chronic sinusitis          - No identified additional risk factors other than previously addressed    Antiplatelet or Anticoagulation Medication Instructions:   - Patient is on no antiplatelet or anticoagulation medications.    Additional Medication Instructions:  Patient is to take all scheduled medications on the day of surgery EXCEPT for modifications listed below:   - sucralfate: HOLD day of surgery.   - rescue Inhaler: Continue PRN. Bring to hospital on the day of surgery.    RECOMMENDATION:  APPROVAL GIVEN to proceed with proposed procedure, without further diagnostic evaluation.    Ordering of each unique test  Prescription drug management    Subjective       HPI related to upcoming procedure: pressure and pain and hearing loss in left ear           6/25/2023     9:14 PM   Preop Questions   1. Have you ever had a heart attack or stroke?  No   2. Have you ever had surgery on your heart or blood vessels, such as a stent placement, a coronary artery bypass, or surgery on an artery in your head, neck, heart, or legs? No   3. Do you have chest pain with activity? No   4. Do you have a history of  heart failure? No   5. Do you currently have a cold, bronchitis or symptoms of other infection? No   6. Do you have a cough, shortness of breath, or wheezing? No   7. Do you or anyone in your family have previous history of blood clots? No   8. Do you or does anyone in your family have a serious bleeding problem such as prolonged bleeding following surgeries or cuts? No   9. Have you ever had problems with anemia or been told to take iron pills? Yes, after C section for about a week    10. Have you had any abnormal blood loss such as black, tarry or bloody stools, or abnormal vaginal bleeding? No   11. Have you ever had a blood transfusion? No   12. Are you willing to have a blood transfusion if it is medically needed before, during, or after your surgery? Yes   13. Have you or any of your relatives ever had problems with anesthesia? No   14. Do you have sleep apnea, excessive snoring or daytime drowsiness? Snoring    15. Do you have any artifical heart valves or other implanted medical devices like a pacemaker, defibrillator, or continuous glucose monitor? No   16. Do you have artificial joints? No   17. Are you allergic to latex? No   18. Is there any chance that you may be pregnant? No     Health Care Directive:  Patient does not have a Health Care Directive or Living Will: Discussed advance care planning with patient; however, patient declined at this time.    Preoperative Review of :   reviewed - no record of controlled substances prescribed.      Status of Chronic Conditions:  See problem list for active medical problems.  Problems all longstanding and stable, except as noted/documented.  See ROS for pertinent symptoms related to these  conditions.      Review of Systems  CONSTITUTIONAL: NEGATIVE for fever, chills, change in weight  INTEGUMENTARY/SKIN: NEGATIVE for worrisome rashes, moles or lesions  EYES: NEGATIVE for vision changes or irritation  ENT/MOUTH: NEGATIVE for ear, mouth and throat problems  RESP: NEGATIVE for significant cough or SOB  CV: NEGATIVE for chest pain, palpitations or peripheral edema  GI: NEGATIVE for nausea, abdominal pain, heartburn, or change in bowel habits  : NEGATIVE for frequency, dysuria, or hematuria  MUSCULOSKELETAL: NEGATIVE for significant arthralgias or myalgia  NEURO: NEGATIVE for weakness, dizziness or paresthesias  ENDOCRINE: NEGATIVE for temperature intolerance, skin/hair changes  HEME: NEGATIVE for bleeding problems  PSYCHIATRIC: NEGATIVE for changes in mood or affect    Patient Active Problem List    Diagnosis Date Noted     Fetal anomaly necessitating delivery 2022     Priority: Medium     Non-reassuring electronic fetal monitoring tracing 2022     Priority: Medium     Postpartum anemia 2022     Priority: Medium     Pre-eclampsia in third trimester 2022     Priority: Medium     Supervision of other normal pregnancy, antepartum 2021     Priority: Medium     B positive  BOY  NIPT negative  Hx PCS  Hx preeclampsia   Hx of GDM diet controlled  Hx of one functioning kidney (Left).  Atrophy of right kidney  Baby doc:  Chas  FOB:  Dominick  Son:  Chico 1               Regional enteritis of large intestine (H) 2021     Priority: Medium     Added automatically from request for surgery [id number]       Family history of Crohn's disease 2021     Priority: Medium     Added automatically from request for surgery [id number]        delivery delivered 2020     Priority: Medium     GBS bacteriuria 2020     Priority: Medium     Treat with pcn in labor.        Microscopic hematuria 2019     Priority: Medium     No anatomic cause noted. Repeat yearly.         Recurrent UTI 2018     Priority: Medium     Renal atrophy, right 2018     Priority: Medium      Past Medical History:   Diagnosis Date     Atrophic kidney      Depressive disorder     Therapy during childhood     Diet controlled gestational diabetes mellitus (GDM) in third trimester 2020    Late-onset     Glucose intolerance 05/15/2020     Glucose intolerance of pregnancy 2020     Hypertension 24     Shift work sleep disorder      Past Surgical History:   Procedure Laterality Date      SECTION N/A 2020    Procedure:  SECTION;  Surgeon: Magdi Cannon MD;  Location: [facility]     COLONOSCOPY       ENDOSCOPY UPPER, COLONOSCOPY, COMBINED N/A 2021    Procedure: colonoscopy with biopsy and polypectomy, upper endoscopy with biopsy;  Surgeon: Joe Nayak MD;  Location: [facility] OR     PE TUBES Bilateral     years      Current Outpatient Medications   Medication Sig Dispense Refill     acetaminophen (TYLENOL) 325 MG tablet Take 325-650 mg by mouth every 6 hours as needed for mild pain       albuterol (PROAIR HFA/PROVENTIL HFA/VENTOLIN HFA) 108 (90 Base) MCG/ACT inhaler Inhale 2 puffs into the lungs every 4 hours as needed for shortness of breath, wheezing or cough 18 g 1     cetirizine (ZYRTEC) 10 MG tablet Take 10 mg every evening 60 tablet prn     CONTOUR NEXT TEST test strip        Microlet Lancets MISC        pantoprazole (PROTONIX) 40 MG EC tablet Take 40 mg by mouth       sucralfate (CARAFATE) 1 GM tablet TAKE 1 TABLET BY MOUTH THREE TIMES DAILY 1 HOUR BEFORE MEALS AND 1 TO 2 TABLETS AT BEDTIME. TAKE AS NEEDED FOR ABDOMINAL PAIN       fluticasone (FLONASE) 50 MCG/ACT nasal spray Spray 2 sprays into both nostrils daily (Patient not taking: Reported on 2023) 16 g 12       Allergies   Allergen Reactions     Pineapple Hives and Swelling     Throat swelling        Social History     Tobacco Use     Smoking status: Former     Packs/day: 0.50     Years: 5.00     Pack  "years: 2.50     Types: Cigarettes     Start date: 2014     Quit date: 3/1/2018     Years since quittin.3     Smokeless tobacco: Never   Substance Use Topics     Alcohol use: No     Family History   Problem Relation Age of Onset     Other - See Comments Mother         bells palsy post auto accident     Heart Disease Father         s/p stents     Asthma Father      Cancer Father      Coronary Artery Disease Father      Hypertension Father      Other Cancer Father      Depression Father      Anxiety Disorder Father      Diabetes Maternal Grandmother      Breast Cancer Paternal Grandmother      History   Drug Use No         Objective     /70   Pulse 92   Temp 97.9  F (36.6  C) (Tympanic)   Ht 1.575 m (5' 2\")   Wt 86.2 kg (190 lb)   SpO2 98%   BMI 34.75 kg/m      Physical Exam    GENERAL APPEARANCE: healthy, alert and no distress     EYES: EOMI, PERRL     HENT: nose and mouth without ulcers or lesions and normal ear canals Right TM scar tissue left retracted      NECK: no adenopathy, no asymmetry, masses, or scars and thyroid normal to palpation     RESP: lungs clear to auscultation - no rales, rhonchi or wheezes     CV: regular rates and rhythm, normal S1 S2, no S3 or S4 and no murmur, click or rub     ABDOMEN:  soft, nontender, no HSM or masses and bowel sounds normal     MS: extremities normal- no gross deformities noted, no evidence of inflammation in joints, FROM in all extremities.     SKIN: no suspicious lesions or rashes     NEURO: Normal strength and tone, sensory exam grossly normal, mentation intact and speech normal     PSYCH: mentation appears normal. and affect normal/bright     LYMPHATICS: No cervical adenopathy    Recent Labs   Lab Test 21  0924 21  1114 21  1659   HGB 13.5 13.1 13.5    299 342     --  136   POTASSIUM 3.6  --  3.8   CR 0.71  --  0.75        Diagnostics:  Recent Results (from the past 24 hour(s))   Basic metabolic panel    Collection Time: " 06/26/23  9:22 AM   Result Value Ref Range    Sodium 138 136 - 145 mmol/L    Potassium 4.0 3.4 - 5.3 mmol/L    Chloride 103 98 - 107 mmol/L    Carbon Dioxide (CO2) 25 22 - 29 mmol/L    Anion Gap 10 7 - 15 mmol/L    Urea Nitrogen 11.3 6.0 - 20.0 mg/dL    Creatinine 0.77 0.51 - 0.95 mg/dL    Calcium 9.5 8.6 - 10.0 mg/dL    Glucose 98 70 - 99 mg/dL    GFR Estimate >90 >60 mL/min/1.73m2   HCG Qual, Urine ([id number])    Collection Time: 06/26/23  9:22 AM   Result Value Ref Range    hCG Urine Qualitative Negative Negative   CBC with platelets and differential    Collection Time: 06/26/23  9:22 AM   Result Value Ref Range    WBC Count 6.1 4.0 - 11.0 10e3/uL    RBC Count 4.91 3.80 - 5.20 10e6/uL    Hemoglobin 13.9 11.7 - 15.7 g/dL    Hematocrit 41.6 35.0 - 47.0 %    MCV 85 78 - 100 fL    MCH 28.3 26.5 - 33.0 pg    MCHC 33.4 31.5 - 36.5 g/dL    RDW 12.7 10.0 - 15.0 %    Platelet Count 317 150 - 450 10e3/uL    % Neutrophils 55 %    % Lymphocytes 36 %    % Monocytes 6 %    % Eosinophils 2 %    % Basophils 1 %    % Immature Granulocytes 0 %    NRBCs per 100 WBC 0 <1 /100    Absolute Neutrophils 3.4 1.6 - 8.3 10e3/uL    Absolute Lymphocytes 2.2 0.8 - 5.3 10e3/uL    Absolute Monocytes 0.4 0.0 - 1.3 10e3/uL    Absolute Eosinophils 0.1 0.0 - 0.7 10e3/uL    Absolute Basophils 0.1 0.0 - 0.2 10e3/uL    Absolute Immature Granulocytes 0.0 <=0.4 10e3/uL    Absolute NRBCs 0.0 10e3/uL      No EKG required, no history of coronary heart disease, significant arrhythmia, peripheral arterial disease or other structural heart disease.    Revised Cardiac Risk Index (RCRI):  The patient has the following serious cardiovascular risks for perioperative complications:   - No serious cardiac risks = 0 points     RCRI Interpretation: 1 point: Class II (low risk - 0.9% complication rate)           Signed Electronically by: SPENSER Hurt CNP  Copy of this evaluation report is provided to requesting physician.

## 2023-06-23 NOTE — ANESTHESIA PREPROCEDURE EVALUATION
Anesthesia Pre-Procedure Evaluation    Patient: May Maria   MRN: [id number] : 1995        Procedure : Procedure(s):  Left Cartilage Tympanoplasty, Possible Fat Thicket  Bilateral Eustachian Tube Dilation          Past Medical History:   Diagnosis Date     Atrophic kidney      Depressive disorder     Therapy during childhood     Diet controlled gestational diabetes mellitus (GDM) in third trimester 2020    Late-onset     Glucose intolerance 05/15/2020     Glucose intolerance of pregnancy 2020     Hypertension 24     Shift work sleep disorder       Past Surgical History:   Procedure Laterality Date      SECTION N/A 2020    Procedure:  SECTION;  Surgeon: Magdi Cannon MD;  Location: [facility]     COLONOSCOPY       ENDOSCOPY UPPER, COLONOSCOPY, COMBINED N/A 2021    Procedure: colonoscopy with biopsy and polypectomy, upper endoscopy with biopsy;  Surgeon: Joe Nayak MD;  Location: [facility]     PE TUBES Bilateral     years       Allergies   Allergen Reactions     Pineapple Hives and Swelling     Throat swelling      Social History     Tobacco Use     Smoking status: Former     Packs/day: 0.50     Years: 5.00     Pack years: 2.50     Types: Cigarettes     Start date: 2014     Quit date: 3/1/2018     Years since quittin.3     Smokeless tobacco: Never   Substance Use Topics     Alcohol use: No      Wt Readings from Last 1 Encounters:   05/15/23 87.1 kg (192 lb)        Anesthesia Evaluation   Pt has had prior anesthetic. Type: MAC, Regional and General.        ROS/MED HX  ENT/Pulmonary: Comment: Possible Sleep Apnea: snores - can assess in the future - possible related to chronic sinusitis    Mixed conductive and sensorineural hearing loss of both ears    (+) HILARIA risk factors, snores loudly, hypertension, obese, daytime somnolence, tobacco use (quit 4years ago ), Past use, asthma (albuterol inhaler; denies asthma; uses inhaler 2 times per month states it  "depends on \"air quality\"; breathing feels normal now )     Neurologic:  - neg neurologic ROS     Cardiovascular:     (+) hypertension (post partum)-range: 124/94; no longer on medication / ----    METS/Exercise Tolerance: >4 METS    Hematologic:  - neg hematologic  ROS     Musculoskeletal:  - neg musculoskeletal ROS     GI/Hepatic:     (+) GERD (protonix), Asymptomatic on medication,     Renal/Genitourinary: Comment: Right renal atrophy      Endo: Comment: Glucose intolerance    (+) Obesity,     Psychiatric/Substance Use:     (+) psychiatric history depression     Infectious Disease:  - neg infectious disease ROS     Malignancy:  - neg malignancy ROS     Other:  - neg other ROS   Any chance pregnant: neg 6/26/23 hcg.       Physical Exam    Airway        Mallampati: I   TM distance: > 3 FB   Neck ROM: full   Mouth opening: > 3 cm    Respiratory Devices and Support         Dental       (+) Completely normal teeth      Cardiovascular          Rhythm and rate: regular and normal     Pulmonary           breath sounds clear to auscultation           OUTSIDE LABS:  CBC:   Lab Results   Component Value Date    WBC 7.1 09/28/2021    WBC 8.2 09/14/2021    HGB 13.5 09/28/2021    HGB 13.1 09/14/2021    HCT 38.6 09/28/2021    HCT 36.8 09/14/2021     09/28/2021     09/14/2021     BMP:   Lab Results   Component Value Date     09/28/2021     08/06/2021    POTASSIUM 3.6 09/28/2021    POTASSIUM 3.8 08/06/2021    CHLORIDE 106 09/28/2021    CHLORIDE 107 08/06/2021    CO2 22 09/28/2021    CO2 23 08/06/2021    BUN 8 09/28/2021    BUN 10 08/06/2021    CR 0.71 09/28/2021    CR 0.75 08/06/2021    GLC 91 09/28/2021    GLC 98 08/06/2021     COAGS: No results found for: PTT, INR, FIBR  POC:   Lab Results   Component Value Date    HCG Negative 02/18/2021     HEPATIC:   Lab Results   Component Value Date    ALBUMIN 3.4 09/28/2021    PROTTOTAL 7.9 09/28/2021    ALT 17 09/28/2021    AST 7 09/28/2021    ALKPHOS 81 " 09/28/2021    BILITOTAL 0.6 09/28/2021     OTHER:   Lab Results   Component Value Date    LACT 1.3 05/15/2018    A1C 5.0 06/22/2020    HUGH 9.1 09/28/2021    PHOS 2.7 05/05/2021    LIPASE 90 09/28/2021    TSH 1.42 01/15/2019    CRP <2.9 05/15/2018       Anesthesia Plan    ASA Status:  2   NPO Status:  NPO Appropriate (2000 last pm)    Anesthesia Type: General.     - Airway: ETT              Consents    Anesthesia Plan(s) and associated risks, benefits, and realistic alternatives discussed. Questions answered and patient/representative(s) expressed understanding.    - Discussed:     - Discussed with:  Patient      - Extended Intubation/Ventilatory Support Discussed: No.      - Patient is DNR/DNI Status: No    Use of blood products discussed: No .     Postoperative Care    Pain management: IV analgesics.   PONV prophylaxis: Ondansetron (or other 5HT-3), Dexamethasone or Solumedrol     Comments:    [facility]    Discussed risks and benefits with patient for general anesthesia including sore throat, nausea, vomiting, aspiration, dental damage, loss of airway, CV complications, stroke, MI, death. Pt wishes to proceed.               SPENSER Chew CRNA

## 2023-06-26 ENCOUNTER — OFFICE VISIT (OUTPATIENT)
Dept: FAMILY MEDICINE | Facility: OTHER | Age: 28
End: 2023-06-26
Attending: NURSE PRACTITIONER
Payer: COMMERCIAL

## 2023-06-26 ENCOUNTER — LAB (OUTPATIENT)
Dept: LAB | Facility: OTHER | Age: 28
End: 2023-06-26
Attending: NURSE PRACTITIONER
Payer: COMMERCIAL

## 2023-06-26 VITALS
HEIGHT: 62 IN | SYSTOLIC BLOOD PRESSURE: 108 MMHG | DIASTOLIC BLOOD PRESSURE: 70 MMHG | TEMPERATURE: 97.9 F | WEIGHT: 190 LBS | HEART RATE: 92 BPM | OXYGEN SATURATION: 98 % | BODY MASS INDEX: 34.96 KG/M2

## 2023-06-26 DIAGNOSIS — H90.6 MIXED CONDUCTIVE AND SENSORINEURAL HEARING LOSS OF BOTH EARS: ICD-10-CM

## 2023-06-26 DIAGNOSIS — Z01.818 PREOP GENERAL PHYSICAL EXAM: ICD-10-CM

## 2023-06-26 DIAGNOSIS — Z01.818 PREOP GENERAL PHYSICAL EXAM: Primary | ICD-10-CM

## 2023-06-26 LAB
ANION GAP SERPL CALCULATED.3IONS-SCNC: 10 MMOL/L (ref 7–15)
BASOPHILS # BLD AUTO: 0.1 10E3/UL (ref 0–0.2)
BASOPHILS NFR BLD AUTO: 1 %
BUN SERPL-MCNC: 11.3 MG/DL (ref 6–20)
CALCIUM SERPL-MCNC: 9.5 MG/DL (ref 8.6–10)
CHLORIDE SERPL-SCNC: 103 MMOL/L (ref 98–107)
CREAT SERPL-MCNC: 0.77 MG/DL (ref 0.51–0.95)
DEPRECATED HCO3 PLAS-SCNC: 25 MMOL/L (ref 22–29)
EOSINOPHIL # BLD AUTO: 0.1 10E3/UL (ref 0–0.7)
EOSINOPHIL NFR BLD AUTO: 2 %
ERYTHROCYTE [DISTWIDTH] IN BLOOD BY AUTOMATED COUNT: 12.7 % (ref 10–15)
GFR SERPL CREATININE-BSD FRML MDRD: >90 ML/MIN/1.73M2
GLUCOSE SERPL-MCNC: 98 MG/DL (ref 70–99)
HCG UR QL: NEGATIVE
HCT VFR BLD AUTO: 41.6 % (ref 35–47)
HGB BLD-MCNC: 13.9 G/DL (ref 11.7–15.7)
IMM GRANULOCYTES # BLD: 0 10E3/UL
IMM GRANULOCYTES NFR BLD: 0 %
LYMPHOCYTES # BLD AUTO: 2.2 10E3/UL (ref 0.8–5.3)
LYMPHOCYTES NFR BLD AUTO: 36 %
MCH RBC QN AUTO: 28.3 PG (ref 26.5–33)
MCHC RBC AUTO-ENTMCNC: 33.4 G/DL (ref 31.5–36.5)
MCV RBC AUTO: 85 FL (ref 78–100)
MONOCYTES # BLD AUTO: 0.4 10E3/UL (ref 0–1.3)
MONOCYTES NFR BLD AUTO: 6 %
NEUTROPHILS # BLD AUTO: 3.4 10E3/UL (ref 1.6–8.3)
NEUTROPHILS NFR BLD AUTO: 55 %
NRBC # BLD AUTO: 0 10E3/UL
NRBC BLD AUTO-RTO: 0 /100
PLATELET # BLD AUTO: 317 10E3/UL (ref 150–450)
POTASSIUM SERPL-SCNC: 4 MMOL/L (ref 3.4–5.3)
RBC # BLD AUTO: 4.91 10E6/UL (ref 3.8–5.2)
SODIUM SERPL-SCNC: 138 MMOL/L (ref 136–145)
WBC # BLD AUTO: 6.1 10E3/UL (ref 4–11)

## 2023-06-26 PROCEDURE — 80048 BASIC METABOLIC PNL TOTAL CA: CPT | Mod: ZL

## 2023-06-26 PROCEDURE — 36415 COLL VENOUS BLD VENIPUNCTURE: CPT | Mod: ZL

## 2023-06-26 PROCEDURE — 99213 OFFICE O/P EST LOW 20 MIN: CPT | Performed by: NURSE PRACTITIONER

## 2023-06-26 PROCEDURE — 81025 URINE PREGNANCY TEST: CPT | Mod: ZL

## 2023-06-26 PROCEDURE — 85025 COMPLETE CBC W/AUTO DIFF WBC: CPT | Mod: ZL

## 2023-06-26 PROCEDURE — G0463 HOSPITAL OUTPT CLINIC VISIT: HCPCS | Performed by: NURSE PRACTITIONER

## 2023-06-26 RX ORDER — PANTOPRAZOLE SODIUM 40 MG/1
40 TABLET, DELAYED RELEASE ORAL
COMMUNITY
Start: 2023-06-09 | End: 2024-05-14

## 2023-06-26 RX ORDER — SUCRALFATE 1 G/1
TABLET ORAL
COMMUNITY
Start: 2023-06-09 | End: 2024-05-14

## 2023-06-26 ASSESSMENT — PAIN SCALES - GENERAL: PAINLEVEL: NO PAIN (0)

## 2023-06-26 NOTE — PATIENT INSTRUCTIONS
For informational purposes only. Not to replace the advice of your health care provider. Copyright   2003,  [facility]. All rights reserved. Clinically reviewed by Shala Massey MD. [employer] [id number] - REV .  Preparing for Your Surgery  Getting started  A nurse will call you to review your health history and instructions. They will give you an arrival time based on your scheduled surgery time. Please be ready to share:  Your doctor's clinic name and phone number  Your medical, surgical, and anesthesia history  A list of allergies and sensitivities  A list of medicines, including herbal treatments and over-the-counter drugs  Whether the patient has a legal guardian (ask how to send us the papers in advance)  Please tell us if you're pregnant--or if there's any chance you might be pregnant. Some surgeries may injure a fetus (unborn baby), so they require a pregnancy test. Surgeries that are safe for a fetus don't always need a test, and you can choose whether to have one.   If you have a child who's having surgery, please ask for a copy of Preparing for Your Child's Surgery.    Preparing for surgery  Within 10 to 30 days of surgery: Have a pre-op exam (sometimes called an H&P, or History and Physical). This can be done at a clinic or pre-operative center.  If you're having a , you may not need this exam. Talk to your care team.  At your pre-op exam, talk to your care team about all medicines you take. If you need to stop any medicines before surgery, ask when to start taking them again.  We do this for your safety. Many medicines can make you bleed too much during surgery. Some change how well surgery (anesthesia) drugs work.  Call your insurance company to let them know you're having surgery. (If you don't have insurance, call [phone].)  Call your clinic if there's any change in your health. This includes signs of a cold or flu (sore throat, runny nose, cough, rash, fever). It  also includes a scrape or scratch near the surgery site.  If you have questions on the day of surgery, call your hospital or surgery center.  Eating and drinking guidelines  For your safety: Unless your surgeon tells you otherwise, follow the guidelines below.  Eat and drink as usual until 8 hours before you arrive for surgery. After that, no food or milk.  Drink clear liquids until 2 hours before you arrive. These are liquids you can see through, like water, Gatorade, and Propel Water. They also include plain black coffee and tea (no cream or milk), candy, and breath mints. You can spit out gum when you arrive.  If you drink alcohol: Stop drinking it the night before surgery.  If your care team tells you to take medicine on the morning of surgery, it's okay to take it with a sip of water.  Preventing infection  Shower or bathe the night before and morning of your surgery. Follow the instructions your clinic gave you. (If no instructions, use regular soap.)  Don't shave or clip hair near your surgery site. We'll remove the hair if needed.  Don't smoke or vape the morning of surgery. You may chew nicotine gum up to 2 hours before surgery. A nicotine patch is okay.  Note: Some surgeries require you to completely quit smoking and nicotine. Check with your surgeon.  Your care team will make every effort to keep you safe from infection. We will:  Clean our hands often with soap and water (or an alcohol-based hand rub).  Clean the skin at your surgery site with a special soap that kills germs.  Give you a special gown to keep you warm. (Cold raises the risk of infection.)  Wear special hair covers, masks, gowns and gloves during surgery.  Give antibiotic medicine, if prescribed. Not all surgeries need antibiotics.  What to bring on the day of surgery  Photo ID and insurance card  Copy of your health care directive, if you have one  Glasses and hearing aids (bring cases)  You can't wear contacts during surgery  Inhaler and  eye drops, if you use them (tell us about these when you arrive)  CPAP machine or breathing device, if you use them  A few personal items, if spending the night  If you have . . .  A pacemaker, ICD (cardiac defibrillator) or other implant: Bring the ID card.  An implanted stimulator: Bring the remote control.  A legal guardian: Bring a copy of the certified (court-stamped) guardianship papers.  Please remove any jewelry, including body piercings. Leave jewelry and other valuables at home.  If you're going home the day of surgery  You must have a responsible adult drive you home. They should stay with you overnight as well.  If you don't have someone to stay with you, and you aren't safe to go home alone, we may keep you overnight. Insurance often won't pay for this.  After surgery  If it's hard to control your pain or you need more pain medicine, please call your surgeon's office.  Questions?   If you have any questions for your care team, list them here: _________________________________________________________________________________________________________________________________________________________________________ ____________________________________ ____________________________________ ____________________________________    How to Take Your Medication Before Surgery  - bring rescue inhaler to the procedure ,  hold Carafate the day of the procedure

## 2023-06-28 ENCOUNTER — ANESTHESIA (OUTPATIENT)
Dept: SURGERY | Facility: HOSPITAL | Age: 28
End: 2023-06-28
Payer: COMMERCIAL

## 2023-06-28 ENCOUNTER — LAB (OUTPATIENT)
Dept: LAB | Facility: HOSPITAL | Age: 28
End: 2023-06-28
Attending: OTOLARYNGOLOGY
Payer: COMMERCIAL

## 2023-06-28 ENCOUNTER — HOSPITAL ENCOUNTER (OUTPATIENT)
Facility: HOSPITAL | Age: 28
Discharge: HOME | End: 2023-06-28
Attending: OTOLARYNGOLOGY | Admitting: OTOLARYNGOLOGY
Payer: COMMERCIAL

## 2023-06-28 VITALS
RESPIRATION RATE: 18 BRPM | SYSTOLIC BLOOD PRESSURE: 142 MMHG | HEART RATE: 81 BPM | OXYGEN SATURATION: 99 % | BODY MASS INDEX: 35.33 KG/M2 | TEMPERATURE: 97.4 F | WEIGHT: 192 LBS | HEIGHT: 62 IN | DIASTOLIC BLOOD PRESSURE: 93 MMHG

## 2023-06-28 DIAGNOSIS — Z98.890 POST-OPERATIVE STATE: Primary | ICD-10-CM

## 2023-06-28 PROCEDURE — 69705 NPS SURG DILAT EUST TUBE UNI: CPT | Mod: 50 | Performed by: OTOLARYNGOLOGY

## 2023-06-28 PROCEDURE — 250N000011 HC RX IP 250 OP 636: Performed by: NURSE ANESTHETIST, CERTIFIED REGISTERED

## 2023-06-28 PROCEDURE — 250N000013 HC RX MED GY IP 250 OP 250 PS 637: Performed by: PHYSICIAN ASSISTANT

## 2023-06-28 PROCEDURE — 272N000001 HC OR GENERAL SUPPLY STERILE: Performed by: OTOLARYNGOLOGY

## 2023-06-28 PROCEDURE — 360N000077 HC SURGERY LEVEL 4, PER MIN: Performed by: OTOLARYNGOLOGY

## 2023-06-28 PROCEDURE — 250N000025 HC SEVOFLURANE, PER MIN: Performed by: OTOLARYNGOLOGY

## 2023-06-28 PROCEDURE — 69620 MYRINGOPLASTY: CPT | Performed by: NURSE ANESTHETIST, CERTIFIED REGISTERED

## 2023-06-28 PROCEDURE — 258N000003 HC RX IP 258 OP 636: Performed by: NURSE ANESTHETIST, CERTIFIED REGISTERED

## 2023-06-28 PROCEDURE — 370N000017 HC ANESTHESIA TECHNICAL FEE, PER MIN: Performed by: OTOLARYNGOLOGY

## 2023-06-28 PROCEDURE — C9046 COCAINE HCL NASAL SOLUTION: HCPCS | Performed by: OTOLARYNGOLOGY

## 2023-06-28 PROCEDURE — 250N000013 HC RX MED GY IP 250 OP 250 PS 637: Performed by: NURSE ANESTHETIST, CERTIFIED REGISTERED

## 2023-06-28 PROCEDURE — 250N000011 HC RX IP 250 OP 636: Performed by: OTOLARYNGOLOGY

## 2023-06-28 PROCEDURE — 69620 MYRINGOPLASTY: CPT | Mod: LT | Performed by: OTOLARYNGOLOGY

## 2023-06-28 PROCEDURE — 250N000009 HC RX 250: Performed by: NURSE ANESTHETIST, CERTIFIED REGISTERED

## 2023-06-28 PROCEDURE — 30930 THER FX NASAL INF TURBINATE: CPT | Performed by: OTOLARYNGOLOGY

## 2023-06-28 PROCEDURE — 250N000011 HC RX IP 250 OP 636: Mod: JZ | Performed by: NURSE ANESTHETIST, CERTIFIED REGISTERED

## 2023-06-28 PROCEDURE — 250N000009 HC RX 250: Performed by: OTOLARYNGOLOGY

## 2023-06-28 PROCEDURE — 710N000010 HC RECOVERY PHASE 1, LEVEL 2, PER MIN: Performed by: OTOLARYNGOLOGY

## 2023-06-28 PROCEDURE — 999N000141 HC STATISTIC PRE-PROCEDURE NURSING ASSESSMENT: Performed by: OTOLARYNGOLOGY

## 2023-06-28 PROCEDURE — 710N000012 HC RECOVERY PHASE 2, PER MINUTE: Performed by: OTOLARYNGOLOGY

## 2023-06-28 RX ORDER — OXYMETAZOLINE HYDROCHLORIDE 0.05 G/100ML
3 SPRAY NASAL
Status: COMPLETED | OUTPATIENT
Start: 2023-06-28 | End: 2023-06-28

## 2023-06-28 RX ORDER — BACITRACIN ZINC 500 [USP'U]/G
OINTMENT TOPICAL PRN
Status: DISCONTINUED | OUTPATIENT
Start: 2023-06-28 | End: 2023-06-28 | Stop reason: HOSPADM

## 2023-06-28 RX ORDER — NALOXONE HYDROCHLORIDE 0.4 MG/ML
0.4 INJECTION, SOLUTION INTRAMUSCULAR; INTRAVENOUS; SUBCUTANEOUS
Status: DISCONTINUED | OUTPATIENT
Start: 2023-06-28 | End: 2023-06-28 | Stop reason: HOSPADM

## 2023-06-28 RX ORDER — HYDRALAZINE HYDROCHLORIDE 20 MG/ML
2.5-5 INJECTION INTRAMUSCULAR; INTRAVENOUS EVERY 10 MIN PRN
Status: DISCONTINUED | OUTPATIENT
Start: 2023-06-28 | End: 2023-06-28 | Stop reason: HOSPADM

## 2023-06-28 RX ORDER — ONDANSETRON 4 MG/1
4 TABLET, ORALLY DISINTEGRATING ORAL EVERY 30 MIN PRN
Status: DISCONTINUED | OUTPATIENT
Start: 2023-06-28 | End: 2023-06-28 | Stop reason: HOSPADM

## 2023-06-28 RX ORDER — ECHINACEA PURPUREA EXTRACT 125 MG
TABLET ORAL
Qty: 480 ML | Status: SHIPPED | OUTPATIENT
Start: 2023-07-05 | End: 2023-10-27

## 2023-06-28 RX ORDER — ONDANSETRON 4 MG/1
4-8 TABLET, ORALLY DISINTEGRATING ORAL EVERY 8 HOURS PRN
Qty: 10 TABLET | Refills: 0 | Status: SHIPPED | OUTPATIENT
Start: 2023-06-28 | End: 2023-06-30

## 2023-06-28 RX ORDER — BACITRACIN ZINC 500 [USP'U]/G
OINTMENT TOPICAL
Status: DISCONTINUED
Start: 2023-06-28 | End: 2023-06-28 | Stop reason: HOSPADM

## 2023-06-28 RX ORDER — LIDOCAINE HYDROCHLORIDE AND EPINEPHRINE 10; 10 MG/ML; UG/ML
INJECTION, SOLUTION INFILTRATION; PERINEURAL PRN
Status: DISCONTINUED | OUTPATIENT
Start: 2023-06-28 | End: 2023-06-28 | Stop reason: HOSPADM

## 2023-06-28 RX ORDER — NALOXONE HYDROCHLORIDE 0.4 MG/ML
0.2 INJECTION, SOLUTION INTRAMUSCULAR; INTRAVENOUS; SUBCUTANEOUS
Status: DISCONTINUED | OUTPATIENT
Start: 2023-06-28 | End: 2023-06-28 | Stop reason: HOSPADM

## 2023-06-28 RX ORDER — DEXMEDETOMIDINE HYDROCHLORIDE 4 UG/ML
INJECTION, SOLUTION INTRAVENOUS PRN
Status: DISCONTINUED | OUTPATIENT
Start: 2023-06-28 | End: 2023-06-28

## 2023-06-28 RX ORDER — ONDANSETRON 2 MG/ML
INJECTION INTRAMUSCULAR; INTRAVENOUS PRN
Status: DISCONTINUED | OUTPATIENT
Start: 2023-06-28 | End: 2023-06-28

## 2023-06-28 RX ORDER — EPINEPHRINE 1 MG/ML
INJECTION, SOLUTION INTRAMUSCULAR; SUBCUTANEOUS
Status: DISCONTINUED
Start: 2023-06-28 | End: 2023-06-28 | Stop reason: HOSPADM

## 2023-06-28 RX ORDER — COCAINE HYDROCHLORIDE 40 MG/ML
SOLUTION NASAL
Status: DISCONTINUED
Start: 2023-06-28 | End: 2023-06-28 | Stop reason: HOSPADM

## 2023-06-28 RX ORDER — LIDOCAINE HYDROCHLORIDE AND EPINEPHRINE 10; 10 MG/ML; UG/ML
INJECTION, SOLUTION INFILTRATION; PERINEURAL
Status: DISCONTINUED
Start: 2023-06-28 | End: 2023-06-28 | Stop reason: HOSPADM

## 2023-06-28 RX ORDER — FENTANYL CITRATE 50 UG/ML
50 INJECTION, SOLUTION INTRAMUSCULAR; INTRAVENOUS EVERY 5 MIN PRN
Status: DISCONTINUED | OUTPATIENT
Start: 2023-06-28 | End: 2023-06-28 | Stop reason: HOSPADM

## 2023-06-28 RX ORDER — SODIUM CHLORIDE, SODIUM LACTATE, POTASSIUM CHLORIDE, CALCIUM CHLORIDE 600; 310; 30; 20 MG/100ML; MG/100ML; MG/100ML; MG/100ML
INJECTION, SOLUTION INTRAVENOUS CONTINUOUS
Status: DISCONTINUED | OUTPATIENT
Start: 2023-06-28 | End: 2023-06-28 | Stop reason: HOSPADM

## 2023-06-28 RX ORDER — DEXAMETHASONE SODIUM PHOSPHATE 4 MG/ML
INJECTION, SOLUTION INTRA-ARTICULAR; INTRALESIONAL; INTRAMUSCULAR; INTRAVENOUS; SOFT TISSUE PRN
Status: DISCONTINUED | OUTPATIENT
Start: 2023-06-28 | End: 2023-06-28

## 2023-06-28 RX ORDER — LIDOCAINE 40 MG/G
CREAM TOPICAL
Status: DISCONTINUED | OUTPATIENT
Start: 2023-06-28 | End: 2023-06-28 | Stop reason: HOSPADM

## 2023-06-28 RX ORDER — COCAINE HYDROCHLORIDE 40 MG/ML
SOLUTION NASAL PRN
Status: DISCONTINUED | OUTPATIENT
Start: 2023-06-28 | End: 2023-06-28 | Stop reason: HOSPADM

## 2023-06-28 RX ORDER — KETAMINE HYDROCHLORIDE 10 MG/ML
INJECTION INTRAMUSCULAR; INTRAVENOUS PRN
Status: DISCONTINUED | OUTPATIENT
Start: 2023-06-28 | End: 2023-06-28

## 2023-06-28 RX ORDER — LABETALOL 20 MG/4 ML (5 MG/ML) INTRAVENOUS SYRINGE
10
Status: DISCONTINUED | OUTPATIENT
Start: 2023-06-28 | End: 2023-06-28 | Stop reason: HOSPADM

## 2023-06-28 RX ORDER — CEFAZOLIN SODIUM 1 G/3ML
INJECTION, POWDER, FOR SOLUTION INTRAMUSCULAR; INTRAVENOUS
Status: DISCONTINUED
Start: 2023-06-28 | End: 2023-06-28 | Stop reason: WASHOUT

## 2023-06-28 RX ORDER — ACETAMINOPHEN 325 MG/1
975 TABLET ORAL ONCE
Status: COMPLETED | OUTPATIENT
Start: 2023-06-28 | End: 2023-06-28

## 2023-06-28 RX ORDER — ONDANSETRON 2 MG/ML
4 INJECTION INTRAMUSCULAR; INTRAVENOUS EVERY 30 MIN PRN
Status: DISCONTINUED | OUTPATIENT
Start: 2023-06-28 | End: 2023-06-28 | Stop reason: HOSPADM

## 2023-06-28 RX ORDER — PROPOFOL 10 MG/ML
INJECTION, EMULSION INTRAVENOUS PRN
Status: DISCONTINUED | OUTPATIENT
Start: 2023-06-28 | End: 2023-06-28

## 2023-06-28 RX ORDER — LIDOCAINE HYDROCHLORIDE 20 MG/ML
INJECTION, SOLUTION INFILTRATION; PERINEURAL PRN
Status: DISCONTINUED | OUTPATIENT
Start: 2023-06-28 | End: 2023-06-28

## 2023-06-28 RX ORDER — OXYCODONE HYDROCHLORIDE 5 MG/1
5 TABLET ORAL ONCE
Status: COMPLETED | OUTPATIENT
Start: 2023-06-28 | End: 2023-06-28

## 2023-06-28 RX ORDER — HYDROMORPHONE HYDROCHLORIDE 1 MG/ML
0.5 INJECTION, SOLUTION INTRAMUSCULAR; INTRAVENOUS; SUBCUTANEOUS EVERY 5 MIN PRN
Status: DISCONTINUED | OUTPATIENT
Start: 2023-06-28 | End: 2023-06-28 | Stop reason: HOSPADM

## 2023-06-28 RX ORDER — OXYCODONE HYDROCHLORIDE 5 MG/1
5 TABLET ORAL EVERY 6 HOURS PRN
Qty: 3 TABLET | Refills: 0 | Status: SHIPPED | OUTPATIENT
Start: 2023-06-28 | End: 2023-06-30

## 2023-06-28 RX ADMIN — FENTANYL CITRATE 50 MCG: 0.05 INJECTION, SOLUTION INTRAMUSCULAR; INTRAVENOUS at 12:58

## 2023-06-28 RX ADMIN — ONDANSETRON 4 MG: 2 INJECTION INTRAMUSCULAR; INTRAVENOUS at 12:22

## 2023-06-28 RX ADMIN — OXYMETAZOLINE HCL 3 SPRAY: 0.05 SPRAY NASAL at 08:25

## 2023-06-28 RX ADMIN — SODIUM CHLORIDE, POTASSIUM CHLORIDE, SODIUM LACTATE AND CALCIUM CHLORIDE: 600; 310; 30; 20 INJECTION, SOLUTION INTRAVENOUS at 08:26

## 2023-06-28 RX ADMIN — ONDANSETRON 4 MG: 2 INJECTION INTRAMUSCULAR; INTRAVENOUS at 10:12

## 2023-06-28 RX ADMIN — Medication 100 MG: at 10:16

## 2023-06-28 RX ADMIN — OXYCODONE HYDROCHLORIDE 5 MG: 5 TABLET ORAL at 13:57

## 2023-06-28 RX ADMIN — DEXMEDETOMIDINE 20 MCG: 100 INJECTION, SOLUTION, CONCENTRATE INTRAVENOUS at 10:12

## 2023-06-28 RX ADMIN — AMISULPRIDE 10 MG: 2.5 INJECTION, SOLUTION INTRAVENOUS at 12:33

## 2023-06-28 RX ADMIN — PROPOFOL 30 MG: 10 INJECTION, EMULSION INTRAVENOUS at 11:45

## 2023-06-28 RX ADMIN — LIDOCAINE HYDROCHLORIDE 40 MG: 20 INJECTION, SOLUTION INFILTRATION; PERINEURAL at 10:16

## 2023-06-28 RX ADMIN — MIDAZOLAM 2 MG: 1 INJECTION INTRAMUSCULAR; INTRAVENOUS at 10:12

## 2023-06-28 RX ADMIN — PROPOFOL 200 MG: 10 INJECTION, EMULSION INTRAVENOUS at 10:16

## 2023-06-28 RX ADMIN — DEXAMETHASONE SODIUM PHOSPHATE 10 MG: 4 INJECTION, SOLUTION INTRA-ARTICULAR; INTRALESIONAL; INTRAMUSCULAR; INTRAVENOUS; SOFT TISSUE at 10:16

## 2023-06-28 RX ADMIN — FENTANYL CITRATE 50 MCG: 0.05 INJECTION, SOLUTION INTRAMUSCULAR; INTRAVENOUS at 12:41

## 2023-06-28 RX ADMIN — SODIUM CHLORIDE, POTASSIUM CHLORIDE, SODIUM LACTATE AND CALCIUM CHLORIDE: 600; 310; 30; 20 INJECTION, SOLUTION INTRAVENOUS at 11:44

## 2023-06-28 RX ADMIN — DEXMEDETOMIDINE 5 MCG: 100 INJECTION, SOLUTION, CONCENTRATE INTRAVENOUS at 11:06

## 2023-06-28 RX ADMIN — Medication 50 MG: at 10:16

## 2023-06-28 RX ADMIN — OXYMETAZOLINE HCL 3 SPRAY: 0.05 SPRAY NASAL at 08:37

## 2023-06-28 RX ADMIN — DEXMEDETOMIDINE 10 MCG: 100 INJECTION, SOLUTION, CONCENTRATE INTRAVENOUS at 10:16

## 2023-06-28 RX ADMIN — ACETAMINOPHEN 975 MG: 325 TABLET, FILM COATED ORAL at 14:35

## 2023-06-28 ASSESSMENT — ACTIVITIES OF DAILY LIVING (ADL)
ADLS_ACUITY_SCORE: 35

## 2023-06-28 ASSESSMENT — LIFESTYLE VARIABLES: TOBACCO_USE: 1

## 2023-06-28 NOTE — ANESTHESIA CARE TRANSFER NOTE
Patient: April M Candace    Procedure: Procedure(s):  Left Fat Myringoplasty  Bilateral Eustachian Tube Dilation, Out-fracture Bilateral Inferior Turbinate, Left Septal Displacement       Diagnosis: Conductive hearing loss [H90.2]  Perforation of tympanic membrane, left [H72.92]  Tympanic membrane retraction, bilateral [H73.893]  Diagnosis Additional Information: No value filed.    Anesthesia Type:   General     Note:    Oropharynx: spontaneously breathing  Level of Consciousness: drowsy  Oxygen Supplementation: nasal cannula  Level of Supplemental Oxygen (L/min / FiO2): 2  Independent Airway: airway patency satisfactory and stable  Dentition: dentition unchanged  Vital Signs Stable: post-procedure vital signs reviewed and stable  Report to RN Given: handoff report given  Patient transferred to: PACU    Handoff Report: Identifed the Patient, Identified the Reponsible Provider, Reviewed the pertinent medical history, Discussed the surgical course, Reviewed Intra-OP anesthesia mangement and issues during anesthesia, Set expectations for post-procedure period and Allowed opportunity for questions and acknowledgement of understanding      Vitals:  Vitals Value Taken Time   BP     Temp     Pulse     Resp 24 06/28/23 1202   SpO2 98 % 06/28/23 1202   Vitals shown include unvalidated device data.    Electronically Signed By: SPENSER Chew CRNA  June 28, 2023  12:03 PM

## 2023-06-28 NOTE — INTERVAL H&P NOTE
"I have reviewed the surgical (or preoperative) H&P that is linked to this encounter, and examined the patient. There are no significant changes    Clinical Conditions Present on Arrival:  Clinically Significant Risk Factors Present on Admission                  # Obesity: Estimated body mass index is 35.12 kg/m  as calculated from the following:    Height as of this encounter: 1.575 m (5' 2\").    Weight as of this encounter: 87.1 kg (192 lb).       "

## 2023-06-28 NOTE — DISCHARGE INSTRUCTIONS
Post-Anesthesia Patient Instructions    IMMEDIATELY FOLLOWING SURGERY:  Do not drive or operate machinery for the first twenty four hours after surgery.  Do not make any important decisions for twenty four hours after surgery or while taking narcotic pain medications or sedatives.  If you develop intractable nausea and vomiting or a severe headache please notify your doctor immediately.    FOLLOW-UP:  Please make an appointment with your surgeon as instructed. You do not need to follow up with anesthesia unless specifically instructed to do so.    WOUND CARE INSTRUCTIONS (if applicable):  Keep a dry clean dressing on the anesthesia/puncture wound site if there is drainage.  Once the wound has quit draining you may leave it open to air.  Generally you should leave the bandage intact for twenty four hours unless there is drainage.  If the epidural site drains for more than 36-48 hours please call the anesthesia department.    QUESTIONS?:  Please feel free to call your physician or the hospital  if you have any questions, and they will be happy to assist you.       Instructions for Ear Surgery    Recovery instructions/medications--  Take daily antihistamine for 3 months (loratadine, cetirizine, fexofenadine or similar). Children and adults can return to all preoperative medications after this procedure, including blood thinners.     Clean gently behind the ear lobe over the incision with a gentle skin cleanser (baby shampoo or cetaphil) and then apply bacitracin or bactroban ointment.  This should be done 3 times a day for first week, then as needed.  Sutures are dissolvable.      Complications - A low grade fever (up to 100 degrees ) is not unusual in the day after surgery.  Treat this with cool wash cloths to the forehead and Tylenol.  If the fever is higher, or does not respond to medication, call the Doctor s office or call service after hours.  A small amount of bloody drainage can occur for the first  week after surgery and is perfectly normal.    Water Precautions - Keep the left ear absolutely dry aside from drops until otherwise notified, usually 2 months.    Do not put any ear drops into the left ear    Follow up - Approximately 1-1.5  months after surgery I like to examine the ears to make sure there are no signs of complications, which are extremely rare.   Follow up with  Dr. Castaneda in 1-1.5 month.   You may have slightly different follow up times depending on our mutual schedules.   No audiogram until I know the graft has healed.    Use nasal saline as indicated starting in 1 week.      PER DR CASTANEDA, MAY USE SALINE TONIGHT IF NEEDED. DON'T OVERUSE. IF COUGHING STOP USING.       If there are any questions or issues with the above, or if there are other issues that concern you, always feel free to call the clinic and I am happy to speak with you as soon as I can.    Yamel Castaneda D.O.    Otolaryngology/Head and Neck Surgery/ Allergy      [phone]

## 2023-06-28 NOTE — OR NURSING
Patient c/o nausea, received zofran IV with no relief, Tasha Leal notified, order given for barhemsys.  Barhemsys 10mg given IV, patient states is no longer nauseated.

## 2023-06-28 NOTE — OR NURSING
Patient and responsible adult given discharge instructions with no questions regarding instructions. Meghana score 19/20. Pain level 5/10.  Discharged from unit via wheelchair. Patient discharged to home with mom.

## 2023-06-28 NOTE — OR NURSING
Pt continues with a 6/10 pain to her nose. Ice pack given for forehead placement.  CRNA updated and new order received for tylenol.

## 2023-06-28 NOTE — ANESTHESIA POSTPROCEDURE EVALUATION
Patient: April YURIY Maria    Procedure: Procedure(s):  Left Fat Myringoplasty  Bilateral Eustachian Tube Dilation, Out-fracture Bilateral Inferior Turbinate, Left Septal Displacement       Anesthesia Type:  General    Note:  Disposition: Outpatient   Postop Pain Control: Uneventful            Sign Out: Well controlled pain   PONV: No   Neuro/Psych: Uneventful            Sign Out: Acceptable/Baseline neuro status   Airway/Respiratory: Uneventful            Sign Out: Acceptable/Baseline resp. status   CV/Hemodynamics: Uneventful            Sign Out: Acceptable CV status; No obvious hypovolemia; No obvious fluid overload   Other NRE: NONE   DID A NON-ROUTINE EVENT OCCUR? No           Last vitals:  Vitals Value Taken Time   /89 06/28/23 1317   Temp 97.4  F (36.3  C) 06/28/23 1317   Pulse 85 06/28/23 1318   Resp 9 06/28/23 1319   SpO2 98 % 06/28/23 1319   Vitals shown include unvalidated device data.    Electronically Signed By: SPENSER KING CRNA  June 28, 2023  3:56 PM

## 2023-06-28 NOTE — ANESTHESIA PROCEDURE NOTES
Airway       Patient location during procedure: OR       Procedure Start/Stop Times: 6/28/2023 10:19 AM and 6/28/2023 10:19 AM  Staff -        CRNA: Leoncio Lai APRN CRNA       Performed By: CRNA  Consent for Airway        Urgency: elective  Indications and Patient Condition       Indications for airway management: hipolito-procedural       Induction type:intravenous       Mask difficulty assessment: 1 - vent by mask    Final Airway Details       Final airway type: endotracheal airway       Successful airway: ETT - single and Oral  Endotracheal Airway Details        ETT size (mm): 6.5       Cuffed: yes       Successful intubation technique: direct laryngoscopy       DL Blade Type: MAC 3       Grade View of Cords: 1       Adjucts: stylet       Position: Right       Measured from: lips       Secured at (cm): 21       Bite block used: None    Post intubation assessment        Placement verified by: capnometry, equal breath sounds and chest rise        Number of attempts at approach: 1       Secured with: plastic tape       Ease of procedure: easy       Dentition: Intact and Unchanged    Medication(s) Administered   Medication Administration Time: 6/28/2023 10:19 AM

## 2023-06-28 NOTE — OP NOTE
Otolaryngology Operative Note     Pre-op Diagnosis: 1.  Bilateral eustachian tube dysfunction   2.  Left tympanic membrane perforation  Post-op Diagnosis:  same and inferior turbinate hypertrophy  Procedures:    1.  Left fat myringoplasty with ear endoscopy  2.  Bilateral endoscopic dilation of the eustachian tubes  3.  Bilateral outfracture inferior turbinates  4.  Blunt displacement septum  Surgeon:  Yamel Lucas D.O.  Anesthesia:  General endotracheal  EBL:  5 ml    Findings: edematous eustachian tubes bilaterally, scant purulence left lateral nasal wall, 5% anterior-inferior left tympanic membrane perforation with pars tensa retraction and mild attic retraction, no cholesteatoma  Complications:  none  Condition:  stable       After surgical consent was obtained, the patient was brought back to the operating room and laid in a comfortable and supine position and administered a general anesthetic by a member of anesthesia. A time-out was taken.  Cocaine pledgets were placed bilaterally into the nares for several minutes then removed with bayonet forceps.     A zero degree endoscope was placed along the floor of the left nares and advanced into the nasopharynx.    There is no nasopharynx mass nor occluding adenoid tissue.  The left eustachian tube was visualized.  The eustachian tube mucosa is edematous and the lumen is narrow.  In order to access the eustachian tubes and place the balloon properly I did need to outfracture the left inferior turbinate with a [place] and medialized the septum with a septal displacer.  Prior to this I anesthetized the septum and inferior turbinates bilaterally with 1% lidocaine with 1-100,000 of epinephrine.  Next I used a 0 degree endoscope and entered the left nares.  I advanced the [employer] eustachian tube balloon was smoothly into the left eustachian tube and inflated. The balloon was held inflated for 2 minutes.   The balloon was fully deflated and removed.  The  eustachian tube is dilated and there is no bleeding.  The balloon was removed.    Access to the right eustachian tube was similarly narrowed due to slight septal deviation and inferior turbinate perjury fee.  Right inferior turbinate outfracture with blunt septal displacement was performed in a similar fashion.  The right endoscopic dilation eustachian tube was performed with similar findings and results.  The balloon was removed.  Hemostasis was achieved with scant use of silver nitrate at the inferior turbinates and septum and is adequate.  The nares are irrigated and gently suctioned and there is no bleeding.    Next I turned my attention to the left ear.  The skin was prepped with alcohol and electrodes were placed for continuous monitoring of the facial nerve for planned cartilage tympanoplasty.   I anesthetized the left ear canal with 1% lidocaine with 1-100,000 of epinephrine.  She was prepped and draped in normal sterile fashion and a timeout was taken.  I used a 0 degree endoscope to evaluate the ear.  There is moderate to severe pars tensa retraction, moderate attic retraction.  There is a 5% dry anterior inferior marginal perforation with some adhesions to the annulus.  No otorrhea.  No cholesteatoma.  I switched to the operating microscope and noted similar anatomy but I had better visualization of the perforation with the scope .  Due to improved retraction post eustachian tube dilation, and small perforation size, I proceeded with a fat myringoplasty.   I switched back to the endoscope and used a sharp pick to freshen the edges of the perforation.  I used cups to removed the epithelial edges.  Next I harvested lobule fat from the left posterior lobule in a standard fashion, preserving the piercing site.  The lobule was demarcated and anesthestized with 1% lidocaine with 1:100,000 epinephrine.  I used a 15 blade to incise the skin.  I then placed perez retractors and harvested lobule fat with a curved  iris and adson.  The wound was irrigated and closed with 5-0 interrupted fast gut.  The anterior lobule and piercing were preserved.      The fat was then dumbelled into the perforation with a pick and alligator with complete coverage of the perforation.  There is no bleeding.  The electrodes were removed.    The patient was handed back over to anesthesia, awakened and brought to the recovery room in stable condition.

## 2023-07-19 ENCOUNTER — OFFICE VISIT (OUTPATIENT)
Dept: OTOLARYNGOLOGY | Facility: OTHER | Age: 28
End: 2023-07-19
Attending: NURSE PRACTITIONER
Payer: COMMERCIAL

## 2023-07-19 VITALS
DIASTOLIC BLOOD PRESSURE: 68 MMHG | BODY MASS INDEX: 35.33 KG/M2 | SYSTOLIC BLOOD PRESSURE: 118 MMHG | HEART RATE: 84 BPM | OXYGEN SATURATION: 98 % | WEIGHT: 192 LBS | HEIGHT: 62 IN | TEMPERATURE: 98.5 F

## 2023-07-19 DIAGNOSIS — Z98.890 H/O MYRINGOPLASTY: Primary | ICD-10-CM

## 2023-07-19 PROCEDURE — G0463 HOSPITAL OUTPT CLINIC VISIT: HCPCS | Mod: 25 | Performed by: NURSE PRACTITIONER

## 2023-07-19 PROCEDURE — 99024 POSTOP FOLLOW-UP VISIT: CPT | Performed by: NURSE PRACTITIONER

## 2023-07-19 PROCEDURE — 92504 EAR MICROSCOPY EXAMINATION: CPT | Performed by: NURSE PRACTITIONER

## 2023-07-19 ASSESSMENT — PAIN SCALES - GENERAL: PAINLEVEL: NO PAIN (1)

## 2023-07-19 NOTE — PROGRESS NOTES
Otolaryngology Note         Chief Complaint:     Patient presents with:  Ear Problem  Follow Up: Ear Check  per Dr. Lucas            History of Present Illness:     May Maria is a 27 year old female seen today for left ear recheck    She is s/p fat myringoplasty completed by Dr Lucas on 6/28/23.  She had initial improvement in hearing on the left, but has noted it has recently decreased to approximately baseline.      No otorrhea.  She has had a dull ache and pressure in her left ear.   No bothersome tinnitus, flux hearing.  NO vertigo.     She also reports the saline spray causes burning sensation.      Otolaryngology Operative Note      Pre-op Diagnosis: 1.  Bilateral eustachian tube dysfunction   2.  Left tympanic membrane perforation  Post-op Diagnosis:  same and inferior turbinate hypertrophy  Procedures:    1.  Left fat myringoplasty with ear endoscopy  2.  Bilateral endoscopic dilation of the eustachian tubes  3.  Bilateral outfracture inferior turbinates  4.  Blunt displacement septum  Surgeon:  Yamel Lucas D.O.  Anesthesia:  General endotracheal  EBL:  5 ml    Findings: edematous eustachian tubes bilaterally, scant purulence left lateral nasal wall, 5% anterior-inferior left tympanic membrane perforation with pars tensa retraction and mild attic retraction, no cholesteatoma  Complications:  none  Condition:  stable           Medications:     Current Outpatient Rx   Medication Sig Dispense Refill    acetaminophen (TYLENOL) 325 MG tablet Take 325-650 mg by mouth every 6 hours as needed for mild pain      albuterol (PROAIR HFA/PROVENTIL HFA/VENTOLIN HFA) 108 (90 Base) MCG/ACT inhaler Inhale 2 puffs into the lungs every 4 hours as needed for shortness of breath, wheezing or cough 18 g 1    cetirizine (ZYRTEC) 10 MG tablet Take 10 mg every evening 60 tablet prn    CONTOUR NEXT TEST test strip       Microlet Lancets MISC       pantoprazole (PROTONIX) 40 MG EC tablet Take 40 mg by  mouth      sodium chloride (OCEAN) 0.65 % nasal spray 2 sprays to each nostril 2 times a day for 1 week.  Start 1 week post op 480 mL prn    sucralfate (CARAFATE) 1 GM tablet TAKE 1 TABLET BY MOUTH THREE TIMES DAILY 1 HOUR BEFORE MEALS AND 1 TO 2 TABLETS AT BEDTIME. TAKE AS NEEDED FOR ABDOMINAL PAIN              Allergies:     Allergies: Pineapple          Past Medical History:     Past Medical History:   Diagnosis Date    Atrophic kidney     Depressive disorder     Therapy during childhood    Diet controlled gestational diabetes mellitus (GDM) in third trimester 2020    Late-onset    Glucose intolerance 05/15/2020    Glucose intolerance of pregnancy 2020    Hypertension 24    Shift work sleep disorder             Past Surgical History:     Past Surgical History:   Procedure Laterality Date     SECTION N/A 2020    Procedure:  SECTION;  Surgeon: Magdi Cannon MD;  Location: [facility]    COLONOSCOPY      DILATE EUSTACHIAN TUBE N/A 2023    Procedure: Bilateral Eustachian Tube Dilation, Out-fracture Bilateral Inferior Turbinate, Left Septal Displacement;  Surgeon: Yamel Lucas MD;  Location: [facility]    ENDOSCOPY UPPER, COLONOSCOPY, COMBINED N/A 2021    Procedure: colonoscopy with biopsy and polypectomy, upper endoscopy with biopsy;  Surgeon: Joe Nayak MD;  Location: [facility] OR    PE TUBES Bilateral     years     TYMPANOPLASTY N/A 2023    Procedure: Left Fat Myringoplasty;  Surgeon: Yamel Lucas MD;  Location: [facility]       ENT family history reviewed         Social History:     Social History     Tobacco Use    Smoking status: Former     Packs/day: 0.50     Years: 5.00     Pack years: 2.50     Types: Cigarettes     Start date: 2014     Quit date: 3/1/2018     Years since quittin.4    Smokeless tobacco: Never   Vaping Use    Vaping Use: Never used   Substance Use Topics    Alcohol use: No    Drug use: No            Review of Systems:     ROS:  "See HPI         Physical Exam:     /68 (BP Location: Right arm, Patient Position: Sitting, Cuff Size: Adult Regular)   Pulse 84   Temp 98.5  F (36.9  C) (Tympanic)   Ht 1.575 m (5' 2\")   Wt 87.1 kg (192 lb)   SpO2 98%   BMI 35.12 kg/m      General - The patient is well nourished and well developed, and appears to have good nutritional status.  Alert and oriented to person and place, answers questions and cooperates with examination appropriately.   Head and Face - Normocephalic and atraumatic, with no gross asymmetry noted.  The facial nerve is intact, with strong symmetric movements.  Voice and Breathing - The patient was breathing comfortably without the use of accessory muscles. There was no wheezing, stridor. The patients voice was clear and strong, and had appropriate pitch and quality.  Ears - External ear normal. The left ear was examined under binocular, there appears to at least partial coverage of the perforation with the fat myringoplasty.  It is still raised and I am unable to see portion of the perforation posterior to the myringoplasty.  No otorrhea, no effusion or retraction.           Assessment and Plan:       ICD-10-CM    1. H/O myringoplasty  Z98.890         Reassured everything appears healthy, continue to wait for complete healing and keep scheduled follow up with Dr Lucas on 8/10    Continue post operative plan  Continue to keep left ear dry    Pauline Kearney NP-C  [facility]    "

## 2023-07-19 NOTE — PATIENT INSTRUCTIONS
Thank you for allowing Pauline Kearney and our ENT team to participate in your care.  If your medications are too expensive, please give the nurse a call.  We can possibly change this medication.  If you have a scheduling or an appointment question please contact our Health Unit Coordinator at their direct line [phone].   ALL nursing questions or concerns can be directed to your ENT nurse at: [phone] - Czv     Stop nasal saline spray     Start Ayr gel     Keep ear dry    Keep appointment with Dr. Lucas

## 2023-07-19 NOTE — LETTER
7/19/2023         RE: May Maria  [address] [address]        Dear Colleague,    Thank you for referring your patient, May Maria, to the [facility]. Please see a copy of my visit note below.    Otolaryngology Note         Chief Complaint:     Patient presents with:  Ear Problem  Follow Up: Ear Check  per Dr. Lucas            History of Present Illness:     May Maria is a 27 year old female seen today for left ear recheck    She is s/p fat myringoplasty completed by Dr Lucas on 6/28/23.  She had initial improvement in hearing on the left, but has noted it has recently decreased to approximately baseline.      No otorrhea.  She has had a dull ache and pressure in her left ear.   No bothersome tinnitus, flux hearing.  NO vertigo.     She also reports the saline spray causes burning sensation.      Otolaryngology Operative Note      Pre-op Diagnosis: 1.  Bilateral eustachian tube dysfunction   2.  Left tympanic membrane perforation  Post-op Diagnosis:  same and inferior turbinate hypertrophy  Procedures:    1.  Left fat myringoplasty with ear endoscopy  2.  Bilateral endoscopic dilation of the eustachian tubes  3.  Bilateral outfracture inferior turbinates  4.  Blunt displacement septum  Surgeon:  Yamel Lucas D.O.  Anesthesia:  General endotracheal  EBL:  5 ml    Findings: edematous eustachian tubes bilaterally, scant purulence left lateral nasal wall, 5% anterior-inferior left tympanic membrane perforation with pars tensa retraction and mild attic retraction, no cholesteatoma  Complications:  none  Condition:  stable           Medications:     Current Outpatient Rx   Medication Sig Dispense Refill     acetaminophen (TYLENOL) 325 MG tablet Take 325-650 mg by mouth every 6 hours as needed for mild pain       albuterol (PROAIR HFA/PROVENTIL HFA/VENTOLIN HFA) 108 (90 Base) MCG/ACT inhaler Inhale 2 puffs into the lungs every 4 hours as needed  for shortness of breath, wheezing or cough 18 g 1     cetirizine (ZYRTEC) 10 MG tablet Take 10 mg every evening 60 tablet prn     CONTOUR NEXT TEST test strip        Microlet Lancets MISC        pantoprazole (PROTONIX) 40 MG EC tablet Take 40 mg by mouth       sodium chloride (OCEAN) 0.65 % nasal spray 2 sprays to each nostril 2 times a day for 1 week.  Start 1 week post op 480 mL prn     sucralfate (CARAFATE) 1 GM tablet TAKE 1 TABLET BY MOUTH THREE TIMES DAILY 1 HOUR BEFORE MEALS AND 1 TO 2 TABLETS AT BEDTIME. TAKE AS NEEDED FOR ABDOMINAL PAIN              Allergies:     Allergies: Pineapple          Past Medical History:     Past Medical History:   Diagnosis Date     Atrophic kidney      Depressive disorder     Therapy during childhood     Diet controlled gestational diabetes mellitus (GDM) in third trimester 2020    Late-onset     Glucose intolerance 05/15/2020     Glucose intolerance of pregnancy 2020     Hypertension 24     Shift work sleep disorder             Past Surgical History:     Past Surgical History:   Procedure Laterality Date      SECTION N/A 2020    Procedure:  SECTION;  Surgeon: Magdi Cannon MD;  Location: [facility]     COLONOSCOPY       DILATE EUSTACHIAN TUBE N/A 2023    Procedure: Bilateral Eustachian Tube Dilation, Out-fracture Bilateral Inferior Turbinate, Left Septal Displacement;  Surgeon: Yamel Lucas MD;  Location: [facility]     ENDOSCOPY UPPER, COLONOSCOPY, COMBINED N/A 2021    Procedure: colonoscopy with biopsy and polypectomy, upper endoscopy with biopsy;  Surgeon: Joe Nayak MD;  Location: [facility] OR     PE TUBES Bilateral     years      TYMPANOPLASTY N/A 2023    Procedure: Left Fat Myringoplasty;  Surgeon: Yamel Lucas MD;  Location: [facility]       ENT family history reviewed         Social History:     Social History     Tobacco Use     Smoking status: Former     Packs/day: 0.50     Years: 5.00     Pack years: 2.50     " Types: Cigarettes     Start date: 2014     Quit date: 3/1/2018     Years since quittin.4     Smokeless tobacco: Never   Vaping Use     Vaping Use: Never used   Substance Use Topics     Alcohol use: No     Drug use: No            Review of Systems:     ROS: See HPI         Physical Exam:     /68 (BP Location: Right arm, Patient Position: Sitting, Cuff Size: Adult Regular)   Pulse 84   Temp 98.5  F (36.9  C) (Tympanic)   Ht 1.575 m (5' 2\")   Wt 87.1 kg (192 lb)   SpO2 98%   BMI 35.12 kg/m      General - The patient is well nourished and well developed, and appears to have good nutritional status.  Alert and oriented to person and place, answers questions and cooperates with examination appropriately.   Head and Face - Normocephalic and atraumatic, with no gross asymmetry noted.  The facial nerve is intact, with strong symmetric movements.  Voice and Breathing - The patient was breathing comfortably without the use of accessory muscles. There was no wheezing, stridor. The patients voice was clear and strong, and had appropriate pitch and quality.  Ears - External ear normal. The left ear was examined under binocular, there appears to at least partial coverage of the perforation with the fat myringoplasty.  It is still raised and I am unable to see portion of the perforation posterior to the myringoplasty.  No otorrhea, no effusion or retraction.           Assessment and Plan:       ICD-10-CM    1. H/O myringoplasty  Z98.890         Reassured everything appears healthy, continue to wait for complete healing and keep scheduled follow up with Dr Lucas on 8/10    Continue post operative plan  Continue to keep left ear dry    Pauline BERGC  [facility]      Again, thank you for allowing me to participate in the care of your patient.        Sincerely,        Pauline Kearney NP  "

## 2023-07-26 ENCOUNTER — OFFICE VISIT (OUTPATIENT)
Dept: FAMILY MEDICINE | Facility: OTHER | Age: 28
End: 2023-07-26
Attending: FAMILY MEDICINE
Payer: COMMERCIAL

## 2023-07-26 ENCOUNTER — MYC MEDICAL ADVICE (OUTPATIENT)
Dept: FAMILY MEDICINE | Facility: OTHER | Age: 28
End: 2023-07-26

## 2023-07-26 VITALS
RESPIRATION RATE: 16 BRPM | SYSTOLIC BLOOD PRESSURE: 119 MMHG | TEMPERATURE: 97.6 F | HEART RATE: 83 BPM | DIASTOLIC BLOOD PRESSURE: 83 MMHG | HEIGHT: 62 IN | WEIGHT: 193 LBS | OXYGEN SATURATION: 98 % | BODY MASS INDEX: 35.51 KG/M2

## 2023-07-26 DIAGNOSIS — M54.9 CHRONIC NECK AND BACK PAIN: ICD-10-CM

## 2023-07-26 DIAGNOSIS — G44.229 CHRONIC TENSION-TYPE HEADACHE, NOT INTRACTABLE: ICD-10-CM

## 2023-07-26 DIAGNOSIS — N62 HYPERTROPHY OF BREAST: Primary | ICD-10-CM

## 2023-07-26 DIAGNOSIS — G89.29 CHRONIC NECK AND BACK PAIN: ICD-10-CM

## 2023-07-26 DIAGNOSIS — M54.2 CHRONIC NECK AND BACK PAIN: ICD-10-CM

## 2023-07-26 PROCEDURE — G0463 HOSPITAL OUTPT CLINIC VISIT: HCPCS | Performed by: FAMILY MEDICINE

## 2023-07-26 PROCEDURE — 99213 OFFICE O/P EST LOW 20 MIN: CPT | Performed by: FAMILY MEDICINE

## 2023-07-26 ASSESSMENT — PAIN SCALES - GENERAL: PAINLEVEL: NO PAIN (0)

## 2023-07-26 NOTE — PROGRESS NOTES
"  Assessment & Plan     Hypertrophy of breast / Chronic tension-type headache, not intractable / Chronic neck and back pain  Pt has completed breast feeding, worked with PT. No improvement in chronic neck and upper back tightness. Getting more tension type headaches lately. Would definitely benefit from breast reduction based on exam and PT assessment. Referral will be placed once pt calls with clinic/surgeon of choice.     25 minutes spent by me on the date of the encounter doing chart review, review of test results, interpretation of tests, patient visit, and documentation     No follow-ups on file.    Jia Kirk MD  [facility]   April is a 27 year old, presenting for the following health issues:  Consult        7/26/2023     9:12 AM   Additional Questions   Roomed by rachel clements   Accompanied by self     HPI     Concern - discuss next step in breast reduction  Onset: ongoing   Description: patient would like to discuss breast reduction steps  Progression of Symptoms:  worsening   Accompanying Signs & Symptoms: tension headache     Completed PT. They did not feel that she would sustain significant improvements until breast reduction occurs. Noting increase in posterior headaches, correlate with upper back tension.     Review of Systems   Constitutional, HEENT, cardiovascular, pulmonary, gi and gu systems are negative, except as otherwise noted.      Objective    /83 (BP Location: Left arm, Patient Position: Sitting, Cuff Size: Adult Large)   Pulse 83   Temp 97.6  F (36.4  C) (Tympanic)   Resp 16   Ht 1.575 m (5' 2\")   Wt 87.5 kg (193 lb)   LMP 07/13/2023 (Exact Date)   SpO2 98%   BMI 35.30 kg/m    Body mass index is 35.3 kg/m .  Physical Exam   GENERAL: healthy, alert and no distress  RESP: lungs clear to auscultation - no rales, rhonchi or wheezes  BREAST: Large pendulous breasts, chronic indentation at breast straps  CV: regular rates and rhythm and normal " S1 S2, no S3 or S4  PSYCH: mentation appears normal, affect normal/bright

## 2023-08-09 ENCOUNTER — TELEPHONE (OUTPATIENT)
Dept: OTOLARYNGOLOGY | Facility: OTHER | Age: 28
End: 2023-08-09

## 2023-08-10 ENCOUNTER — TELEPHONE (OUTPATIENT)
Dept: OTOLARYNGOLOGY | Facility: OTHER | Age: 28
End: 2023-08-10

## 2023-08-31 ENCOUNTER — MYC MEDICAL ADVICE (OUTPATIENT)
Dept: FAMILY MEDICINE | Facility: OTHER | Age: 28
End: 2023-08-31

## 2023-08-31 DIAGNOSIS — N62 HYPERTROPHY OF BREAST: Primary | ICD-10-CM

## 2023-09-14 NOTE — PROGRESS NOTES
"History of Present Illness -April is status post left fat myringoplasty, bilateral endoscopic dilation eustachian tubes, bilateral outfracture inferior turbinates implant displacement septum on 6/28/2023.    Intraoperative findings were a 5% anterior-inferior left tympanic membrane perforation with pars tensor retraction and mild attic retraction and no cholesteatoma       She was consented for tympanoplasty but the perforation had become much smaller at the time of surgery and I felt this was amenable to fat myringoplasty      They have had no issues since the surgery.  Pain was easily controlled, no vertigo, no headache, no fevers or chills.  No chronic otorrhea.      She notes her left ear seems plugged, right is irritated  April notes improved nasal breathing and resolution of right tinnitus    adhering to water precautions.       Physical Exam    /76 (BP Location: Right arm, Cuff Size: Adult Large)   Pulse 89   Temp 98.1  F (36.7  C) (Tympanic)   Ht 1.575 m (5' 2\")   Wt 86.6 kg (191 lb)   LMP 07/13/2023 (Exact Date)   SpO2 99%   BMI 34.93 kg/m        General - The patient is well nourished and well developed, and appears to have good nutritional status.  Alert and oriented to person and place, answers questions and cooperates with examination appropriately.   Head and Face - Normocephalic and atraumatic, with no gross asymmetry noted of the contour of the facial features.  The facial nerve is intact, with strong symmetric movements.  Grade 1/6 bilaterally  Eyes - Extraocular movements intact, and the pupils were reactive to light.  Sclera were not icteric or injected, conjunctiva were pink and moist.  Ears -  examined under microscopy bilaterally  Right tympanic membrane intact without effusion or retraction  Left tympanic membrane is intact fat graft has taken.  No retraction or effusion.  Slight thickening overlying the graft site.  Posterio lobule incision is well-healed piercing in place  Duncan " is midline with 256 tuning fork    A/P - Healing appropriately status post left fat myringoplasty, bilateral ETD      To do schedule:  Audiogram in 1 month with Lindsey Shah.  Start medication:  Ciprodex:  ear drops to both ears twice a day for 7 days.     Left tympanic membrane is intact.     Follow-up with ENT as needed

## 2023-09-15 ENCOUNTER — OFFICE VISIT (OUTPATIENT)
Dept: OTOLARYNGOLOGY | Facility: OTHER | Age: 28
End: 2023-09-15
Attending: OTOLARYNGOLOGY
Payer: COMMERCIAL

## 2023-09-15 VITALS
DIASTOLIC BLOOD PRESSURE: 76 MMHG | BODY MASS INDEX: 35.15 KG/M2 | SYSTOLIC BLOOD PRESSURE: 110 MMHG | HEIGHT: 62 IN | OXYGEN SATURATION: 99 % | TEMPERATURE: 98.1 F | HEART RATE: 89 BPM | WEIGHT: 191 LBS

## 2023-09-15 DIAGNOSIS — Z98.890 S/P EAR SURGERY: Primary | ICD-10-CM

## 2023-09-15 PROBLEM — Z87.59 H/O PRE-ECLAMPSIA: Status: ACTIVE | Noted: 2023-05-17

## 2023-09-15 PROCEDURE — 99024 POSTOP FOLLOW-UP VISIT: CPT | Performed by: OTOLARYNGOLOGY

## 2023-09-15 PROCEDURE — G0463 HOSPITAL OUTPT CLINIC VISIT: HCPCS

## 2023-09-15 PROCEDURE — 92504 EAR MICROSCOPY EXAMINATION: CPT | Performed by: OTOLARYNGOLOGY

## 2023-09-15 ASSESSMENT — PAIN SCALES - GENERAL: PAINLEVEL: MODERATE PAIN (5)

## 2023-09-15 NOTE — PATIENT INSTRUCTIONS
To do schedule:  Audiogram in 1 month with Lindsey Shah.  Start medication:  Ciprodex:  ear drops to both ears twice a day for 7 days.     Left tympanic membrane is intact.     Thank you for allowing Dr. Lucas and our ENT team to participate in your care.  If your medications are too expensive, please give the nurse a call.  We can possibly change this medication.  If you have a scheduling or an appointment question please contact our Health Unit Coordinator at their direct line [phone].   ALL nursing questions or concerns can be directed to your ENT nurse, Rupert, at: [phone]

## 2023-09-15 NOTE — LETTER
"    9/15/2023         RE: May Maria  [address] [address]        Dear Colleague,    Thank you for referring your patient, May Maria, to the [facility]. Please see a copy of my visit note below.    History of Present Illness -April is status post left fat myringoplasty, bilateral endoscopic dilation eustachian tubes, bilateral outfracture inferior turbinates implant displacement septum on 6/28/2023.    Intraoperative findings were a 5% anterior-inferior left tympanic membrane perforation with pars tensor retraction and mild attic retraction and no cholesteatoma       She was consented for tympanoplasty but the perforation had become much smaller at the time of surgery and I felt this was amenable to fat myringoplasty      They have had no issues since the surgery.  Pain was easily controlled, no vertigo, no headache, no fevers or chills.  No chronic otorrhea.      She notes her left ear seems plugged, right is irritated  April notes improved nasal breathing and resolution of right tinnitus    adhering to water precautions.       Physical Exam    /76 (BP Location: Right arm, Cuff Size: Adult Large)   Pulse 89   Temp 98.1  F (36.7  C) (Tympanic)   Ht 1.575 m (5' 2\")   Wt 86.6 kg (191 lb)   LMP 07/13/2023 (Exact Date)   SpO2 99%   BMI 34.93 kg/m        General - The patient is well nourished and well developed, and appears to have good nutritional status.  Alert and oriented to person and place, answers questions and cooperates with examination appropriately.   Head and Face - Normocephalic and atraumatic, with no gross asymmetry noted of the contour of the facial features.  The facial nerve is intact, with strong symmetric movements.  Grade 1/6 bilaterally  Eyes - Extraocular movements intact, and the pupils were reactive to light.  Sclera were not icteric or injected, conjunctiva were pink and moist.  Ears -  examined under microscopy bilaterally  Right " tympanic membrane intact without effusion or retraction  Left tympanic membrane is intact fat graft has taken.  No retraction or effusion.  Slight thickening overlying the graft site.  Posterio lobule incision is well-healed piercing in place  Duncan is midline with 256 tuning fork    A/P - Healing appropriately status post left fat myringoplasty, bilateral ETD      To do schedule:  Audiogram in 1 month with Lindsey Shah.  Start medication:  Ciprodex:  ear drops to both ears twice a day for 7 days.     Left tympanic membrane is intact.     Follow-up with ENT as needed          Again, thank you for allowing me to participate in the care of your patient.        Sincerely,        Yamel Lucas MD

## 2023-09-19 DIAGNOSIS — Z98.890 S/P EAR SURGERY: Primary | ICD-10-CM

## 2023-09-19 RX ORDER — CIPROFLOXACIN AND DEXAMETHASONE 3; 1 MG/ML; MG/ML
4 SUSPENSION/ DROPS AURICULAR (OTIC) 2 TIMES DAILY
Qty: 2.8 ML | Refills: 0 | Status: SHIPPED | OUTPATIENT
Start: 2023-09-19 | End: 2023-09-26

## 2023-10-09 DIAGNOSIS — H91.93 DECREASED HEARING OF BOTH EARS: Primary | ICD-10-CM

## 2023-10-16 ENCOUNTER — OFFICE VISIT (OUTPATIENT)
Dept: AUDIOLOGY | Facility: OTHER | Age: 28
End: 2023-10-16
Attending: AUDIOLOGIST
Payer: COMMERCIAL

## 2023-10-16 DIAGNOSIS — H91.93 DECREASED HEARING OF BOTH EARS: ICD-10-CM

## 2023-10-16 DIAGNOSIS — H90.6 MIXED CONDUCTIVE AND SENSORINEURAL HEARING LOSS OF BOTH EARS: Primary | ICD-10-CM

## 2023-10-16 DIAGNOSIS — H69.93 DYSFUNCTION OF EUSTACHIAN TUBE, BILATERAL: ICD-10-CM

## 2023-10-16 PROCEDURE — 92557 COMPREHENSIVE HEARING TEST: CPT | Performed by: AUDIOLOGIST

## 2023-10-16 PROCEDURE — 92567 TYMPANOMETRY: CPT | Performed by: AUDIOLOGIST

## 2023-10-16 NOTE — PROGRESS NOTES
Audiology Evaluation Completed. Please refer SCANNED AUDIOGRAM and/or TYMPANOGRAM for BACKGROUND, RESULTS, RECOMMENDATIONS.      Lindsey HERNANDEZ, [facility]  Audiologist #[id number]

## 2023-10-24 NOTE — PROGRESS NOTES
"Otolaryngology Progress Note          May Maria is a 27 year old female  Evaluation of her left ear.    S/p left fat myringoplasty, bilateral endoscopic dilation eustachian tubes, bilateral outfracture inferior turbinates and blunt displacement septum on 6/28/2023.    The graft was intact at her 915 postoperative exam there is slight thickening over the graft site but it was intact.  Duncan was midline with 256 tuning fork.    She continues to note left hearing loss.   She is having difficulty with conversation  No fluctuating hearing loss bothersome tinnitus or vertigo  No otorrhea or otalgia    No congestion or rhinorrhea    Postop audiogram 10/16/2023 shows a persistent left moderate mixed hearing loss with improved right ear mild mixed loss   flat B tympanograms bilaterally SRT 45 dB left which was the same as preop right SRT 25 dB.  Discrimination excellent bilaterally  There is a left sensorineural notch at 2K    There is a very slight left asymmetrical sensorineural hearing loss  No concerns with her right ear       Intraoperative findings were a 5% anterior-inferior left tympanic membrane perforation with pars tensor retraction and mild attic retraction and no cholesteatoma     CT temporal bone dated 4/6/2023 was normal    Physical Exam  /70 (BP Location: Left arm, Cuff Size: Adult Regular)   Pulse 68   Temp 98.2  F (36.8  C) (Tympanic)   Ht 1.575 m (5' 2\")   Wt 86.6 kg (191 lb)   SpO2 98%   BMI 34.93 kg/m    General - The patient is well nourished and well developed, and appears to have good nutritional status.  Alert and oriented to person and place, interactive.  Head and Face - Normocephalic and atraumatic, with no gross asymmetry noted of the contour of the facial features.  The facial nerve is intact, with strong symmetric movements.  Grade 1 out of 6 bilaterally  Neck-no palpable lymphadenopathy or thyroid mass.  Trachea is midline.  Eyes - Extraocular movements intact.   Ears- " examined under microscopy bilaterally  External auditory canals are patent  Right TM with pars tensa and attic retraction, no cholesteatoma, scant effusion  Left TM with thickened fat graft in place posterior inferior quadrant.  Pars tensa and attic retraction, no effusion, no chavo cholesteatoma  512 tuning fork :  rodriguez lateralizes left, rinne bc>ac left  Nose - Nasal mucosa is pink and moist with no abnormal mucus.  The septum was grossly midline and non-obstructive, turbinates of normal size and position.  No polyps, masses, or purulence noted on examination.  Mouth - Examination of the oral cavity shows pink, healthy, moist mucosa.  No lesions or ulceration noted.  The dentition are in good repair.  The tongue is mobile and midline.  Throat - The walls of the oropharynx were smooth, pink, moist, symmetric, and had no lesions or ulcerations.  The tonsillar pillars and soft palate were symmetric.  The uvula was midline on elevation.      Impression/Plan  May Maria is a 27 year old female    ICD-10-CM    1. left mixed heairng loss  H90.2 Adult ENT  Referral      2. ASNHL (asymmetrical sensorineural hearing loss)  H90.3       3. Chronic Eustachian tube dysfunction, bilateral  H69.93       4. Tympanic membrane retraction, bilateral  H73.893       5. COME (chronic otitis media with effusion), right  H65.491             MRI IAC due to slight left asymmetrical sensorineural hearing loss  Referral to otology to consider left tympanoplasty, possible stapedectomy    I did not perform a right myringotomy as this is her better hearing ear and she has no complaints on the right side.   I told April she may require a right tube in the future.     Option of HA discussed    She will follow-up with me accordingly    Yamel Lucas D.O.  Otolaryngology/Head and Neck Surgery  Allergy

## 2023-10-27 ENCOUNTER — OFFICE VISIT (OUTPATIENT)
Dept: OTOLARYNGOLOGY | Facility: OTHER | Age: 28
End: 2023-10-27
Attending: OTOLARYNGOLOGY
Payer: COMMERCIAL

## 2023-10-27 VITALS
DIASTOLIC BLOOD PRESSURE: 70 MMHG | WEIGHT: 191 LBS | BODY MASS INDEX: 35.15 KG/M2 | HEART RATE: 68 BPM | SYSTOLIC BLOOD PRESSURE: 110 MMHG | OXYGEN SATURATION: 98 % | HEIGHT: 62 IN | TEMPERATURE: 98.2 F

## 2023-10-27 DIAGNOSIS — H69.93 CHRONIC EUSTACHIAN TUBE DYSFUNCTION, BILATERAL: ICD-10-CM

## 2023-10-27 DIAGNOSIS — H65.491 COME (CHRONIC OTITIS MEDIA WITH EFFUSION), RIGHT: ICD-10-CM

## 2023-10-27 DIAGNOSIS — H90.3 ASNHL (ASYMMETRICAL SENSORINEURAL HEARING LOSS): ICD-10-CM

## 2023-10-27 DIAGNOSIS — H90.A12 CONDUCTIVE HEARING LOSS OF LEFT EAR WITH RESTRICTED HEARING OF RIGHT EAR: Primary | ICD-10-CM

## 2023-10-27 DIAGNOSIS — H73.893 TYMPANIC MEMBRANE RETRACTION, BILATERAL: ICD-10-CM

## 2023-10-27 PROCEDURE — 99213 OFFICE O/P EST LOW 20 MIN: CPT | Mod: 25 | Performed by: OTOLARYNGOLOGY

## 2023-10-27 PROCEDURE — G0463 HOSPITAL OUTPT CLINIC VISIT: HCPCS

## 2023-10-27 PROCEDURE — 92504 EAR MICROSCOPY EXAMINATION: CPT | Performed by: OTOLARYNGOLOGY

## 2023-10-27 ASSESSMENT — PAIN SCALES - GENERAL: PAINLEVEL: NO PAIN (0)

## 2023-10-27 NOTE — PATIENT INSTRUCTIONS
Follow-up at the [facility] for Left Conductive Hearing Loss (Dr. Jefferson or Dr. Murray). If you do not hear from someone within 2 weeks, please call them at [phone]. If you have any problems with the referral, reach out to BRYAN Emery at [phone].    MRI of the inner ear. Someone will call you to schedule.     Thank you for allowing Dr. Lucas and our ENT team to participate in your care.  If your medications are too expensive, please give the nurse a call.  We can possibly change this medication.  If you have a scheduling or an appointment question please contact our Health Unit Coordinator at their direct line [phone].   ALL nursing questions or concerns can be directed to your ENT nurse, Rupert, at: [phone]

## 2023-10-27 NOTE — LETTER
"    10/27/2023         RE: May Maria  [address] [address]        Dear Colleague,    Thank you for referring your patient, May Maria, to the [facility]. Please see a copy of my visit note below.    Otolaryngology Progress Note          May Maria is a 27 year old female  Evaluation of her left ear.    S/p left fat myringoplasty, bilateral endoscopic dilation eustachian tubes, bilateral outfracture inferior turbinates and blunt displacement septum on 6/28/2023.    The graft was intact at her 915 postoperative exam there is slight thickening over the graft site but it was intact.  Duncan was midline with 256 tuning fork.    She continues to note left hearing loss.   She is having difficulty with conversation  No fluctuating hearing loss bothersome tinnitus or vertigo  No otorrhea or otalgia    No congestion or rhinorrhea    Postop audiogram 10/16/2023 shows a persistent left moderate mixed hearing loss with improved right ear mild mixed loss   flat B tympanograms bilaterally SRT 45 dB left which was the same as preop right SRT 25 dB.  Discrimination excellent bilaterally  There is a left sensorineural notch at 2K    There is a very slight left asymmetrical sensorineural hearing loss  No concerns with her right ear       Intraoperative findings were a 5% anterior-inferior left tympanic membrane perforation with pars tensor retraction and mild attic retraction and no cholesteatoma     CT temporal bone dated 4/6/2023 was normal    Physical Exam  /70 (BP Location: Left arm, Cuff Size: Adult Regular)   Pulse 68   Temp 98.2  F (36.8  C) (Tympanic)   Ht 1.575 m (5' 2\")   Wt 86.6 kg (191 lb)   SpO2 98%   BMI 34.93 kg/m    General - The patient is well nourished and well developed, and appears to have good nutritional status.  Alert and oriented to person and place, interactive.  Head and Face - Normocephalic and atraumatic, with no gross asymmetry noted of the " contour of the facial features.  The facial nerve is intact, with strong symmetric movements.  Grade 1 out of 6 bilaterally  Neck-no palpable lymphadenopathy or thyroid mass.  Trachea is midline.  Eyes - Extraocular movements intact.   Ears- examined under microscopy bilaterally  External auditory canals are patent  Right TM with pars tensa and attic retraction, no cholesteatoma, scant effusion  Left TM with thickened fat graft in place posterior inferior quadrant.  Pars tensa and attic retraction, no effusion, no chavo cholesteatoma  512 tuning fork :  rodriguez lateralizes left, rinne bc>ac left  Nose - Nasal mucosa is pink and moist with no abnormal mucus.  The septum was grossly midline and non-obstructive, turbinates of normal size and position.  No polyps, masses, or purulence noted on examination.  Mouth - Examination of the oral cavity shows pink, healthy, moist mucosa.  No lesions or ulceration noted.  The dentition are in good repair.  The tongue is mobile and midline.  Throat - The walls of the oropharynx were smooth, pink, moist, symmetric, and had no lesions or ulcerations.  The tonsillar pillars and soft palate were symmetric.  The uvula was midline on elevation.      Impression/Plan  May Maria is a 27 year old female    ICD-10-CM    1. left mixed heairng loss  H90.2 Adult ENT  Referral      2. ASNHL (asymmetrical sensorineural hearing loss)  H90.3       3. Chronic Eustachian tube dysfunction, bilateral  H69.93       4. Tympanic membrane retraction, bilateral  H73.893       5. COME (chronic otitis media with effusion), right  H65.491             MRI IAC due to slight left asymmetrical sensorineural hearing loss  Referral to otology to consider left tympanoplasty, possible stapedectomy    I did not perform a right myringotomy as this is her better hearing ear and she has no complaints on the right side.   I told April she may require a right tube in the future.     Option of HA  discussed    She will follow-up with me accordingly    Yamel Lucas D.O.  Otolaryngology/Head and Neck Surgery  Allergy          Again, thank you for allowing me to participate in the care of your patient.        Sincerely,        Yamel Lucas MD

## 2023-10-30 ENCOUNTER — TELEPHONE (OUTPATIENT)
Dept: OTOLARYNGOLOGY | Facility: OTHER | Age: 28
End: 2023-10-30

## 2023-10-30 DIAGNOSIS — H90.3 ASNHL (ASYMMETRICAL SENSORINEURAL HEARING LOSS): Primary | ICD-10-CM

## 2023-11-02 ENCOUNTER — MYC MEDICAL ADVICE (OUTPATIENT)
Dept: FAMILY MEDICINE | Facility: OTHER | Age: 28
End: 2023-11-02

## 2023-11-07 ENCOUNTER — HOSPITAL ENCOUNTER (OUTPATIENT)
Dept: MRI IMAGING | Facility: HOSPITAL | Age: 28
Discharge: HOME | End: 2023-11-07
Attending: OTOLARYNGOLOGY | Admitting: OTOLARYNGOLOGY
Payer: COMMERCIAL

## 2023-11-07 DIAGNOSIS — H90.3 ASNHL (ASYMMETRICAL SENSORINEURAL HEARING LOSS): ICD-10-CM

## 2023-11-07 PROCEDURE — A9585 GADOBUTROL INJECTION: HCPCS | Mod: JZ | Performed by: RADIOLOGY

## 2023-11-07 PROCEDURE — 255N000002 HC RX 255 OP 636: Mod: JZ | Performed by: RADIOLOGY

## 2023-11-07 PROCEDURE — 70543 MRI ORBT/FAC/NCK W/O &W/DYE: CPT

## 2023-11-07 RX ORDER — GADOBUTROL 604.72 MG/ML
10 INJECTION INTRAVENOUS ONCE
Status: COMPLETED | OUTPATIENT
Start: 2023-11-07 | End: 2023-11-07

## 2023-11-07 RX ADMIN — GADOBUTROL 10 ML: 604.72 INJECTION INTRAVENOUS at 15:07

## 2023-11-21 ENCOUNTER — TRANSFERRED RECORDS (OUTPATIENT)
Dept: HEALTH INFORMATION MANAGEMENT | Facility: CLINIC | Age: 28
End: 2023-11-21

## 2023-11-22 NOTE — TELEPHONE ENCOUNTER
FUTURE VISIT INFORMATION      FUTURE VISIT INFORMATION:  Date: 1/31/2024  Time: 9 AM  Location: [facility]  REFERRAL INFORMATION:  Referring provider:  Yamel Lucas MD  Referring providers clinic:  [facility]  Reason for visit/diagnosis  Per pt, Ref'd Dr Lucas, Conductive hearing loss, [facility] conf'd     RECORDS REQUESTED FROM:       Clinic name Comments Records Status Imaging Status   [facility] 10/27/23 referral/ OV notes- Yamel Lucas MD    Audiogram: 10/16/23, 4/4/23    Procedure:  6/28/23 Left Fat Myringoplasty  [facility] MR IAC 11/7/23  CT temporal 4/6/23 Epic PACS

## 2024-01-16 ENCOUNTER — TELEPHONE (OUTPATIENT)
Dept: FAMILY MEDICINE | Facility: OTHER | Age: 29
End: 2024-01-16

## 2024-01-16 NOTE — TELEPHONE ENCOUNTER
1:33 PM    Reason for Call: OVERBOOK    Patient is having the following symptoms: PREOP/ [facility] PLASTIC SURGERY/ 02/12/ DR. ALESIA MARTIN/ BREAST REDUCTION    The patient is requesting an appointment for before her procedure with Reagan.    Was an appointment offered for this call? No  If yes : Appointment type              Date    Preferred method for responding to this message: Telephone Call  What is your phone number ?[phone]    If we cannot reach you directly, may we leave a detailed response at the number you provided? Yes    Can this message wait until your PCP/provider returns, if unavailable today? Not applicable    Natasha Regalado

## 2024-01-26 DIAGNOSIS — H90.3 ASNHL (ASYMMETRICAL SENSORINEURAL HEARING LOSS): Primary | ICD-10-CM

## 2024-01-31 ENCOUNTER — OFFICE VISIT (OUTPATIENT)
Dept: AUDIOLOGY | Facility: CLINIC | Age: 29
End: 2024-01-31
Payer: COMMERCIAL

## 2024-01-31 ENCOUNTER — PRE VISIT (OUTPATIENT)
Dept: OTOLARYNGOLOGY | Facility: CLINIC | Age: 29
End: 2024-01-31

## 2024-01-31 ENCOUNTER — OFFICE VISIT (OUTPATIENT)
Dept: OTOLARYNGOLOGY | Facility: CLINIC | Age: 29
End: 2024-01-31
Payer: COMMERCIAL

## 2024-01-31 VITALS
WEIGHT: 189 LBS | HEIGHT: 62 IN | SYSTOLIC BLOOD PRESSURE: 132 MMHG | BODY MASS INDEX: 34.78 KG/M2 | OXYGEN SATURATION: 98 % | HEART RATE: 99 BPM | DIASTOLIC BLOOD PRESSURE: 90 MMHG | TEMPERATURE: 97.6 F

## 2024-01-31 DIAGNOSIS — H90.0 CONDUCTIVE HEARING LOSS, BILATERAL: ICD-10-CM

## 2024-01-31 DIAGNOSIS — H69.93 DYSFUNCTION OF BOTH EUSTACHIAN TUBES: Primary | ICD-10-CM

## 2024-01-31 DIAGNOSIS — H90.0 CONDUCTIVE HEARING LOSS, BILATERAL: Primary | ICD-10-CM

## 2024-01-31 DIAGNOSIS — H90.3 ASNHL (ASYMMETRICAL SENSORINEURAL HEARING LOSS): ICD-10-CM

## 2024-01-31 PROCEDURE — 92567 TYMPANOMETRY: CPT | Performed by: AUDIOLOGIST

## 2024-01-31 PROCEDURE — 92565 STENGER TEST PURE TONE: CPT | Performed by: AUDIOLOGIST

## 2024-01-31 PROCEDURE — 99203 OFFICE O/P NEW LOW 30 MIN: CPT | Performed by: OTOLARYNGOLOGY

## 2024-01-31 PROCEDURE — 92557 COMPREHENSIVE HEARING TEST: CPT | Performed by: AUDIOLOGIST

## 2024-01-31 RX ORDER — CEFAZOLIN SODIUM 2 G/50ML
2 SOLUTION INTRAVENOUS
Status: CANCELLED | OUTPATIENT
Start: 2024-01-31

## 2024-01-31 RX ORDER — CEFAZOLIN SODIUM 2 G/50ML
2 SOLUTION INTRAVENOUS SEE ADMIN INSTRUCTIONS
Status: CANCELLED | OUTPATIENT
Start: 2024-01-31

## 2024-01-31 RX ORDER — DEXAMETHASONE SODIUM PHOSPHATE 4 MG/ML
10 INJECTION, SOLUTION INTRA-ARTICULAR; INTRALESIONAL; INTRAMUSCULAR; INTRAVENOUS; SOFT TISSUE ONCE
Status: CANCELLED | OUTPATIENT
Start: 2024-01-31 | End: 2024-01-31

## 2024-01-31 RX ORDER — ACETAMINOPHEN 325 MG/1
975 TABLET ORAL ONCE
Status: CANCELLED | OUTPATIENT
Start: 2024-01-31 | End: 2024-01-31

## 2024-01-31 ASSESSMENT — PAIN SCALES - GENERAL: PAINLEVEL: NO PAIN (0)

## 2024-01-31 NOTE — PROGRESS NOTES
AUDIOLOGY REPORT    SUMMARY: Audiology visit completed. See audiogram for results.      RECOMMENDATIONS: Follow-up with ENT.    Iggy Garcia, [facility]  Licensed Audiologist  [place] #[id number]

## 2024-01-31 NOTE — LETTER
"2024       RE: May Maria  [address] [facility] [address]     Dear Colleague,    Thank you for referring your patient, May Maria, to the [facility] [place] at [facility]. Please see a copy of my visit note below.    May Maria is a 28 year old female being seen in consultation from Yamel Lucas MD regarding conductive hearing loss of the left ear with restricted hearing of the right ear. She is s/p left fat myringoplasty, bilateral endoscopic dilation eustachian tubes, bilateral outfracture inferior turbinates and blunt displacement septum on 2023. In the appointment with Dr. Lucas, \"patient reports that she continues to experience left hearing loss and has difficulty with conversation. She has no fluctuating hearing loss or bothersome tinnitus or vertigo. Patient denies otorrhea or otalgia. \"    Today, she presents with her father. She reports she has had left-sided hearing loss for a long time, really since childhood. She had 2 sets of PE tubes as a child but has had continued ear infections and hearing loss since they fell out. She continues to have trouble with conversation and hearing others. She continues to deny otorrhea or otalgia at this time. No vertigo or balance concerns.       Past Medical History:   Diagnosis Date     Atrophic kidney      Depressive disorder     Therapy during childhood     Diet controlled gestational diabetes mellitus (GDM) in third trimester 2020    Late-onset     Glucose intolerance 05/15/2020     Glucose intolerance of pregnancy 2020     Hypertension 24     Shift work sleep disorder        Past Surgical History:   Procedure Laterality Date      SECTION N/A 2020    Procedure:  SECTION;  Surgeon: Magdi Cannon MD;  Location: [facility]     COLONOSCOPY       DILATE EUSTACHIAN TUBE N/A 2023    Procedure: Bilateral " Eustachian Tube Dilation, Out-fracture Bilateral Inferior Turbinate, Left Septal Displacement;  Surgeon: Yamel Lucas MD;  Location: [facility] OR     ENDOSCOPY UPPER, COLONOSCOPY, COMBINED N/A 2021    Procedure: colonoscopy with biopsy and polypectomy, upper endoscopy with biopsy;  Surgeon: Joe Nayak MD;  Location: [facility] OR     PE TUBES Bilateral     years      TYMPANOPLASTY N/A 2023    Procedure: Left Fat Myringoplasty;  Surgeon: Yamel Lucas MD;  Location: [facility]       Family History   Problem Relation Age of Onset     Other - See Comments Mother         bells palsy post auto accident     Heart Disease Father         s/p stents     Asthma Father      Cancer Father      Coronary Artery Disease Father      Hypertension Father      Other Cancer Father      Depression Father      Anxiety Disorder Father      Diabetes Maternal Grandmother      Breast Cancer Paternal Grandmother        Social History     Tobacco Use     Smoking status: Former     Packs/day: 0.50     Years: 5.00     Additional pack years: 0.00     Total pack years: 2.50     Types: Cigarettes     Start date: 2014     Quit date: 3/1/2018     Years since quittin.9     Passive exposure: Past     Smokeless tobacco: Never   Vaping Use     Vaping Use: Never used   Substance Use Topics     Alcohol use: No     Drug use: No       Patient Supplied Answers to Review of Systems      2024     7:51 AM    ENT ROS   Ears, Nose, Throat Hearing loss    Ear pain    Ringing/noise in ears    Nasal congestion or drainage   Gastrointestinal/Genitourinary Heartburn/indigestion   Musculoskeletal Back pain   The remainder of the 10 point review of systems is otherwise negative.    Physical examination:  Constitutional:  In no acute distress, appears stated age  Eyes:  Extraocular movements intact, no spontaneous nystagmus  Ears:  Both ears examined under the microscope.  Right: TM monomeric posteriorly and anteriorly, with an area of  scarring going from end of the umbo to inferior ear canal and retracted into mesotympanum onto the long process along the incus without erosion, retraction going towards the antrum, no squamous debris noted, moderate epitympanic pocket. Left: TM intact, significantly retracted into hypotympanum and mesotympanum with no long process of the incus present, TM pexied onto the stapes capitulum and stapedial tendon, retracted towards the epitympanum, no squamous debris.   Respiratory:  No increased work of breathing, wheezing or stridor  Musculoskeletal:  Good upper extremity strength  Skin:  No rashes on the head and neck  Neurologic:  House Brackman 1/6 bilaterally, ambulating normally  Psychiatric:  Alert, normal affect, answering questions appropriately    Audiogram:  Audiogram (10/06/2023) was independently reviewed and compared to prior tests. Right moderate upsloping to normal/mild conductive hearing loss, left primarily moderate with an upsloping to mild in the high frequencies conductive hearing loss, 100% speech discrimination bilaterally, severe negative pressure right tympanogram, left shallow tympanogram, could not test reflexes as patient was unable to tolerate the test.  October 2023 test showed right normal/mild hearing with a drop at 250Hz into the mild range, left moderate upsloping to mild conductive hearing loss.  April 2023 test showed right mild upsloping to normal/mild conductive hearing loss, left moderate/severe upsloping to mild conductive hearing loss.    CT: Temporal bone CT and MRI independently reviewed. Right mastoid moderately sclerotic but aerated, middle ear with opacification lateral to the malleus and incus, slight opacification around the stapes, remainder clear, ossicular chain appears intact, otic capsule intact, facial nerve in its usual position. Left severely sclerotic mastoid with nothing but an antral air cell essentially, absent long process of the incus with opacification  around the stapes with a possibly attenuated stapes suprastructure, otic capsule intact, facial nerve in its usual position. Slight frothy debris in the left superior maxillary sinus.  MRI without areas of enhancement, unremarkable.  CT temporal 4/6/23:  IMPRESSION: Normal CT scan of the temporal bones  MRI IACs 11/7/23:  IMPRESSION: Normal cerebellar pontine angle cisterns and internal auditory canals.    Outside records from [facility] were independently reviewed and summarized above.     Assessment and plan:    May Maria is a 29yo female with bilateral eustachian tube dysfunction with bilateral retraction and ossicular chain erosion on the left. This was discussed with her and her father. Recommendation was made for cartilage backed tympanoplasty and t-tube placement on both ears. Discussed with patient that she will likely need two surgeries on the left as it is highly likely that the retracted TM will not be able to be elevated off the stapes and floor of the middle ear without tears leading to a risk of cholesteatoma formation. If this were the case, no ossicular chain reconstruction would be performed until the 2nd surgery. Hopefully, there would only need to be one surgery on the right. We also discussed the need for long term aeration of both middle ears with t-tube placement and that she will need essentially lifelong maintenance of her ears. The risks and benefits of surgery were discussed. The risks include but are not limited to:  Worsened hearing which may require further surgery, profound and irreversible hearing loss, dizziness, damage to the taste nerve, damage to the facial nerve, tympanic membrane perforation requiring further surgery and infection. Postoperative restrictions were discussed. We finally discussed whether the surgeries should be done separately or simultaneously. She understands that her hearing would be quite down in both ears if she were to elect to  have both done simultaneously. They had their questions answered and she and her dad would like to proceed with bilateral cartilage tympanoplasty and t-tube placement.    Scribe Disclosure:   I, Aurea Stephenson, am serving as a scribe; to document services personally performed by Cristal Murray MD -based on data collection and the provider's statements to me.     Provider Disclosure:  I agree with above History, Review of Systems, Physical exam and Plan.  I have reviewed the content of the documentation and have edited it as needed. I have personally performed the services documented here and the documentation accurately represents those services and the decisions I have made.      Again, thank you for allowing me to participate in the care of your patient.      Sincerely,    Cristal Murray MD

## 2024-01-31 NOTE — PROGRESS NOTES
"May Maria is a 28 year old female being seen in consultation from Yamel Lucas MD regarding conductive hearing loss of the left ear with restricted hearing of the right ear. She is s/p left fat myringoplasty, bilateral endoscopic dilation eustachian tubes, bilateral outfracture inferior turbinates and blunt displacement septum on 2023. In the appointment with Dr. Lucas, \"patient reports that she continues to experience left hearing loss and has difficulty with conversation. She has no fluctuating hearing loss or bothersome tinnitus or vertigo. Patient denies otorrhea or otalgia. \"    Today, she presents with her father. She reports she has had left-sided hearing loss for a long time, really since childhood. She had 2 sets of PE tubes as a child but has had continued ear infections and hearing loss since they fell out. She continues to have trouble with conversation and hearing others. She continues to deny otorrhea or otalgia at this time. No vertigo or balance concerns.       Past Medical History:   Diagnosis Date    Atrophic kidney     Depressive disorder     Therapy during childhood    Diet controlled gestational diabetes mellitus (GDM) in third trimester 2020    Late-onset    Glucose intolerance 05/15/2020    Glucose intolerance of pregnancy 2020    Hypertension 24    Shift work sleep disorder        Past Surgical History:   Procedure Laterality Date     SECTION N/A 2020    Procedure:  SECTION;  Surgeon: Magdi Cannon MD;  [facility]: [facility]    COLONOSCOPY      DILATE EUSTACHIAN TUBE N/A 2023    Procedure: Bilateral Eustachian Tube Dilation, Out-fracture Bilateral Inferior Turbinate, Left Septal Displacement;  Surgeon: Yamel Lucas MD;  Location: [facility]    ENDOSCOPY UPPER, COLONOSCOPY, COMBINED N/A 2021    Procedure: colonoscopy with biopsy and polypectomy, upper endoscopy with biopsy;  Surgeon: Joe Nayak MD;  Location: [facility] OR "    PE TUBES Bilateral     years     TYMPANOPLASTY N/A 2023    Procedure: Left Fat Myringoplasty;  Surgeon: Yamel Lucas MD;  Location: [facility]       Family History   Problem Relation Age of Onset    Other - See Comments Mother         bells palsy post auto accident    Heart Disease Father         s/p stents    Asthma Father     Cancer Father     Coronary Artery Disease Father     Hypertension Father     Other Cancer Father     Depression Father     Anxiety Disorder Father     Diabetes Maternal Grandmother     Breast Cancer Paternal Grandmother        Social History     Tobacco Use    Smoking status: Former     Packs/day: 0.50     Years: 5.00     Additional pack years: 0.00     Total pack years: 2.50     Types: Cigarettes     Start date: 2014     Quit date: 3/1/2018     Years since quittin.9     Passive exposure: Past    Smokeless tobacco: Never   Vaping Use    Vaping Use: Never used   Substance Use Topics    Alcohol use: No    Drug use: No       Patient Supplied Answers to Review of Systems      2024     7:51 AM    ENT ROS   Ears, Nose, Throat Hearing loss    Ear pain    Ringing/noise in ears    Nasal congestion or drainage   Gastrointestinal/Genitourinary Heartburn/indigestion   Musculoskeletal Back pain   The remainder of the 10 point review of systems is otherwise negative.    Physical examination:  Constitutional:  In no acute distress, appears stated age  Eyes:  Extraocular movements intact, no spontaneous nystagmus  Ears:  Both ears examined under the microscope.  Right: TM monomeric posteriorly and anteriorly, with an area of scarring going from end of the umbo to inferior ear canal and retracted into mesotympanum onto the long process along the incus without erosion, retraction going towards the antrum, no squamous debris noted, moderate epitympanic pocket. Left: TM intact, significantly retracted into hypotympanum and mesotympanum with no long process of the incus present, TM  pexied onto the stapes capitulum and stapedial tendon, retracted towards the epitympanum, no squamous debris.   Respiratory:  No increased work of breathing, wheezing or stridor  Musculoskeletal:  Good upper extremity strength  Skin:  No rashes on the head and neck  Neurologic:  House Brackman 1/6 bilaterally, ambulating normally  Psychiatric:  Alert, normal affect, answering questions appropriately    Audiogram:  Audiogram (10/06/2023) was independently reviewed and compared to prior tests. Right moderate upsloping to normal/mild conductive hearing loss, left primarily moderate with an upsloping to mild in the high frequencies conductive hearing loss, 100% speech discrimination bilaterally, severe negative pressure right tympanogram, left shallow tympanogram, could not test reflexes as patient was unable to tolerate the test.  October 2023 test showed right normal/mild hearing with a drop at 250Hz into the mild range, left moderate upsloping to mild conductive hearing loss.  April 2023 test showed right mild upsloping to normal/mild conductive hearing loss, left moderate/severe upsloping to mild conductive hearing loss.    CT: Temporal bone CT and MRI independently reviewed. Right mastoid moderately sclerotic but aerated, middle ear with opacification lateral to the malleus and incus, slight opacification around the stapes, remainder clear, ossicular chain appears intact, otic capsule intact, facial nerve in its usual position. Left severely sclerotic mastoid with nothing but an antral air cell essentially, absent long process of the incus with opacification around the stapes with a possibly attenuated stapes suprastructure, otic capsule intact, facial nerve in its usual position. Slight frothy debris in the left superior maxillary sinus.  MRI without areas of enhancement, unremarkable.  CT temporal 4/6/23:  IMPRESSION: Normal CT scan of the temporal bones  MRI IACs 11/7/23:  IMPRESSION: Normal cerebellar pontine  angle cisterns and internal auditory canals.    Outside records from [facility] were independently reviewed and summarized above.     Assessment and plan:    May Maria is a 27yo female with bilateral eustachian tube dysfunction with bilateral retraction and ossicular chain erosion on the left. This was discussed with her and her father. Recommendation was made for cartilage backed tympanoplasty and t-tube placement on both ears. Discussed with patient that she will likely need two surgeries on the left as it is highly likely that the retracted TM will not be able to be elevated off the stapes and floor of the middle ear without tears leading to a risk of cholesteatoma formation. If this were the case, no ossicular chain reconstruction would be performed until the 2nd surgery. Hopefully, there would only need to be one surgery on the right. We also discussed the need for long term aeration of both middle ears with t-tube placement and that she will need essentially lifelong maintenance of her ears. The risks and benefits of surgery were discussed. The risks include but are not limited to:  Worsened hearing which may require further surgery, profound and irreversible hearing loss, dizziness, damage to the taste nerve, damage to the facial nerve, tympanic membrane perforation requiring further surgery and infection. Postoperative restrictions were discussed. We finally discussed whether the surgeries should be done separately or simultaneously. She understands that her hearing would be quite down in both ears if she were to elect to have both done simultaneously. They had their questions answered and she and her dad would like to proceed with bilateral cartilage tympanoplasty and t-tube placement.    Scribe Disclosure:   I, Aurea Stephenson, am serving as a scribe; to document services personally performed by Cristal Murray MD -based on data collection and the provider's statements to me.      Provider Disclosure:  I agree with above History, Review of Systems, Physical exam and Plan.  I have reviewed the content of the documentation and have edited it as needed. I have personally performed the services documented here and the documentation accurately represents those services and the decisions I have made.

## 2024-01-31 NOTE — NURSING NOTE
"Chief Complaint   Patient presents with    Consult     Conductive hearing loss      .Blood pressure (!) 132/90, pulse 99, temperature 97.6  F (36.4  C), height 1.575 m (5' 2\"), weight 85.7 kg (189 lb), SpO2 98%, not currently breastfeeding.    Marvin Fuentes LPN    "

## 2024-02-02 ENCOUNTER — TELEPHONE (OUTPATIENT)
Dept: OTOLARYNGOLOGY | Facility: CLINIC | Age: 29
End: 2024-02-02
Payer: COMMERCIAL

## 2024-02-02 PROBLEM — H90.0 CONDUCTIVE HEARING LOSS, BILATERAL: Status: ACTIVE | Noted: 2024-01-31

## 2024-02-02 PROBLEM — H69.93 DYSFUNCTION OF BOTH EUSTACHIAN TUBES: Status: ACTIVE | Noted: 2024-01-31

## 2024-02-02 NOTE — TELEPHONE ENCOUNTER
Left patient a voicemail to schedule surgery for TYMPANOPLASTY WITH CARTILAGE BACKING WITH T-TUBE PLACEMENT (Right)   TYMPANOPLASTY WITH CARTILAGE BACKING, POSSIBLE T-TUBE PLACEMENT (Left) with Dr. Trevor Restrepo on 2/2/2024 at 10:59 AM

## 2024-02-02 NOTE — TELEPHONE ENCOUNTER
Patient is scheduled for surgery with Dr. Murray.     Spoke with: Patient     Date of Surgery: 5/24/2024    Location: [facility] OR     Pre op with Provider: GIBRAN     H&P: Patient will schedule with Jia Kirk. Informed patient pre op will need to be done within 30 days of surgery date.     Additional imaging/appointments: Patient is scheduled for a 3 week post op with Dr. Murray on 6/13/24 at 7:45 AM.     Surgery packet: Per patients preference, will mail packet. Confirmed with patient address in chart works best. Informed patient arrival time for surgery will not be listed within packet.      Additional comments: Informed patient a pre op nurse will call 2-5 days prior to surgery to go over further details/give arrival time.         Aleyda Golden on 2/2/2024 at 2:47 PM

## 2024-02-05 ENCOUNTER — OFFICE VISIT (OUTPATIENT)
Dept: FAMILY MEDICINE | Facility: OTHER | Age: 29
End: 2024-02-05
Payer: COMMERCIAL

## 2024-02-05 VITALS
BODY MASS INDEX: 35.34 KG/M2 | DIASTOLIC BLOOD PRESSURE: 86 MMHG | OXYGEN SATURATION: 99 % | RESPIRATION RATE: 16 BRPM | SYSTOLIC BLOOD PRESSURE: 132 MMHG | TEMPERATURE: 98.1 F | HEART RATE: 92 BPM | WEIGHT: 193.2 LBS

## 2024-02-05 DIAGNOSIS — H69.93 DYSFUNCTION OF BOTH EUSTACHIAN TUBES: ICD-10-CM

## 2024-02-05 DIAGNOSIS — Z01.818 PREOP GENERAL PHYSICAL EXAM: Primary | ICD-10-CM

## 2024-02-05 DIAGNOSIS — K21.9 GASTROESOPHAGEAL REFLUX DISEASE WITHOUT ESOPHAGITIS: ICD-10-CM

## 2024-02-05 DIAGNOSIS — G44.229 CHRONIC TENSION-TYPE HEADACHE, NOT INTRACTABLE: ICD-10-CM

## 2024-02-05 DIAGNOSIS — N26.1 RENAL ATROPHY, RIGHT: ICD-10-CM

## 2024-02-05 DIAGNOSIS — N62 HYPERTROPHY OF BREAST: ICD-10-CM

## 2024-02-05 DIAGNOSIS — H66.001 NON-RECURRENT ACUTE SUPPURATIVE OTITIS MEDIA OF RIGHT EAR WITHOUT SPONTANEOUS RUPTURE OF TYMPANIC MEMBRANE: ICD-10-CM

## 2024-02-05 DIAGNOSIS — R03.0 ELEVATED BLOOD PRESSURE READING WITHOUT DIAGNOSIS OF HYPERTENSION: ICD-10-CM

## 2024-02-05 LAB
ANION GAP SERPL CALCULATED.3IONS-SCNC: 9 MMOL/L (ref 7–15)
BASOPHILS # BLD AUTO: 0.1 10E3/UL (ref 0–0.2)
BASOPHILS NFR BLD AUTO: 1 %
BUN SERPL-MCNC: 9.3 MG/DL (ref 6–20)
CALCIUM SERPL-MCNC: 9.2 MG/DL (ref 8.6–10)
CHLORIDE SERPL-SCNC: 104 MMOL/L (ref 98–107)
CREAT SERPL-MCNC: 0.72 MG/DL (ref 0.51–0.95)
DEPRECATED HCO3 PLAS-SCNC: 25 MMOL/L (ref 22–29)
EGFRCR SERPLBLD CKD-EPI 2021: >90 ML/MIN/1.73M2
EOSINOPHIL # BLD AUTO: 0.2 10E3/UL (ref 0–0.7)
EOSINOPHIL NFR BLD AUTO: 2 %
ERYTHROCYTE [DISTWIDTH] IN BLOOD BY AUTOMATED COUNT: 13 % (ref 10–15)
GLUCOSE SERPL-MCNC: 102 MG/DL (ref 70–99)
HCG UR QL: NEGATIVE
HCT VFR BLD AUTO: 38.2 % (ref 35–47)
HGB BLD-MCNC: 12.8 G/DL (ref 11.7–15.7)
IMM GRANULOCYTES # BLD: 0 10E3/UL
IMM GRANULOCYTES NFR BLD: 0 %
LYMPHOCYTES # BLD AUTO: 2.3 10E3/UL (ref 0.8–5.3)
LYMPHOCYTES NFR BLD AUTO: 32 %
MCH RBC QN AUTO: 28.3 PG (ref 26.5–33)
MCHC RBC AUTO-ENTMCNC: 33.5 G/DL (ref 31.5–36.5)
MCV RBC AUTO: 84 FL (ref 78–100)
MONOCYTES # BLD AUTO: 0.4 10E3/UL (ref 0–1.3)
MONOCYTES NFR BLD AUTO: 6 %
NEUTROPHILS # BLD AUTO: 4.1 10E3/UL (ref 1.6–8.3)
NEUTROPHILS NFR BLD AUTO: 59 %
NRBC # BLD AUTO: 0 10E3/UL
NRBC BLD AUTO-RTO: 0 /100
PLATELET # BLD AUTO: 419 10E3/UL (ref 150–450)
POTASSIUM SERPL-SCNC: 4.2 MMOL/L (ref 3.4–5.3)
RBC # BLD AUTO: 4.53 10E6/UL (ref 3.8–5.2)
SODIUM SERPL-SCNC: 138 MMOL/L (ref 135–145)
WBC # BLD AUTO: 7 10E3/UL (ref 4–11)

## 2024-02-05 PROCEDURE — 99213 OFFICE O/P EST LOW 20 MIN: CPT

## 2024-02-05 PROCEDURE — 80048 BASIC METABOLIC PNL TOTAL CA: CPT | Mod: ZL

## 2024-02-05 PROCEDURE — 81025 URINE PREGNANCY TEST: CPT | Mod: ZL

## 2024-02-05 PROCEDURE — 36415 COLL VENOUS BLD VENIPUNCTURE: CPT | Mod: ZL

## 2024-02-05 PROCEDURE — G0463 HOSPITAL OUTPT CLINIC VISIT: HCPCS

## 2024-02-05 PROCEDURE — 85025 COMPLETE CBC W/AUTO DIFF WBC: CPT | Mod: ZL

## 2024-02-05 NOTE — PATIENT INSTRUCTIONS
Additional Medication Instructions   - sucralfate: HOLD day of surgery.   - cetirizine and first generation antihistamines: HOLD on day of surgery.   - rescue Inhaler: Continue PRN. Bring to hospital on the day of surgery.   - Herbal medications and vitamins: HOLD 14 days prior to surgery.

## 2024-02-05 NOTE — PROGRESS NOTES
Preoperative Evaluation  [facility] [place]  [address]  [place] [address]  Phone: [phone]  Primary Provider: Jia Kirk  Pre-op Performing Provider: MARILOU HOLT  Feb 5, 2024       April is a 28 year old, presenting for the following:  Pre-Op Exam        2/5/2024     9:16 AM   Additional Questions   Roomed by Radha Nolen   Accompanied by none         2/5/2024     9:16 AM   Patient Reported Additional Medications   Patient reports taking the following new medications none     Surgical Information  Surgery/Procedure: Breast reduction  Surgery Location: [facility]  Surgeon: Dr. Minaya  Surgery Date: 2/12/28  Time of Surgery: TBD  Where patient plans to recover: At home with family  Fax number for surgical facility: Unknown    Assessment & Plan     The proposed surgical procedure is considered INTERMEDIATE risk.    Preop general physical exam/Hypertrophy of breast/Chronic tension-type headache, not intractable  - CBC with platelets and differential  - Basic metabolic panel  - HCG Qual, Urine ([id number])    Gastroesophageal reflux disease without esophagitis  Stable.    Renal atrophy, right  Following with nephrology, last visit May 2023. BP above goal today. Discussed follow-up with PCP, consider addition of BP medication.    Elevated blood pressure reading without diagnosis of hypertension  Goal of <130/80. OK for procedure however encouraged to schedule with PCP.    Non-recurrent acute suppurative otitis media of right ear without spontaneous rupture of tympanic membrane  Treat with course of augmentin. Encouraged to call with worsening symptoms, concerns.  - amoxicillin-clavulanate (AUGMENTIN) 875-125 MG tablet  Dispense: 14 tablet; Refill: 0       - No identified additional risk factors other than previously addressed    Antiplatelet or Anticoagulation Medication Instructions   - Patient is on no antiplatelet or anticoagulation medications.    Additional Medication Instructions   -  sucralfate: HOLD day of surgery.   - cetirizine and first generation antihistamines: HOLD on day of surgery.   - rescue Inhaler: Continue PRN. Bring to hospital on the day of surgery.   - Herbal medications and vitamins: HOLD 14 days prior to surgery.    Recommendation  APPROVAL GIVEN to proceed with proposed procedure, without further diagnostic evaluation.    25 minutes spent by me on the date of the encounter doing chart review, history and exam, documentation and further activities per the note    Subjective       HPI related to upcoming procedure: GILDARDO is a 28-year-old female presenting for preoperative exam for upcoming reduction mammaplasty          2/5/2024     9:16 AM   Preop Questions   1. Have you ever had a heart attack or stroke? No   2. Have you ever had surgery on your heart or blood vessels, such as a stent placement, a coronary artery bypass, or surgery on an artery in your head, neck, heart, or legs? No   3. Do you have chest pain with activity? No   4. Do you have a history of  heart failure? No   5. Do you currently have a cold, bronchitis or symptoms of other infection? No   6. Do you have a cough, shortness of breath, or wheezing? No   7. Do you or anyone in your family have previous history of blood clots? No   8. Do you or does anyone in your family have a serious bleeding problem such as prolonged bleeding following surgeries or cuts? No   9. Have you ever had problems with anemia or been told to take iron pills? YES - was on iron pills about 2 years ago. Pregnancy   10. Have you had any abnormal blood loss such as black, tarry or bloody stools, or abnormal vaginal bleeding? No   11. Have you ever had a blood transfusion? No   12. Are you willing to have a blood transfusion if it is medically needed before, during, or after your surgery? Yes   13. Have you or any of your relatives ever had problems with anesthesia? YES - grandmother has woken up during procedure    14. Do you have sleep  apnea, excessive snoring or daytime drowsiness? No   15. Do you have any artifical heart valves or other implanted medical devices like a pacemaker, defibrillator, or continuous glucose monitor? No   16. Do you have artificial joints? No   17. Are you allergic to latex? No   18. Is there any chance that you may be pregnant? No     Health Care Directive  Patient does not have a Health Care Directive or Living Will: Discussed advance care planning with patient; however, patient declined at this time.    Preoperative Review of    reviewed - no record of controlled substances prescribed.      Status of Chronic Conditions:  See problem list for active medical problems.  Problems all longstanding and stable, except as noted/documented.  See ROS for pertinent symptoms related to these conditions.    Patient Active Problem List    Diagnosis Date Noted    Conductive hearing loss, bilateral 2024     Priority: Medium    Dysfunction of both eustachian tubes 2024     Priority: Medium    H/O pre-eclampsia 2023     Priority: Medium    Fetal anomaly necessitating delivery 2022     Priority: Medium    Non-reassuring electronic fetal monitoring tracing 2022     Priority: Medium    Postpartum anemia 2022     Priority: Medium    Pre-eclampsia in third trimester 2022     Priority: Medium    Supervision of other normal pregnancy, antepartum 2021     Priority: Medium     B positive  BOY  NIPT negative  Hx PCS  Hx preeclampsia   Hx of GDM diet controlled  Hx of one functioning kidney (Left).  Atrophy of right kidney  Baby doc:  Chas  FOB:  Dominick  Son:  [place] 1              Regional enteritis of large intestine (H) 2021     Priority: Medium     Added automatically from request for surgery [id number]      Family history of Crohn's disease 2021     Priority: Medium     Added automatically from request for surgery [id number]       delivery delivered 2020     Priority:  Medium    GBS bacteriuria 2020     Priority: Medium     Treat with pcn in labor.       Microscopic hematuria 2019     Priority: Medium     No anatomic cause noted. Repeat yearly.       Recurrent UTI 2018     Priority: Medium    Renal atrophy, right 2018     Priority: Medium      Past Medical History:   Diagnosis Date    Atrophic kidney     Depressive disorder     Therapy during childhood    Diet controlled gestational diabetes mellitus (GDM) in third trimester 2020    Late-onset    Glucose intolerance 05/15/2020    Glucose intolerance of pregnancy 2020    Hypertension 24    Shift work sleep disorder      Past Surgical History:   Procedure Laterality Date     SECTION N/A 2020    Procedure:  SECTION;  Surgeon: Magdi Cannon MD;  Location: [facility]    COLONOSCOPY      DILATE EUSTACHIAN TUBE N/A 2023    Procedure: Bilateral Eustachian Tube Dilation, Out-fracture Bilateral Inferior Turbinate, Left Septal Displacement;  Surgeon: Yamel Lucas MD;  Location: [facility]    ENDOSCOPY UPPER, COLONOSCOPY, COMBINED N/A 2021    Procedure: colonoscopy with biopsy and polypectomy, upper endoscopy with biopsy;  Surgeon: Joe Nayak MD;  Location: [facility] OR    PE TUBES Bilateral     years     TYMPANOPLASTY N/A 2023    Procedure: Left Fat Myringoplasty;  Surgeon: Yamel Lucas MD;  Location: [facility]     Current Outpatient Medications   Medication Sig Dispense Refill    acetaminophen (TYLENOL) 325 MG tablet Take 325-650 mg by mouth every 6 hours as needed for mild pain      albuterol (PROAIR HFA/PROVENTIL HFA/VENTOLIN HFA) 108 (90 Base) MCG/ACT inhaler Inhale 2 puffs into the lungs every 4 hours as needed for shortness of breath, wheezing or cough 18 g 1    amoxicillin-clavulanate (AUGMENTIN) 875-125 MG tablet Take 1 tablet by mouth 2 times daily for 7 days 14 tablet 0    cetirizine (ZYRTEC) 10 MG tablet Take 10 mg every evening 60 tablet prn     CONTOUR NEXT TEST test strip       Microlet Lancets MISC       pantoprazole (PROTONIX) 40 MG EC tablet Take 40 mg by mouth      sucralfate (CARAFATE) 1 GM tablet TAKE 1 TABLET BY MOUTH THREE TIMES DAILY 1 HOUR BEFORE MEALS AND 1 TO 2 TABLETS AT BEDTIME. TAKE AS NEEDED FOR ABDOMINAL PAIN         Allergies   Allergen Reactions    Pineapple Hives and Swelling     Throat swelling        Social History     Tobacco Use    Smoking status: Former     Packs/day: 0.50     Years: 5.00     Additional pack years: 0.00     Total pack years: 2.50     Types: Cigarettes     Start date: 2014     Quit date: 3/1/2018     Years since quittin.9     Passive exposure: Past    Smokeless tobacco: Never   Substance Use Topics    Alcohol use: No     Family History   Problem Relation Age of Onset    Other - See Comments Mother         bells palsy post auto accident    Heart Disease Father         s/p stents    Asthma Father     Cancer Father     Coronary Artery Disease Father     Hypertension Father     Other Cancer Father     Depression Father     Anxiety Disorder Father     Diabetes Maternal Grandmother     Breast Cancer Paternal Grandmother      History   Drug Use No         Review of Systems    Review of Systems  CONSTITUTIONAL: NEGATIVE for fever, chills, change in weight  INTEGUMENTARY/SKIN: NEGATIVE for worrisome rashes, moles or lesions  EYES: NEGATIVE for vision changes or irritation  ENT/MOUTH: NEGATIVE for ear, mouth and throat problems  RESP: NEGATIVE for significant cough or SOB  BREAST: NEGATIVE for masses, tenderness or discharge  CV: NEGATIVE for chest pain, palpitations or peripheral edema  GI: NEGATIVE for nausea, abdominal pain, heartburn, or change in bowel habits  : NEGATIVE for frequency, dysuria, or hematuria  MUSCULOSKELETAL: NEGATIVE for significant arthralgias or myalgia  NEURO: NEGATIVE for weakness, dizziness or paresthesias  ENDOCRINE: NEGATIVE for temperature intolerance, skin/hair changes  HEME: NEGATIVE  "for bleeding problems  PSYCHIATRIC: NEGATIVE for changes in mood or affect    Objective    /86   Pulse 92   Temp 98.1  F (36.7  C) (Tympanic)   Resp 16   Wt 87.6 kg (193 lb 3.2 oz)   SpO2 99%   BMI 35.34 kg/m     Estimated body mass index is 35.34 kg/m  as calculated from the following:    Height as of 1/31/24: 1.575 m (5' 2\").    Weight as of this encounter: 87.6 kg (193 lb 3.2 oz).    Physical Exam    GENERAL: alert and no distress  EYES: Eyes grossly normal to inspection, PERRL and conjunctivae and sclerae normal  HENT: normal cephalic/atraumatic, right ear: erythematous and bulging membrane, left ear: normal: no effusions, no erythema, normal landmarks, nose and mouth without ulcers or lesions, oropharynx clear, oral mucous membranes moist, and sinuses: maxillary tenderness on right  NECK: no adenopathy, no asymmetry, masses, or scars  RESP: lungs clear to auscultation - no rales, rhonchi or wheezes  CV: regular rate and rhythm, normal S1 S2, no S3 or S4, no murmur, click or rub, no peripheral edema  ABDOMEN: soft, nontender, no hepatosplenomegaly, no masses and bowel sounds normal  MS: no gross musculoskeletal defects noted, no edema  SKIN: no suspicious lesions or rashes  NEURO: Normal strength and tone, mentation intact and speech normal  PSYCH: mentation appears normal, affect normal/bright  LYMPH: no cervical, supraclavicular, axillary, or inguinal adenopathy    Recent Labs   Lab Test 06/26/23  0922   HGB 13.9         POTASSIUM 4.0   CR 0.77        Diagnostics  Recent Results (from the past 24 hour(s))   Basic metabolic panel    Collection Time: 02/05/24  9:31 AM   Result Value Ref Range    Sodium 138 135 - 145 mmol/L    Potassium 4.2 3.4 - 5.3 mmol/L    Chloride 104 98 - 107 mmol/L    Carbon Dioxide (CO2) 25 22 - 29 mmol/L    Anion Gap 9 7 - 15 mmol/L    Urea Nitrogen 9.3 6.0 - 20.0 mg/dL    Creatinine 0.72 0.51 - 0.95 mg/dL    GFR Estimate >90 >60 mL/min/1.73m2    Calcium 9.2 8.6 " - 10.0 mg/dL    Glucose 102 (H) 70 - 99 mg/dL   CBC with platelets and differential    Collection Time: 02/05/24  9:31 AM   Result Value Ref Range    WBC Count 7.0 4.0 - 11.0 10e3/uL    RBC Count 4.53 3.80 - 5.20 10e6/uL    Hemoglobin 12.8 11.7 - 15.7 g/dL    Hematocrit 38.2 35.0 - 47.0 %    MCV 84 78 - 100 fL    MCH 28.3 26.5 - 33.0 pg    MCHC 33.5 31.5 - 36.5 g/dL    RDW 13.0 10.0 - 15.0 %    Platelet Count 419 150 - 450 10e3/uL    % Neutrophils 59 %    % Lymphocytes 32 %    % Monocytes 6 %    % Eosinophils 2 %    % Basophils 1 %    % Immature Granulocytes 0 %    NRBCs per 100 WBC 0 <1 /100    Absolute Neutrophils 4.1 1.6 - 8.3 10e3/uL    Absolute Lymphocytes 2.3 0.8 - 5.3 10e3/uL    Absolute Monocytes 0.4 0.0 - 1.3 10e3/uL    Absolute Eosinophils 0.2 0.0 - 0.7 10e3/uL    Absolute Basophils 0.1 0.0 - 0.2 10e3/uL    Absolute Immature Granulocytes 0.0 <=0.4 10e3/uL    Absolute NRBCs 0.0 10e3/uL   HCG Qual, Urine ([id number])    Collection Time: 02/05/24 10:04 AM   Result Value Ref Range    hCG Urine Qualitative Negative Negative      No EKG required, no history of coronary heart disease, significant arrhythmia, peripheral arterial disease or other structural heart disease.    Revised Cardiac Risk Index (RCRI)  The patient has the following serious cardiovascular risks for perioperative complications:   - No serious cardiac risks = 0 points     RCRI Interpretation: 0 points: Class I (very low risk - 0.4% complication rate)         Signed Electronically by: SPENSER Buckley CNP  Copy of this evaluation report is provided to requesting physician.

## 2024-02-06 ENCOUNTER — TELEPHONE (OUTPATIENT)
Dept: FAMILY MEDICINE | Facility: OTHER | Age: 29
End: 2024-02-06

## 2024-04-01 ENCOUNTER — TELEPHONE (OUTPATIENT)
Dept: OTOLARYNGOLOGY | Facility: CLINIC | Age: 29
End: 2024-04-01
Payer: COMMERCIAL

## 2024-04-01 NOTE — TELEPHONE ENCOUNTER
Left Voicemail (1st Attempt) for the patient to call back and schedule the following:    Appointment type: RTN  Provider: Trevor  Return date: 7/3 - 7/11  Specialty phone number: direct  Additional appointment(s) needed: ENT audio   Additonal Notes: 2nd post op- DOS 5/24- 6/7 week with WIN-7/3-7/11

## 2024-04-12 ENCOUNTER — TELEPHONE (OUTPATIENT)
Dept: OTOLARYNGOLOGY | Facility: CLINIC | Age: 29
End: 2024-04-12
Payer: COMMERCIAL

## 2024-05-11 NOTE — PROGRESS NOTES
Preoperative Evaluation  [facility] [place]  [address]  [place] [address]  Phone: [phone]  Primary Provider: Jia Kirk  Pre-op Performing Provider: MARILOU HOLT  May 13, 2024       April is a 28 year old, presenting for the following:  Pre-Op Exam        5/13/2024     9:17 AM   Additional Questions   Roomed by Radha Nolen   Accompanied by none         5/13/2024     9:17 AM   Patient Reported Additional Medications   Patient reports taking the following new medications none     Surgical Information  Surgery/Procedure: R Tympanoplasty with cartilage backing with t-tube placement  Surgery Location: [facility]  Surgeon: Dr. Carson  Surgery Date: 5/24  Time of Surgery: TBD  Where patient plans to recover: At home with family  Fax number for surgical facility: Note does not need to be faxed, will be available electronically in Epic.    Assessment & Plan     The proposed surgical procedure is considered INTERMEDIATE risk.    Preop general physical exam/Conductive hearing loss, bilateral/Dysfunction of both eustachian tubes  Approval given to proceed. Medication instructions reviewed.   - CBC with platelets and differential  - Basic metabolic panel  - HCG Qual, Urine ([id number])    Gastroesophageal reflux disease without esophagitis  Stable.    Renal atrophy, right  Follows with nephrology, notes reviewed. BP ok for above procedure however above goal. Discussed dietary modification, physical activity, weight loss.       Antiplatelet or Anticoagulation Medication Instructions   - Patient is on no antiplatelet or anticoagulation medications.    Additional Medication Instructions   - sucralfate: HOLD day of surgery.   - cetirizine and first generation antihistamines: HOLD on day of surgery.   - Herbal medications and vitamins: HOLD 14 days prior to surgery.    Recommendation  APPROVAL GIVEN to proceed with proposed procedure, without further diagnostic evaluation.    25 minutes spent by me on the  date of the encounter doing chart review, history and exam, documentation and further activities per the note    Subjective       HPI related to upcoming procedure: GILDARDO is a 28-year-old female presenting for preoperative exam for upcoming t-tube placement and cartilage backed tympanoplasty.          5/13/2024     9:15 AM   Preop Questions   1. Have you ever had a heart attack or stroke? No   2. Have you ever had surgery on your heart or blood vessels, such as a stent placement, a coronary artery bypass, or surgery on an artery in your head, neck, heart, or legs? No   3. Do you have chest pain with activity? No   4. Do you have a history of  heart failure? No   5. Do you currently have a cold, bronchitis or symptoms of other infection? No   6. Do you have a cough, shortness of breath, or wheezing? No   7. Do you or anyone in your family have previous history of blood clots? No   8. Do you or does anyone in your family have a serious bleeding problem such as prolonged bleeding following surgeries or cuts? YES - grandmother   9. Have you ever had problems with anemia or been told to take iron pills? YES - after csection was on iron pills, is no longer on them   10. Have you had any abnormal blood loss such as black, tarry or bloody stools, or abnormal vaginal bleeding? No   11. Have you ever had a blood transfusion? No   12. Are you willing to have a blood transfusion if it is medically needed before, during, or after your surgery? Yes   13. Have you or any of your relatives ever had problems with anesthesia? YES - grandmother woken during procedure   14. Do you have sleep apnea, excessive snoring or daytime drowsiness? No   15. Do you have any artifical heart valves or other implanted medical devices like a pacemaker, defibrillator, or continuous glucose monitor? No   16. Do you have artificial joints? No   17. Are you allergic to latex? No   18. Is there any chance that you may be pregnant? No     Health Care  Directive  Patient does not have a Health Care Directive or Living Will: Discussed advance care planning with patient; however, patient declined at this time.    Preoperative Review of    reviewed - no record of controlled substances prescribed.    Status of Chronic Conditions:  See problem list for active medical problems.  Problems all longstanding and stable, except as noted/documented.  See ROS for pertinent symptoms related to these conditions.    Patient Active Problem List    Diagnosis Date Noted    Conductive hearing loss, bilateral 2024     Priority: Medium    Dysfunction of both eustachian tubes 2024     Priority: Medium    H/O pre-eclampsia 2023     Priority: Medium    Fetal anomaly necessitating delivery 2022     Priority: Medium    Non-reassuring electronic fetal monitoring tracing 2022     Priority: Medium    Postpartum anemia 2022     Priority: Medium    Pre-eclampsia in third trimester 2022     Priority: Medium    Supervision of other normal pregnancy, antepartum 2021     Priority: Medium     B positive  BOY  NIPT negative  Hx PCS  Hx preeclampsia   Hx of GDM diet controlled  Hx of one functioning kidney (Left).  Atrophy of right kidney  Baby doc:  Chas  FOB:  Dominick  Son:  [place] 1              Regional enteritis of large intestine (H) 2021     Priority: Medium     Added automatically from request for surgery [id number]      Family history of Crohn's disease 2021     Priority: Medium     Added automatically from request for surgery [id number]       delivery delivered 2020     Priority: Medium    GBS bacteriuria 2020     Priority: Medium     Treat with pcn in labor.       Microscopic hematuria 2019     Priority: Medium     No anatomic cause noted. Repeat yearly.       Recurrent UTI 2018     Priority: Medium    Renal atrophy, right 2018     Priority: Medium      Past Medical History:   Diagnosis Date     Atrophic kidney     Depressive disorder     Therapy during childhood    Diet controlled gestational diabetes mellitus (GDM) in third trimester 2020    Late-onset    Glucose intolerance 05/15/2020    Glucose intolerance of pregnancy 2020    Hypertension 24    Shift work sleep disorder      Past Surgical History:   Procedure Laterality Date     SECTION N/A 2020    Procedure:  SECTION;  Surgeon: Magdi Cannon MD;  Location: [facility]    COLONOSCOPY      DILATE EUSTACHIAN TUBE N/A 2023    Procedure: Bilateral Eustachian Tube Dilation, Out-fracture Bilateral Inferior Turbinate, Left Septal Displacement;  Surgeon: Yamel Lucas MD;  Location: [facility]    ENDOSCOPY UPPER, COLONOSCOPY, COMBINED N/A 2021    Procedure: colonoscopy with biopsy and polypectomy, upper endoscopy with biopsy;  Surgeon: Joe Nayak MD;  Location: [facility]    PE TUBES Bilateral     years     TYMPANOPLASTY N/A 2023    Procedure: Left Fat Myringoplasty;  Surgeon: Yamel Lucas MD;  Location: [facility]     Current Outpatient Medications   Medication Sig Dispense Refill    acetaminophen (TYLENOL) 325 MG tablet Take 325-650 mg by mouth every 6 hours as needed for mild pain      albuterol (PROAIR HFA/PROVENTIL HFA/VENTOLIN HFA) 108 (90 Base) MCG/ACT inhaler Inhale 2 puffs into the lungs every 4 hours as needed for shortness of breath, wheezing or cough 18 g 1    cetirizine (ZYRTEC) 10 MG tablet Take 10 mg every evening 60 tablet prn    CONTOUR NEXT TEST test strip       Microlet Lancets MISC       pantoprazole (PROTONIX) 40 MG EC tablet Take 40 mg by mouth      sucralfate (CARAFATE) 1 GM tablet TAKE 1 TABLET BY MOUTH THREE TIMES DAILY 1 HOUR BEFORE MEALS AND 1 TO 2 TABLETS AT BEDTIME. TAKE AS NEEDED FOR ABDOMINAL PAIN         Allergies   Allergen Reactions    Pineapple Hives and Swelling     Throat swelling        Social History     Tobacco Use    Smoking status: Former     Current packs/day:  "0.00     Average packs/day: 0.5 packs/day for 5.0 years (2.5 ttl pk-yrs)     Types: Cigarettes     Start date: 2014     Quit date: 3/1/2018     Years since quittin.2     Passive exposure: Past    Smokeless tobacco: Never   Substance Use Topics    Alcohol use: No     Family History   Problem Relation Age of Onset    Other - See Comments Mother         bells palsy post auto accident    Heart Disease Father         s/p stents    Asthma Father     Cancer Father     Coronary Artery Disease Father     Hypertension Father     Other Cancer Father     Depression Father     Anxiety Disorder Father     Diabetes Maternal Grandmother     Breast Cancer Paternal Grandmother      History   Drug Use No         Review of Systems    Review of Systems  CONSTITUTIONAL: NEGATIVE for fever, chills, change in weight  INTEGUMENTARY/SKIN: NEGATIVE for worrisome rashes, moles or lesions  EYES: NEGATIVE for vision changes or irritation  ENT/MOUTH: NEGATIVE for ear, mouth and throat problems  RESP: NEGATIVE for significant cough or SOB  CV: NEGATIVE for chest pain, palpitations or peripheral edema  GI: NEGATIVE for nausea, abdominal pain, heartburn, or change in bowel habits  : NEGATIVE for frequency, dysuria, or hematuria  MUSCULOSKELETAL: NEGATIVE for significant arthralgias or myalgia  NEURO: NEGATIVE for weakness, dizziness or paresthesias  ENDOCRINE: NEGATIVE for temperature intolerance, skin/hair changes  HEME: NEGATIVE for bleeding problems  PSYCHIATRIC: NEGATIVE for changes in mood or affect    Objective    /89 (BP Location: Left arm, Patient Position: Sitting, Cuff Size: Adult Large)   Pulse 90   Temp 98.4  F (36.9  C) (Tympanic)   Resp 16   Ht 1.575 m (5' 2\")   Wt 84.6 kg (186 lb 8 oz)   SpO2 100%   BMI 34.11 kg/m     Estimated body mass index is 34.11 kg/m  as calculated from the following:    Height as of this encounter: 1.575 m (5' 2\").    Weight as of this encounter: 84.6 kg (186 lb 8 oz).    Physical " Exam  GENERAL: alert and no distress  EYES: Eyes grossly normal to inspection, PERRL and conjunctivae and sclerae normal  HENT: ear canals and TM's normal, nose and mouth without ulcers or lesions  NECK: no adenopathy, no asymmetry, masses, or scars  RESP: lungs clear to auscultation - no rales, rhonchi or wheezes  CV: regular rate and rhythm, normal S1 S2, no S3 or S4, no murmur, click or rub, no peripheral edema  ABDOMEN: soft, nontender, no hepatosplenomegaly, no masses and bowel sounds normal  MS: no gross musculoskeletal defects noted, no edema  SKIN: no suspicious lesions or rashes  NEURO: Normal strength and tone, mentation intact and speech normal  PSYCH: mentation appears normal, affect normal/bright    Recent Labs   Lab Test 02/05/24  0931 06/26/23  0922   HGB 12.8 13.9    317    138   POTASSIUM 4.2 4.0   CR 0.72 0.77        Diagnostics  Recent Results (from the past 24 hour(s))   Basic metabolic panel    Collection Time: 05/13/24  9:24 AM   Result Value Ref Range    Sodium 135 135 - 145 mmol/L    Potassium 3.8 3.4 - 5.3 mmol/L    Chloride 102 98 - 107 mmol/L    Carbon Dioxide (CO2) 23 22 - 29 mmol/L    Anion Gap 10 7 - 15 mmol/L    Urea Nitrogen 11.6 6.0 - 20.0 mg/dL    Creatinine 0.80 0.51 - 0.95 mg/dL    GFR Estimate >90 >60 mL/min/1.73m2    Calcium 9.4 8.6 - 10.0 mg/dL    Glucose 98 70 - 99 mg/dL   CBC with platelets and differential    Collection Time: 05/13/24  9:24 AM   Result Value Ref Range    WBC Count 7.2 4.0 - 11.0 10e3/uL    RBC Count 4.95 3.80 - 5.20 10e6/uL    Hemoglobin 13.0 11.7 - 15.7 g/dL    Hematocrit 40.3 35.0 - 47.0 %    MCV 81 78 - 100 fL    MCH 26.3 (L) 26.5 - 33.0 pg    MCHC 32.3 31.5 - 36.5 g/dL    RDW 13.7 10.0 - 15.0 %    Platelet Count 307 150 - 450 10e3/uL    % Neutrophils 59 %    % Lymphocytes 33 %    % Monocytes 6 %    % Eosinophils 2 %    % Basophils 0 %    % Immature Granulocytes 0 %    NRBCs per 100 WBC 0 <1 /100    Absolute Neutrophils 4.2 1.6 - 8.3 10e3/uL     Absolute Lymphocytes 2.3 0.8 - 5.3 10e3/uL    Absolute Monocytes 0.4 0.0 - 1.3 10e3/uL    Absolute Eosinophils 0.2 0.0 - 0.7 10e3/uL    Absolute Basophils 0.0 0.0 - 0.2 10e3/uL    Absolute Immature Granulocytes 0.0 <=0.4 10e3/uL    Absolute NRBCs 0.0 10e3/uL   HCG Qual, Urine ([id number])    Collection Time: 05/13/24  9:56 AM   Result Value Ref Range    hCG Urine Qualitative Negative Negative      No EKG required, no history of coronary heart disease, significant arrhythmia, peripheral arterial disease or other structural heart disease.    Revised Cardiac Risk Index (RCRI)  The patient has the following serious cardiovascular risks for perioperative complications:   - No serious cardiac risks = 0 points     RCRI Interpretation: 0 points: Class I (very low risk - 0.4% complication rate)         Signed Electronically by: SPENSER Buckley CNP  Copy of this evaluation report is provided to requesting physician.

## 2024-05-13 ENCOUNTER — OFFICE VISIT (OUTPATIENT)
Dept: FAMILY MEDICINE | Facility: OTHER | Age: 29
End: 2024-05-13
Payer: COMMERCIAL

## 2024-05-13 VITALS
RESPIRATION RATE: 16 BRPM | SYSTOLIC BLOOD PRESSURE: 133 MMHG | HEIGHT: 62 IN | TEMPERATURE: 98.4 F | OXYGEN SATURATION: 100 % | WEIGHT: 186.5 LBS | HEART RATE: 90 BPM | DIASTOLIC BLOOD PRESSURE: 89 MMHG | BODY MASS INDEX: 34.32 KG/M2

## 2024-05-13 DIAGNOSIS — Z01.818 PREOP GENERAL PHYSICAL EXAM: Primary | ICD-10-CM

## 2024-05-13 DIAGNOSIS — H90.0 CONDUCTIVE HEARING LOSS, BILATERAL: ICD-10-CM

## 2024-05-13 DIAGNOSIS — N26.1 RENAL ATROPHY, RIGHT: ICD-10-CM

## 2024-05-13 DIAGNOSIS — K21.9 GASTROESOPHAGEAL REFLUX DISEASE WITHOUT ESOPHAGITIS: ICD-10-CM

## 2024-05-13 DIAGNOSIS — H69.93 DYSFUNCTION OF BOTH EUSTACHIAN TUBES: ICD-10-CM

## 2024-05-13 LAB
ANION GAP SERPL CALCULATED.3IONS-SCNC: 10 MMOL/L (ref 7–15)
BASOPHILS # BLD AUTO: 0 10E3/UL (ref 0–0.2)
BASOPHILS NFR BLD AUTO: 0 %
BUN SERPL-MCNC: 11.6 MG/DL (ref 6–20)
CALCIUM SERPL-MCNC: 9.4 MG/DL (ref 8.6–10)
CHLORIDE SERPL-SCNC: 102 MMOL/L (ref 98–107)
CREAT SERPL-MCNC: 0.8 MG/DL (ref 0.51–0.95)
DEPRECATED HCO3 PLAS-SCNC: 23 MMOL/L (ref 22–29)
EGFRCR SERPLBLD CKD-EPI 2021: >90 ML/MIN/1.73M2
EOSINOPHIL # BLD AUTO: 0.2 10E3/UL (ref 0–0.7)
EOSINOPHIL NFR BLD AUTO: 2 %
ERYTHROCYTE [DISTWIDTH] IN BLOOD BY AUTOMATED COUNT: 13.7 % (ref 10–15)
GLUCOSE SERPL-MCNC: 98 MG/DL (ref 70–99)
HCG UR QL: NEGATIVE
HCT VFR BLD AUTO: 40.3 % (ref 35–47)
HGB BLD-MCNC: 13 G/DL (ref 11.7–15.7)
IMM GRANULOCYTES # BLD: 0 10E3/UL
IMM GRANULOCYTES NFR BLD: 0 %
LYMPHOCYTES # BLD AUTO: 2.3 10E3/UL (ref 0.8–5.3)
LYMPHOCYTES NFR BLD AUTO: 33 %
MCH RBC QN AUTO: 26.3 PG (ref 26.5–33)
MCHC RBC AUTO-ENTMCNC: 32.3 G/DL (ref 31.5–36.5)
MCV RBC AUTO: 81 FL (ref 78–100)
MONOCYTES # BLD AUTO: 0.4 10E3/UL (ref 0–1.3)
MONOCYTES NFR BLD AUTO: 6 %
NEUTROPHILS # BLD AUTO: 4.2 10E3/UL (ref 1.6–8.3)
NEUTROPHILS NFR BLD AUTO: 59 %
NRBC # BLD AUTO: 0 10E3/UL
NRBC BLD AUTO-RTO: 0 /100
PLATELET # BLD AUTO: 307 10E3/UL (ref 150–450)
POTASSIUM SERPL-SCNC: 3.8 MMOL/L (ref 3.4–5.3)
RBC # BLD AUTO: 4.95 10E6/UL (ref 3.8–5.2)
SODIUM SERPL-SCNC: 135 MMOL/L (ref 135–145)
WBC # BLD AUTO: 7.2 10E3/UL (ref 4–11)

## 2024-05-13 PROCEDURE — 80048 BASIC METABOLIC PNL TOTAL CA: CPT | Mod: ZL

## 2024-05-13 PROCEDURE — 85048 AUTOMATED LEUKOCYTE COUNT: CPT | Mod: ZL

## 2024-05-13 PROCEDURE — 81025 URINE PREGNANCY TEST: CPT | Mod: ZL

## 2024-05-13 PROCEDURE — 36415 COLL VENOUS BLD VENIPUNCTURE: CPT | Mod: ZL

## 2024-05-13 PROCEDURE — G0463 HOSPITAL OUTPT CLINIC VISIT: HCPCS

## 2024-05-13 PROCEDURE — 99213 OFFICE O/P EST LOW 20 MIN: CPT

## 2024-05-13 NOTE — PATIENT INSTRUCTIONS
- sucralfate: HOLD day of surgery. This is carafate   - cetirizine and first generation antihistamines: HOLD on day of surgery. This is zyrtec   - Herbal medications and vitamins: HOLD 14 days prior to surgery.  Preparing for Your Surgery  Getting started  A nurse will call you to review your health history and instructions. They will give you an arrival time based on your scheduled surgery time. Please be ready to share:  Your doctor's clinic name and phone number  Your medical, surgical, and anesthesia history  A list of allergies and sensitivities  A list of medicines, including herbal treatments and over-the-counter drugs  Whether the patient has a legal guardian (ask how to send us the papers in advance)  Please tell us if you're pregnant--or if there's any chance you might be pregnant. Some surgeries may injure a fetus (unborn baby), so they require a pregnancy test. Surgeries that are safe for a fetus don't always need a test, and you can choose whether to have one.   If you have a child who's having surgery, please ask for a copy of Preparing for Your Child's Surgery.    Preparing for surgery  Within 10 to 30 days of surgery: Have a pre-op exam (sometimes called an H&P, or History and Physical). This can be done at a clinic or pre-operative center.  If you're having a , you may not need this exam. Talk to your care team.  At your pre-op exam, talk to your care team about all medicines you take. If you need to stop any medicines before surgery, ask when to start taking them again.  We do this for your safety. Many medicines can make you bleed too much during surgery. Some change how well surgery (anesthesia) drugs work.  Call your insurance company to let them know you're having surgery. (If you don't have insurance, call [phone].)  Call your clinic if there's any change in your health. This includes signs of a cold or flu (sore throat, runny nose, cough, rash, fever). It also includes a scrape  or scratch near the surgery site.  If you have questions on the day of surgery, call your hospital or surgery center.  Eating and drinking guidelines  For your safety: Unless your surgeon tells you otherwise, follow the guidelines below.  Eat and drink as usual until 8 hours before you arrive for surgery. After that, no food or milk.  Drink clear liquids until 2 hours before you arrive. These are liquids you can see through, like water, Gatorade, and Propel Water. They also include plain black coffee and tea (no cream or milk), candy, and breath mints. You can spit out gum when you arrive.  If you drink alcohol: Stop drinking it the night before surgery.  If your care team tells you to take medicine on the morning of surgery, it's okay to take it with a sip of water.  Preventing infection  Shower or bathe the night before and morning of your surgery. Follow the instructions your clinic gave you. (If no instructions, use regular soap.)  Don't shave or clip hair near your surgery site. We'll remove the hair if needed.  Don't smoke or vape the morning of surgery. You may chew nicotine gum up to 2 hours before surgery. A nicotine patch is okay.  Note: Some surgeries require you to completely quit smoking and nicotine. Check with your surgeon.  Your care team will make every effort to keep you safe from infection. We will:  Clean our hands often with soap and water (or an alcohol-based hand rub).  Clean the skin at your surgery site with a special soap that kills germs.  Give you a special gown to keep you warm. (Cold raises the risk of infection.)  Wear special hair covers, masks, gowns and gloves during surgery.  Give antibiotic medicine, if prescribed. Not all surgeries need antibiotics.  What to bring on the day of surgery  Photo ID and insurance card  Copy of your health care directive, if you have one  Glasses and hearing aids (bring cases)  You can't wear contacts during surgery  Inhaler and eye drops, if you use  them (tell us about these when you arrive)  CPAP machine or breathing device, if you use them  A few personal items, if spending the night  If you have . . .  A pacemaker, ICD (cardiac defibrillator) or other implant: Bring the ID card.  An implanted stimulator: Bring the remote control.  A legal guardian: Bring a copy of the certified (court-stamped) guardianship papers.  Please remove any jewelry, including body piercings. Leave jewelry and other valuables at home.  If you're going home the day of surgery  You must have a responsible adult drive you home. They should stay with you overnight as well.  If you don't have someone to stay with you, and you aren't safe to go home alone, we may keep you overnight. Insurance often won't pay for this.  After surgery  If it's hard to control your pain or you need more pain medicine, please call your surgeon's office.  Questions?   If you have any questions for your care team, list them here: _________________________________________________________________________________________________________________________________________________________________________ ____________________________________ ____________________________________ ____________________________________  For informational purposes only. Not to replace the advice of your health care provider. Copyright   2003, 2019 [facility]. All rights reserved. Clinically reviewed by Shala Massey MD. Loot! [id number] - REV 12/22.

## 2024-05-14 ENCOUNTER — OFFICE VISIT (OUTPATIENT)
Dept: FAMILY MEDICINE | Facility: OTHER | Age: 29
End: 2024-05-14
Attending: FAMILY MEDICINE
Payer: COMMERCIAL

## 2024-05-14 VITALS
HEART RATE: 84 BPM | WEIGHT: 188 LBS | DIASTOLIC BLOOD PRESSURE: 84 MMHG | BODY MASS INDEX: 31.32 KG/M2 | OXYGEN SATURATION: 100 % | SYSTOLIC BLOOD PRESSURE: 132 MMHG | TEMPERATURE: 98.7 F | HEIGHT: 65 IN

## 2024-05-14 DIAGNOSIS — R03.0 ELEVATED BLOOD PRESSURE READING WITHOUT DIAGNOSIS OF HYPERTENSION: ICD-10-CM

## 2024-05-14 DIAGNOSIS — N92.1 MENORRHAGIA WITH IRREGULAR CYCLE: Primary | ICD-10-CM

## 2024-05-14 DIAGNOSIS — N26.1 RENAL ATROPHY, RIGHT: ICD-10-CM

## 2024-05-14 DIAGNOSIS — Z00.00 ROUTINE GENERAL MEDICAL EXAMINATION AT A HEALTH CARE FACILITY: ICD-10-CM

## 2024-05-14 DIAGNOSIS — K21.9 GASTROESOPHAGEAL REFLUX DISEASE WITHOUT ESOPHAGITIS: ICD-10-CM

## 2024-05-14 DIAGNOSIS — Z12.4 SCREENING FOR CERVICAL CANCER: ICD-10-CM

## 2024-05-14 DIAGNOSIS — N89.8 VAGINAL DISCHARGE: ICD-10-CM

## 2024-05-14 LAB
ANION GAP SERPL CALCULATED.3IONS-SCNC: 11 MMOL/L (ref 7–15)
BUN SERPL-MCNC: 12.6 MG/DL (ref 6–20)
CALCIUM SERPL-MCNC: 9.8 MG/DL (ref 8.6–10)
CHLORIDE SERPL-SCNC: 103 MMOL/L (ref 98–107)
CLUE CELLS: NORMAL
CREAT SERPL-MCNC: 0.84 MG/DL (ref 0.51–0.95)
DEPRECATED HCO3 PLAS-SCNC: 23 MMOL/L (ref 22–29)
EGFRCR SERPLBLD CKD-EPI 2021: >90 ML/MIN/1.73M2
EST. AVERAGE GLUCOSE BLD GHB EST-MCNC: 108 MG/DL
GLUCOSE SERPL-MCNC: 96 MG/DL (ref 70–99)
HBA1C MFR BLD: 5.4 %
POTASSIUM SERPL-SCNC: 4 MMOL/L (ref 3.4–5.3)
SODIUM SERPL-SCNC: 137 MMOL/L (ref 135–145)
TRICHOMONAS, WET PREP: NORMAL
TSH SERPL DL<=0.005 MIU/L-ACNC: 1.52 UIU/ML (ref 0.3–4.2)
WBC'S/HIGH POWER FIELD, WET PREP: NORMAL
YEAST, WET PREP: NORMAL

## 2024-05-14 PROCEDURE — 84403 ASSAY OF TOTAL TESTOSTERONE: CPT | Mod: ZL | Performed by: FAMILY MEDICINE

## 2024-05-14 PROCEDURE — 80048 BASIC METABOLIC PNL TOTAL CA: CPT | Mod: ZL | Performed by: FAMILY MEDICINE

## 2024-05-14 PROCEDURE — 99213 OFFICE O/P EST LOW 20 MIN: CPT | Mod: 25 | Performed by: FAMILY MEDICINE

## 2024-05-14 PROCEDURE — 84443 ASSAY THYROID STIM HORMONE: CPT | Mod: ZL | Performed by: FAMILY MEDICINE

## 2024-05-14 PROCEDURE — 36415 COLL VENOUS BLD VENIPUNCTURE: CPT | Mod: ZL | Performed by: FAMILY MEDICINE

## 2024-05-14 PROCEDURE — G0463 HOSPITAL OUTPT CLINIC VISIT: HCPCS

## 2024-05-14 PROCEDURE — 87210 SMEAR WET MOUNT SALINE/INK: CPT | Mod: ZL | Performed by: FAMILY MEDICINE

## 2024-05-14 PROCEDURE — 84146 ASSAY OF PROLACTIN: CPT | Mod: ZL | Performed by: FAMILY MEDICINE

## 2024-05-14 PROCEDURE — 82166 ASSAY ANTI-MULLERIAN HORM: CPT | Mod: ZL | Performed by: FAMILY MEDICINE

## 2024-05-14 PROCEDURE — 99395 PREV VISIT EST AGE 18-39: CPT | Performed by: FAMILY MEDICINE

## 2024-05-14 PROCEDURE — 83036 HEMOGLOBIN GLYCOSYLATED A1C: CPT | Mod: ZL | Performed by: FAMILY MEDICINE

## 2024-05-14 PROCEDURE — 84270 ASSAY OF SEX HORMONE GLOBUL: CPT | Mod: ZL | Performed by: FAMILY MEDICINE

## 2024-05-14 PROCEDURE — G0123 SCREEN CERV/VAG THIN LAYER: HCPCS | Mod: ZL | Performed by: FAMILY MEDICINE

## 2024-05-14 RX ORDER — PANTOPRAZOLE SODIUM 20 MG/1
20 TABLET, DELAYED RELEASE ORAL DAILY
Qty: 90 TABLET | Refills: 0 | Status: SHIPPED | OUTPATIENT
Start: 2024-05-14

## 2024-05-14 SDOH — HEALTH STABILITY: PHYSICAL HEALTH: ON AVERAGE, HOW MANY DAYS PER WEEK DO YOU ENGAGE IN MODERATE TO STRENUOUS EXERCISE (LIKE A BRISK WALK)?: 5 DAYS

## 2024-05-14 SDOH — HEALTH STABILITY: PHYSICAL HEALTH: ON AVERAGE, HOW MANY MINUTES DO YOU ENGAGE IN EXERCISE AT THIS LEVEL?: 60 MIN

## 2024-05-14 ASSESSMENT — SOCIAL DETERMINANTS OF HEALTH (SDOH): HOW OFTEN DO YOU GET TOGETHER WITH FRIENDS OR RELATIVES?: TWICE A WEEK

## 2024-05-14 ASSESSMENT — PAIN SCALES - GENERAL: PAINLEVEL: NO PAIN (0)

## 2024-05-14 NOTE — PATIENT INSTRUCTIONS
"Preventive Care Advice   This is general advice we often give to help people stay healthy. Your care team may have specific advice just for you. Please talk to your care team about your own preventive care needs.  Lifestyle  Exercise at least 150 minutes each week (30 minutes a day, 5 days a week).  Do muscle strengthening activities 2 days a week. These help control your weight and prevent disease.  No smoking.  Wear sunscreen to prevent skin cancer.  Have your home tested for radon every 2 to 5 years. Radon is a colorless, odorless gas that can harm your lungs. To learn more, go to www.health.[facility]. and search for \"Radon in Homes.\"  Keep guns unloaded and locked up in a safe place like a safe or gun vault, or, use a gun lock and hide the keys. Always lock away bullets separately. To learn more, visit [employer].[place].gov and search for \"safe gun storage.\"  Nutrition  Eat 5 or more servings of fruits and vegetables each day.  Try wheat bread, brown rice and whole grain pasta (instead of white bread, rice, and pasta).  Get enough calcium and vitamin D. Check the label on foods and aim for 100% of the RDA (recommended daily allowance).  Regular exams  Have a dental exam and cleaning every 6 months.  See your health care team every year to talk about:  Any changes in your health.  Any medicines your care team has prescribed.  Preventive care, family planning, and ways to prevent chronic diseases.  Shots (vaccines)   HPV shots (up to age 26), if you've never had them before.  Hepatitis B shots (up to age 59), if you've never had them before.  COVID-19 shot: Get this shot when it's due.  Flu shot: Get a flu shot every year.  Tetanus shot: Get a tetanus shot every 10 years.  Pneumococcal, hepatitis A, and RSV shots: Ask your care team if you need these based on your risk.  Shingles shot (for age 50 and up).  General health tests  Diabetes screening:  Starting at age 35, Get screened for diabetes at least every 3 years.  If " you are younger than age 35, ask your care team if you should be screened for diabetes.  Cholesterol test: At age 39, start having a cholesterol test every 5 years, or more often if advised.  Bone density scan (DEXA): At age 50, ask your care team if you should have this scan for osteoporosis (brittle bones).  Hepatitis C: Get tested at least once in your life.  Abdominal aortic aneurysm screening: Talk to your doctor about having this screening if you:  Have ever smoked; and  Are biologically male; and  Are between the ages of 65 and 75.  STIs (sexually transmitted infections)  Before age 24: Ask your care team if you should be screened for STIs.  After age 24: Get screened for STIs if you're at risk. You are at risk for STIs (including HIV) if:  You are sexually active with more than one person.  You don't use condoms every time.  You or a partner was diagnosed with a sexually transmitted infection.  If you are at risk for HIV, ask about PrEP medicine to prevent HIV.  Get tested for HIV at least once in your life, whether you are at risk for HIV or not.  Cancer screening tests  Cervical cancer screening: If you have a cervix, begin getting regular cervical cancer screening tests at age 21. Most people who have regular screenings with normal results can stop after age 65. Talk about this with your provider.  Breast cancer scan (mammogram): If you've ever had breasts, begin having regular mammograms starting at age 40. This is a scan to check for breast cancer.  Colon cancer screening: It is important to start screening for colon cancer at age 45.  Have a colonoscopy test every 10 years (or more often if you're at risk) Or, ask your provider about stool tests like a FIT test every year or Cologuard test every 3 years.  To learn more about your testing options, visit: www.[employer]/065363.pdf.  For help making a decision, visit: vanessa/wr98131.  Prostate cancer screening test: If you have a prostate and are age 55  to 69, ask your provider if you would benefit from a yearly prostate cancer screening test.  Lung cancer screening: If you are a current or former smoker age 50 to 80, ask your care team if ongoing lung cancer screenings are right for you.  For informational purposes only. Not to replace the advice of your health care provider. Copyright   2023 [employer]. All rights reserved. Clinically reviewed by the [facility]itions [facility]. [employer] [id number] - REV 04/24.    Learning About Stress  What is stress?     Stress is your body's response to a hard situation. Your body can have a physical, emotional, or mental response. Stress is a fact of life for most people, and it affects everyone differently. What causes stress for you may not be stressful for someone else.  A lot of things can cause stress. You may feel stress when you go on a job interview, take a test, or run a race. This kind of short-term stress is normal and even useful. It can help you if you need to work hard or react quickly. For example, stress can help you finish an important job on time.  Long-term stress is caused by ongoing stressful situations or events. Examples of long-term stress include long-term health problems, ongoing problems at work, or conflicts in your family. Long-term stress can harm your health.  How does stress affect your health?  When you are stressed, your body responds as though you are in danger. It makes hormones that speed up your heart, make you breathe faster, and give you a burst of energy. This is called the fight-or-flight stress response. If the stress is over quickly, your body goes back to normal and no harm is done.  But if stress happens too often or lasts too long, it can have bad effects. Long-term stress can make you more likely to get sick, and it can make symptoms of some diseases worse. If you tense up when you are stressed, you may develop neck, shoulder, or low back pain. Stress is  linked to high blood pressure and heart disease.  Stress also harms your emotional health. It can make you liu, tense, or depressed. Your relationships may suffer, and you may not do well at work or school.  What can you do to manage stress?  You can try these things to help manage stress:   Do something active. Exercise or activity can help reduce stress. Walking is a great way to get started. Even everyday activities such as housecleaning or yard work can help.  Try yoga or izabela chi. These techniques combine exercise and meditation. You may need some training at first to learn them.  Do something you enjoy. For example, listen to music or go to a movie. Practice your hobby or do volunteer work.  Meditate. This can help you relax, because you are not worrying about what happened before or what may happen in the future.  Do guided imagery. Imagine yourself in any setting that helps you feel calm. You can use online videos, books, or a teacher to guide you.  Do breathing exercises. For example:  From a standing position, bend forward from the waist with your knees slightly bent. Let your arms dangle close to the floor.  Breathe in slowly and deeply as you return to a standing position. Roll up slowly and lift your head last.  Hold your breath for just a few seconds in the standing position.  Breathe out slowly and bend forward from the waist.  Let your feelings out. Talk, laugh, cry, and express anger when you need to. Talking with supportive friends or family, a counselor, or a percy leader about your feelings is a healthy way to relieve stress. Avoid discussing your feelings with people who make you feel worse.  Write. It may help to write about things that are bothering you. This helps you find out how much stress you feel and what is causing it. When you know this, you can find better ways to cope.  What can you do to prevent stress?  You might try some of these things to help prevent stress:  Manage your time.  "This helps you find time to do the things you want and need to do.  Get enough sleep. Your body recovers from the stresses of the day while you are sleeping.  Get support. Your family, friends, and community can make a difference in how you experience stress.  Limit your news feed. Avoid or limit time on social media or news that may make you feel stressed.  Do something active. Exercise or activity can help reduce stress. Walking is a great way to get started.  Where can you learn more?  Go to https://www.[employer].net/patiented  Enter N032 in the search box to learn more about \"Learning About Stress.\"  Current as of: October 24, 2023               Content Version: 14.0    6156-8464 [employer].   Care instructions adapted under license by your healthcare professional. If you have questions about a medical condition or this instruction, always ask your healthcare professional. [employer] disclaims any warranty or liability for your use of this information.      "

## 2024-05-14 NOTE — PROGRESS NOTES
"Preventive Care Visit  [facility]  Jia Kirk MD, Family Medicine  May 14, 2024      Assessment & Plan     April was seen today for physical.    Diagnoses and all orders for this visit:    Routine general medical examination at a health care facility    Menorrhagia with irregular cycle  Started about 6 mo post delivery from last baby. Very frequent and very heavy with clots, increase in cramping. Also noting increase in acne. Labs as below. Previously did not tolerate ocp, depo or iud well and would like to not be on hormonal birth control   -     Testosterone Free and Total; Future  -     Hemoglobin A1c; Future  -     Mullerian Hormone Antibody; Future  -     TSH with free T4 reflex; Future  -     Prolactin; Future  -     Prolactin  -     TSH with free T4 reflex  -     Mullerian Hormone Antibody  -     Hemoglobin A1c  -     Testosterone Free and Total    Elevated blood pressure reading without diagnosis of hypertension / renal atrophy - right  Consider acei or arb given high readings at home. Looking into labs as above, if normal consider adding med  -     Basic metabolic panel; Future  -     Basic metabolic panel    Screening for cervical cancer  -     A pap thin layer screen reflex to HPV if ASCUS - recommend age 25 - 29    Vaginal discharge  Mild, noted on exam only  -     Wet prep  -     Multiplex Vaginal Panel by PCR; Future  -     Multiplex Vaginal Panel by PCR    Gastroesophageal reflux disease without esophagitis  Ok to decrease dose of protonix to 20mg. Will de escalate as able. Hx of capsule enteroscopy: \"Differential diagnosis includes NSAID-induced enteropathy versus early mild Crohn disease. There are no luminal narrowing deep aphthae ulcer or any definitive evidence of Crohn disease, on this capsule. \" Currently no abd pain.   -     pantoprazole (PROTONIX) 20 MG EC tablet; Take 1 tablet (20 mg) by mouth daily            30 minutes spent by me on the date of the encounter doing chart " "review, review of test results, interpretation of tests, patient visit, and documentation       BMI  Estimated body mass index is 31.28 kg/m  as calculated from the following:    Height as of this encounter: 1.651 m (5' 5\").    Weight as of this encounter: 85.3 kg (188 lb).     Counseling  Appropriate preventive services were discussed with this patient, including applicable screening as appropriate for fall prevention, nutrition, physical activity, Tobacco-use cessation, weight loss and cognition.  Checklist reviewing preventive services available has been given to the patient.  Reviewed patient's diet, addressing concerns and/or questions.   She is at risk for psychosocial distress and has been provided with information to reduce risk.     Return in about 53 weeks (around 5/20/2025) for Annual Wellness Visit.    Ana Cristina Olvera is a 28 year old, presenting for the following:  Physical        5/14/2024     1:58 PM   Additional Questions   Roomed by Shaniqua   Accompanied by self        Health Care Directive  Patient does not have a Health Care Directive or Living Will: Discussed advance care planning with patient; however, patient declined at this time.    HPI            5/14/2024   General Health   How would you rate your overall physical health? Good   Feel stress (tense, anxious, or unable to sleep) Only a little   (!) STRESS CONCERN      5/14/2024   Nutrition   Three or more servings of calcium each day? Yes   Diet: Low salt    Diabetic   How many servings of fruit and vegetables per day? (!) 2-3   How many sweetened beverages each day? 0-1         5/14/2024   Exercise   Days per week of moderate/strenous exercise 5 days   Average minutes spent exercising at this level 60 min         5/14/2024   Social Factors   Frequency of gathering with friends or relatives Twice a week   Worry food won't last until get money to buy more No   Food not last or not have enough money for food? No   Do you have housing?  Yes   Are " you worried about losing your housing? No   Lack of transportation? No   Unable to get utilities (heat,electricity)? No         2024   Dental   Dentist two times every year? Yes         2024   TB Screening   Were you born outside of the [place]? No     Today's PHQ-2 Score:       2024     1:59 PM   PHQ-2 (  [employer])   Q1: Little interest or pleasure in doing things 0   Q2: Feeling down, depressed or hopeless 0   PHQ-2 Score 0         2024   Substance Use   Alcohol more than 3/day or more than 7/wk No   Do you use any other substances recreationally? No     Social History     Tobacco Use    Smoking status: Former     Current packs/day: 0.00     Average packs/day: 0.5 packs/day for 5.0 years (2.5 ttl pk-yrs)     Types: Cigarettes     Start date: 2014     Quit date: 3/1/2018     Years since quittin.2     Passive exposure: Past    Smokeless tobacco: Never   Vaping Use    Vaping status: Never Used   Substance Use Topics    Alcohol use: No    Drug use: No         2024   Breast Cancer Screening   Family history of breast, colon, or ovarian cancer? Yes         2024   LAST FHS-7 RESULTS   1st degree relative breast or ovarian cancer Yes   Any relative bilateral breast cancer Yes   Any male have breast cancer No   Any ONE woman have BOTH breast AND ovarian cancer No   Any woman with breast cancer before 50yrs Yes   2 or more relatives with breast AND/OR ovarian cancer No   2 or more relatives with breast AND/OR bowel cancer No     Mammogram Screening - Patient under 40 years of age: Routine Mammogram Screening not recommended.         2024   STI Screening   New sexual partner(s) since last STI/HIV test? No     History of abnormal Pap smear: NO - age 21-29 PAP every 3 years recommended        2021    11:43 AM 2019     1:44 PM   PAP / HPV   PAP (Historical) NIL  NIL            2024   Contraception/Family Planning   Questions about contraception or family planning No  "    Reviewed and updated as needed this visit by Provider   Tobacco  Allergies  Meds  Problems  Med Hx  Surg Hx  Fam Hx          Review of Systems  Constitutional, HEENT, cardiovascular, pulmonary, gi and gu systems are negative, except as otherwise noted.     Objective    Exam  /84   Pulse 84   Temp 98.7  F (37.1  C) (Tympanic)   Ht 1.651 m (5' 5\")   Wt 85.3 kg (188 lb)   SpO2 100%   BMI 31.28 kg/m     Estimated body mass index is 31.28 kg/m  as calculated from the following:    Height as of this encounter: 1.651 m (5' 5\").    Weight as of this encounter: 85.3 kg (188 lb).    Physical Exam  GENERAL: alert and no distress  NECK: no adenopathy, no asymmetry, masses, or scars  RESP: lungs clear to auscultation - no rales, rhonchi or wheezes  CV: regular rates and rhythm, normal S1 S2, no S3 or S4, peripheral pulses strong, and no peripheral edema  ABDOMEN: soft, nontender, no hepatosplenomegaly, no masses and bowel sounds normal   (female) w/bimanual: normal female external genitalia, normal urethral meatus, normal vaginal mucosa, normal cervix/adnexa/uterus without masses or discharge  MS: no gross musculoskeletal defects noted, no edema  SKIN: no suspicious lesions or rashes  PSYCH: mentation appears normal, affect normal/bright        Signed Electronically by: Jia Kirk MD    "

## 2024-05-16 LAB
MIS SERPL-MCNC: 3.33 NG/ML (ref 0.89–9.9)
PROLACTIN SERPL 3RD IS-MCNC: 9 NG/ML (ref 5–23)
SHBG SERPL-SCNC: 33 NMOL/L (ref 30–135)

## 2024-05-17 LAB
TESTOST FREE SERPL-MCNC: 0.41 NG/DL
TESTOST SERPL-MCNC: 23 NG/DL (ref 8–60)

## 2024-05-22 LAB
BKR LAB AP GYN ADEQUACY: NORMAL
BKR LAB AP GYN INTERPRETATION: NORMAL
BKR LAB AP HPV REFLEX: NORMAL
BKR LAB AP PREVIOUS ABNORMAL: NORMAL
PATH REPORT.COMMENTS IMP SPEC: NORMAL
PATH REPORT.COMMENTS IMP SPEC: NORMAL
PATH REPORT.RELEVANT HX SPEC: NORMAL

## 2024-05-23 ENCOUNTER — ANESTHESIA EVENT (OUTPATIENT)
Dept: SURGERY | Facility: AMBULATORY SURGERY CENTER | Age: 29
End: 2024-05-23
Payer: COMMERCIAL

## 2024-05-24 ENCOUNTER — ANESTHESIA (OUTPATIENT)
Dept: SURGERY | Facility: AMBULATORY SURGERY CENTER | Age: 29
End: 2024-05-24
Payer: COMMERCIAL

## 2024-05-24 ENCOUNTER — HOSPITAL ENCOUNTER (OUTPATIENT)
Facility: AMBULATORY SURGERY CENTER | Age: 29
Discharge: HOME | End: 2024-05-24
Attending: OTOLARYNGOLOGY
Payer: COMMERCIAL

## 2024-05-24 VITALS
SYSTOLIC BLOOD PRESSURE: 121 MMHG | BODY MASS INDEX: 31.28 KG/M2 | OXYGEN SATURATION: 95 % | HEART RATE: 107 BPM | RESPIRATION RATE: 18 BRPM | DIASTOLIC BLOOD PRESSURE: 69 MMHG | TEMPERATURE: 98.1 F | WEIGHT: 187.99 LBS

## 2024-05-24 DIAGNOSIS — H69.93 DYSFUNCTION OF BOTH EUSTACHIAN TUBES: Primary | ICD-10-CM

## 2024-05-24 DIAGNOSIS — G89.18 POSTOPERATIVE PAIN: ICD-10-CM

## 2024-05-24 LAB
HCG UR QL: NEGATIVE
INTERNAL QC OK POCT: NORMAL
POCT KIT EXPIRATION DATE: NORMAL
POCT KIT LOT NUMBER: NORMAL

## 2024-05-24 PROCEDURE — 81025 URINE PREGNANCY TEST: CPT | Performed by: PATHOLOGY

## 2024-05-24 PROCEDURE — 69631 REPAIR EARDRUM STRUCTURES: CPT | Performed by: NURSE ANESTHETIST, CERTIFIED REGISTERED

## 2024-05-24 PROCEDURE — 21235 EAR CARTILAGE GRAFT: CPT | Mod: 59

## 2024-05-24 PROCEDURE — 69631 REPAIR EARDRUM STRUCTURES: CPT | Performed by: ANESTHESIOLOGY

## 2024-05-24 PROCEDURE — 69631 REPAIR EARDRUM STRUCTURES: CPT | Mod: 50

## 2024-05-24 RX ORDER — FENTANYL CITRATE 50 UG/ML
50 INJECTION, SOLUTION INTRAMUSCULAR; INTRAVENOUS EVERY 5 MIN PRN
Status: DISCONTINUED | OUTPATIENT
Start: 2024-05-24 | End: 2024-05-24 | Stop reason: HOSPADM

## 2024-05-24 RX ORDER — LABETALOL HYDROCHLORIDE 5 MG/ML
10 INJECTION, SOLUTION INTRAVENOUS
Status: DISCONTINUED | OUTPATIENT
Start: 2024-05-24 | End: 2024-05-24 | Stop reason: HOSPADM

## 2024-05-24 RX ORDER — ACETAMINOPHEN 325 MG/1
650 TABLET ORAL
Status: DISCONTINUED | OUTPATIENT
Start: 2024-05-24 | End: 2024-05-25 | Stop reason: HOSPADM

## 2024-05-24 RX ORDER — ACETAMINOPHEN 325 MG/1
975 TABLET ORAL ONCE
Status: COMPLETED | OUTPATIENT
Start: 2024-05-24 | End: 2024-05-24

## 2024-05-24 RX ORDER — ONDANSETRON 2 MG/ML
4 INJECTION INTRAMUSCULAR; INTRAVENOUS EVERY 30 MIN PRN
Status: DISCONTINUED | OUTPATIENT
Start: 2024-05-24 | End: 2024-05-25 | Stop reason: HOSPADM

## 2024-05-24 RX ORDER — SODIUM CHLORIDE, SODIUM LACTATE, POTASSIUM CHLORIDE, CALCIUM CHLORIDE 600; 310; 30; 20 MG/100ML; MG/100ML; MG/100ML; MG/100ML
INJECTION, SOLUTION INTRAVENOUS CONTINUOUS
Status: DISCONTINUED | OUTPATIENT
Start: 2024-05-24 | End: 2024-05-24 | Stop reason: HOSPADM

## 2024-05-24 RX ORDER — DEXAMETHASONE SODIUM PHOSPHATE 10 MG/ML
4 INJECTION, SOLUTION INTRAMUSCULAR; INTRAVENOUS
Status: DISCONTINUED | OUTPATIENT
Start: 2024-05-24 | End: 2024-05-24 | Stop reason: HOSPADM

## 2024-05-24 RX ORDER — BACITRACIN 500 [USP'U]/G
OINTMENT OPHTHALMIC PRN
Status: DISCONTINUED | OUTPATIENT
Start: 2024-05-24 | End: 2024-05-24 | Stop reason: HOSPADM

## 2024-05-24 RX ORDER — HYDROCODONE BITARTRATE AND ACETAMINOPHEN 5; 325 MG/1; MG/1
1 TABLET ORAL EVERY 6 HOURS PRN
Qty: 6 TABLET | Refills: 0 | Status: SHIPPED | OUTPATIENT
Start: 2024-05-24

## 2024-05-24 RX ORDER — PROPOFOL 10 MG/ML
INJECTION, EMULSION INTRAVENOUS CONTINUOUS PRN
Status: DISCONTINUED | OUTPATIENT
Start: 2024-05-24 | End: 2024-05-24

## 2024-05-24 RX ORDER — NALOXONE HYDROCHLORIDE 0.4 MG/ML
0.1 INJECTION, SOLUTION INTRAMUSCULAR; INTRAVENOUS; SUBCUTANEOUS
Status: DISCONTINUED | OUTPATIENT
Start: 2024-05-24 | End: 2024-05-24 | Stop reason: HOSPADM

## 2024-05-24 RX ORDER — ONDANSETRON 2 MG/ML
4 INJECTION INTRAMUSCULAR; INTRAVENOUS EVERY 30 MIN PRN
Status: DISCONTINUED | OUTPATIENT
Start: 2024-05-24 | End: 2024-05-24 | Stop reason: HOSPADM

## 2024-05-24 RX ORDER — NALOXONE HYDROCHLORIDE 0.4 MG/ML
0.1 INJECTION, SOLUTION INTRAMUSCULAR; INTRAVENOUS; SUBCUTANEOUS
Status: DISCONTINUED | OUTPATIENT
Start: 2024-05-24 | End: 2024-05-25 | Stop reason: HOSPADM

## 2024-05-24 RX ORDER — FENTANYL CITRATE 50 UG/ML
25 INJECTION, SOLUTION INTRAMUSCULAR; INTRAVENOUS
Status: DISCONTINUED | OUTPATIENT
Start: 2024-05-24 | End: 2024-05-25 | Stop reason: HOSPADM

## 2024-05-24 RX ORDER — HYDROMORPHONE HYDROCHLORIDE 1 MG/ML
0.4 INJECTION, SOLUTION INTRAMUSCULAR; INTRAVENOUS; SUBCUTANEOUS EVERY 5 MIN PRN
Status: DISCONTINUED | OUTPATIENT
Start: 2024-05-24 | End: 2024-05-24 | Stop reason: HOSPADM

## 2024-05-24 RX ORDER — ONDANSETRON 4 MG/1
4 TABLET, ORALLY DISINTEGRATING ORAL EVERY 30 MIN PRN
Status: DISCONTINUED | OUTPATIENT
Start: 2024-05-24 | End: 2024-05-25 | Stop reason: HOSPADM

## 2024-05-24 RX ORDER — HYDROCODONE BITARTRATE AND ACETAMINOPHEN 5; 325 MG/1; MG/1
1 TABLET ORAL
Status: DISCONTINUED | OUTPATIENT
Start: 2024-05-24 | End: 2024-05-25 | Stop reason: HOSPADM

## 2024-05-24 RX ORDER — HYDROMORPHONE HYDROCHLORIDE 1 MG/ML
0.2 INJECTION, SOLUTION INTRAMUSCULAR; INTRAVENOUS; SUBCUTANEOUS EVERY 5 MIN PRN
Status: DISCONTINUED | OUTPATIENT
Start: 2024-05-24 | End: 2024-05-24 | Stop reason: HOSPADM

## 2024-05-24 RX ORDER — FENTANYL CITRATE 50 UG/ML
INJECTION, SOLUTION INTRAMUSCULAR; INTRAVENOUS PRN
Status: DISCONTINUED | OUTPATIENT
Start: 2024-05-24 | End: 2024-05-24

## 2024-05-24 RX ORDER — OXYCODONE HYDROCHLORIDE 5 MG/1
10 TABLET ORAL
Status: DISCONTINUED | OUTPATIENT
Start: 2024-05-24 | End: 2024-05-25 | Stop reason: HOSPADM

## 2024-05-24 RX ORDER — GLYCOPYRROLATE 0.2 MG/ML
INJECTION, SOLUTION INTRAMUSCULAR; INTRAVENOUS PRN
Status: DISCONTINUED | OUTPATIENT
Start: 2024-05-24 | End: 2024-05-24

## 2024-05-24 RX ORDER — CEFAZOLIN SODIUM 2 G/50ML
2 SOLUTION INTRAVENOUS
Status: COMPLETED | OUTPATIENT
Start: 2024-05-24 | End: 2024-05-24

## 2024-05-24 RX ORDER — KETOROLAC TROMETHAMINE 30 MG/ML
15 INJECTION, SOLUTION INTRAMUSCULAR; INTRAVENOUS ONCE
Status: COMPLETED | OUTPATIENT
Start: 2024-05-24 | End: 2024-05-24

## 2024-05-24 RX ORDER — DEXAMETHASONE SODIUM PHOSPHATE 10 MG/ML
4 INJECTION, SOLUTION INTRAMUSCULAR; INTRAVENOUS
Status: DISCONTINUED | OUTPATIENT
Start: 2024-05-24 | End: 2024-05-25 | Stop reason: HOSPADM

## 2024-05-24 RX ORDER — FENTANYL CITRATE 50 UG/ML
25 INJECTION, SOLUTION INTRAMUSCULAR; INTRAVENOUS EVERY 5 MIN PRN
Status: DISCONTINUED | OUTPATIENT
Start: 2024-05-24 | End: 2024-05-24 | Stop reason: HOSPADM

## 2024-05-24 RX ORDER — MEPERIDINE HYDROCHLORIDE 25 MG/ML
12.5 INJECTION INTRAMUSCULAR; INTRAVENOUS; SUBCUTANEOUS EVERY 5 MIN PRN
Status: DISCONTINUED | OUTPATIENT
Start: 2024-05-24 | End: 2024-05-24 | Stop reason: HOSPADM

## 2024-05-24 RX ORDER — PROPOFOL 10 MG/ML
INJECTION, EMULSION INTRAVENOUS PRN
Status: DISCONTINUED | OUTPATIENT
Start: 2024-05-24 | End: 2024-05-24

## 2024-05-24 RX ORDER — OXYCODONE HYDROCHLORIDE 5 MG/1
5 TABLET ORAL
Status: COMPLETED | OUTPATIENT
Start: 2024-05-24 | End: 2024-05-24

## 2024-05-24 RX ORDER — CEFAZOLIN SODIUM 2 G/50ML
2 SOLUTION INTRAVENOUS SEE ADMIN INSTRUCTIONS
Status: DISCONTINUED | OUTPATIENT
Start: 2024-05-24 | End: 2024-05-24 | Stop reason: HOSPADM

## 2024-05-24 RX ORDER — ONDANSETRON 4 MG/1
4 TABLET, ORALLY DISINTEGRATING ORAL EVERY 30 MIN PRN
Status: DISCONTINUED | OUTPATIENT
Start: 2024-05-24 | End: 2024-05-24 | Stop reason: HOSPADM

## 2024-05-24 RX ORDER — LIDOCAINE HYDROCHLORIDE 20 MG/ML
INJECTION, SOLUTION INFILTRATION; PERINEURAL PRN
Status: DISCONTINUED | OUTPATIENT
Start: 2024-05-24 | End: 2024-05-24

## 2024-05-24 RX ORDER — ONDANSETRON 2 MG/ML
INJECTION INTRAMUSCULAR; INTRAVENOUS PRN
Status: DISCONTINUED | OUTPATIENT
Start: 2024-05-24 | End: 2024-05-24

## 2024-05-24 RX ORDER — MAGNESIUM HYDROXIDE 1200 MG/15ML
LIQUID ORAL PRN
Status: DISCONTINUED | OUTPATIENT
Start: 2024-05-24 | End: 2024-05-24 | Stop reason: HOSPADM

## 2024-05-24 RX ORDER — DEXAMETHASONE SODIUM PHOSPHATE 10 MG/ML
10 INJECTION, SOLUTION INTRAMUSCULAR; INTRAVENOUS ONCE
Status: DISCONTINUED | OUTPATIENT
Start: 2024-05-24 | End: 2024-05-24 | Stop reason: HOSPADM

## 2024-05-24 RX ORDER — DEXAMETHASONE SODIUM PHOSPHATE 4 MG/ML
INJECTION, SOLUTION INTRA-ARTICULAR; INTRALESIONAL; INTRAMUSCULAR; INTRAVENOUS; SOFT TISSUE PRN
Status: DISCONTINUED | OUTPATIENT
Start: 2024-05-24 | End: 2024-05-24

## 2024-05-24 RX ORDER — OFLOXACIN 3 MG/ML
5 SOLUTION AURICULAR (OTIC) 2 TIMES DAILY
Qty: 15 ML | Refills: 0 | Status: SHIPPED | OUTPATIENT
Start: 2024-05-24 | End: 2024-06-13

## 2024-05-24 RX ORDER — LIDOCAINE 40 MG/G
CREAM TOPICAL
Status: DISCONTINUED | OUTPATIENT
Start: 2024-05-24 | End: 2024-05-24 | Stop reason: HOSPADM

## 2024-05-24 RX ADMIN — FENTANYL CITRATE 25 MCG: 50 INJECTION, SOLUTION INTRAMUSCULAR; INTRAVENOUS at 11:15

## 2024-05-24 RX ADMIN — Medication 0.5 MG: at 09:12

## 2024-05-24 RX ADMIN — LIDOCAINE HYDROCHLORIDE 80 MG: 20 INJECTION, SOLUTION INFILTRATION; PERINEURAL at 07:32

## 2024-05-24 RX ADMIN — ONDANSETRON 4 MG: 2 INJECTION INTRAMUSCULAR; INTRAVENOUS at 10:27

## 2024-05-24 RX ADMIN — PROPOFOL 200 MG: 10 INJECTION, EMULSION INTRAVENOUS at 07:30

## 2024-05-24 RX ADMIN — ACETAMINOPHEN 975 MG: 325 TABLET ORAL at 06:35

## 2024-05-24 RX ADMIN — FENTANYL CITRATE 100 MCG: 50 INJECTION, SOLUTION INTRAMUSCULAR; INTRAVENOUS at 07:32

## 2024-05-24 RX ADMIN — KETOROLAC TROMETHAMINE 15 MG: 30 INJECTION, SOLUTION INTRAMUSCULAR; INTRAVENOUS at 11:18

## 2024-05-24 RX ADMIN — ONDANSETRON 4 MG: 4 TABLET, ORALLY DISINTEGRATING ORAL at 12:03

## 2024-05-24 RX ADMIN — DEXAMETHASONE SODIUM PHOSPHATE 10 MG: 4 INJECTION, SOLUTION INTRA-ARTICULAR; INTRALESIONAL; INTRAMUSCULAR; INTRAVENOUS; SOFT TISSUE at 07:36

## 2024-05-24 RX ADMIN — SODIUM CHLORIDE, SODIUM LACTATE, POTASSIUM CHLORIDE, CALCIUM CHLORIDE: 600; 310; 30; 20 INJECTION, SOLUTION INTRAVENOUS at 06:36

## 2024-05-24 RX ADMIN — HYDROMORPHONE HYDROCHLORIDE 0.2 MG: 1 INJECTION, SOLUTION INTRAMUSCULAR; INTRAVENOUS; SUBCUTANEOUS at 11:23

## 2024-05-24 RX ADMIN — Medication 0.5 MG: at 09:01

## 2024-05-24 RX ADMIN — GLYCOPYRROLATE 0.2 MG: 0.2 INJECTION, SOLUTION INTRAMUSCULAR; INTRAVENOUS at 07:42

## 2024-05-24 RX ADMIN — CEFAZOLIN SODIUM 2 G: 2 SOLUTION INTRAVENOUS at 07:18

## 2024-05-24 RX ADMIN — OXYCODONE HYDROCHLORIDE 5 MG: 5 TABLET ORAL at 11:04

## 2024-05-24 RX ADMIN — PROPOFOL 200 MCG/KG/MIN: 10 INJECTION, EMULSION INTRAVENOUS at 07:30

## 2024-05-24 RX ADMIN — FENTANYL CITRATE 25 MCG: 50 INJECTION, SOLUTION INTRAMUSCULAR; INTRAVENOUS at 11:05

## 2024-05-24 NOTE — BRIEF OP NOTE
[facility] Brief Operative Note    Pre-operative diagnosis: Conductive hearing loss, bilateral [H90.0]  Dysfunction of both eustachian tubes [H69.93]   Post-operative diagnosis As above   Procedure: Procedure(s):  TYMPANOPLASTY WITH CARTILAGE BACKING WITH T-TUBE PLACEMENT RIGHT  TYMPANOPLASTY WITH CARTILAGE BACKING, T-TUBE PLACEMENT   Surgeon(s): Surgeons and Role:     * Cristal Murray MD - Primary     * Yoli Cronin MD - Resident - Assisting   Estimated blood loss: 5 mL    Specimens: * No specimens in log *   Findings: Left side with very significant retraction of the tympanic membrane throughout the mesotympanum. The incus was completely eroded and there was no remaining identifiable structure. There was a myringostapedopexy which was elevated. The malleus was intact. Multiple tears were made in the TM. A T tube was placed in the anterior superior quadrant and the TM was constructed with a cartilage/perichondrial graft.   Right side with posterior mesotympanum retraction. The ossicular chain was intact and mobile with minor erosion of the long process of the incus. A very small tear was made in the posterior superior TM just posterior to the malleus.  A T tube was placed in the anterior superior quadrant and the TM was reconstructed with a cartilage/perichondrial graft.

## 2024-05-24 NOTE — ANESTHESIA PROCEDURE NOTES
Airway       Patient location during procedure: OR       Procedure Start/Stop Times: 5/24/2024 7:34 AM  Staff -        Performed By: CRNA  Consent for Airway        Urgency: elective  Indications and Patient Condition       Indications for airway management: hipolito-procedural       Induction type:intravenous       Mask difficulty assessment: 0 - not attempted    Final Airway Details       Final airway type: endotracheal airway       Successful airway: ETT - single  Endotracheal Airway Details        ETT size (mm): 7.0       Cuffed: yes       Cuff volume (mL): 6       Successful intubation technique: direct laryngoscopy       DL Blade Type: Nguyen 2       Grade View of Cords: 1       Adjucts: stylet       Position: Right       Measured from: lips       Secured at (cm): 22       Bite block used: None    Post intubation assessment        Placement verified by: capnometry, equal breath sounds and chest rise        Number of attempts at approach: 1       Secured with: tape       Ease of procedure: easy       Dentition: Intact and Unchanged    Medication(s) Administered   Medication Administration Time: 5/24/2024 7:34 AM

## 2024-05-24 NOTE — ANESTHESIA CARE TRANSFER NOTE
Patient: April M Candace    Procedure: Procedure(s):  TYMPANOPLASTY WITH CARTILAGE BACKING WITH T-TUBE PLACEMENT RIGHT  TYMPANOPLASTY WITH CARTILAGE BACKING, T-TUBE PLACEMENT       Diagnosis: Conductive hearing loss, bilateral [H90.0]  Dysfunction of both eustachian tubes [H69.93]  Diagnosis Additional Information: No value filed.    Anesthesia Type:   General     Note:    Oropharynx: oropharynx clear of all foreign objects and spontaneously breathing  Level of Consciousness: awake and drowsy  Oxygen Supplementation: face mask  Level of Supplemental Oxygen (L/min / FiO2): 6  Independent Airway: airway patency satisfactory and stable  Dentition: dentition unchanged  Vital Signs Stable: post-procedure vital signs reviewed and stable  Report to RN Given: handoff report given  Patient transferred to: PACU    Handoff Report: Identifed the Patient, Identified the Reponsible Provider, Reviewed the pertinent medical history, Discussed the surgical course, Reviewed Intra-OP anesthesia mangement and issues during anesthesia, Set expectations for post-procedure period and Allowed opportunity for questions and acknowledgement of understanding      Vitals:  Vitals Value Taken Time   /72 05/24/24 1044   Temp 36.7  C (98.1  F) 05/24/24 1044   Pulse 134 05/24/24 1047   Resp 12 05/24/24 1047   SpO2 96 % 05/24/24 1047   Vitals shown include unfiled device data.    Electronically Signed By: SPENSER Fleming CRNA  May 24, 2024  10:48 AM

## 2024-05-24 NOTE — ANESTHESIA PREPROCEDURE EVALUATION
Anesthesia Pre-Procedure Evaluation    Patient: May Maria   MRN: [id number] : 1995        Procedure : Procedure(s):  TYMPANOPLASTY WITH CARTILAGE BACKING WITH T-TUBE PLACEMENT RIGHT  TYMPANOPLASTY WITH CARTILAGE BACKING, POSSIBLE T-TUBE PLACEMENT          Past Medical History:   Diagnosis Date     Atrophic kidney      Depressive disorder     Therapy during childhood     Diet controlled gestational diabetes mellitus (GDM) in third trimester 2020    Late-onset     Glucose intolerance 05/15/2020     Glucose intolerance of pregnancy 2020     Hypertension 24     Shift work sleep disorder       Past Surgical History:   Procedure Laterality Date      SECTION N/A 2020    Procedure:  SECTION;  Surgeon: Magdi Cannon MD;  Location: [facility]     COLONOSCOPY       DILATE EUSTACHIAN TUBE N/A 2023    Procedure: Bilateral Eustachian Tube Dilation, Out-fracture Bilateral Inferior Turbinate, Left Septal Displacement;  Surgeon: Yamel Lucas MD;  Location: [facility]     ENDOSCOPY UPPER, COLONOSCOPY, COMBINED N/A 2021    Procedure: colonoscopy with biopsy and polypectomy, upper endoscopy with biopsy;  Surgeon: Joe Nayak MD;  Location: [facility]     PE TUBES Bilateral     years      TYMPANOPLASTY N/A 2023    Procedure: Left Fat Myringoplasty;  Surgeon: Yamel Lucas MD;  Location: [facility]      Allergies   Allergen Reactions     Pineapple Hives and Swelling     Throat swelling      Social History     Tobacco Use     Smoking status: Former     Current packs/day: 0.00     Average packs/day: 0.5 packs/day for 5.0 years (2.5 ttl pk-yrs)     Types: Cigarettes     Start date: 2014     Quit date: 3/1/2018     Years since quittin.2     Passive exposure: Past     Smokeless tobacco: Never   Substance Use Topics     Alcohol use: No      Wt Readings from Last 1 Encounters:   24 85.3 kg (188 lb)        Anesthesia Evaluation   Pt has had prior anesthetic.  "Type: General.        ROS/MED HX  ENT/Pulmonary:     (+)     HILARIA risk factors,  hypertension,                                 Neurologic:  - neg neurologic ROS     Cardiovascular:     (+)  hypertension- -   -  - -                                      METS/Exercise Tolerance: >4 METS    Hematologic:  - neg hematologic  ROS     Musculoskeletal:  - neg musculoskeletal ROS     GI/Hepatic:     (+) GERD, Asymptomatic on medication,                  Renal/Genitourinary:     (+) renal disease, type: CRI,            Endo:     (+) type I DM,  Last HgA1c: 5.4, date: 05/14/2024, Not using insulin, - not using insulin pump.  not previously admitted for DM/DKA.       Obesity,       Psychiatric/Substance Use:  - neg psychiatric ROS     Infectious Disease:       Malignancy:  - neg malignancy ROS     Other:  - neg other ROS        Physical Exam    Airway        Mallampati: II   TM distance: > 3 FB   Neck ROM: full   Mouth opening: > 3 cm    Respiratory Devices and Support         Dental       (+) Minor Abnormalities - some fillings, tiny chips      Cardiovascular   cardiovascular exam normal       Rhythm and rate: regular and normal     Pulmonary   pulmonary exam normal        breath sounds clear to auscultation       OUTSIDE LABS:  CBC:   Lab Results   Component Value Date    WBC 7.2 05/13/2024    WBC 7.0 02/05/2024    HGB 13.0 05/13/2024    HGB 12.8 02/05/2024    HCT 40.3 05/13/2024    HCT 38.2 02/05/2024     05/13/2024     02/05/2024     BMP:   Lab Results   Component Value Date     05/14/2024     05/13/2024    POTASSIUM 4.0 05/14/2024    POTASSIUM 3.8 05/13/2024    CHLORIDE 103 05/14/2024    CHLORIDE 102 05/13/2024    CO2 23 05/14/2024    CO2 23 05/13/2024    BUN 12.6 05/14/2024    BUN 11.6 05/13/2024    CR 0.84 05/14/2024    CR 0.80 05/13/2024    GLC 96 05/14/2024    GLC 98 05/13/2024     COAGS: No results found for: \"PTT\", \"INR\", \"FIBR\"  POC:   Lab Results   Component Value Date    HCG Negative " "05/13/2024     HEPATIC:   Lab Results   Component Value Date    ALBUMIN 3.4 09/28/2021    PROTTOTAL 7.9 09/28/2021    ALT 17 09/28/2021    AST 7 09/28/2021    ALKPHOS 81 09/28/2021    BILITOTAL 0.6 09/28/2021     OTHER:   Lab Results   Component Value Date    LACT 1.3 05/15/2018    A1C 5.4 05/14/2024    HUGH 9.8 05/14/2024    PHOS 2.7 05/05/2021    LIPASE 90 09/28/2021    TSH 1.52 05/14/2024    CRP <2.9 05/15/2018       Anesthesia Plan    ASA Status:  2    NPO Status:  NPO Appropriate    Anesthesia Type: General.     - Airway: ETT      Maintenance: TIVA.        Consents    Anesthesia Plan(s) and associated risks, benefits, and realistic alternatives discussed. Questions answered and patient/representative(s) expressed understanding.     - Discussed:     - Discussed with:  Patient            Postoperative Care    Pain management: Multi-modal analgesia.   PONV prophylaxis: Ondansetron (or other 5HT-3), Dexamethasone or Solumedrol, Background Propofol Infusion     Comments:             Meagan Velasquez MD    I have reviewed the pertinent notes and labs in the chart from the past 30 days and (re)examined the patient.  Any updates or changes from those notes are reflected in this note.     # Hyponatremia: Lowest Na = 135 mmol/L in last 30 days, will monitor as appropriate          # Obesity: Estimated body mass index is 31.28 kg/m  as calculated from the following:    Height as of 5/14/24: 1.651 m (5' 5\").    Weight as of 5/14/24: 85.3 kg (188 lb).      "

## 2024-05-24 NOTE — OP NOTE
Date of service:  5/24/24    Preoperative diagnosis:  eustachian tube dysfunction     Postoperative diagnosis:  eustachian tube dysfunction     Procedure:  1.  Bilateral tympanoplasty with cartilage backing  2.  Bilateral myringotomy and t-tube placement  3.  Facial nerve monitoring x 3 hours    Surgeon:  Cristal Murray MD    Fellow:  Yoli Cronin MD    Anesthesia:  General    EBL:  5cc    Specimens:  None    Complications:  None    Findings:  Left TM pexied onto the entire anterior inferior and inferior floor of the middle ear as well as onto the stapes tendon and stapes capitulum, fully absent incus, also pexied onto the posterior edge of the malleus, stapes suprastructure intact and mobile. Right TM pexied onto the end of the long process of the incus but the ossicular chain was otherwise intact.     Indications:  May Maria is a patient with a bilateral eustachian tube dysfunction.  Discussion was had about cartilage tympanoplasty with possible t-tube placement.  Risks and benefits had been discussed and consent had been obtained.    Procedure:  The patient was taken to the operating room and placed supine on the operating room table.  General anesthesia was induced and endotracheal intubation was performed.  70% nitrous was administered for 10 minutes. Time Out was then performed with confirmation of the patient, site and procedure.  Facial nerve electrodes were placed on the bilateral sides of the face.  Impedances were checked and the nerve was monitored for the entirety of the case.  1:100,000 epinephrine was injected into the ear canals and tragus.     The left ear was turned up. The area was prepped and draped in standard surgical fashion.  The operating microscope was brought into position and used for the entirety of the left procedure.  The ear canal was inspected and irrigated with normal saline.  The tympanic membrane was inspected.  The TM remains deeply pexied onto the entire inferior and  posterior floor of the middle ear with erosion of the long process of the incus and the TM pexied onto the stapes capitulum and stapedial tendon as well as going into the antrum. Nitrous had not insufflated the TM. There was still a small anterior superior air space. Myringotomy incision was made in the anterior superior quadrant and a t-tube was placed. No effusion was noted. Canal incisions were made and the tympanomeatal flap elevated. The middle ear was entered in the hypotympanum and there was a small middle ear space that could be entered. However, the TM was adhered to the floor of the middle ear as well as into the sinus tympani. A whirlybird was used to elevate the TM out of the sinus tympani and off the floor of the middle ear and cochlea. This was done without tears. The mesotympanum was identified and the TM is much more adherent. The chorda tympani was identified and it was able to be preserved. The middle ear was then entered over the posterior epitympanum as there was a middle ear space present superiorly. The TM was then elevated in a superior to inferior fashion. A whirlybird was used to take the TM out of the antrum without tears. No long process of the incus was identified. The tympanic segment of the facial nerve was identified. The lateral bone over the nerve is intact but the inferior edge of the bone next to the stapes is dehiscent. Attention was turned to the inferior mesotympanum again. The TM was elevated off the stapedial tendon and stapes capitulum. The stapes suprastructure was identified and the TM taken off the suprastructure. A tear had been made in the TM during elevation off either the stapes or the malleus as the TM was also adhered to the posterior edge of the long process of the malleus. The antrum was further explored and no incus was identified. The TM was elevated off the posterior edge of the malleus. The TM was now elevated fully out of the posterior middle ear. The TM  remained pexied onto the anterior inferior floor. As this was being elevated, a large tear was made. The floor was explored and there was a small piece of TM which appeared to be attached to the umbo going onto the floor of the middle ear and this was removed. The entire TM was now elevated. The eustachian tube orifice was probed and noted to be narrow but open. Tragal incision was made and a large approximately 1.5cm x 1cm piece of tragal cartilage with perichondrium was harvested. The incision was closed with chromic suture. The cartilage was prepared on the back table to be used for grafting on both sides. The middle ear was again explored and appeared free of disease. The tensor tympani tendon was cut. The anterior middle ear was filled with gelfoam. A piece of cartilage with perichondrium was placed to repair the posterior and majority of the inferior perforation. A small piece of perichondrium was used to fill a gap along the anterior superior edge of the repair. The cartilage graft and flap were elevated and gelfoam placed into the posterior middle ear, taking care to cover the ossicular chain. The grafts and tympanomeatal flap was placed back into position and there appeared to be good coverage of and gelfoam used to cover the graft and seal the incision.  Bacitracin was used to fill the ear canal and cover the lumen of the t-tube.  A cotton ball was placed over the ear canal. The patient was undraped.    The right ear was then turned up. The area was prepped and draped in standard surgical fashion.  The operating microscope was brought into position and used for the entirety of the right procedure.  The ear canal was inspected and irrigated with normal saline.  The tympanic membrane was inspected.  The middle ear had mostly insufflated although there was still some pexy of the TM onto the end of the long process of the incus. Myringotomy incision was made in the anterior superior quadrant and a t-tube  placed. The TM is quite thin in this area and a perforation was created around the t-tube. The ear canal on the right is more oblong and narrow with a more upwardly slanting position than the left. Canal incisions were made and the tympanomeatal flap elevated. The hypotympanum was entered. It is noted to be clear with adhesions which were lysed. The TM was further elevated superiorly. Again, the angle of the canal made it more difficult to access the middle ear than the left. The chorda tympani was identified and preserved. The TM was elevated off the ossicular chain which was difficult to visualize. The TM appeared intact at the time of elevation. However, a tear was made as the TM was attempted to be elevated more anteriorly in order to gain better visualization. The ossicular chain was noted to be intact and mobile. Gelfoam was placed in the entire middle ear. A cartilage graft with perichondrium was used to back the posterior half of the TM and repair the small perforation. The tympanomeatal flap was placed back into position. Blood was noted to have sealed into the perforation around the t-tube. Gelfoam was used to cover the graft and seal the incision.  Bacitracin was used to fill the ear canal and cover the lumen of the t-tube.  A cotton ball was placed over the ear canal. Facial nerve electrodes removed.  The patient tolerated the procedure well and counts were correct.  The patient was returned to Anesthesia, awakened, extubated and taken to the PACU in stable condition.    Please note the left cartilage tympanoplasty was much more difficult than usual due to the extensive retraction and pexy of the TM onto the entire posterior and inferior portion of the middle ear. Elevating the TM off the floor of the middle ear and the stapes and malleus required at least 20% more time and effort than usual.    Cristal HAN MD, was scrubbed during the entire procedure and performed the critical portions of the  procedure.

## 2024-05-24 NOTE — ANESTHESIA POSTPROCEDURE EVALUATION
Patient: May Maria    Procedure: Procedure(s):  TYMPANOPLASTY WITH CARTILAGE BACKING WITH T-TUBE PLACEMENT RIGHT  TYMPANOPLASTY WITH CARTILAGE BACKING, T-TUBE PLACEMENT       Anesthesia Type:  General    Note:  Disposition: Outpatient   Postop Pain Control: Uneventful            Sign Out: Well controlled pain   PONV: No   Neuro/Psych: Uneventful            Sign Out: Acceptable/Baseline neuro status   Airway/Respiratory: Uneventful            Sign Out: Acceptable/Baseline resp. status   CV/Hemodynamics: Uneventful            Sign Out: Acceptable CV status; No obvious hypovolemia; No obvious fluid overload   Other NRE: NONE   DID A NON-ROUTINE EVENT OCCUR? No       Last vitals:  Vitals Value Taken Time   /73 05/24/24 1130   Temp 36.4  C (97.6  F) 05/24/24 1130   Pulse 110 05/24/24 1130   Resp 16 05/24/24 1130   SpO2 95 % 05/24/24 1130       Electronically Signed By: Meagan Velasquez MD  May 24, 2024  12:41 PM

## 2024-05-24 NOTE — DISCHARGE INSTRUCTIONS
"1. Resume your home medications. Take pain medication, Tylenol or ibuprofen as needed. Use docusate to avoid constipation. Start your ear drops in 1 week after surgery and use until your follow up.    2. Wound care  * Change the cotton ball in your ear as needed for drainage.  It's normal to expect some drainage from your ear for the first few days after surgery.    3. Use a cotton ball coated with vasoline to keep water out of ear canal.    4. For the next week, no heavy lifting more than 5 pounds, no strenuous activities. No nose blowing. Sneeze with your mouth open.    5. Please call MD or come to the ED for shortness of breath, trouble breathing, inability to tolerate liquids, intractable dizziness, or signs of infection such as fevers or redness.      Call the [phone] clinic number if you have any questions and concerns during the day and call [phone] at night and ask for \"ENT resident on call\".     6. Follow up with Dr. Murray as previously scheduled.    [facility]  Home Care Following Anesthesia  For 24 hours after surgery:  Get plenty of rest.  A responsible adult must stay with you for at least 24 hours after you leave the surgery center.  Do not drive or use heavy equipment.  If you have weakness or tingling, don't drive or use heavy equipment until this feeling goes away.   Do not drink alcohol.   Avoid strenuous or risky activities.  Ask for help when climbing stairs.  You may feel lightheaded.  IF so, sit for a few minutes before standing.  Have someone help you get up.   If you have nausea (feel sick to your stomach): Drink only clear liquids such as apple juice, ginger ale, broth or 7-Up.  Rest may also help.  Be sure to drink enough fluids.  Move to a regular diet as you feel able.   You may have a slight fever.  Call the doctor if your fever is over 100 F (37.7 C) (taken under the tongue) or lasts longer than 24 hours.  You may have a dry mouth, a sore " throat, muscle aches or trouble sleeping. These should go away after 24 hours.  Do not make important or legal decisions.   It is recommended to avoid smoking.               Tips for taking pain medications  To get the best pain relief possible, remember these points:  Take pain medications as directed, before pain becomes severe.  Pain medication can upset your stomach: taking it with food may help.  Constipation is a common side effect of pain medication. Drink plenty of  fluids.  Eat foods high in fiber. Take a stool softener if recommended by your doctor or pharmacist.  Do not drink alcohol, drive or operate machinery while taking pain medications.  Ask about other ways to control pain, such as with heat, ice or relaxation.    Tylenol/Acetaminophen Consumption    If you feel your pain relief is insufficient, you may take Tylenol/Acetaminophen in addition to your narcotic pain medication.   Be careful not to exceed 4,000 mg of Tylenol/Acetaminophen in a 24 hour period from all sources.  If you are taking extra strength Tylenol/acetaminophen (500 mg), the maximum dose is 8 tablets in 24 hours.  If you are taking regular strength acetaminophen (325 mg), the maximum dose is 12 tablets in 24 hours.    Call a doctor for any of the following:  Signs of infection (fever, growing tenderness at the surgery site, a large amount of drainage or bleeding, severe pain, foul-smelling drainage, redness, swelling).  It has been over 8 to 10 hours since surgery and you are still not able to urinate (pass water).  Headache for over 24 hours.  Numbness, tingling or weakness the day after surgery (if you had spinal anesthesia).  Signs of Covid-19 infection (temperature over 100 degrees, shortness of breath, cough, loss of taste/smell, generalized body aches, persistent headache, chills, sore throat, nausea/vomiting/diarrhea)  Your doctor is:  Dr. Cristal Murray, ENT Otolaryngology: [phone]                    Or dial [phone] and  ask for the resident on call for:  ENT Otolaryngology  For emergency care, call the:  [facility]:  [phone] (TTY for hearing impaired: [phone])                     No. DEA screening performed.  STOP BANG Legend: 0-2 = LOW Risk; 3-4 = INTERMEDIATE Risk; 5-8 = HIGH Risk

## 2024-06-13 ENCOUNTER — OFFICE VISIT (OUTPATIENT)
Dept: OTOLARYNGOLOGY | Facility: CLINIC | Age: 29
End: 2024-06-13
Payer: COMMERCIAL

## 2024-06-13 VITALS
WEIGHT: 185 LBS | DIASTOLIC BLOOD PRESSURE: 84 MMHG | HEIGHT: 65 IN | SYSTOLIC BLOOD PRESSURE: 138 MMHG | HEART RATE: 90 BPM | BODY MASS INDEX: 30.82 KG/M2

## 2024-06-13 DIAGNOSIS — H69.93 DYSFUNCTION OF BOTH EUSTACHIAN TUBES: ICD-10-CM

## 2024-06-13 PROCEDURE — 99024 POSTOP FOLLOW-UP VISIT: CPT | Mod: GC | Performed by: OTOLARYNGOLOGY

## 2024-06-13 RX ORDER — PREDNISOLONE ACETATE 10 MG/ML
4 SUSPENSION/ DROPS OPHTHALMIC 2 TIMES DAILY
Qty: 10 ML | Refills: 0 | Status: SHIPPED | OUTPATIENT
Start: 2024-06-13

## 2024-06-13 RX ORDER — OFLOXACIN 3 MG/ML
5 SOLUTION AURICULAR (OTIC) 2 TIMES DAILY
Qty: 15 ML | Refills: 0 | Status: SHIPPED | OUTPATIENT
Start: 2024-06-13

## 2024-06-13 NOTE — LETTER
6/13/2024      RE: May Maria  [address] [address]       May Maria is seen for follow up s/p bilateral tympanoplasty with cartilage backing and bilateral t-tube placement on 5/24/24. She is doing well post-OP. Her pain subsided after 1.5 weeks. She is no longer draining. The left ear still feels avalos than the right.     Physical examination:  female in no acute distress.  Alert and answering questions appropriately.  HB 1/6 bilaterally.  Bilateral ears examined under the microscope.   Right ear: EAC with minimally sloughing skin. Packing suctioned. Cartilage graft in good position, edematous. T tube in place, patent.   Left ear: EAC with minimally sloughing skin. Packing suctioned. Cartilage graft in good position, edematous. T tube in place, patent.     Assessment and plan:  April is a 29 yo F with bilateral eustachian tube dysfunction with bilateral retraction and ossicular chain erosion on the left s/p bilateral tympanoplasty with cartilage backing and bilateral t-tube placement on 5/24/24. She is healing well. Cartilage grafts are in good position, just a little edematous. T tube are patent. We will add a steroid drop bilaterally due to the cartilage graft edema. She will continue the floxin drops for the total of 21 days. She has follow up with audiogram scheduled.     MD GUILLE Gar, Cristal Murray MD, saw this patient with the resident/fellow and agree with the resident/fellow s findings and plan of care as documented in the resident s/fellow s note. I was present for the entire procedure.    MD Cristal Ma MD

## 2024-06-13 NOTE — PROGRESS NOTES
May Maria is seen for follow up s/p bilateral tympanoplasty with cartilage backing and bilateral t-tube placement on 5/24/24. She is doing well post-OP. Her pain subsided after 1.5 weeks. She is no longer draining. The left ear still feels avalos than the right.     Physical examination:  female in no acute distress.  Alert and answering questions appropriately.  HB 1/6 bilaterally.  Bilateral ears examined under the microscope.   Right ear: EAC with minimally sloughing skin. Packing suctioned. Cartilage graft in good position, edematous. T tube in place, patent.   Left ear: EAC with minimally sloughing skin. Packing suctioned. Cartilage graft in good position, edematous. T tube in place, patent.     Assessment and plan:  April is a 29 yo F with bilateral eustachian tube dysfunction with bilateral retraction and ossicular chain erosion on the left s/p bilateral tympanoplasty with cartilage backing and bilateral t-tube placement on 5/24/24. She is healing well. Cartilage grafts are in good position, just a little edematous. T tube are patent. We will add a steroid drop bilaterally due to the cartilage graft edema. She will continue the floxin drops for the total of 21 days. She has follow up with audiogram scheduled.     MD GUILLE Gar, Cristal Murray MD, saw this patient with the resident/fellow and agree with the resident/fellow s findings and plan of care as documented in the resident s/fellow s note. I was present for the entire procedure.    Cristal Murray MD

## 2024-06-13 NOTE — PATIENT INSTRUCTIONS
You were seen in the ENT Clinic today by Dr. Murray. If you have any questions or concerns after your appointment, please contact us (see below)    The following has been recommended for you based upon your appointment today:  Reschedule follow up  Sent floxin and pred-forte to pharmacy      How to Contact Us:  Send a [employer] message to your provider. Our team will respond to you via [employer]. Occasionally, we will need to call you to get further information.  For urgent matters (Monday-Friday), call the ENT Clinic: [phone] and speak with a call center team member - they will route your call appropriately.   If you'd like to speak directly with a nurse, please find our contact information below. We do our best to check voicemail frequently throughout the day, and will work to call you back within 1-2 days. For urgent matters, please use the general clinic phone numbers listed above.    Honey JEWELL, RN, BSN  RN Care Coordinator, ENT Clinic  [facility]  Direct: [phone]  Vanessa PISANO LPN  Direct: [phone]

## 2024-07-11 ENCOUNTER — TELEPHONE (OUTPATIENT)
Dept: OTOLARYNGOLOGY | Facility: CLINIC | Age: 29
End: 2024-07-11

## 2024-07-11 DIAGNOSIS — H90.0 CONDUCTIVE HEARING LOSS, BILATERAL: Primary | ICD-10-CM

## 2024-07-17 ENCOUNTER — TELEPHONE (OUTPATIENT)
Dept: OTOLARYNGOLOGY | Facility: CLINIC | Age: 29
End: 2024-07-17

## 2024-07-17 NOTE — TELEPHONE ENCOUNTER
Left Voicemail (1st Attempt) for the patient to call back and schedule the following:    Appointment type: Return ENT   Provider: Dr. Murray  Return date: next available     Specialty phone number: [phone]  Additional appointment(s) needed:   Additional Notes: ENT Audio prior

## 2024-07-19 ENCOUNTER — TELEPHONE (OUTPATIENT)
Dept: OTOLARYNGOLOGY | Facility: CLINIC | Age: 29
End: 2024-07-19
Payer: COMMERCIAL

## 2024-07-19 NOTE — TELEPHONE ENCOUNTER
Left Voicemail (1st Attempt) for the patient to call back and schedule the following:    Appointment type: Return ent   Provider: Dr. Murray  Return date: next available     Specialty phone number: [phone]  Additional appointment(s) needed: Ent Audio prior  Additional Notes:

## 2024-10-10 ENCOUNTER — LAB (OUTPATIENT)
Dept: LAB | Facility: OTHER | Age: 29
End: 2024-10-10
Payer: COMMERCIAL

## 2024-10-10 ENCOUNTER — OFFICE VISIT (OUTPATIENT)
Dept: FAMILY MEDICINE | Facility: OTHER | Age: 29
End: 2024-10-10
Attending: FAMILY MEDICINE
Payer: COMMERCIAL

## 2024-10-10 VITALS
WEIGHT: 180.8 LBS | DIASTOLIC BLOOD PRESSURE: 86 MMHG | SYSTOLIC BLOOD PRESSURE: 108 MMHG | BODY MASS INDEX: 30.09 KG/M2 | HEART RATE: 96 BPM | RESPIRATION RATE: 16 BRPM | OXYGEN SATURATION: 100 % | TEMPERATURE: 98.9 F

## 2024-10-10 DIAGNOSIS — R10.11 RUQ ABDOMINAL PAIN: Primary | ICD-10-CM

## 2024-10-10 DIAGNOSIS — R10.11 RUQ ABDOMINAL PAIN: ICD-10-CM

## 2024-10-10 DIAGNOSIS — N89.8 VAGINAL ITCHING: ICD-10-CM

## 2024-10-10 DIAGNOSIS — R39.9 URINARY SYMPTOM OR SIGN: ICD-10-CM

## 2024-10-10 LAB
ALBUMIN SERPL BCG-MCNC: 4.5 G/DL (ref 3.5–5.2)
ALBUMIN UR-MCNC: 20 MG/DL
ALP SERPL-CCNC: 101 U/L (ref 40–150)
ALT SERPL W P-5'-P-CCNC: 16 U/L (ref 0–50)
ANION GAP SERPL CALCULATED.3IONS-SCNC: 13 MMOL/L (ref 7–15)
APPEARANCE UR: CLEAR
AST SERPL W P-5'-P-CCNC: 16 U/L (ref 0–45)
BACTERIAL VAGINOSIS VAG-IMP: NEGATIVE
BASOPHILS # BLD AUTO: 0 10E3/UL (ref 0–0.2)
BASOPHILS NFR BLD AUTO: 1 %
BILIRUB SERPL-MCNC: 0.7 MG/DL
BILIRUB UR QL STRIP: NEGATIVE
BUN SERPL-MCNC: 10.1 MG/DL (ref 6–20)
C TRACH DNA SPEC QL PROBE+SIG AMP: NEGATIVE
CALCIUM SERPL-MCNC: 9.1 MG/DL (ref 8.8–10.4)
CANDIDA DNA VAG QL NAA+PROBE: NOT DETECTED
CANDIDA GLABRATA / CANDIDA KRUSEI DNA: NOT DETECTED
CHLORIDE SERPL-SCNC: 104 MMOL/L (ref 98–107)
COLOR UR AUTO: YELLOW
CREAT SERPL-MCNC: 0.87 MG/DL (ref 0.51–0.95)
CRP SERPL-MCNC: 27.02 MG/L
EGFRCR SERPLBLD CKD-EPI 2021: >90 ML/MIN/1.73M2
EOSINOPHIL # BLD AUTO: 0.1 10E3/UL (ref 0–0.7)
EOSINOPHIL NFR BLD AUTO: 1 %
ERYTHROCYTE [DISTWIDTH] IN BLOOD BY AUTOMATED COUNT: 13.2 % (ref 10–15)
GLUCOSE SERPL-MCNC: 89 MG/DL (ref 70–99)
GLUCOSE UR STRIP-MCNC: NEGATIVE MG/DL
HCG UR QL: NEGATIVE
HCO3 SERPL-SCNC: 20 MMOL/L (ref 22–29)
HCT VFR BLD AUTO: 39.7 % (ref 35–47)
HGB BLD-MCNC: 13.5 G/DL (ref 11.7–15.7)
HGB UR QL STRIP: ABNORMAL
IMM GRANULOCYTES # BLD: 0 10E3/UL
IMM GRANULOCYTES NFR BLD: 0 %
KETONES UR STRIP-MCNC: NEGATIVE MG/DL
LEUKOCYTE ESTERASE UR QL STRIP: NEGATIVE
LIPASE SERPL-CCNC: 24 U/L (ref 13–60)
LYMPHOCYTES # BLD AUTO: 1.7 10E3/UL (ref 0.8–5.3)
LYMPHOCYTES NFR BLD AUTO: 27 %
MCH RBC QN AUTO: 27.9 PG (ref 26.5–33)
MCHC RBC AUTO-ENTMCNC: 34 G/DL (ref 31.5–36.5)
MCV RBC AUTO: 82 FL (ref 78–100)
MONOCYTES # BLD AUTO: 0.5 10E3/UL (ref 0–1.3)
MONOCYTES NFR BLD AUTO: 9 %
MUCOUS THREADS #/AREA URNS LPF: PRESENT /LPF
N GONORRHOEA DNA SPEC QL NAA+PROBE: NEGATIVE
NEUTROPHILS # BLD AUTO: 3.9 10E3/UL (ref 1.6–8.3)
NEUTROPHILS NFR BLD AUTO: 62 %
NITRATE UR QL: NEGATIVE
NRBC # BLD AUTO: 0 10E3/UL
NRBC BLD AUTO-RTO: 0 /100
PH UR STRIP: 5.5 [PH] (ref 4.7–8)
PLATELET # BLD AUTO: 256 10E3/UL (ref 150–450)
POTASSIUM SERPL-SCNC: 3.7 MMOL/L (ref 3.4–5.3)
PROT SERPL-MCNC: 7.6 G/DL (ref 6.4–8.3)
RBC # BLD AUTO: 4.84 10E6/UL (ref 3.8–5.2)
RBC URINE: 3 /HPF
SODIUM SERPL-SCNC: 137 MMOL/L (ref 135–145)
SP GR UR STRIP: 1.02 (ref 1–1.03)
SQUAMOUS EPITHELIAL: 0 /HPF
T VAGINALIS DNA VAG QL NAA+PROBE: NOT DETECTED
UROBILINOGEN UR STRIP-MCNC: NORMAL MG/DL
WBC # BLD AUTO: 6.3 10E3/UL (ref 4–11)
WBC URINE: 3 /HPF

## 2024-10-10 PROCEDURE — 81003 URINALYSIS AUTO W/O SCOPE: CPT | Mod: ZL

## 2024-10-10 PROCEDURE — 99214 OFFICE O/P EST MOD 30 MIN: CPT | Performed by: FAMILY MEDICINE

## 2024-10-10 PROCEDURE — 36415 COLL VENOUS BLD VENIPUNCTURE: CPT | Mod: ZL

## 2024-10-10 PROCEDURE — 83690 ASSAY OF LIPASE: CPT | Mod: ZL

## 2024-10-10 PROCEDURE — 80053 COMPREHEN METABOLIC PANEL: CPT | Mod: ZL

## 2024-10-10 PROCEDURE — 85025 COMPLETE CBC W/AUTO DIFF WBC: CPT | Mod: ZL

## 2024-10-10 PROCEDURE — 81025 URINE PREGNANCY TEST: CPT | Mod: ZL

## 2024-10-10 PROCEDURE — 86140 C-REACTIVE PROTEIN: CPT | Mod: ZL

## 2024-10-10 PROCEDURE — 87491 CHLMYD TRACH DNA AMP PROBE: CPT | Mod: ZL

## 2024-10-10 PROCEDURE — 0352U MULTIPLEX VAGINAL PANEL BY PCR: CPT | Mod: ZL

## 2024-10-10 PROCEDURE — G0463 HOSPITAL OUTPT CLINIC VISIT: HCPCS

## 2024-10-10 ASSESSMENT — PAIN SCALES - GENERAL: PAINLEVEL: MODERATE PAIN (5)

## 2024-10-10 NOTE — PROGRESS NOTES
"  Assessment & Plan     RUQ abdominal pain  - GC/Chlamydia by PCR - [facility]; Future  - Multiplex Vaginal Panel by PCR; Future  - CBC with Platelets & Differential; Future  - Comprehensive metabolic panel; Future  - Lipase; Future  - CRP inflammation; Future  - HCG Qual, Urine ([id number]); Future  - US Abdomen Limited; Future  - Adult Gen Surg  Referral    Vaginal itching  - GC/Chlamydia by PCR - [facility]; Future  - Multiplex Vaginal Panel by PCR; Future  - HCG Qual, Urine ([id number]); Future    Urinary symptom or sign  - UA Macroscopic with reflex to Microscopic and Culture; Future  - GC/Chlamydia by PCR - [facility]; Future  - Multiplex Vaginal Panel by PCR; Future  - HCG Qual, Urine ([id number]); Future        US showing 2 polyps vs adherent stones.  Symptoms sound gallbladder in nature.  Will ask General Surgery to see her for opinion.        Subjective   April is a 28 year old, presenting for the following health issues:  Urinary Problem        10/10/2024    10:22 AM   Additional Questions   Roomed by Yohannes Beck lpn   Accompanied by self     27yo female.  1.5 weeks of nausea.  Monday woke with throbbing abd pain - ruq/right flank  Does have right renal atrophy  Pain worse at night than during day  Burns with urination, frequency  Feels like garbage, feels \"off\"    Intermittent feels hot  Hasn't checked temp    Food makes pain worse - like fetal position    Was in ER this past srping for same thing  Referred to GI - didn't see them as they never called.  Has seen GI in past for issues - camera capsule - states told nsaid ulcer but states no nsaids due to her kidney      CHART REVIEW:  HIDA 5/10/21 - normal gallbladder EF but did cause RUQ pain with test        History of Present Illness       Back Pain:  She presents for follow up of back pain. Patient's back pain is a recurring problem.  Location of back pain:  Right middle of back, left middle of back and right upper back  Description of back pain: cramping, dull " ache and shooting  Back pain spreads: right shoulder    Since patient first noticed back pain, pain is: gradually worsening  Does back pain interfere with her job:  No       Reason for visit:  Abdominal pain, frequent urination,nausea  Symptom onset:  1-2 weeks ago  Symptoms include:  Frequent urination, nausea, abdominal pain, back pain  Symptom intensity:  Moderate  Symptom progression:  Staying the same  Had these symptoms before:  Yes  Has tried/received treatment for these symptoms:  Yes  Previous treatment was successful:  Yes  Prior treatment description:  Antibotics  What makes it worse:  Laying down, eating  What makes it better:  Not right now   She is taking medications regularly.       Genitourinary - Female  Onset/Duration: a week, but last few days has gotten worse  Description:   Painful urination (Dysuria): YES           Frequency: YES  Blood in urine (Hematuria): No  Delay in urine (Hesitency): YES- sometimes  Intensity: moderate  Progression of Symptoms:  waxing and waning  Accompanying Signs & Symptoms:  Fever/chills: No  Flank pain: YES- right side more  Nausea and vomiting: YES- nausea  Vaginal symptoms: itching and burning  Abdominal/Pelvic Pain: YES  History:   History of frequent UTI s: YES  History of kidney stones: unsure  Sexually Active: YES  Possibility of pregnancy: No  Precipitating or alleviating factors: None  Therapies tried and outcome: Cranberry juice prn (contraindicated in Coumadin patients), Increase fluid intake, and OTC advil or tylenol             Objective    /86 (BP Location: Left arm, Patient Position: Sitting, Cuff Size: Adult Regular)   Pulse 96   Temp 98.9  F (37.2  C) (Tympanic)   Resp 16   Wt 82 kg (180 lb 12.8 oz)   LMP 09/16/2024 (Approximate)   SpO2 100%   BMI 30.09 kg/m    Body mass index is 30.09 kg/m .  Physical Exam  Constitutional:       General: She is not in acute distress.     Appearance: Normal appearance.   Eyes:      Conjunctiva/sclera:  Conjunctivae normal.   Cardiovascular:      Rate and Rhythm: Normal rate and regular rhythm.      Heart sounds: Normal heart sounds. No murmur heard.  Pulmonary:      Effort: Pulmonary effort is normal.      Breath sounds: Normal breath sounds.   Abdominal:      General: Bowel sounds are normal. There is no distension.      Palpations: Abdomen is soft. There is no mass.      Tenderness: There is no abdominal tenderness. There is no right CVA tenderness, left CVA tenderness or guarding.      Hernia: No hernia is present.   Musculoskeletal:      Right lower leg: No edema.      Left lower leg: No edema.   Skin:     Coloration: Skin is not jaundiced.   Neurological:      Mental Status: She is alert and oriented to person, place, and time.                    Signed Electronically by: SHAZIA SCOTT DO

## 2024-10-14 ENCOUNTER — HOSPITAL ENCOUNTER (OUTPATIENT)
Dept: ULTRASOUND IMAGING | Facility: HOSPITAL | Age: 29
Discharge: HOME | End: 2024-10-14
Attending: FAMILY MEDICINE | Admitting: FAMILY MEDICINE
Payer: COMMERCIAL

## 2024-10-14 DIAGNOSIS — R39.9 URINARY SYMPTOM OR SIGN: ICD-10-CM

## 2024-10-14 DIAGNOSIS — R10.11 RUQ ABDOMINAL PAIN: ICD-10-CM

## 2024-10-14 PROCEDURE — 76705 ECHO EXAM OF ABDOMEN: CPT

## 2024-10-15 NOTE — RESULT ENCOUNTER NOTE
Patient notified of results. Seen by patient May YAN krishadi on 10/15/2024 11:56 AM  Skyla Marina LPN

## 2024-10-18 ENCOUNTER — PREP FOR PROCEDURE (OUTPATIENT)
Dept: SURGERY | Facility: OTHER | Age: 29
End: 2024-10-18

## 2024-10-18 ENCOUNTER — OFFICE VISIT (OUTPATIENT)
Dept: SURGERY | Facility: OTHER | Age: 29
End: 2024-10-18
Attending: SURGERY
Payer: COMMERCIAL

## 2024-10-18 VITALS
SYSTOLIC BLOOD PRESSURE: 126 MMHG | HEART RATE: 90 BPM | OXYGEN SATURATION: 100 % | BODY MASS INDEX: 33.13 KG/M2 | WEIGHT: 180 LBS | RESPIRATION RATE: 14 BRPM | DIASTOLIC BLOOD PRESSURE: 80 MMHG | HEIGHT: 62 IN

## 2024-10-18 DIAGNOSIS — K27.9 PUD (PEPTIC ULCER DISEASE): Primary | ICD-10-CM

## 2024-10-18 DIAGNOSIS — R10.11 RUQ ABDOMINAL PAIN: Primary | ICD-10-CM

## 2024-10-18 DIAGNOSIS — K80.50 BILIARY COLIC: Primary | ICD-10-CM

## 2024-10-18 PROCEDURE — 99203 OFFICE O/P NEW LOW 30 MIN: CPT | Performed by: SURGERY

## 2024-10-18 PROCEDURE — G0463 HOSPITAL OUTPT CLINIC VISIT: HCPCS

## 2024-10-18 ASSESSMENT — PAIN SCALES - GENERAL: PAINLEVEL: MODERATE PAIN (5)

## 2024-10-18 NOTE — PATIENT INSTRUCTIONS
Thank you for allowing Dr. White and our surgical team to participate in your care. Please call our health unit coordinator at [phone] with scheduling questions or the nurse at [phone] with any other questions or concerns.      You have been scheduled for: Upper endoscopy with  on 11/25/24.   Please see handout for additional instruction.  You will not need a pre-operative appointment with your primary care provider.  You may call [phone] or [phone] with any questions.       You have been scheduled for: laparoscopic cholecystectomy with  on 12/5/24.   Please see handout for additional instruction.  You will not need a pre-operative appointment with your primary care provider.  You may call [phone] or [phone] with any questions.

## 2024-10-18 NOTE — PROGRESS NOTES
CLINIC NOTE - CONSULT  10/18/2024    Patient:May Maria  Referring Physician:  Dr. Jones    Reason for Referral: Epigastric abdominal pain    HPI:  May Maria is a very pleasant 28 year old female PMH right renal atrophy with preserved kidney function s/p C section x2 referred to [facility] for postprandial epigastric abdominal pain in the setting of gallstones/polyps on ultrasound.    Patient states that she has been having this pain since .  She states the pain keeps her up at night and will last for days after eating.  Nothing seems to make it better.  She also endorses GERD for which she is taking omeprazole.  On chart review, she has had a HIDA scan some years ago which demonstrated an ejection fraction of 70%.  EGD in  was normal.      She is otherwise healthy and can climb stairs without stopping.  She does not take blood thinners.  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    Past Medical History:  Past Medical History:   Diagnosis Date    Atrophic kidney     Depressive disorder     Therapy during childhood    Diet controlled gestational diabetes mellitus (GDM) in third trimester 2020    Late-onset    Glucose intolerance 05/15/2020    Glucose intolerance of pregnancy 2020    Hypertension 24    Shift work sleep disorder        Past Surgical History:  Past Surgical History:   Procedure Laterality Date     SECTION N/A 2020    Procedure:  SECTION;  Surgeon: Magdi Cannon MD;  [facility]    COLONOSCOPY      DILATE EUSTACHIAN TUBE N/A 2023    Procedure: Bilateral Eustachian Tube Dilation, Out-fracture Bilateral Inferior Turbinate, Left Septal Displacement;  Surgeon: Yamel Lucas MD;  [facility]    ENDOSCOPY UPPER, COLONOSCOPY, COMBINED N/A 2021    Procedure: colonoscopy with biopsy and polypectomy, upper endoscopy with biopsy;  Surgeon: Joe Nayak MD;  [facility]    LASER DIODE  "TYMPANOPLASTY Left 5/24/2024    Procedure: TYMPANOPLASTY WITH CARTILAGE BACKING, T-TUBE PLACEMENT;  Surgeon: Cristal Murray MD;  Location: [facility] OR    PE TUBES Bilateral     years     TYMPANOPLASTY N/A 6/28/2023    Procedure: Left Fat Myringoplasty;  Surgeon: Yamel Lucas MD;  [facility]    TYMPANOPLASTY Right 5/24/2024    Procedure: TYMPANOPLASTY WITH CARTILAGE BACKING WITH T-TUBE PLACEMENT RIGHT;  Surgeon: Cristal Murray MD;  Location: [facility] OR       Family History History:  Family History   Problem Relation Age of Onset    Other - See Comments Mother         bells palsy post auto accident    Heart Disease Father         s/p stents    Asthma Father     Cancer Father     Coronary Artery Disease Father     Hypertension Father     Other Cancer Father     Depression Father     Anxiety Disorder Father     Diabetes Maternal Grandmother     Breast Cancer Paternal Grandmother        History of Tobacco Use:  History   Smoking Status    Former    Types: Cigarettes   Smokeless Tobacco    Never       Current Medications:  Current Outpatient Medications   Medication Sig Dispense Refill    acetaminophen (TYLENOL) 325 MG tablet Take 325-650 mg by mouth every 6 hours as needed for mild pain      CONTOUR NEXT TEST test strip       Microlet Lancets MISC       pantoprazole (PROTONIX) 20 MG EC tablet Take 1 tablet (20 mg) by mouth daily 90 tablet 0       Allergies:  Allergies   Allergen Reactions    Pineapple Hives and Swelling     Throat swelling       ROS:  Pertinent items are noted in HPI.    PHYSICAL EXAM:     Vital signs: /80 (BP Location: Left arm, Cuff Size: Adult Regular)   Pulse 90   Resp 14   Ht 1.575 m (5' 2\")   Wt 81.6 kg (180 lb)   LMP 09/16/2024 (Approximate)   SpO2 100%   BMI 32.92 kg/m     Weight: [unfilled]   BMI: Body mass index is 32.92 kg/m .   General: No apparent distress   Skin: No rashes or readily discernible lesions   Neck: Neck supple, symmetric and without palpable adenopathy or " masses   Lungs: Nonlabored breathing on room air.  Clear to auscultation bilaterally   CV: Regular rate and rhythm on ausculation   Abdominal: Soft, nontender, nondistended.   Extremities: No peripheral edema    Labs:  Reviewed    Imaging:  Ultrasound Reviewed.  Patient has 2 subcentimeter polyps/gallstones with one at the infundibulum.    ASSESSMENT:  May Maria is a very pleasant 28 year old female presenting with symptomatic cholelithiasis/polyps.    I think her gallbladder is most likely responsible for her abdominal pain.  However, with the reflux symptoms, will also check an EGD.    Discussed with patient risks and benefits of EGD including bleeding and perforation.    Discussed with patient risks and benefits of laparoscopic possible open cholecystectomy including bleeding, infection, bile leak, damage to nearby structures including bile ducts and small intestine which may require additional hospitalization, GI procedure, or even reconstructive surgery.    Patient demonstrated understanding of both procedures and wish to proceed with EGD and laparoscopic possible open cholecystectomy.    PLAN:  -Proceed with EGD and laparoscopic possible open cholecystectomy.

## 2024-11-18 ENCOUNTER — ANESTHESIA EVENT (OUTPATIENT)
Dept: SURGERY | Facility: HOSPITAL | Age: 29
End: 2024-11-18
Payer: COMMERCIAL

## 2024-11-18 ASSESSMENT — LIFESTYLE VARIABLES: TOBACCO_USE: 1

## 2024-11-18 NOTE — ANESTHESIA PREPROCEDURE EVALUATION
Anesthesia Pre-Procedure Evaluation    Patient: May Maria   MRN: [id number] : 1995        Procedure : Procedure(s):  Upper endoscopy          Past Medical History:   Diagnosis Date     Atrophic kidney      Depressive disorder     Therapy during childhood     Diet controlled gestational diabetes mellitus (GDM) in third trimester 2020    Late-onset     Glucose intolerance 05/15/2020     Glucose intolerance of pregnancy 2020     Hypertension 24     Shift work sleep disorder       Past Surgical History:   Procedure Laterality Date      SECTION N/A 2020    Procedure:  SECTION;  Surgeon: Magdi Cannon MD;  [facility]     COLONOSCOPY       DILATE EUSTACHIAN TUBE N/A 2023    Procedure: Bilateral Eustachian Tube Dilation, Out-fracture Bilateral Inferior Turbinate, Left Septal Displacement;  Surgeon: Yamel Lucas MD;  [facility]     ENDOSCOPY UPPER, COLONOSCOPY, COMBINED N/A 2021    Procedure: colonoscopy with biopsy and polypectomy, upper endoscopy with biopsy;  Surgeon: Joe Nayak MD;  [facility]     LASER DIODE TYMPANOPLASTY Left 2024    Procedure: TYMPANOPLASTY WITH CARTILAGE BACKING, T-TUBE PLACEMENT;  Surgeon: Cristal Murray MD;  Location: [facility] OR     PE TUBES Bilateral     years      TYMPANOPLASTY N/A 2023    Procedure: Left Fat Myringoplasty;  Surgeon: Yamel Lucas MD;  [facility]     TYMPANOPLASTY Right 2024    Procedure: TYMPANOPLASTY WITH CARTILAGE BACKING WITH T-TUBE PLACEMENT RIGHT;  Surgeon: Cristal Murray MD;  Location: [facility] OR      Allergies   Allergen Reactions     Pineapple Hives and Swelling     Throat swelling      Social History     Tobacco Use     Smoking status: Former     Current packs/day: 0.00     Average packs/day: 0.5 packs/day for 5.0 years (2.5 ttl pk-yrs)     Types: Cigarettes     Start date: 2014     Quit date: 3/1/2018     Years since quittin.7     Passive  exposure: Past     Smokeless tobacco: Never   Substance Use Topics     Alcohol use: No      Wt Readings from Last 1 Encounters:   10/18/24 81.6 kg (180 lb)        Anesthesia Evaluation   Pt has had prior anesthetic. Type: General, MAC and Regional.    No history of anesthetic complications       ROS/MED HX  ENT/Pulmonary: Comment: Conductive hearing loss, bilateral    (+)                tobacco use, Past use,  3  Pack-Year Hx,                      Neurologic:  - neg neurologic ROS     Cardiovascular: Comment: 2019 ziopatch - sinus without any abnormal rhythms    Hx postpartum HTN - no current meds (11/18/24) - neg cardiovascular ROS   (+)  - -   -  - -                                 Previous cardiac testing   Echo: Date: Results:    Stress Test:  Date: Results:    ECG Reviewed:  Date: 1/17/2019 Results:  HR 89, sinus  Cath:  Date: Results:      METS/Exercise Tolerance: >4 METS    Hematologic:  - neg hematologic  ROS     Musculoskeletal:  - neg musculoskeletal ROS     GI/Hepatic: Comment: Regional enteritis of large intestine (H)    (+) GERD (on protonix), Symptomatic,                  Renal/Genitourinary: Comment: Atrophic kidney  Normal creatine for at least 3 years now (11/18/24)    (+) renal disease, type: CRI,            Endo:     (+)   Last HgA1c: 5.4, date: 05/14/2024, Not using insulin, - not using insulin pump.  not previously admitted for DM/DKA.       Obesity,       Psychiatric/Substance Use:     (+) psychiatric history depression       Infectious Disease:  - neg infectious disease ROS     Malignancy:  - neg malignancy ROS     Other:  - neg other ROS          Physical Exam    Airway        Mallampati: III   TM distance: > 3 FB   Neck ROM: full   Mouth opening: > 3 cm    Respiratory Devices and Support         Dental       (+) Completely normal teeth      Cardiovascular   cardiovascular exam normal          Pulmonary   pulmonary exam normal            OUTSIDE LABS:  CBC:   Lab Results   Component Value  "Date    WBC 6.3 10/10/2024    WBC 7.2 05/13/2024    HGB 13.5 10/10/2024    HGB 13.0 05/13/2024    HCT 39.7 10/10/2024    HCT 40.3 05/13/2024     10/10/2024     05/13/2024     BMP:   Lab Results   Component Value Date     10/10/2024     05/14/2024    POTASSIUM 3.7 10/10/2024    POTASSIUM 4.0 05/14/2024    CHLORIDE 104 10/10/2024    CHLORIDE 103 05/14/2024    CO2 20 (L) 10/10/2024    CO2 23 05/14/2024    BUN 10.1 10/10/2024    BUN 12.6 05/14/2024    CR 0.87 10/10/2024    CR 0.84 05/14/2024    GLC 89 10/10/2024    GLC 96 05/14/2024     COAGS: No results found for: \"PTT\", \"INR\", \"FIBR\"  POC:   Lab Results   Component Value Date    HCG Negative 10/10/2024     HEPATIC:   Lab Results   Component Value Date    ALBUMIN 4.5 10/10/2024    PROTTOTAL 7.6 10/10/2024    ALT 16 10/10/2024    AST 16 10/10/2024    ALKPHOS 101 10/10/2024    BILITOTAL 0.7 10/10/2024     OTHER:   Lab Results   Component Value Date    LACT 1.3 05/15/2018    A1C 5.4 05/14/2024    HUGH 9.1 10/10/2024    PHOS 2.7 05/05/2021    LIPASE 24 10/10/2024    TSH 1.52 05/14/2024    CRP <2.9 05/15/2018       Anesthesia Plan    ASA Status:  2    NPO Status:  NPO Appropriate    Anesthesia Type: MAC.     - Reason for MAC: straight local not clinically adequate   Induction: Intravenous, Propofol.   Maintenance: Balanced.        Consents    Anesthesia Plan(s) and associated risks, benefits, and realistic alternatives discussed. Questions answered and patient/representative(s) expressed understanding.     - Discussed: Risks, Benefits and Alternatives for BOTH SEDATION and the PROCEDURE were discussed     - Discussed with:  Patient      - Extended Intubation/Ventilatory Support Discussed: No.      - Patient is DNR/DNI Status: No     Use of blood products discussed: No .     Postoperative Care            Comments:    Other Comments: Risks and benefits of MAC anesthetic discussed including dental damage, aspiration, loss of airway, conversion to " general anesthetic, CV complications, MI, stroke, death. Pt wishes to proceed. 10/18 Christopher - states no preop needed hl    HCG ordered           SPENSER Gómez CNP    I have reviewed the pertinent notes and labs in the chart from the past 30 days. Any updates or changes from those notes are reflected in this note.               # Hypertension: Noted on problem list

## 2024-11-25 ENCOUNTER — ANESTHESIA (OUTPATIENT)
Dept: SURGERY | Facility: HOSPITAL | Age: 29
End: 2024-11-25
Payer: COMMERCIAL

## 2024-11-25 ENCOUNTER — HOSPITAL ENCOUNTER (OUTPATIENT)
Facility: HOSPITAL | Age: 29
Discharge: HOME | End: 2024-11-25
Attending: SURGERY | Admitting: SURGERY
Payer: COMMERCIAL

## 2024-11-25 ENCOUNTER — APPOINTMENT (OUTPATIENT)
Dept: LAB | Facility: HOSPITAL | Age: 29
End: 2024-11-25
Attending: SURGERY
Payer: COMMERCIAL

## 2024-11-25 VITALS
HEART RATE: 70 BPM | HEIGHT: 62 IN | DIASTOLIC BLOOD PRESSURE: 75 MMHG | BODY MASS INDEX: 32.57 KG/M2 | OXYGEN SATURATION: 98 % | RESPIRATION RATE: 16 BRPM | TEMPERATURE: 98.8 F | SYSTOLIC BLOOD PRESSURE: 104 MMHG | WEIGHT: 177 LBS

## 2024-11-25 LAB — HCG UR QL: NEGATIVE

## 2024-11-25 PROCEDURE — 370N000017 HC ANESTHESIA TECHNICAL FEE, PER MIN: Performed by: SURGERY

## 2024-11-25 PROCEDURE — 360N000075 HC SURGERY LEVEL 2, PER MIN: Performed by: SURGERY

## 2024-11-25 PROCEDURE — 43239 EGD BIOPSY SINGLE/MULTIPLE: CPT | Performed by: SURGERY

## 2024-11-25 PROCEDURE — 272N000001 HC OR GENERAL SUPPLY STERILE: Performed by: SURGERY

## 2024-11-25 PROCEDURE — 81025 URINE PREGNANCY TEST: CPT | Performed by: NURSE PRACTITIONER

## 2024-11-25 PROCEDURE — 999N000141 HC STATISTIC PRE-PROCEDURE NURSING ASSESSMENT: Performed by: SURGERY

## 2024-11-25 PROCEDURE — 250N000009 HC RX 250: Performed by: NURSE ANESTHETIST, CERTIFIED REGISTERED

## 2024-11-25 PROCEDURE — 710N000012 HC RECOVERY PHASE 2, PER MINUTE: Performed by: SURGERY

## 2024-11-25 PROCEDURE — 88305 TISSUE EXAM BY PATHOLOGIST: CPT | Mod: 26 | Performed by: PATHOLOGY

## 2024-11-25 PROCEDURE — 88305 TISSUE EXAM BY PATHOLOGIST: CPT | Mod: TC | Performed by: SURGERY

## 2024-11-25 PROCEDURE — 250N000011 HC RX IP 250 OP 636: Performed by: NURSE ANESTHETIST, CERTIFIED REGISTERED

## 2024-11-25 PROCEDURE — 258N000003 HC RX IP 258 OP 636: Performed by: NURSE PRACTITIONER

## 2024-11-25 RX ORDER — LIDOCAINE 40 MG/G
CREAM TOPICAL
Status: DISCONTINUED | OUTPATIENT
Start: 2024-11-25 | End: 2024-11-25 | Stop reason: HOSPADM

## 2024-11-25 RX ORDER — NALOXONE HYDROCHLORIDE 0.4 MG/ML
0.1 INJECTION, SOLUTION INTRAMUSCULAR; INTRAVENOUS; SUBCUTANEOUS
Status: DISCONTINUED | OUTPATIENT
Start: 2024-11-25 | End: 2024-11-25 | Stop reason: HOSPADM

## 2024-11-25 RX ORDER — ONDANSETRON 2 MG/ML
4 INJECTION INTRAMUSCULAR; INTRAVENOUS EVERY 30 MIN PRN
Status: DISCONTINUED | OUTPATIENT
Start: 2024-11-25 | End: 2024-11-25 | Stop reason: HOSPADM

## 2024-11-25 RX ORDER — SODIUM CHLORIDE, SODIUM LACTATE, POTASSIUM CHLORIDE, CALCIUM CHLORIDE 600; 310; 30; 20 MG/100ML; MG/100ML; MG/100ML; MG/100ML
INJECTION, SOLUTION INTRAVENOUS CONTINUOUS
Status: DISCONTINUED | OUTPATIENT
Start: 2024-11-25 | End: 2024-11-25 | Stop reason: HOSPADM

## 2024-11-25 RX ORDER — PROPOFOL 10 MG/ML
INJECTION, EMULSION INTRAVENOUS PRN
Status: DISCONTINUED | OUTPATIENT
Start: 2024-11-25 | End: 2024-11-25

## 2024-11-25 RX ORDER — ONDANSETRON 4 MG/1
4 TABLET, ORALLY DISINTEGRATING ORAL EVERY 30 MIN PRN
Status: DISCONTINUED | OUTPATIENT
Start: 2024-11-25 | End: 2024-11-25 | Stop reason: HOSPADM

## 2024-11-25 RX ORDER — DEXAMETHASONE SODIUM PHOSPHATE 10 MG/ML
4 INJECTION, SOLUTION INTRAMUSCULAR; INTRAVENOUS
Status: DISCONTINUED | OUTPATIENT
Start: 2024-11-25 | End: 2024-11-25 | Stop reason: HOSPADM

## 2024-11-25 RX ORDER — LIDOCAINE HYDROCHLORIDE 20 MG/ML
INJECTION, SOLUTION INFILTRATION; PERINEURAL PRN
Status: DISCONTINUED | OUTPATIENT
Start: 2024-11-25 | End: 2024-11-25

## 2024-11-25 RX ADMIN — PROPOFOL 50 MG: 10 INJECTION, EMULSION INTRAVENOUS at 13:01

## 2024-11-25 RX ADMIN — LIDOCAINE HYDROCHLORIDE 40 MG: 20 INJECTION, SOLUTION INFILTRATION; PERINEURAL at 13:00

## 2024-11-25 RX ADMIN — PROPOFOL 100 MG: 10 INJECTION, EMULSION INTRAVENOUS at 13:03

## 2024-11-25 RX ADMIN — SODIUM CHLORIDE, POTASSIUM CHLORIDE, SODIUM LACTATE AND CALCIUM CHLORIDE: 600; 310; 30; 20 INJECTION, SOLUTION INTRAVENOUS at 12:53

## 2024-11-25 RX ADMIN — PROPOFOL 50 MG: 10 INJECTION, EMULSION INTRAVENOUS at 13:00

## 2024-11-25 ASSESSMENT — ACTIVITIES OF DAILY LIVING (ADL): ADLS_ACUITY_SCORE: 42

## 2024-11-25 NOTE — ANESTHESIA POSTPROCEDURE EVALUATION
Patient: May Maria    Procedure: Procedure(s):  Upper endoscopy with biopsy       Anesthesia Type:  MAC    Note:  Disposition: Outpatient   Postop Pain Control: Uneventful            Sign Out: Well controlled pain   PONV: No   Neuro/Psych: Uneventful            Sign Out: Acceptable/Baseline neuro status   Airway/Respiratory: Uneventful            Sign Out: Acceptable/Baseline resp. status   CV/Hemodynamics: Uneventful            Sign Out: Acceptable CV status; No obvious hypovolemia; No obvious fluid overload   Other NRE: NONE   DID A NON-ROUTINE EVENT OCCUR? No       Last vitals:  Vitals Value Taken Time   /75 11/25/24 1335   Temp 98.8  F (37.1  C) 11/25/24 1310   Pulse 70 11/25/24 1335   Resp 16 11/25/24 1335   SpO2 98 % 11/25/24 1336   Vitals shown include unfiled device data.    Electronically Signed By: SPENSER Chew CRNA  November 25, 2024  2:09 PM

## 2024-11-25 NOTE — OP NOTE
REPORT OF OPERATION  DATE OF PROCEDURE: 11/25/2024    PATIENT: May Maria    SURGERY PERFORMED: Esophagogastroduodenoscopy with biopsies     PREOPERATIVE DIAGNOSIS: Abdominal Pain    POSTOPERATIVE DIAGNOSIS:    Normal Esophagogastroduodenoscopy   Squamous columnar junction at 35    SURGEON: Yohan White MD    ASSISTANTS: None    ANESTHESIA: Monitored Anesthesia Care    COMPLICATIONS: None apparent    ESTIMATED BLOOD LOSS: * No values recorded between 11/25/2024  1:02 PM and 11/25/2024  1:08 PM *    TRANSFUSIONS: None    TISSUE TO PATHOLOGY: Duodenal, Antral, and Distal Esophageal    FINDINGS: Normal Esophagogastroduodenoscopy    INDICATIONS: This is a 29 year old female in need of an Esophagogastroduodenoscopy for Abdominal Pain.  The patient will be taken to the endoscopy suite for that procedure.    DESCRIPTIONS OF PROCEDURE IN DETAIL: After consent was obtained the patient was taken to the operative suite and mariela in the left lateral decubitus position.  The patient was identified and the correct patient was confirmed. Monitored Anesthesia Care was given by anesthesia. A time out was performed verifying the correct patient and the correct procedure.  The entire operative team was in agreement.  All necessary equipment and supplies were in the room.    The endoscope was inserted into the mouth and passed without difficulty to the third portion of the duodenum.  Duodenal biopsies were taken.  The endoscope was then withdrawn through the duodenum, the duodenal bulb and pyloric channel and no abnormalities were noted.  The endoscope was brought back into the stomach and antral biopsies were obtained.  The endoscope was then retroflexed and no lesions of the fundus body or antrum were seen.  The endoscope was straightened back out and brought into the distal esophagus and a well-defined squamocolumnar junction was identified at 35 cm. Biopsies of the distal esophagus were taken.  The endoscope was slowly  withdrawn through the remaining esophagus no other abnormalities are seen,  The endoscope was withdrawn from the patient the patient was then taken to the recovery room in stable condition tolerated the procedure well.

## 2024-11-25 NOTE — H&P
EGD Consult  2024    Patient:May Maria    Reason for Referral: GERD    HPI:  May Maria is a very pleasant 29 year old female referred for EGD for GERD.  She is currently on protonix 20mg daily and continues to have reflux symptoms.  She also has epigastric postprandial abdominal pain for which she is having cholecystectomy in the coming weeks.      Past Medical History:  Past Medical History:   Diagnosis Date    Atrophic kidney     Depressive disorder     Therapy during childhood    Diet controlled gestational diabetes mellitus (GDM) in third trimester 2020    Late-onset    Glucose intolerance 05/15/2020    Glucose intolerance of pregnancy 2020    Hypertension 24    Shift work sleep disorder        Past Surgical History:  Past Surgical History:   Procedure Laterality Date     SECTION N/A 2020    Procedure:  SECTION;  Surgeon: Magdi Cannon MD;  [facility]    COLONOSCOPY      DILATE EUSTACHIAN TUBE N/A 2023    Procedure: Bilateral Eustachian Tube Dilation, Out-fracture Bilateral Inferior Turbinate, Left Septal Displacement;  Surgeon: Yamel Lucas MD;  [facility]    ENDOSCOPY UPPER, COLONOSCOPY, COMBINED N/A 2021    Procedure: colonoscopy with biopsy and polypectomy, upper endoscopy with biopsy;  Surgeon: Joe Nayak MD;  [facility]: [facility]    LASER DIODE TYMPANOPLASTY Left 2024    Procedure: TYMPANOPLASTY WITH CARTILAGE BACKING, T-TUBE PLACEMENT;  Surgeon: Cristal Murray MD;  Location: [facility] OR    PE TUBES Bilateral     years     TYMPANOPLASTY N/A 2023    Procedure: Left Fat Myringoplasty;  Surgeon: Yamel Lucas MD;  [facility]    TYMPANOPLASTY Right 2024    Procedure: TYMPANOPLASTY WITH CARTILAGE BACKING WITH T-TUBE PLACEMENT RIGHT;  Surgeon: Cristal Murray MD;  Location: [facility] OR       Family History History:  Family History   Problem Relation Age of Onset    Other - See Comments Mother         bells  palsy post auto accident    Heart Disease Father         s/p stents    Asthma Father     Cancer Father     Coronary Artery Disease Father     Hypertension Father     Other Cancer Father     Depression Father     Anxiety Disorder Father     Diabetes Maternal Grandmother     Breast Cancer Paternal Grandmother        History of Tobacco Use:  History   Smoking Status    Former    Types: Cigarettes   Smokeless Tobacco    Never       Current Medications:  No current outpatient medications on file.       Allergies:  Allergies   Allergen Reactions    Pineapple Hives and Swelling     Throat swelling       ROS:  Pertinent items are noted in HPI.    PHYSICAL EXAM:     Vital signs: LMP 11/18/2024 (Approximate)   Breastfeeding No    Weight: [unfilled]   BMI: There is no height or weight on file to calculate BMI.   General: No apparent distress   Skin: No rashes or readily discernible lesions   Neck: Neck supple, symmetric and without palpable adenopathy or masses   Lungs: Nonlabored breathing on room air.  Clear to auscultation bilaterally   CV: Regular rate and rhythm on auscultation   Abdominal: Soft, nontender, nondistended   Extremities: No peripheral edema    Labs:  Reviewed    Imaging:  N/a    ASSESSMENT:  May Maria is a very pleasant 29 year old female presenting for EGD.    Discussed risks and benefits of EGD including esophageal perforation.  Patient demonstrated understanding and wishes to proceed.    PLAN:  Proceed with EGD

## 2024-11-25 NOTE — OR NURSING
Patient and responsible adult given discharge instructions with no questions regarding instructions. Meghana score 19/20. Pain level 0/10.  Discharged from unit via walking. Patient discharged to home with mother. Tolerating PO intake.

## 2024-11-25 NOTE — ANESTHESIA CARE TRANSFER NOTE
Patient: April YURIY Maria    Procedure: Procedure(s):  Upper endoscopy with biopsy       Diagnosis: PUD (peptic ulcer disease) [K27.9]  Diagnosis Additional Information: No value filed.    Anesthesia Type:   MAC     Note:    Oropharynx: oropharynx clear of all foreign objects  Level of Consciousness: drowsy  Oxygen Supplementation: room air    Independent Airway: airway patency satisfactory and stable  Dentition: dentition unchanged  Vital Signs Stable: post-procedure vital signs reviewed and stable    Patient transferred to: Phase II    Handoff Report: Identifed the Patient, Identified the Reponsible Provider, Reviewed the pertinent medical history, Discussed the surgical course, Reviewed Intra-OP anesthesia mangement and issues during anesthesia, Set expectations for post-procedure period and Allowed opportunity for questions and acknowledgement of understanding      Vitals:  Vitals Value Taken Time   BP 98/53 11/25/24 1310   Temp     Pulse 68 11/25/24 1310   Resp     SpO2 98 % 11/25/24 1313   Vitals shown include unfiled device data.    Electronically Signed By: SPENSER Bobby CRNA  November 25, 2024  1:14 PM

## 2024-11-26 LAB
PATH REPORT.COMMENTS IMP SPEC: NORMAL
PATH REPORT.FINAL DX SPEC: NORMAL
PATH REPORT.GROSS SPEC: NORMAL
PATH REPORT.MICROSCOPIC SPEC OTHER STN: NORMAL
PATH REPORT.RELEVANT HX SPEC: NORMAL
PHOTO IMAGE: NORMAL

## 2024-11-26 ASSESSMENT — LIFESTYLE VARIABLES: TOBACCO_USE: 1

## 2024-11-26 NOTE — ANESTHESIA PREPROCEDURE EVALUATION
Anesthesia Pre-Procedure Evaluation    Patient: May Maria   MRN: [id number] : 1995        Procedure : Procedure(s):  Laparoscopic cholecystectomy          Past Medical History:   Diagnosis Date    Atrophic kidney     Depressive disorder     Therapy during childhood    Diet controlled gestational diabetes mellitus (GDM) in third trimester 2020    Late-onset    Glucose intolerance 05/15/2020    Glucose intolerance of pregnancy 2020    Hypertension 24    Shift work sleep disorder       Past Surgical History:   Procedure Laterality Date     SECTION N/A 2020    Procedure:  SECTION;  Surgeon: Magdi Cannon MD;  Location: [facility]    COLONOSCOPY      DILATE EUSTACHIAN TUBE N/A 2023    Procedure: Bilateral Eustachian Tube Dilation, Out-fracture Bilateral Inferior Turbinate, Left Septal Displacement;  Surgeon: Yamel Lucas MD;  Location: [facility]    ENDOSCOPY UPPER, COLONOSCOPY, COMBINED N/A 2021    Procedure: colonoscopy with biopsy and polypectomy, upper endoscopy with biopsy;  Surgeon: Joe Nayak MD;  Location: [facility]    ENDOSCOPY UPPER, COLONOSCOPY, COMBINED N/A 2024    Procedure: Upper endoscopy with biopsy;  Surgeon: Yohan White MD;  Location: [facility]    LASER DIODE TYMPANOPLASTY Left 2024    Procedure: TYMPANOPLASTY WITH CARTILAGE BACKING, T-TUBE PLACEMENT;  Surgeon: Cristal Murray MD;  Location: [facility] OR    PE TUBES Bilateral     years     TYMPANOPLASTY N/A 2023    Procedure: Left Fat Myringoplasty;  Surgeon: Yamel Lucas MD;  Location: [facility]    TYMPANOPLASTY Right 2024    Procedure: TYMPANOPLASTY WITH CARTILAGE BACKING WITH T-TUBE PLACEMENT RIGHT;  Surgeon: Cristal Murray MD;  Location: [facility] OR      Allergies   Allergen Reactions    Pineapple Hives and Swelling     Throat swelling      Social History     Tobacco Use    Smoking status: Former     Current packs/day: 0.00     Average packs/day: 0.5 packs/day  for 5.0 years (2.5 ttl pk-yrs)     Types: Cigarettes     Start date: 2014     Quit date: 3/1/2018     Years since quittin.7     Passive exposure: Past    Smokeless tobacco: Never   Substance Use Topics    Alcohol use: No      Wt Readings from Last 1 Encounters:   24 80.3 kg (177 lb)        Anesthesia Evaluation   Pt has had prior anesthetic. Type: General, MAC and Regional.    History of anesthetic complications  - PONV.      ROS/MED HX  ENT/Pulmonary: Comment: Conductive hearing loss, bilateral    (+)                tobacco use, Past use,  3  Pack-Year Hx,                      Neurologic:  - neg neurologic ROS     Cardiovascular: Comment: 2019 ziopatch - sinus without any abnormal rhythms    Hx postpartum HTN - no current meds (24) - neg cardiovascular ROS   (+)  hypertension- -   -  - -                                 Previous cardiac testing   Echo: Date: Results:    Stress Test:  Date: Results:    ECG Reviewed:  Date: 2019 Results:  HR 89, sinus  Cath:  Date: Results:      METS/Exercise Tolerance: >4 METS    Hematologic:  - neg hematologic  ROS     Musculoskeletal:  - neg musculoskeletal ROS     GI/Hepatic: Comment: Regional enteritis of large intestine (H)    (+) GERD (on protonix), Asymptomatic on medication,                  Renal/Genitourinary: Comment: Atrophic kidney  Normal creatine for at least 3 years now (24)    (+) renal disease, type: CRI,            Endo: Comment: Gestational diabetic    (+)   Last HgA1c: 5.4, date: 2024, Not using insulin, - not using insulin pump.  not previously admitted for DM/DKA.       Obesity,       Psychiatric/Substance Use:     (+) psychiatric history depression       Infectious Disease:  - neg infectious disease ROS     Malignancy:  - neg malignancy ROS     Other:  - neg other ROS          Physical Exam    Airway        Mallampati: II   TM distance: > 3 FB   Neck ROM: full   Mouth opening: > 3 cm    Respiratory Devices and Support      "    Dental       (+) Completely normal teeth      Cardiovascular   cardiovascular exam normal       Rhythm and rate: regular and normal     Pulmonary           breath sounds clear to auscultation           OUTSIDE LABS:  CBC:   Lab Results   Component Value Date    WBC 6.3 10/10/2024    WBC 7.2 05/13/2024    HGB 13.5 10/10/2024    HGB 13.0 05/13/2024    HCT 39.7 10/10/2024    HCT 40.3 05/13/2024     10/10/2024     05/13/2024     BMP:   Lab Results   Component Value Date     10/10/2024     05/14/2024    POTASSIUM 3.7 10/10/2024    POTASSIUM 4.0 05/14/2024    CHLORIDE 104 10/10/2024    CHLORIDE 103 05/14/2024    CO2 20 (L) 10/10/2024    CO2 23 05/14/2024    BUN 10.1 10/10/2024    BUN 12.6 05/14/2024    CR 0.87 10/10/2024    CR 0.84 05/14/2024    GLC 89 10/10/2024    GLC 96 05/14/2024     COAGS: No results found for: \"PTT\", \"INR\", \"FIBR\"  POC:   Lab Results   Component Value Date    HCG Negative 11/25/2024     HEPATIC:   Lab Results   Component Value Date    ALBUMIN 4.5 10/10/2024    PROTTOTAL 7.6 10/10/2024    ALT 16 10/10/2024    AST 16 10/10/2024    ALKPHOS 101 10/10/2024    BILITOTAL 0.7 10/10/2024     OTHER:   Lab Results   Component Value Date    LACT 1.3 05/15/2018    A1C 5.4 05/14/2024    HUGH 9.1 10/10/2024    PHOS 2.7 05/05/2021    LIPASE 24 10/10/2024    TSH 1.52 05/14/2024    CRP <2.9 05/15/2018       Anesthesia Plan    ASA Status:  2    NPO Status:  NPO Appropriate    Anesthesia Type: General.     - Airway: ETT   Induction: Intravenous, Propofol.   Maintenance: Balanced.        Consents    Anesthesia Plan(s) and associated risks, benefits, and realistic alternatives discussed. Questions answered and patient/representative(s) expressed understanding.     - Discussed: Risks, Benefits and Alternatives for BOTH SEDATION and the PROCEDURE were discussed     - Discussed with:  Patient      - Extended Intubation/Ventilatory Support Discussed: No.      - Patient is DNR/DNI Status: No   " "  Use of blood products discussed: No .     Postoperative Care    Pain management: Multi-modal analgesia, IV analgesics.   PONV prophylaxis: Ondansetron (or other 5HT-3), Dexamethasone or Solumedrol, Scopolamine patch, Background Propofol Infusion     Comments:    Other Comments: Discussed risks and benefits with patient for general anesthesia including sore throat, nausea, vomiting, aspiration, dental damage, loss of airway, CV complications, stroke, MI, death. Pt wishes to proceed.            SPENSER Mota CRNA    I have reviewed the pertinent notes and labs in the chart from the past 30 days and (re)examined the patient.  Any updates or changes from those notes are reflected in this note.               # Hypertension: Noted on problem list           # Obesity: Estimated body mass index is 32.37 kg/m  as calculated from the following:    Height as of 11/25/24: 1.575 m (5' 2\").    Weight as of 11/25/24: 80.3 kg (177 lb).             "

## 2024-12-05 ENCOUNTER — HOSPITAL ENCOUNTER (OUTPATIENT)
Facility: HOSPITAL | Age: 29
Discharge: HOME | End: 2024-12-05
Attending: SURGERY | Admitting: SURGERY
Payer: COMMERCIAL

## 2024-12-05 ENCOUNTER — ANESTHESIA (OUTPATIENT)
Dept: SURGERY | Facility: HOSPITAL | Age: 29
End: 2024-12-05
Payer: COMMERCIAL

## 2024-12-05 ENCOUNTER — LAB (OUTPATIENT)
Dept: LAB | Facility: HOSPITAL | Age: 29
End: 2024-12-05
Attending: SURGERY
Payer: COMMERCIAL

## 2024-12-05 VITALS
HEART RATE: 78 BPM | OXYGEN SATURATION: 99 % | SYSTOLIC BLOOD PRESSURE: 136 MMHG | TEMPERATURE: 98 F | HEIGHT: 62 IN | BODY MASS INDEX: 32.57 KG/M2 | WEIGHT: 177 LBS | DIASTOLIC BLOOD PRESSURE: 86 MMHG | RESPIRATION RATE: 16 BRPM

## 2024-12-05 DIAGNOSIS — K80.20 SYMPTOMATIC CHOLELITHIASIS: Primary | ICD-10-CM

## 2024-12-05 LAB — HCG UR QL: NEGATIVE

## 2024-12-05 PROCEDURE — 250N000011 HC RX IP 250 OP 636: Performed by: NURSE ANESTHETIST, CERTIFIED REGISTERED

## 2024-12-05 PROCEDURE — 999N000141 HC STATISTIC PRE-PROCEDURE NURSING ASSESSMENT: Performed by: SURGERY

## 2024-12-05 PROCEDURE — 81025 URINE PREGNANCY TEST: CPT | Performed by: NURSE ANESTHETIST, CERTIFIED REGISTERED

## 2024-12-05 PROCEDURE — 88304 TISSUE EXAM BY PATHOLOGIST: CPT | Mod: 26 | Performed by: PATHOLOGY

## 2024-12-05 PROCEDURE — 250N000011 HC RX IP 250 OP 636

## 2024-12-05 PROCEDURE — 47562 LAPAROSCOPIC CHOLECYSTECTOMY: CPT | Performed by: SURGERY

## 2024-12-05 PROCEDURE — 710N000010 HC RECOVERY PHASE 1, LEVEL 2, PER MIN: Performed by: SURGERY

## 2024-12-05 PROCEDURE — 250N000013 HC RX MED GY IP 250 OP 250 PS 637: Performed by: SURGERY

## 2024-12-05 PROCEDURE — 710N000012 HC RECOVERY PHASE 2, PER MINUTE: Performed by: SURGERY

## 2024-12-05 PROCEDURE — 250N000009 HC RX 250: Performed by: NURSE ANESTHETIST, CERTIFIED REGISTERED

## 2024-12-05 PROCEDURE — 258N000003 HC RX IP 258 OP 636: Performed by: NURSE ANESTHETIST, CERTIFIED REGISTERED

## 2024-12-05 PROCEDURE — 250N000011 HC RX IP 250 OP 636: Performed by: SURGERY

## 2024-12-05 PROCEDURE — 250N000025 HC SEVOFLURANE, PER MIN: Performed by: SURGERY

## 2024-12-05 PROCEDURE — 250N000009 HC RX 250: Performed by: SURGERY

## 2024-12-05 PROCEDURE — 360N000077 HC SURGERY LEVEL 4, PER MIN: Performed by: SURGERY

## 2024-12-05 PROCEDURE — 272N000001 HC OR GENERAL SUPPLY STERILE: Performed by: SURGERY

## 2024-12-05 PROCEDURE — 370N000017 HC ANESTHESIA TECHNICAL FEE, PER MIN: Performed by: SURGERY

## 2024-12-05 PROCEDURE — 88304 TISSUE EXAM BY PATHOLOGIST: CPT | Mod: TC | Performed by: SURGERY

## 2024-12-05 RX ORDER — NALOXONE HYDROCHLORIDE 0.4 MG/ML
0.1 INJECTION, SOLUTION INTRAMUSCULAR; INTRAVENOUS; SUBCUTANEOUS
Status: DISCONTINUED | OUTPATIENT
Start: 2024-12-05 | End: 2024-12-05 | Stop reason: HOSPADM

## 2024-12-05 RX ORDER — ONDANSETRON 4 MG/1
4 TABLET, ORALLY DISINTEGRATING ORAL EVERY 30 MIN PRN
Status: DISCONTINUED | OUTPATIENT
Start: 2024-12-05 | End: 2024-12-05 | Stop reason: HOSPADM

## 2024-12-05 RX ORDER — ONDANSETRON 2 MG/ML
INJECTION INTRAMUSCULAR; INTRAVENOUS PRN
Status: DISCONTINUED | OUTPATIENT
Start: 2024-12-05 | End: 2024-12-05

## 2024-12-05 RX ORDER — SODIUM CHLORIDE, SODIUM LACTATE, POTASSIUM CHLORIDE, CALCIUM CHLORIDE 600; 310; 30; 20 MG/100ML; MG/100ML; MG/100ML; MG/100ML
INJECTION, SOLUTION INTRAVENOUS CONTINUOUS
Status: DISCONTINUED | OUTPATIENT
Start: 2024-12-05 | End: 2024-12-05 | Stop reason: HOSPADM

## 2024-12-05 RX ORDER — LIDOCAINE 40 MG/G
CREAM TOPICAL
Status: DISCONTINUED | OUTPATIENT
Start: 2024-12-05 | End: 2024-12-05 | Stop reason: HOSPADM

## 2024-12-05 RX ORDER — ONDANSETRON 4 MG/1
4 TABLET, ORALLY DISINTEGRATING ORAL ONCE
Status: DISCONTINUED | OUTPATIENT
Start: 2024-12-05 | End: 2024-12-05 | Stop reason: HOSPADM

## 2024-12-05 RX ORDER — OXYCODONE HYDROCHLORIDE 5 MG/1
5 TABLET ORAL EVERY 6 HOURS PRN
Qty: 12 TABLET | Refills: 0 | Status: SHIPPED | OUTPATIENT
Start: 2024-12-05 | End: 2024-12-08

## 2024-12-05 RX ORDER — BUPIVACAINE HYDROCHLORIDE 2.5 MG/ML
INJECTION, SOLUTION EPIDURAL; INFILTRATION; INTRACAUDAL
Status: DISCONTINUED
Start: 2024-12-05 | End: 2024-12-05 | Stop reason: HOSPADM

## 2024-12-05 RX ORDER — INDOCYANINE GREEN AND WATER 25 MG
2.5 KIT INJECTION ONCE
Status: COMPLETED | OUTPATIENT
Start: 2024-12-05 | End: 2024-12-05

## 2024-12-05 RX ORDER — FENTANYL CITRATE 50 UG/ML
25 INJECTION, SOLUTION INTRAMUSCULAR; INTRAVENOUS EVERY 5 MIN PRN
Status: DISCONTINUED | OUTPATIENT
Start: 2024-12-05 | End: 2024-12-05 | Stop reason: HOSPADM

## 2024-12-05 RX ORDER — HYDROMORPHONE HYDROCHLORIDE 1 MG/ML
0.2 INJECTION, SOLUTION INTRAMUSCULAR; INTRAVENOUS; SUBCUTANEOUS EVERY 5 MIN PRN
Status: DISCONTINUED | OUTPATIENT
Start: 2024-12-05 | End: 2024-12-05 | Stop reason: HOSPADM

## 2024-12-05 RX ORDER — DEXAMETHASONE SODIUM PHOSPHATE 4 MG/ML
INJECTION, SOLUTION INTRA-ARTICULAR; INTRALESIONAL; INTRAMUSCULAR; INTRAVENOUS; SOFT TISSUE PRN
Status: DISCONTINUED | OUTPATIENT
Start: 2024-12-05 | End: 2024-12-05

## 2024-12-05 RX ORDER — PROPOFOL 10 MG/ML
INJECTION, EMULSION INTRAVENOUS PRN
Status: DISCONTINUED | OUTPATIENT
Start: 2024-12-05 | End: 2024-12-05

## 2024-12-05 RX ORDER — FENTANYL CITRATE 50 UG/ML
50 INJECTION, SOLUTION INTRAMUSCULAR; INTRAVENOUS EVERY 5 MIN PRN
Status: DISCONTINUED | OUTPATIENT
Start: 2024-12-05 | End: 2024-12-05 | Stop reason: HOSPADM

## 2024-12-05 RX ORDER — SCOLOPAMINE TRANSDERMAL SYSTEM 1 MG/1
1 PATCH, EXTENDED RELEASE TRANSDERMAL ONCE
Status: DISCONTINUED | OUTPATIENT
Start: 2024-12-05 | End: 2024-12-05 | Stop reason: HOSPADM

## 2024-12-05 RX ORDER — ONDANSETRON 4 MG/1
TABLET, ORALLY DISINTEGRATING ORAL
Status: COMPLETED
Start: 2024-12-05 | End: 2024-12-05

## 2024-12-05 RX ORDER — PHENYLEPHRINE HYDROCHLORIDE 10 MG/ML
INJECTION INTRAVENOUS PRN
Status: DISCONTINUED | OUTPATIENT
Start: 2024-12-05 | End: 2024-12-05

## 2024-12-05 RX ORDER — SODIUM CHLORIDE, SODIUM LACTATE, POTASSIUM CHLORIDE, CALCIUM CHLORIDE 600; 310; 30; 20 MG/100ML; MG/100ML; MG/100ML; MG/100ML
INJECTION, SOLUTION INTRAVENOUS CONTINUOUS PRN
Status: DISCONTINUED | OUTPATIENT
Start: 2024-12-05 | End: 2024-12-05

## 2024-12-05 RX ORDER — KETOROLAC TROMETHAMINE 30 MG/ML
INJECTION, SOLUTION INTRAMUSCULAR; INTRAVENOUS PRN
Status: DISCONTINUED | OUTPATIENT
Start: 2024-12-05 | End: 2024-12-05

## 2024-12-05 RX ORDER — LIDOCAINE HYDROCHLORIDE 20 MG/ML
INJECTION, SOLUTION INFILTRATION; PERINEURAL PRN
Status: DISCONTINUED | OUTPATIENT
Start: 2024-12-05 | End: 2024-12-05

## 2024-12-05 RX ORDER — ONDANSETRON 2 MG/ML
4 INJECTION INTRAMUSCULAR; INTRAVENOUS EVERY 30 MIN PRN
Status: DISCONTINUED | OUTPATIENT
Start: 2024-12-05 | End: 2024-12-05 | Stop reason: HOSPADM

## 2024-12-05 RX ORDER — HYDROMORPHONE HYDROCHLORIDE 1 MG/ML
0.4 INJECTION, SOLUTION INTRAMUSCULAR; INTRAVENOUS; SUBCUTANEOUS EVERY 5 MIN PRN
Status: DISCONTINUED | OUTPATIENT
Start: 2024-12-05 | End: 2024-12-05 | Stop reason: HOSPADM

## 2024-12-05 RX ORDER — BUPIVACAINE HYDROCHLORIDE 5 MG/ML
INJECTION, SOLUTION PERINEURAL PRN
Status: DISCONTINUED | OUTPATIENT
Start: 2024-12-05 | End: 2024-12-05 | Stop reason: HOSPADM

## 2024-12-05 RX ORDER — BUPIVACAINE HYDROCHLORIDE 5 MG/ML
INJECTION, SOLUTION EPIDURAL; INTRACAUDAL
Status: DISCONTINUED
Start: 2024-12-05 | End: 2024-12-05 | Stop reason: HOSPADM

## 2024-12-05 RX ORDER — PROPOFOL 10 MG/ML
INJECTION, EMULSION INTRAVENOUS CONTINUOUS PRN
Status: DISCONTINUED | OUTPATIENT
Start: 2024-12-05 | End: 2024-12-05

## 2024-12-05 RX ORDER — FENTANYL CITRATE 50 UG/ML
INJECTION, SOLUTION INTRAMUSCULAR; INTRAVENOUS PRN
Status: DISCONTINUED | OUTPATIENT
Start: 2024-12-05 | End: 2024-12-05

## 2024-12-05 RX ORDER — OXYCODONE HYDROCHLORIDE 5 MG/1
5 TABLET ORAL
Status: COMPLETED | OUTPATIENT
Start: 2024-12-05 | End: 2024-12-05

## 2024-12-05 RX ORDER — DEXAMETHASONE SODIUM PHOSPHATE 10 MG/ML
4 INJECTION, SOLUTION INTRAMUSCULAR; INTRAVENOUS
Status: DISCONTINUED | OUTPATIENT
Start: 2024-12-05 | End: 2024-12-05 | Stop reason: HOSPADM

## 2024-12-05 RX ORDER — INDOCYANINE GREEN AND WATER 25 MG
KIT INJECTION
Status: COMPLETED
Start: 2024-12-05 | End: 2024-12-05

## 2024-12-05 RX ADMIN — ONDANSETRON 4 MG: 4 TABLET, ORALLY DISINTEGRATING ORAL at 10:49

## 2024-12-05 RX ADMIN — PHENYLEPHRINE HYDROCHLORIDE 100 MCG: 10 INJECTION INTRAVENOUS at 07:49

## 2024-12-05 RX ADMIN — Medication 200 MG: at 08:31

## 2024-12-05 RX ADMIN — HYDROMORPHONE HYDROCHLORIDE 0.5 MG: 1 INJECTION, SOLUTION INTRAMUSCULAR; INTRAVENOUS; SUBCUTANEOUS at 08:01

## 2024-12-05 RX ADMIN — SCOPOLAMINE 1 PATCH: 1.5 PATCH, EXTENDED RELEASE TRANSDERMAL at 07:04

## 2024-12-05 RX ADMIN — INDOCYANINE GREEN AND WATER 2.5 MG: KIT at 07:23

## 2024-12-05 RX ADMIN — PROPOFOL 50 MCG/KG/MIN: 10 INJECTION, EMULSION INTRAVENOUS at 07:44

## 2024-12-05 RX ADMIN — MIDAZOLAM 2 MG: 1 INJECTION INTRAMUSCULAR; INTRAVENOUS at 07:27

## 2024-12-05 RX ADMIN — SODIUM CHLORIDE, POTASSIUM CHLORIDE, SODIUM LACTATE AND CALCIUM CHLORIDE: 600; 310; 30; 20 INJECTION, SOLUTION INTRAVENOUS at 07:06

## 2024-12-05 RX ADMIN — FENTANYL CITRATE 100 MCG: 50 INJECTION INTRAMUSCULAR; INTRAVENOUS at 07:34

## 2024-12-05 RX ADMIN — DEXAMETHASONE SODIUM PHOSPHATE 10 MG: 4 INJECTION, SOLUTION INTRA-ARTICULAR; INTRALESIONAL; INTRAMUSCULAR; INTRAVENOUS; SOFT TISSUE at 07:34

## 2024-12-05 RX ADMIN — Medication 2.5 MG: at 07:23

## 2024-12-05 RX ADMIN — FENTANYL CITRATE 50 MCG: 50 INJECTION, SOLUTION INTRAMUSCULAR; INTRAVENOUS at 09:23

## 2024-12-05 RX ADMIN — PROPOFOL 30 MG: 10 INJECTION, EMULSION INTRAVENOUS at 07:57

## 2024-12-05 RX ADMIN — PHENYLEPHRINE HYDROCHLORIDE 150 MCG: 10 INJECTION INTRAVENOUS at 07:45

## 2024-12-05 RX ADMIN — KETOROLAC TROMETHAMINE 30 MG: 30 INJECTION, SOLUTION INTRAMUSCULAR at 08:31

## 2024-12-05 RX ADMIN — OXYCODONE HYDROCHLORIDE 5 MG: 5 TABLET ORAL at 10:10

## 2024-12-05 RX ADMIN — ONDANSETRON 4 MG: 2 INJECTION INTRAMUSCULAR; INTRAVENOUS at 08:31

## 2024-12-05 RX ADMIN — PROPOFOL 200 MG: 10 INJECTION, EMULSION INTRAVENOUS at 07:34

## 2024-12-05 RX ADMIN — SODIUM CHLORIDE, POTASSIUM CHLORIDE, SODIUM LACTATE AND CALCIUM CHLORIDE: 600; 310; 30; 20 INJECTION, SOLUTION INTRAVENOUS at 07:28

## 2024-12-05 RX ADMIN — LIDOCAINE HYDROCHLORIDE 40 MG: 20 INJECTION, SOLUTION INFILTRATION; PERINEURAL at 07:34

## 2024-12-05 RX ADMIN — FENTANYL CITRATE 25 MCG: 50 INJECTION, SOLUTION INTRAMUSCULAR; INTRAVENOUS at 09:08

## 2024-12-05 ASSESSMENT — ACTIVITIES OF DAILY LIVING (ADL)
ADLS_ACUITY_SCORE: 22

## 2024-12-05 NOTE — ANESTHESIA CARE TRANSFER NOTE
Patient: April M Candace    Procedure: Procedure(s):  Laparoscopic cholecystectomy       Diagnosis: Biliary colic [K80.50]  Diagnosis Additional Information: No value filed.    Anesthesia Type:   General     Note:    Oropharynx: spontaneously breathing and oral airway in place  Level of Consciousness: drowsy  Oxygen Supplementation: nasal cannula    Independent Airway: airway patency satisfactory and stable  Dentition: dentition unchanged  Vital Signs Stable: post-procedure vital signs reviewed and stable  Report to RN Given: handoff report given  Patient transferred to: PACU    Handoff Report: Identifed the Patient, Identified the Reponsible Provider, Reviewed the pertinent medical history, Discussed the surgical course, Reviewed Intra-OP anesthesia mangement and issues during anesthesia, Set expectations for post-procedure period and Allowed opportunity for questions and acknowledgement of understanding      Vitals:  Vitals Value Taken Time   BP     Temp     Pulse 77 12/05/24 0850   Resp 12 12/05/24 0850   SpO2 99 % 12/05/24 0850   Vitals shown include unfiled device data.    Electronically Signed By: Yanna Espinoza  December 5, 2024  8:51 AM

## 2024-12-05 NOTE — OR NURSING
Patient and responsible adult given discharge instructions with no questions regarding instructions. Meghana score 20. Pain level 3/10.  Discharged from unit via wheelchair. Patient discharged to home.

## 2024-12-05 NOTE — ANESTHESIA POSTPROCEDURE EVALUATION
Patient: April M Candace    Procedure: Procedure(s):  Laparoscopic cholecystectomy       Anesthesia Type:  General    Note:  Disposition: Outpatient   Postop Pain Control: Uneventful            Sign Out: Well controlled pain   PONV: Yes            Sign Out: Ongoing Nausea   Neuro/Psych: Uneventful            Sign Out: Acceptable/Baseline neuro status   Airway/Respiratory: Uneventful            Sign Out: Acceptable/Baseline resp. status   CV/Hemodynamics: Uneventful            Sign Out: Acceptable CV status; No obvious hypovolemia; No obvious fluid overload   Other NRE: NONE   DID A NON-ROUTINE EVENT OCCUR? No           Last vitals:  Vitals Value Taken Time   /86 12/05/24 0943   Temp 97.3  F (36.3  C) 12/05/24 0938   Pulse 80 12/05/24 0944   Resp 23 12/05/24 0945   SpO2 98 % 12/05/24 0944   Vitals shown include unfiled device data.    Electronically Signed By: SPENSER Balderrama CRNA  December 5, 2024  11:03 AM

## 2024-12-05 NOTE — ANESTHESIA PROCEDURE NOTES
Airway       Patient location during procedure: OR       Procedure Start/Stop Times: 12/5/2024 7:36 AM and 12/5/2024 7:37 AM  Staff -        CRNA: Nicolás Calderon APRN CRNA       Other Anesthesia Staff: Yanna Espinoza       Performed By: CRNAIndications and Patient Condition       Indications for airway management: hipolito-procedural and airway protection       Induction type:intravenous       Mask difficulty assessment: 1 - vent by mask    Final Airway Details       Final airway type: endotracheal airway       Successful airway: ETT - single  Endotracheal Airway Details        ETT size (mm): 7.0       Cuffed: yes       Successful intubation technique: direct laryngoscopy       DL Blade Type: MAC 3       Grade View of Cords: 1       Adjucts: stylet       Position: Center       Measured from: lips       Secured at (cm): 21       Bite block used: None    Post intubation assessment        Placement verified by: capnometry, equal breath sounds and chest rise        Number of attempts at approach: 1       Number of other approaches attempted: 0       Secured with: tape       Ease of procedure: easy       Dentition: Intact and Unchanged    Medication(s) Administered   Medication Administration Time: 12/5/2024 7:36 AM

## 2024-12-05 NOTE — OR NURSING
After removing IV and patient started getting dressed stated felt nauseated. Alessia DONOVAN notified, order for zofran received.  Given oral zofran.  No dizziness, patient sitting up in bed.

## 2024-12-05 NOTE — OP NOTE
REPORT OF OPERATION  DATE OF PROCEDURE: 12/5/2024    PATIENT: May Maria    Operation: Procedure(s):  Laparoscopic cholecystectomy     PREOPERATIVE DIAGNOSIS: Symptomatic cholelithiasis    POSTOPERATIVE DIAGNOSIS: Same    SURGEON: Yohan White MD    ASSISTANTS:  Mike MAGALLANES Student    ANESTHESIA: General    COMPLICATIONS: None    ESTIMATED BLOOD LOSS: 10 cc    TRANSFUSIONS: None    PATHOLOGY: Gallbladder    FINDINGS: Cholelithiasis with mild cholecystitis    INDICATIONS:  This is a 29 year old female presenting with symptomatic cholelithiasis.  She was consented for laparoscopic possible open cholecystectomy.     DESCRIPTIONS OF PROCEDURE IN DETAIL: After consent was obtained the patient was taken to the operative suite and mariela in the supine position.  The patient was identified and the correct patient was confirmed.  General endotracheal anesthesia was induced by anesthesia team.  The patient was sterilely prepped and draped in the usual fashion.  A time out was performed verifying the correct patient and the correct procedure.  The entire operative team was in agreement.  All necessary equipment and supplies were in the room.    A 5 mm supraumbilical incision was made.  A Verees needle was inserted into the peritoneal cavity.  After a negative aspiration test, the abdomen was insufflated to 15 mmHg.  The patient was positioned head up and right side up.  A 12 mm incision was made at the epigastrium and a 12 mm port was inserted.  2 subcostal incisions were made and 5 mm ports were placed in these locations under direct laparoscopic visualization.    The gallbladder fundus was grasped and retracted towards patient's right shoulder.  The infundibulum was grasped and retracted laterally.  Calot's Pilger was dissected free of all fibrofatty tissue.  The gallbladder was dissected partially off of the cystic plate.  There were 2 and only 2 structures entering the gallbladder with the liver  vizualized posteriorly through Calot's Cordova.  The cystic duct and artery were clipped and cut.  The gallbladder was dissected off of the liver bed.  There was spillage of bile during this.  The gallbladder was placed in a bag and removed through the 12 mm epigastric port.    The liver bed was inspected and there was no active bleeding.  The clips were in good position.  There was a single duct on the cystic duct clips.  The gallbladder fossa and Rodriguez's Pouch were suction irrigated.  The epigastric fascia was closed with an O vicryl stitch under direct laparoscopic visualization.    The ports were removed.  Local anesthetic was injected at the incision sites.  The skin was closed with 4 O monocryl.  Steri strips were placed over the skin.  The patient tolerated the procedure well and was transferred to PACU in stable condition.

## 2025-06-22 ENCOUNTER — HEALTH MAINTENANCE LETTER (OUTPATIENT)
Age: 30
End: 2025-06-22

## 2025-08-27 ENCOUNTER — OFFICE VISIT (OUTPATIENT)
Dept: FAMILY MEDICINE | Facility: OTHER | Age: 30
End: 2025-08-27
Attending: FAMILY MEDICINE
Payer: COMMERCIAL

## 2025-08-27 VITALS
DIASTOLIC BLOOD PRESSURE: 80 MMHG | HEART RATE: 92 BPM | SYSTOLIC BLOOD PRESSURE: 112 MMHG | OXYGEN SATURATION: 100 % | RESPIRATION RATE: 16 BRPM | TEMPERATURE: 97 F | HEIGHT: 62 IN | BODY MASS INDEX: 32.3 KG/M2 | WEIGHT: 175.5 LBS

## 2025-08-27 DIAGNOSIS — R31.29 MICROSCOPIC HEMATURIA: Primary | ICD-10-CM

## 2025-08-27 DIAGNOSIS — N26.1 RENAL ATROPHY, RIGHT: ICD-10-CM

## 2025-08-27 DIAGNOSIS — G47.00 INSOMNIA, UNSPECIFIED TYPE: ICD-10-CM

## 2025-08-27 PROBLEM — K50.10 REGIONAL ENTERITIS OF LARGE INTESTINE (H): Status: RESOLVED | Noted: 2021-02-03 | Resolved: 2025-08-27

## 2025-08-27 LAB
ALBUMIN UR-MCNC: NEGATIVE MG/DL
ANION GAP SERPL CALCULATED.3IONS-SCNC: 13 MMOL/L (ref 7–15)
APPEARANCE UR: CLEAR
BACTERIA #/AREA URNS HPF: ABNORMAL /HPF
BASOPHILS # BLD AUTO: 0.05 10E3/UL (ref 0–0.2)
BASOPHILS NFR BLD AUTO: 0.8 %
BILIRUB UR QL STRIP: NEGATIVE
BUN SERPL-MCNC: 14.9 MG/DL (ref 6–20)
CALCIUM SERPL-MCNC: 9.5 MG/DL (ref 8.8–10.4)
CHLORIDE SERPL-SCNC: 103 MMOL/L (ref 98–107)
COLOR UR AUTO: ABNORMAL
CREAT SERPL-MCNC: 0.81 MG/DL (ref 0.51–0.95)
EGFRCR SERPLBLD CKD-EPI 2021: >90 ML/MIN/1.73M2
EOSINOPHIL # BLD AUTO: 0.16 10E3/UL (ref 0–0.7)
EOSINOPHIL NFR BLD AUTO: 2.5 %
ERYTHROCYTE [DISTWIDTH] IN BLOOD BY AUTOMATED COUNT: 13 % (ref 10–15)
GLUCOSE SERPL-MCNC: 94 MG/DL (ref 70–99)
GLUCOSE UR STRIP-MCNC: NEGATIVE MG/DL
HCO3 SERPL-SCNC: 23 MMOL/L (ref 22–29)
HCT VFR BLD AUTO: 38.7 % (ref 35–47)
HGB BLD-MCNC: 13.5 G/DL (ref 11.7–15.7)
HGB UR QL STRIP: ABNORMAL
IMM GRANULOCYTES # BLD: <0.03 10E3/UL
IMM GRANULOCYTES NFR BLD: 0.3 %
KETONES UR STRIP-MCNC: NEGATIVE MG/DL
LEUKOCYTE ESTERASE UR QL STRIP: NEGATIVE
LYMPHOCYTES # BLD AUTO: 2.37 10E3/UL (ref 0.8–5.3)
LYMPHOCYTES NFR BLD AUTO: 36.8 %
MCH RBC QN AUTO: 29.2 PG (ref 26.5–33)
MCHC RBC AUTO-ENTMCNC: 34.9 G/DL (ref 31.5–36.5)
MCV RBC AUTO: 83.8 FL (ref 78–100)
MONOCYTES # BLD AUTO: 0.43 10E3/UL (ref 0–1.3)
MONOCYTES NFR BLD AUTO: 6.7 %
MUCOUS THREADS #/AREA URNS LPF: PRESENT /LPF
NEUTROPHILS # BLD AUTO: 3.41 10E3/UL (ref 1.6–8.3)
NEUTROPHILS NFR BLD AUTO: 52.9 %
NITRATE UR QL: NEGATIVE
NRBC # BLD AUTO: <0.03 10E3/UL
NRBC BLD AUTO-RTO: 0 /100
PH UR STRIP: 5 [PH] (ref 4.7–8)
PLATELET # BLD AUTO: 286 10E3/UL (ref 150–450)
POTASSIUM SERPL-SCNC: 4.1 MMOL/L (ref 3.4–5.3)
RBC # BLD AUTO: 4.62 10E6/UL (ref 3.8–5.2)
RBC URINE: 3 /HPF
SODIUM SERPL-SCNC: 139 MMOL/L (ref 135–145)
SP GR UR STRIP: 1.02 (ref 1–1.03)
SQUAMOUS EPITHELIAL: 2 /HPF
TSH SERPL DL<=0.005 MIU/L-ACNC: 1.47 UIU/ML (ref 0.3–4.2)
UROBILINOGEN UR STRIP-MCNC: NORMAL MG/DL
WBC # BLD AUTO: 6.44 10E3/UL (ref 4–11)
WBC URINE: 5 /HPF

## 2025-08-27 PROCEDURE — 36415 COLL VENOUS BLD VENIPUNCTURE: CPT | Mod: ZL | Performed by: FAMILY MEDICINE

## 2025-08-27 PROCEDURE — 80048 BASIC METABOLIC PNL TOTAL CA: CPT | Mod: ZL | Performed by: FAMILY MEDICINE

## 2025-08-27 PROCEDURE — 81001 URINALYSIS AUTO W/SCOPE: CPT | Mod: ZL | Performed by: FAMILY MEDICINE

## 2025-08-27 PROCEDURE — 84443 ASSAY THYROID STIM HORMONE: CPT | Mod: ZL | Performed by: FAMILY MEDICINE

## 2025-08-27 PROCEDURE — G0463 HOSPITAL OUTPT CLINIC VISIT: HCPCS

## 2025-08-27 PROCEDURE — 85004 AUTOMATED DIFF WBC COUNT: CPT | Mod: ZL | Performed by: FAMILY MEDICINE

## 2025-08-27 RX ORDER — ZOLPIDEM TARTRATE 5 MG/1
5 TABLET ORAL
Qty: 90 TABLET | Refills: 3 | Status: SHIPPED | OUTPATIENT
Start: 2025-08-27

## 2025-08-27 ASSESSMENT — PAIN SCALES - GENERAL: PAINLEVEL_OUTOF10: MILD PAIN (3)

## (undated) DEVICE — COTTON BALL 2IN STRL C15000-300

## (undated) DEVICE — TRAY SKIN PREP POVIDONE/IODINE DYND70372

## (undated) DEVICE — LABEL STERILE PREPRINTED FOR OR FRRH01-2M

## (undated) DEVICE — PACK BASIN SET UP SUTCNBSBBA

## (undated) DEVICE — KIT INFLATOR BALLOON 18INFKIT

## (undated) DEVICE — ANTIFOG SOLUTION W/FOAM PAD CF-1002

## (undated) DEVICE — IRRIGATION-H2O 1000ML

## (undated) DEVICE — APPLICATOR BNZN TNCT RYN SWBSTK NWVN SNGL APLS1106

## (undated) DEVICE — SYR 30ML LL W/O NDL 302832

## (undated) DEVICE — CAUTERY PAD-POLYHESIVE II ADULT

## (undated) DEVICE — TAPE-MEDIPORE 4" X 2YD

## (undated) DEVICE — SCD SLEEVE-KNEE REG.

## (undated) DEVICE — SYR 03ML LL W/O NDL 309657

## (undated) DEVICE — ENDO TROCAR SLEEVE KII ADV FIXATION 05X100MM CFS02

## (undated) DEVICE — SU VICRYL 0 UR-6 27" J603H

## (undated) DEVICE — PREP POVIDONE-IODINE 7.5% SCRUB 4OZ BOTTLE MDS093945

## (undated) DEVICE — DRAPE-EAR

## (undated) DEVICE — DRSG TEGADERM 2 3/8X2 3/4" 1624W

## (undated) DEVICE — SUTURE-PDS II 0 CTX Z370T

## (undated) DEVICE — SYRINGE-ASEPTO IRRIGATION

## (undated) DEVICE — CONNECTOR ERBEFLO 2 PORT 20325-215

## (undated) DEVICE — Device

## (undated) DEVICE — SUCTION MANIFOLD NEPTUNE 2 SYS 4 PORT 0702-020-000

## (undated) DEVICE — SOL NACL 0.9% IRRIG 1000ML BOTTLE 2F7124

## (undated) DEVICE — DRAPE STERI APERATURE 51X33" 1030

## (undated) DEVICE — CANISTER SUCTION MEDI-VAC GUARDIAN 2000ML 90D 65651-220

## (undated) DEVICE — ESU CORD MONOPOLAR 10'  E0510

## (undated) DEVICE — IRRIGATION-NACL 1000ML

## (undated) DEVICE — BLANKET BAIR HUGGER UPPER BODY 42268

## (undated) DEVICE — PACK EAR CUSTOM ASC

## (undated) DEVICE — ENDO POUCH UNIV RETRIEVAL SYSTEM INZII 10MM CD001

## (undated) DEVICE — PREP POVIDONE IODINE SOLUTION 10% 4OZ BOTTLE 29906-004

## (undated) DEVICE — SLEEVE SCD EXPRESS KNEE LENGTH MED 9529

## (undated) DEVICE — LIGHT HANDLE COVER

## (undated) DEVICE — NIM ELEC SUBDERMAL NDL 3PAIR/BOX

## (undated) DEVICE — NIM PROBE PRASS STIMULATOR PROTECTED TIP 8225101

## (undated) DEVICE — GLOVE BIOGEL PI MICRO SZ 6.5 48565

## (undated) DEVICE — FORCEP COLON BIOPSY STD W/NEEDLE 160CM M00513390

## (undated) DEVICE — TUBING SUCTION 20FT N620A

## (undated) DEVICE — GOWN SURG XL LVL 3 REINFORCED 9541

## (undated) DEVICE — FORCEPS BIOPSY RADIAL JAW 4 LARGE W/NEEDLE 240CM M00513332

## (undated) DEVICE — CLIP APPLIER ENDO 5MM M/L LIGAMAX EL5ML

## (undated) DEVICE — SURGICAL BINDER-ABDOMINAL 46-62

## (undated) DEVICE — DRAPE U SPLIT 74X120" 29440

## (undated) DEVICE — FORCEP-COLON BIOPSY LARGE W/NEEDLE 240CM

## (undated) DEVICE — NDL ANGIOCATH 14GA 1.25" 4048

## (undated) DEVICE — FLUID TRAP FOR VIROSAFE FILTERS

## (undated) DEVICE — TUBE EAR GOODE T SIL 10-16010

## (undated) DEVICE — DRAPE MICRO ZEISS OPMI 137X381CM 306070-0000-000

## (undated) DEVICE — SYSTEM CLEARIFY VISUALIZATION 21-345

## (undated) DEVICE — NDL INSUFFLATION 13GA 120MM C2201

## (undated) DEVICE — NIM ELEC SUBDERMAL NDL PAIRED 2 CHANNEL 8227410

## (undated) DEVICE — SOL WATER IRRIG 500ML BOTTLE 2F7113

## (undated) DEVICE — STRAP KNEE/BODY 31143004

## (undated) DEVICE — BLADE KNIFE BEAVER MINI BEAVER6400

## (undated) DEVICE — SUTURE-CHROMIC GUT 1 CTX 905H

## (undated) DEVICE — SOL NACL 0.9% IRRIG 500ML BOTTLE 2F7123

## (undated) DEVICE — APPLICATOR-CHLORAPREP 26ML TINTED CHG 2%+ 70% IPA-SURGICAL

## (undated) DEVICE — LABEL-STERILE PREPRINTED FOR OR

## (undated) DEVICE — SYR 30ML SLIP TIP W/O NDL 302833

## (undated) DEVICE — SOL WATER IRRIG 1000ML BOTTLE 2F7114

## (undated) DEVICE — SU MONOCRYL 4-0 PS-2 18" UND Y496G

## (undated) DEVICE — PREP PAD ALCOHOL 6818

## (undated) DEVICE — TAPE MEDIPORE 4"X2YD 2864

## (undated) DEVICE — ENDO TROCAR FIRST ENTRY KII FIOS ADV FIX 12X100MM CFF73

## (undated) DEVICE — BLADE KNIFE BEAVER TYMPANOPLASTY 2.5MM W/60D DOWN 377200

## (undated) DEVICE — ELECTRODE CAUTERY LAP L-HOOK W/SHAFT 33CM 0020S

## (undated) DEVICE — GLV-8.5 BIOGEL LATEX

## (undated) DEVICE — COVER LT HANDLE 2/PK 5160-2FG

## (undated) DEVICE — DRAPE SLEEVE 599

## (undated) DEVICE — PREP CHLORAPREP 26ML TINTED HI-LITE ORANGE 930815

## (undated) DEVICE — SOL NACL 0.9% INJ 1000ML BAG 2B1324X

## (undated) DEVICE — INSTRUMENT WIPE VISIWIPE 581047

## (undated) DEVICE — LINEN TOWEL PACK X5 5464

## (undated) DEVICE — JELLY LUBRICATING SURGILUBE 2OZ TUBE

## (undated) DEVICE — SET IRRIGATION CLEARVISION II TUBE DISPOSABLE 031229-01

## (undated) DEVICE — PACK-C-SECTION-CUSTOM

## (undated) DEVICE — SUTURE SILK 0 TIE A306H A306H

## (undated) DEVICE — BLADE KNIFE BEAVER MYRINGOTOMY 7121

## (undated) DEVICE — SYR 10ML FINGER CONTROL W/O NDL 309695

## (undated) DEVICE — ESU ENDO SCISSORS 5MM CVD 5DCS

## (undated) DEVICE — SPONGE RAY-TEC 4X4" 7317

## (undated) DEVICE — ENDO TROCAR FIRST ENTRY KII FIOS ADV FIX 05X100MM CFF03

## (undated) DEVICE — GLOVE 6.5 PROTEXIS PI CLSC PF BD CUF STRL LF 12IN 2D72PL65X

## (undated) DEVICE — DRAPE MICROSCOPE LEICA 54X150" AR8033650

## (undated) DEVICE — ESU ELEC BLADE 2.75" COATED/INSULATED E1455

## (undated) DEVICE — NDL 27GA 1.25" 305136

## (undated) DEVICE — DRSG COTTON BALL 6PK LCB62

## (undated) DEVICE — SUCTION STRYKERFLOW II 250-070-500

## (undated) DEVICE — SUCTION MANIFOLD NEPTUNE 2 SYS 1 PORT 702-025-000

## (undated) DEVICE — ESU GROUND PAD ADULT W/CORD E7507

## (undated) DEVICE — CANISTER-SUCTION 2000CC

## (undated) DEVICE — BLADE 15 RB BK SS STRL LF DISPLF DISP 371215

## (undated) DEVICE — ESU PENCIL W/SMOKE EVAC CVPLP2000

## (undated) DEVICE — SPONGE COTTONOID 1/2X3" 80-1407

## (undated) DEVICE — GOWN-SURG XL LVL 3 REINFORCED

## (undated) DEVICE — BLADE CLIPPER SGL USE 9680

## (undated) DEVICE — SPONGE PEANUT 3/8IN  7103

## (undated) DEVICE — TUBING-SUCTION 20FT

## (undated) DEVICE — PACK SET UP CUSTOM SBA32SUMBF

## (undated) DEVICE — PENROSE DRAIN 1/2 X 18 LATEX] 30416-050

## (undated) DEVICE — PACK LAPAROSCOPY CUSTOM SBACNLSMBF

## (undated) DEVICE — NDL 25GA 1.5" 305127

## (undated) DEVICE — SUTURE-VICRYL 0 CT J358H

## (undated) DEVICE — TRAY PREP DRY SKIN SCRUB 067

## (undated) DEVICE — PUNCTURE CLOSURE DEVICE PMITCSG

## (undated) DEVICE — BLANKET BAIR HUGGER LOWER BODY 42568

## (undated) DEVICE — DRSG BANDAID 1X3" FABRIC CURITY LATEX FREE KC44101

## (undated) DEVICE — DRSG STERI STRIP 1/2X4" R1547

## (undated) DEVICE — SYR 10ML LL W/O NDL

## (undated) DEVICE — BLADE KNIFE CARTILAGE KURZ 8000140

## (undated) DEVICE — TUBE NASOGASTRIC 18FR 48" 2 LUMEN 8888266148

## (undated) DEVICE — EVAC SYSTEM CLEAR FLOW SC082500

## (undated) DEVICE — PACK-BASIN SET-UP

## (undated) DEVICE — DRSG-SPONGE STERILE 8 X 4

## (undated) DEVICE — TUBING INSUFFLATION INSUFFLATOR FILTER HIGH FLOW 031322-01

## (undated) DEVICE — ENDO BITE BLOCK ADULT OLYMPUS LATEX FREE MAJ-1632

## (undated) DEVICE — SPONGE COTTONOID 1/4X1/4" 80-1399

## (undated) DEVICE — CATH IV ADVANTIV 18GA X L1.25IN RADOPQ SFSHLD JJ3165

## (undated) DEVICE — SU VICRYL 4-0 SH-1 27" UND J218H

## (undated) DEVICE — CONNECTOR-ERBEFLO 2 PORT

## (undated) DEVICE — BLADE KNIFE BEAVER MINI STR BEAVER6900

## (undated) RX ORDER — ONDANSETRON 2 MG/ML
INJECTION INTRAMUSCULAR; INTRAVENOUS
Status: DISPENSED
Start: 2024-12-05

## (undated) RX ORDER — HYDROMORPHONE HYDROCHLORIDE 2 MG/ML
INJECTION, SOLUTION INTRAMUSCULAR; INTRAVENOUS; SUBCUTANEOUS
Status: DISPENSED
Start: 2020-07-16

## (undated) RX ORDER — EPINEPHRINE NASAL SOLUTION 1 MG/ML
SOLUTION NASAL
Status: DISPENSED
Start: 2024-05-24

## (undated) RX ORDER — AZITHROMYCIN 500 MG/1
INJECTION, POWDER, LYOPHILIZED, FOR SOLUTION INTRAVENOUS
Status: DISPENSED
Start: 2020-07-16

## (undated) RX ORDER — OXYCODONE HYDROCHLORIDE 5 MG/1
TABLET ORAL
Status: DISPENSED
Start: 2024-05-24

## (undated) RX ORDER — ONDANSETRON 2 MG/ML
INJECTION INTRAMUSCULAR; INTRAVENOUS
Status: DISPENSED
Start: 2023-06-28

## (undated) RX ORDER — DEXAMETHASONE SODIUM PHOSPHATE 10 MG/ML
INJECTION, SOLUTION INTRAMUSCULAR; INTRAVENOUS
Status: DISPENSED
Start: 2020-07-16

## (undated) RX ORDER — ONDANSETRON 4 MG/1
TABLET, ORALLY DISINTEGRATING ORAL
Status: DISPENSED
Start: 2024-05-24

## (undated) RX ORDER — MISOPROSTOL 200 UG/1
TABLET ORAL
Status: DISPENSED
Start: 2020-07-16

## (undated) RX ORDER — KETOROLAC TROMETHAMINE 30 MG/ML
INJECTION, SOLUTION INTRAMUSCULAR; INTRAVENOUS
Status: DISPENSED
Start: 2024-12-05

## (undated) RX ORDER — HYDROMORPHONE HYDROCHLORIDE 1 MG/ML
INJECTION, SOLUTION INTRAMUSCULAR; INTRAVENOUS; SUBCUTANEOUS
Status: DISPENSED
Start: 2024-05-24

## (undated) RX ORDER — REMIFENTANIL HYDROCHLORIDE 1 MG/ML
INJECTION, POWDER, LYOPHILIZED, FOR SOLUTION INTRAVENOUS
Status: DISPENSED
Start: 2024-05-24

## (undated) RX ORDER — PROPOFOL 10 MG/ML
INJECTION, EMULSION INTRAVENOUS
Status: DISPENSED
Start: 2024-05-24

## (undated) RX ORDER — PROPOFOL 10 MG/ML
INJECTION, EMULSION INTRAVENOUS
Status: DISPENSED
Start: 2021-02-18

## (undated) RX ORDER — FENTANYL CITRATE 50 UG/ML
INJECTION, SOLUTION INTRAMUSCULAR; INTRAVENOUS
Status: DISPENSED
Start: 2024-05-24

## (undated) RX ORDER — DEXAMETHASONE SODIUM PHOSPHATE 10 MG/ML
INJECTION, SOLUTION INTRAMUSCULAR; INTRAVENOUS
Status: DISPENSED
Start: 2024-12-05

## (undated) RX ORDER — EPHEDRINE SULFATE 50 MG/ML
INJECTION, SOLUTION INTRAVENOUS
Status: DISPENSED
Start: 2020-07-16

## (undated) RX ORDER — OXYTOCIN/0.9 % SODIUM CHLORIDE 30/500 ML
PLASTIC BAG, INJECTION (ML) INTRAVENOUS
Status: DISPENSED
Start: 2020-07-16

## (undated) RX ORDER — PROPOFOL 10 MG/ML
INJECTION, EMULSION INTRAVENOUS
Status: DISPENSED
Start: 2023-06-28

## (undated) RX ORDER — PROPOFOL 10 MG/ML
INJECTION, EMULSION INTRAVENOUS
Status: DISPENSED
Start: 2024-12-05

## (undated) RX ORDER — PHENYLEPHRINE HCL IN 0.9% NACL 1 MG/10 ML
SYRINGE (ML) INTRAVENOUS
Status: DISPENSED
Start: 2020-07-16

## (undated) RX ORDER — FENTANYL CITRATE 50 UG/ML
INJECTION, SOLUTION INTRAMUSCULAR; INTRAVENOUS
Status: DISPENSED
Start: 2021-02-18

## (undated) RX ORDER — CEFAZOLIN SODIUM 2 G/50ML
SOLUTION INTRAVENOUS
Status: DISPENSED
Start: 2024-05-24

## (undated) RX ORDER — KETOROLAC TROMETHAMINE 30 MG/ML
INJECTION, SOLUTION INTRAMUSCULAR; INTRAVENOUS
Status: DISPENSED
Start: 2024-05-24

## (undated) RX ORDER — HYDROMORPHONE HYDROCHLORIDE 1 MG/ML
INJECTION, SOLUTION INTRAMUSCULAR; INTRAVENOUS; SUBCUTANEOUS
Status: DISPENSED
Start: 2024-12-05

## (undated) RX ORDER — TRANEXAMIC ACID 100 MG/ML
INJECTION, SOLUTION INTRAVENOUS
Status: DISPENSED
Start: 2020-07-16

## (undated) RX ORDER — DEXAMETHASONE SODIUM PHOSPHATE 10 MG/ML
INJECTION, SOLUTION INTRAMUSCULAR; INTRAVENOUS
Status: DISPENSED
Start: 2023-06-28

## (undated) RX ORDER — ONDANSETRON 2 MG/ML
INJECTION INTRAMUSCULAR; INTRAVENOUS
Status: DISPENSED
Start: 2020-07-16

## (undated) RX ORDER — FENTANYL CITRATE 50 UG/ML
INJECTION, SOLUTION INTRAMUSCULAR; INTRAVENOUS
Status: DISPENSED
Start: 2024-12-05

## (undated) RX ORDER — CEFAZOLIN SODIUM 1 G/3ML
INJECTION, POWDER, FOR SOLUTION INTRAMUSCULAR; INTRAVENOUS
Status: DISPENSED
Start: 2020-07-16

## (undated) RX ORDER — ACETAMINOPHEN 325 MG/1
TABLET ORAL
Status: DISPENSED
Start: 2024-05-24

## (undated) RX ORDER — FENTANYL CITRATE 50 UG/ML
INJECTION, SOLUTION INTRAMUSCULAR; INTRAVENOUS
Status: DISPENSED
Start: 2020-07-16

## (undated) RX ORDER — SODIUM CHLORIDE 9 MG/ML
INJECTION, SOLUTION INTRAVENOUS
Status: DISPENSED
Start: 2020-07-16